# Patient Record
Sex: MALE | Race: BLACK OR AFRICAN AMERICAN | NOT HISPANIC OR LATINO
[De-identification: names, ages, dates, MRNs, and addresses within clinical notes are randomized per-mention and may not be internally consistent; named-entity substitution may affect disease eponyms.]

---

## 2021-08-27 ENCOUNTER — APPOINTMENT (OUTPATIENT)
Dept: ORTHOPEDIC SURGERY | Facility: CLINIC | Age: 71
End: 2021-08-27
Payer: MEDICARE

## 2021-08-27 VITALS
BODY MASS INDEX: 19.04 KG/M2 | WEIGHT: 136 LBS | HEIGHT: 71 IN | DIASTOLIC BLOOD PRESSURE: 66 MMHG | SYSTOLIC BLOOD PRESSURE: 187 MMHG | HEART RATE: 86 BPM

## 2021-08-27 PROCEDURE — 20610 DRAIN/INJ JOINT/BURSA W/O US: CPT | Mod: RT

## 2021-08-27 PROCEDURE — 99204 OFFICE O/P NEW MOD 45 MIN: CPT | Mod: 25

## 2021-08-27 NOTE — PHYSICAL EXAM
[de-identified] : Constitutional - the patient is of normal build and nutrition.  He is a patient that has had end-stage renal disease and in the past has had hemodialysis.  The patient remains oriented to person, time, and  place.  Mood is normal. Vital signs as recorded.  The patients gait is slow and with some discomfort over the lateral aspect of his right hip. The patient has satisfactory  balance and can stand on toes and heels.\par \par The patient has no difficulty with respiration. Respiration at rest is a normal rate. The patient is not short of breath and has not become short of breath with short ambulation. There is no audible wheezing. No coughing.\par \par Skin is normal for the patient's age. There are no abnormal masses or lymph nodes which stand out in the lower extremities.\par \par Spine - deep tendon reflexes are symmetric. Motor and sensory are symmetric.  He has had intermittent low back pain in the past.\par \par \par UPPER EXTREMITIES \par \par Shoulders ROM  is symmetric  and the motion is satisfactory.  There is no significant shoulder pain or limitation in motion which would make using a cane or a walker difficult. Shoulder stability and  strength are satisfactory.\par \par There is normal motion in the wrists and elbows.\par \par The shunts from hemodialysis could be seen in his left forearm.\par \par Circulation appears satisfactory with pedal pulses present.  There is no major edema in the lower legs. No skin tenderness or increased temperature. No major varicosities.\par \par HIP EXAMINATION the abduction and abduction as well as rotation measurements were taken with the hip in flexion.\par \par Motion\par His hip motion is symmetrical flexion of 135 degrees, abduction is 75 degrees adduction 30 degrees external rotation 70 degrees internal rotation of 15 degrees.  He has pain directly over his right greater trochanter consistent with trochanteric bursitis.\par \par The hips have good range of motion. There is good strength across the hips. There is no crepitus in either hip. The alignment of the hips is normal.\par \par \par KNEE EXAMINATION\par \par Motion\par Right Knee has 0 to 135 degrees of motion with good medial  lateral and anterior posterior stability.  There is no major effusion.  There is no Baker's cyst.  There is no significant patellofemoral crepitus.  The patient has satisfactory strength across the knee.               \par Left  Knee   has 0 to 135 degrees of motion with good medial lateral and anterior posterior stability.  There is no major effusion there is no Baker's cyst.  There is no significant patellofemoral crepitus.  The patient has satisfactory strength across the knee.            \par \par Ankle and foot examination\par Of the ankle has normal motion.  There is normal ankle stability.  The patient has no major abnormalities of the foot.\par \par \par \par  [de-identified] : X-rays that he has had previously are reviewed and these do show that there is no osteoarthritis in his right or his left hip.  There is no calcification over the greater trochanter but this does not rule out trochanteric bursitis

## 2021-08-27 NOTE — PROCEDURE
[de-identified] : Procedure Note: \par \par Anatomic Location: Right hip trochanteric bursitis\par \par Diagnosis: Right hip trochanteric bursitis\par \par Procedure:  Injection of 6ccs of Marcaine 0.5% plain and Depo-Medrol 1cc (40 MG)\par \par Local Spray: Ethyl Chloride.\par \par Skin preparation with alcohol.\par \par Patient has consented for the procedure.\par \par Injection 22-gauge spinal needle  through a direct lateral approach.\par \par Patient tolerated the procedure well.\par \par Patient instructed to call the office if any reaction, fever, chills, increased erythema or swelling.   [phone].

## 2021-08-27 NOTE — DISCUSSION/SUMMARY
[de-identified] : At this time the patient is managing satisfactorily with his hips and has excellent hip motion is pain is over his greater trochanter consistent with trochanteric bursitis he has some initial improvement following the injection he will return in 3 months as needed.

## 2021-08-27 NOTE — HISTORY OF PRESENT ILLNESS
[de-identified] : Mr. LISA ROJAS is a 70 year male who presents to office complaining of right hip pain x 2-3 weeks with insidious onset.\par Pain had been felt intermittently years ago which was dull/achy and intermittent.\par Now, pain is constant and is sharp in nature. Pain is on the lateral aspect of the hip.\par Pain is aggravated with getting up from a seated position and with ambulating long distances.\par Pain radiates into his anterior thigh. Denies numbness/tingling in the leg.\par He has tried rest, Tylenol, and heat application, which has helped a bit.\par Patient has a kidney transplant, so he is unable to take NSAIDs.\par All review of systems, family history, social history, surgical history, past medical history, medications, and allergies not previously stated as positive are negative. They were reviewed by me today with the patient and documented accordingly.

## 2021-10-06 ENCOUNTER — APPOINTMENT (OUTPATIENT)
Dept: ORTHOPEDIC SURGERY | Facility: CLINIC | Age: 71
End: 2021-10-06
Payer: MEDICARE

## 2021-10-06 VITALS
HEIGHT: 71 IN | SYSTOLIC BLOOD PRESSURE: 167 MMHG | BODY MASS INDEX: 19.04 KG/M2 | DIASTOLIC BLOOD PRESSURE: 55 MMHG | HEART RATE: 82 BPM | WEIGHT: 136 LBS

## 2021-10-06 DIAGNOSIS — M70.61 TROCHANTERIC BURSITIS, RIGHT HIP: ICD-10-CM

## 2021-10-06 PROCEDURE — 99212 OFFICE O/P EST SF 10 MIN: CPT

## 2021-10-06 NOTE — PHYSICAL EXAM
[de-identified] : He does have a some stiffening of the spine that was seen on his x-ray last visit with a straightening of his normal lordosis but his deep tendon reflexes motor and sensory remained symmetric and normal.\par \par HIP EXAMINATION the abduction and abduction as well as rotation measurements were taken with the hip in flexion.\par \par Motion his motion has not changed since his last visit.\par His hip motion is symmetrical flexion of 135 degrees, abduction is 75 degrees adduction 30 degrees external rotation 70 degrees internal rotation of 15 degrees.  \par \par His pain again is directly over his right greater trochanter.  The patient is very thin and that there is not much subcutaneous tissue and the tenderness is present again.  At this time he is using ice and Tylenol which gives some relief.  \par \par

## 2021-10-06 NOTE — HISTORY OF PRESENT ILLNESS
[de-identified] : Mr. LISA ROJAS is a 70 year male who returns to office complaining of right hip pain x 2-3 months with insidious onset.\par Pain had been felt intermittently years ago which was dull/achy and intermittent.\par Now, pain is constant and is sharp in nature. Pain is on the lateral aspect of the hip.\par Pain is aggravated with getting up from a seated position and with ambulating long distances.\par Pain radiates into his anterior thigh. Denies numbness/tingling in the leg.\par He has tried rest, Tylenol, and heat application, which has helped a bit.\par This patient does have right trochanteric bursitis.  He said the shot given to him last time gave him improvement but it is now wearing off.\par Patient has a kidney transplant, so he is unable to take NSAIDs.\par He received a right hip GT bursa injection on 8/27/21 which only helped for a short time, maybe for a few weeks.

## 2021-10-06 NOTE — DISCUSSION/SUMMARY
[de-identified] : At this time we will follow conservative measures and ice and Tylenol.  In another 5- 6 weeks if is still bothering him he can receive another injection of of cortisone.  He will stay away from oral anti-inflammatories because of his history of renal disease.  Return visit after 1 month.

## 2023-03-09 ENCOUNTER — RX ONLY (RX ONLY)
Age: 73
End: 2023-03-09

## 2023-03-09 ENCOUNTER — OFFICE (OUTPATIENT)
Dept: URBAN - METROPOLITAN AREA CLINIC 76 | Facility: CLINIC | Age: 73
Setting detail: OPHTHALMOLOGY
End: 2023-03-09
Payer: MEDICARE

## 2023-03-09 DIAGNOSIS — H01.004: ICD-10-CM

## 2023-03-09 DIAGNOSIS — H16.223: ICD-10-CM

## 2023-03-09 DIAGNOSIS — H54.40: ICD-10-CM

## 2023-03-09 DIAGNOSIS — H01.001: ICD-10-CM

## 2023-03-09 DIAGNOSIS — E11.3593: ICD-10-CM

## 2023-03-09 DIAGNOSIS — H26.493: ICD-10-CM

## 2023-03-09 PROCEDURE — 92250 FUNDUS PHOTOGRAPHY W/I&R: CPT | Performed by: OPHTHALMOLOGY

## 2023-03-09 PROCEDURE — 99213 OFFICE O/P EST LOW 20 MIN: CPT | Performed by: OPHTHALMOLOGY

## 2023-03-09 ASSESSMENT — KERATOMETRY
OS_AXISANGLE_DEGREES: 15
OD_AXISANGLE_DEGREES: 70
OS_K2POWER_DIOPTERS: 43.00
OS_K1POWER_DIOPTERS: 42.50
OD_K2POWER_DIOPTERS: 40.75
OD_K1POWER_DIOPTERS: 40.25

## 2023-03-09 ASSESSMENT — REFRACTION_MANIFEST
OS_ADD: +2.50
OU_VA: 20/40
OS_SPHERE: -1.50
OD_SPHERE: -1.50
OD_AXIS: 50
OS_AXIS: 50
OS_CYLINDER: -4.00
OD_CYLINDER: -4.00
OD_ADD: +2.50
OD_VA1: 20/CF
OS_VA1: 20/40

## 2023-03-09 ASSESSMENT — SPHEQUIV_DERIVED
OS_SPHEQUIV: -3.25
OD_SPHEQUIV: 0.375
OS_SPHEQUIV: -3.5
OD_SPHEQUIV: -3.5

## 2023-03-09 ASSESSMENT — VISUAL ACUITY
OS_BCVA: 20/CF
OD_BCVA: 20/80

## 2023-03-09 ASSESSMENT — SUPERFICIAL PUNCTATE KERATITIS (SPK)
OD_SPK: 2+
OS_SPK: 2+

## 2023-03-09 ASSESSMENT — TONOMETRY
OS_IOP_MMHG: 10
OD_IOP_MMHG: 10

## 2023-03-09 ASSESSMENT — AXIALLENGTH_DERIVED
OS_AL: 25.2414
OD_AL: 26.3452
OD_AL: 24.5881
OS_AL: 25.3534

## 2023-03-09 ASSESSMENT — LID EXAM ASSESSMENTS
OD_BLEPHARITIS: RUL 1+
OS_BLEPHARITIS: LUL 1+

## 2023-03-09 ASSESSMENT — CONFRONTATIONAL VISUAL FIELD TEST (CVF)
OD_FINDINGS: FULL
OS_FINDINGS: FULL

## 2023-03-09 ASSESSMENT — REFRACTION_AUTOREFRACTION
OS_SPHERE: -1.25
OD_SPHERE: +1.25
OS_AXIS: 49
OD_AXIS: 97
OD_CYLINDER: -1.75
OS_CYLINDER: -4.00

## 2023-08-15 ENCOUNTER — OFFICE (OUTPATIENT)
Dept: URBAN - METROPOLITAN AREA CLINIC 76 | Facility: CLINIC | Age: 73
Setting detail: OPHTHALMOLOGY
End: 2023-08-15
Payer: MEDICARE

## 2023-08-15 DIAGNOSIS — H26.40: ICD-10-CM

## 2023-08-15 DIAGNOSIS — E11.3593: ICD-10-CM

## 2023-08-15 DIAGNOSIS — H01.004: ICD-10-CM

## 2023-08-15 DIAGNOSIS — H16.223: ICD-10-CM

## 2023-08-15 DIAGNOSIS — H01.001: ICD-10-CM

## 2023-08-15 DIAGNOSIS — Z96.1: ICD-10-CM

## 2023-08-15 DIAGNOSIS — H52.4: ICD-10-CM

## 2023-08-15 DIAGNOSIS — H54.40: ICD-10-CM

## 2023-08-15 PROCEDURE — 92015 DETERMINE REFRACTIVE STATE: CPT | Performed by: OPHTHALMOLOGY

## 2023-08-15 PROCEDURE — 92134 CPTRZ OPH DX IMG PST SGM RTA: CPT | Performed by: OPHTHALMOLOGY

## 2023-08-15 PROCEDURE — 92014 COMPRE OPH EXAM EST PT 1/>: CPT | Performed by: OPHTHALMOLOGY

## 2023-08-15 ASSESSMENT — KERATOMETRY
OS_K1POWER_DIOPTERS: 42.50
OS_K2POWER_DIOPTERS: 43.00
OD_K2POWER_DIOPTERS: 40.75
OS_AXISANGLE_DEGREES: 15
OD_AXISANGLE_DEGREES: 70
OD_K1POWER_DIOPTERS: 40.25

## 2023-08-15 ASSESSMENT — AXIALLENGTH_DERIVED
OS_AL: 24.4843
OD_AL: 26.3452
OS_AL: 25.3534
OD_AL: 24.5881
OS_AL: 25.2414

## 2023-08-15 ASSESSMENT — REFRACTION_MANIFEST
OD_VA1: 20/CF
OS_VA1: 20/40
OD_SPHERE: -1.50
OS_SPHERE: -1.50
OD_SPHERE: PLANO
OS_ADD: +2.50
OS_VA1: 20/80
OD_AXIS: 50
OS_ADD: +2.50
OD_ADD: +2.50
OS_AXIS: 50
OD_VA1: HM
OD_ADD: +2.50
OS_CYLINDER: -1.00
OU_VA: 20/40
OD_CYLINDER: -4.00
OS_CYLINDER: -4.00
OS_SPHERE: -1.00
OS_AXIS: 51

## 2023-08-15 ASSESSMENT — TONOMETRY
OD_IOP_MMHG: 15
OS_IOP_MMHG: 14

## 2023-08-15 ASSESSMENT — REFRACTION_AUTOREFRACTION
OD_SPHERE: +1.25
OD_CYLINDER: -1.75
OS_AXIS: 49
OS_SPHERE: -1.25
OS_CYLINDER: -4.00
OD_AXIS: 97

## 2023-08-15 ASSESSMENT — SPHEQUIV_DERIVED
OD_SPHEQUIV: -3.5
OS_SPHEQUIV: -3.5
OD_SPHEQUIV: 0.375
OS_SPHEQUIV: -1.5
OS_SPHEQUIV: -3.25

## 2023-08-15 ASSESSMENT — SUPERFICIAL PUNCTATE KERATITIS (SPK)
OD_SPK: 3+
OS_SPK: 3+

## 2023-08-15 ASSESSMENT — CONFRONTATIONAL VISUAL FIELD TEST (CVF): OS_FINDINGS: FULL

## 2023-08-15 ASSESSMENT — LID EXAM ASSESSMENTS
OD_BLEPHARITIS: RUL 1+
OS_BLEPHARITIS: LUL 1+

## 2023-08-15 ASSESSMENT — VISUAL ACUITY
OS_BCVA: HM
OD_BCVA: 20/80

## 2023-11-07 ENCOUNTER — INPATIENT (INPATIENT)
Facility: HOSPITAL | Age: 73
LOS: 27 days | Discharge: NURSING, REHAB OR LONG-TERM CARE | End: 2023-12-05
Attending: STUDENT IN AN ORGANIZED HEALTH CARE EDUCATION/TRAINING PROGRAM | Admitting: STUDENT IN AN ORGANIZED HEALTH CARE EDUCATION/TRAINING PROGRAM
Payer: MEDICARE

## 2023-11-07 VITALS
SYSTOLIC BLOOD PRESSURE: 121 MMHG | OXYGEN SATURATION: 100 % | HEART RATE: 73 BPM | DIASTOLIC BLOOD PRESSURE: 87 MMHG | RESPIRATION RATE: 21 BRPM

## 2023-11-07 DIAGNOSIS — R41.82 ALTERED MENTAL STATUS, UNSPECIFIED: ICD-10-CM

## 2023-11-07 LAB
ALBUMIN SERPL ELPH-MCNC: 1.7 G/DL — LOW (ref 3.3–5)
ALBUMIN SERPL ELPH-MCNC: 1.7 G/DL — LOW (ref 3.3–5)
ALBUMIN SERPL ELPH-MCNC: 2.2 G/DL — LOW (ref 3.3–5)
ALBUMIN SERPL ELPH-MCNC: 2.2 G/DL — LOW (ref 3.3–5)
ALBUMIN SERPL ELPH-MCNC: 2.3 G/DL — LOW (ref 3.3–5)
ALBUMIN SERPL ELPH-MCNC: 2.3 G/DL — LOW (ref 3.3–5)
ALP SERPL-CCNC: 368 U/L — HIGH (ref 40–120)
ALP SERPL-CCNC: 368 U/L — HIGH (ref 40–120)
ALP SERPL-CCNC: 484 U/L — HIGH (ref 40–120)
ALP SERPL-CCNC: 484 U/L — HIGH (ref 40–120)
ALP SERPL-CCNC: 512 U/L — HIGH (ref 40–120)
ALP SERPL-CCNC: 512 U/L — HIGH (ref 40–120)
ALT FLD-CCNC: 119 U/L — HIGH (ref 4–41)
ALT FLD-CCNC: 119 U/L — HIGH (ref 4–41)
ALT FLD-CCNC: 174 U/L — HIGH (ref 4–41)
ALT FLD-CCNC: 174 U/L — HIGH (ref 4–41)
ALT FLD-CCNC: 186 U/L — HIGH (ref 4–41)
ALT FLD-CCNC: 186 U/L — HIGH (ref 4–41)
AMMONIA BLD-MCNC: 53 UMOL/L — SIGNIFICANT CHANGE UP (ref 11–55)
AMMONIA BLD-MCNC: 53 UMOL/L — SIGNIFICANT CHANGE UP (ref 11–55)
ANION GAP SERPL CALC-SCNC: 10 MMOL/L — SIGNIFICANT CHANGE UP (ref 7–14)
ANION GAP SERPL CALC-SCNC: 10 MMOL/L — SIGNIFICANT CHANGE UP (ref 7–14)
ANION GAP SERPL CALC-SCNC: 13 MMOL/L — SIGNIFICANT CHANGE UP (ref 7–14)
ANION GAP SERPL CALC-SCNC: 13 MMOL/L — SIGNIFICANT CHANGE UP (ref 7–14)
ANION GAP SERPL CALC-SCNC: 14 MMOL/L — SIGNIFICANT CHANGE UP (ref 7–14)
ANION GAP SERPL CALC-SCNC: 14 MMOL/L — SIGNIFICANT CHANGE UP (ref 7–14)
APTT BLD: 41.9 SEC — HIGH (ref 24.5–35.6)
APTT BLD: 41.9 SEC — HIGH (ref 24.5–35.6)
APTT BLD: 43.5 SEC — HIGH (ref 24.5–35.6)
APTT BLD: 43.5 SEC — HIGH (ref 24.5–35.6)
AST SERPL-CCNC: 398 U/L — HIGH (ref 4–40)
AST SERPL-CCNC: 398 U/L — HIGH (ref 4–40)
AST SERPL-CCNC: 606 U/L — HIGH (ref 4–40)
AST SERPL-CCNC: 606 U/L — HIGH (ref 4–40)
AST SERPL-CCNC: 656 U/L — HIGH (ref 4–40)
AST SERPL-CCNC: 656 U/L — HIGH (ref 4–40)
BASE EXCESS BLDV CALC-SCNC: 3.2 MMOL/L — HIGH (ref -2–3)
BASE EXCESS BLDV CALC-SCNC: 3.2 MMOL/L — HIGH (ref -2–3)
BASOPHILS # BLD AUTO: 0 K/UL — SIGNIFICANT CHANGE UP (ref 0–0.2)
BASOPHILS # BLD AUTO: 0 K/UL — SIGNIFICANT CHANGE UP (ref 0–0.2)
BASOPHILS # BLD AUTO: 0.02 K/UL — SIGNIFICANT CHANGE UP (ref 0–0.2)
BASOPHILS NFR BLD AUTO: 0 % — SIGNIFICANT CHANGE UP (ref 0–2)
BASOPHILS NFR BLD AUTO: 0 % — SIGNIFICANT CHANGE UP (ref 0–2)
BASOPHILS NFR BLD AUTO: 0.3 % — SIGNIFICANT CHANGE UP (ref 0–2)
BILIRUB SERPL-MCNC: 3.1 MG/DL — HIGH (ref 0.2–1.2)
BILIRUB SERPL-MCNC: 3.1 MG/DL — HIGH (ref 0.2–1.2)
BILIRUB SERPL-MCNC: 4.8 MG/DL — HIGH (ref 0.2–1.2)
BILIRUB SERPL-MCNC: 4.8 MG/DL — HIGH (ref 0.2–1.2)
BILIRUB SERPL-MCNC: 4.9 MG/DL — HIGH (ref 0.2–1.2)
BILIRUB SERPL-MCNC: 4.9 MG/DL — HIGH (ref 0.2–1.2)
BLD GP AB SCN SERPL QL: NEGATIVE — SIGNIFICANT CHANGE UP
BLD GP AB SCN SERPL QL: NEGATIVE — SIGNIFICANT CHANGE UP
BLOOD GAS ARTERIAL COMPREHENSIVE RESULT: SIGNIFICANT CHANGE UP
BLOOD GAS ARTERIAL COMPREHENSIVE RESULT: SIGNIFICANT CHANGE UP
BLOOD GAS VENOUS COMPREHENSIVE RESULT: SIGNIFICANT CHANGE UP
BUN SERPL-MCNC: 18 MG/DL — SIGNIFICANT CHANGE UP (ref 7–23)
BUN SERPL-MCNC: 18 MG/DL — SIGNIFICANT CHANGE UP (ref 7–23)
BUN SERPL-MCNC: 24 MG/DL — HIGH (ref 7–23)
BUN SERPL-MCNC: 24 MG/DL — HIGH (ref 7–23)
BUN SERPL-MCNC: 29 MG/DL — HIGH (ref 7–23)
BUN SERPL-MCNC: 29 MG/DL — HIGH (ref 7–23)
CALCIUM SERPL-MCNC: 5.5 MG/DL — CRITICAL LOW (ref 8.4–10.5)
CALCIUM SERPL-MCNC: 5.5 MG/DL — CRITICAL LOW (ref 8.4–10.5)
CALCIUM SERPL-MCNC: 7.8 MG/DL — LOW (ref 8.4–10.5)
CALCIUM SERPL-MCNC: 7.8 MG/DL — LOW (ref 8.4–10.5)
CALCIUM SERPL-MCNC: 8 MG/DL — LOW (ref 8.4–10.5)
CALCIUM SERPL-MCNC: 8 MG/DL — LOW (ref 8.4–10.5)
CHLORIDE BLDV-SCNC: 102 MMOL/L — SIGNIFICANT CHANGE UP (ref 96–108)
CHLORIDE BLDV-SCNC: 102 MMOL/L — SIGNIFICANT CHANGE UP (ref 96–108)
CHLORIDE SERPL-SCNC: 101 MMOL/L — SIGNIFICANT CHANGE UP (ref 98–107)
CHLORIDE SERPL-SCNC: 101 MMOL/L — SIGNIFICANT CHANGE UP (ref 98–107)
CHLORIDE SERPL-SCNC: 109 MMOL/L — HIGH (ref 98–107)
CHLORIDE SERPL-SCNC: 109 MMOL/L — HIGH (ref 98–107)
CHLORIDE SERPL-SCNC: 99 MMOL/L — SIGNIFICANT CHANGE UP (ref 98–107)
CHLORIDE SERPL-SCNC: 99 MMOL/L — SIGNIFICANT CHANGE UP (ref 98–107)
CO2 BLDV-SCNC: 29.2 MMOL/L — HIGH (ref 22–26)
CO2 BLDV-SCNC: 29.2 MMOL/L — HIGH (ref 22–26)
CO2 SERPL-SCNC: 21 MMOL/L — LOW (ref 22–31)
CO2 SERPL-SCNC: 21 MMOL/L — LOW (ref 22–31)
CO2 SERPL-SCNC: 24 MMOL/L — SIGNIFICANT CHANGE UP (ref 22–31)
CO2 SERPL-SCNC: 24 MMOL/L — SIGNIFICANT CHANGE UP (ref 22–31)
CO2 SERPL-SCNC: 25 MMOL/L — SIGNIFICANT CHANGE UP (ref 22–31)
CO2 SERPL-SCNC: 25 MMOL/L — SIGNIFICANT CHANGE UP (ref 22–31)
CORTIS F PM SERPL-MCNC: 6.4 UG/DL — SIGNIFICANT CHANGE UP (ref 2.7–10.5)
CORTIS F PM SERPL-MCNC: 6.4 UG/DL — SIGNIFICANT CHANGE UP (ref 2.7–10.5)
CREAT SERPL-MCNC: 2.54 MG/DL — HIGH (ref 0.5–1.3)
CREAT SERPL-MCNC: 2.54 MG/DL — HIGH (ref 0.5–1.3)
CREAT SERPL-MCNC: 3.42 MG/DL — HIGH (ref 0.5–1.3)
CREAT SERPL-MCNC: 3.42 MG/DL — HIGH (ref 0.5–1.3)
CREAT SERPL-MCNC: 3.7 MG/DL — HIGH (ref 0.5–1.3)
CREAT SERPL-MCNC: 3.7 MG/DL — HIGH (ref 0.5–1.3)
EGFR: 17 ML/MIN/1.73M2 — LOW
EGFR: 17 ML/MIN/1.73M2 — LOW
EGFR: 18 ML/MIN/1.73M2 — LOW
EGFR: 18 ML/MIN/1.73M2 — LOW
EGFR: 26 ML/MIN/1.73M2 — LOW
EGFR: 26 ML/MIN/1.73M2 — LOW
EOSINOPHIL # BLD AUTO: 0 K/UL — SIGNIFICANT CHANGE UP (ref 0–0.5)
EOSINOPHIL # BLD AUTO: 0 K/UL — SIGNIFICANT CHANGE UP (ref 0–0.5)
EOSINOPHIL # BLD AUTO: 0.06 K/UL — SIGNIFICANT CHANGE UP (ref 0–0.5)
EOSINOPHIL # BLD AUTO: 0.06 K/UL — SIGNIFICANT CHANGE UP (ref 0–0.5)
EOSINOPHIL # BLD AUTO: 0.1 K/UL — SIGNIFICANT CHANGE UP (ref 0–0.5)
EOSINOPHIL # BLD AUTO: 0.1 K/UL — SIGNIFICANT CHANGE UP (ref 0–0.5)
EOSINOPHIL NFR BLD AUTO: 0 % — SIGNIFICANT CHANGE UP (ref 0–6)
EOSINOPHIL NFR BLD AUTO: 0 % — SIGNIFICANT CHANGE UP (ref 0–6)
EOSINOPHIL NFR BLD AUTO: 1 % — SIGNIFICANT CHANGE UP (ref 0–6)
EOSINOPHIL NFR BLD AUTO: 1 % — SIGNIFICANT CHANGE UP (ref 0–6)
EOSINOPHIL NFR BLD AUTO: 1.7 % — SIGNIFICANT CHANGE UP (ref 0–6)
EOSINOPHIL NFR BLD AUTO: 1.7 % — SIGNIFICANT CHANGE UP (ref 0–6)
GAS PNL BLDV: 137 MMOL/L — SIGNIFICANT CHANGE UP (ref 136–145)
GAS PNL BLDV: 137 MMOL/L — SIGNIFICANT CHANGE UP (ref 136–145)
GLUCOSE BLDV-MCNC: 113 MG/DL — HIGH (ref 70–99)
GLUCOSE BLDV-MCNC: 113 MG/DL — HIGH (ref 70–99)
GLUCOSE SERPL-MCNC: 107 MG/DL — HIGH (ref 70–99)
GLUCOSE SERPL-MCNC: 107 MG/DL — HIGH (ref 70–99)
GLUCOSE SERPL-MCNC: 116 MG/DL — HIGH (ref 70–99)
GLUCOSE SERPL-MCNC: 116 MG/DL — HIGH (ref 70–99)
GLUCOSE SERPL-MCNC: 173 MG/DL — HIGH (ref 70–99)
GLUCOSE SERPL-MCNC: 173 MG/DL — HIGH (ref 70–99)
HAPTOGLOB SERPL-MCNC: <20 MG/DL — LOW (ref 34–200)
HAPTOGLOB SERPL-MCNC: <20 MG/DL — LOW (ref 34–200)
HCO3 BLDV-SCNC: 28 MMOL/L — SIGNIFICANT CHANGE UP (ref 22–29)
HCO3 BLDV-SCNC: 28 MMOL/L — SIGNIFICANT CHANGE UP (ref 22–29)
HCT VFR BLD CALC: 19.9 % — CRITICAL LOW (ref 39–50)
HCT VFR BLD CALC: 19.9 % — CRITICAL LOW (ref 39–50)
HCT VFR BLD CALC: 24.1 % — LOW (ref 39–50)
HCT VFR BLD CALC: 24.1 % — LOW (ref 39–50)
HCT VFR BLD CALC: 26.5 % — LOW (ref 39–50)
HCT VFR BLD CALC: 26.5 % — LOW (ref 39–50)
HCT VFR BLDA CALC: 27 % — LOW (ref 39–51)
HCT VFR BLDA CALC: 27 % — LOW (ref 39–51)
HGB BLD CALC-MCNC: 9.1 G/DL — LOW (ref 12.6–17.4)
HGB BLD CALC-MCNC: 9.1 G/DL — LOW (ref 12.6–17.4)
HGB BLD-MCNC: 7 G/DL — CRITICAL LOW (ref 13–17)
HGB BLD-MCNC: 7 G/DL — CRITICAL LOW (ref 13–17)
HGB BLD-MCNC: 8.6 G/DL — LOW (ref 13–17)
HGB BLD-MCNC: 8.6 G/DL — LOW (ref 13–17)
HGB BLD-MCNC: 9.6 G/DL — LOW (ref 13–17)
HGB BLD-MCNC: 9.6 G/DL — LOW (ref 13–17)
IANC: 2.7 K/UL — SIGNIFICANT CHANGE UP (ref 1.8–7.4)
IANC: 2.7 K/UL — SIGNIFICANT CHANGE UP (ref 1.8–7.4)
IANC: 3.98 K/UL — SIGNIFICANT CHANGE UP (ref 1.8–7.4)
IANC: 3.98 K/UL — SIGNIFICANT CHANGE UP (ref 1.8–7.4)
IANC: 4.11 K/UL — SIGNIFICANT CHANGE UP (ref 1.8–7.4)
IANC: 4.11 K/UL — SIGNIFICANT CHANGE UP (ref 1.8–7.4)
IMM GRANULOCYTES NFR BLD AUTO: 0.2 % — SIGNIFICANT CHANGE UP (ref 0–0.9)
IMM GRANULOCYTES NFR BLD AUTO: 0.2 % — SIGNIFICANT CHANGE UP (ref 0–0.9)
IMM GRANULOCYTES NFR BLD AUTO: 0.7 % — SIGNIFICANT CHANGE UP (ref 0–0.9)
IMM GRANULOCYTES NFR BLD AUTO: 0.7 % — SIGNIFICANT CHANGE UP (ref 0–0.9)
INR BLD: 1.89 RATIO — HIGH (ref 0.85–1.18)
INR BLD: 1.89 RATIO — HIGH (ref 0.85–1.18)
INR BLD: 2.05 RATIO — HIGH (ref 0.85–1.18)
INR BLD: 2.05 RATIO — HIGH (ref 0.85–1.18)
LACTATE BLDV-MCNC: 2.2 MMOL/L — HIGH (ref 0.5–2)
LACTATE BLDV-MCNC: 2.2 MMOL/L — HIGH (ref 0.5–2)
LYMPHOCYTES # BLD AUTO: 1.36 K/UL — SIGNIFICANT CHANGE UP (ref 1–3.3)
LYMPHOCYTES # BLD AUTO: 1.36 K/UL — SIGNIFICANT CHANGE UP (ref 1–3.3)
LYMPHOCYTES # BLD AUTO: 1.38 K/UL — SIGNIFICANT CHANGE UP (ref 1–3.3)
LYMPHOCYTES # BLD AUTO: 1.38 K/UL — SIGNIFICANT CHANGE UP (ref 1–3.3)
LYMPHOCYTES # BLD AUTO: 2.09 K/UL — SIGNIFICANT CHANGE UP (ref 1–3.3)
LYMPHOCYTES # BLD AUTO: 2.09 K/UL — SIGNIFICANT CHANGE UP (ref 1–3.3)
LYMPHOCYTES # BLD AUTO: 22.8 % — SIGNIFICANT CHANGE UP (ref 13–44)
LYMPHOCYTES # BLD AUTO: 22.8 % — SIGNIFICANT CHANGE UP (ref 13–44)
LYMPHOCYTES # BLD AUTO: 23 % — SIGNIFICANT CHANGE UP (ref 13–44)
LYMPHOCYTES # BLD AUTO: 23 % — SIGNIFICANT CHANGE UP (ref 13–44)
LYMPHOCYTES # BLD AUTO: 39 % — SIGNIFICANT CHANGE UP (ref 13–44)
LYMPHOCYTES # BLD AUTO: 39 % — SIGNIFICANT CHANGE UP (ref 13–44)
MAGNESIUM SERPL-MCNC: 2.1 MG/DL — SIGNIFICANT CHANGE UP (ref 1.6–2.6)
MCHC RBC-ENTMCNC: 30 PG — SIGNIFICANT CHANGE UP (ref 27–34)
MCHC RBC-ENTMCNC: 30.2 PG — SIGNIFICANT CHANGE UP (ref 27–34)
MCHC RBC-ENTMCNC: 30.2 PG — SIGNIFICANT CHANGE UP (ref 27–34)
MCHC RBC-ENTMCNC: 35.2 GM/DL — SIGNIFICANT CHANGE UP (ref 32–36)
MCHC RBC-ENTMCNC: 35.2 GM/DL — SIGNIFICANT CHANGE UP (ref 32–36)
MCHC RBC-ENTMCNC: 35.7 GM/DL — SIGNIFICANT CHANGE UP (ref 32–36)
MCHC RBC-ENTMCNC: 35.7 GM/DL — SIGNIFICANT CHANGE UP (ref 32–36)
MCHC RBC-ENTMCNC: 36.2 GM/DL — HIGH (ref 32–36)
MCHC RBC-ENTMCNC: 36.2 GM/DL — HIGH (ref 32–36)
MCV RBC AUTO: 83.3 FL — SIGNIFICANT CHANGE UP (ref 80–100)
MCV RBC AUTO: 83.3 FL — SIGNIFICANT CHANGE UP (ref 80–100)
MCV RBC AUTO: 84 FL — SIGNIFICANT CHANGE UP (ref 80–100)
MCV RBC AUTO: 84 FL — SIGNIFICANT CHANGE UP (ref 80–100)
MCV RBC AUTO: 85.4 FL — SIGNIFICANT CHANGE UP (ref 80–100)
MCV RBC AUTO: 85.4 FL — SIGNIFICANT CHANGE UP (ref 80–100)
MONOCYTES # BLD AUTO: 0.37 K/UL — SIGNIFICANT CHANGE UP (ref 0–0.9)
MONOCYTES # BLD AUTO: 0.37 K/UL — SIGNIFICANT CHANGE UP (ref 0–0.9)
MONOCYTES # BLD AUTO: 0.38 K/UL — SIGNIFICANT CHANGE UP (ref 0–0.9)
MONOCYTES # BLD AUTO: 0.38 K/UL — SIGNIFICANT CHANGE UP (ref 0–0.9)
MONOCYTES # BLD AUTO: 0.53 K/UL — SIGNIFICANT CHANGE UP (ref 0–0.9)
MONOCYTES # BLD AUTO: 0.53 K/UL — SIGNIFICANT CHANGE UP (ref 0–0.9)
MONOCYTES NFR BLD AUTO: 6.2 % — SIGNIFICANT CHANGE UP (ref 2–14)
MONOCYTES NFR BLD AUTO: 6.2 % — SIGNIFICANT CHANGE UP (ref 2–14)
MONOCYTES NFR BLD AUTO: 7 % — SIGNIFICANT CHANGE UP (ref 2–14)
MONOCYTES NFR BLD AUTO: 7 % — SIGNIFICANT CHANGE UP (ref 2–14)
MONOCYTES NFR BLD AUTO: 8.8 % — SIGNIFICANT CHANGE UP (ref 2–14)
MONOCYTES NFR BLD AUTO: 8.8 % — SIGNIFICANT CHANGE UP (ref 2–14)
MRSA PCR RESULT.: SIGNIFICANT CHANGE UP
MRSA PCR RESULT.: SIGNIFICANT CHANGE UP
NEUTROPHILS # BLD AUTO: 2.9 K/UL — SIGNIFICANT CHANGE UP (ref 1.8–7.4)
NEUTROPHILS # BLD AUTO: 2.9 K/UL — SIGNIFICANT CHANGE UP (ref 1.8–7.4)
NEUTROPHILS # BLD AUTO: 3.98 K/UL — SIGNIFICANT CHANGE UP (ref 1.8–7.4)
NEUTROPHILS # BLD AUTO: 3.98 K/UL — SIGNIFICANT CHANGE UP (ref 1.8–7.4)
NEUTROPHILS # BLD AUTO: 4.11 K/UL — SIGNIFICANT CHANGE UP (ref 1.8–7.4)
NEUTROPHILS # BLD AUTO: 4.11 K/UL — SIGNIFICANT CHANGE UP (ref 1.8–7.4)
NEUTROPHILS NFR BLD AUTO: 54 % — SIGNIFICANT CHANGE UP (ref 43–77)
NEUTROPHILS NFR BLD AUTO: 54 % — SIGNIFICANT CHANGE UP (ref 43–77)
NEUTROPHILS NFR BLD AUTO: 66.2 % — SIGNIFICANT CHANGE UP (ref 43–77)
NEUTROPHILS NFR BLD AUTO: 66.2 % — SIGNIFICANT CHANGE UP (ref 43–77)
NEUTROPHILS NFR BLD AUTO: 68.8 % — SIGNIFICANT CHANGE UP (ref 43–77)
NEUTROPHILS NFR BLD AUTO: 68.8 % — SIGNIFICANT CHANGE UP (ref 43–77)
NRBC # BLD: 0 /100 WBCS — SIGNIFICANT CHANGE UP (ref 0–0)
NRBC # FLD: 0 K/UL — SIGNIFICANT CHANGE UP (ref 0–0)
NRBC # FLD: 0 K/UL — SIGNIFICANT CHANGE UP (ref 0–0)
NRBC # FLD: 0.02 K/UL — HIGH (ref 0–0)
NRBC # FLD: 0.02 K/UL — HIGH (ref 0–0)
NT-PROBNP SERPL-SCNC: HIGH PG/ML
NT-PROBNP SERPL-SCNC: HIGH PG/ML
PCO2 BLDV: 42 MMHG — SIGNIFICANT CHANGE UP (ref 42–55)
PCO2 BLDV: 42 MMHG — SIGNIFICANT CHANGE UP (ref 42–55)
PH BLDV: 7.43 — SIGNIFICANT CHANGE UP (ref 7.32–7.43)
PH BLDV: 7.43 — SIGNIFICANT CHANGE UP (ref 7.32–7.43)
PHOSPHATE SERPL-MCNC: 2.8 MG/DL — SIGNIFICANT CHANGE UP (ref 2.5–4.5)
PHOSPHATE SERPL-MCNC: 2.8 MG/DL — SIGNIFICANT CHANGE UP (ref 2.5–4.5)
PHOSPHATE SERPL-MCNC: 3 MG/DL — SIGNIFICANT CHANGE UP (ref 2.5–4.5)
PHOSPHATE SERPL-MCNC: 3 MG/DL — SIGNIFICANT CHANGE UP (ref 2.5–4.5)
PLATELET # BLD AUTO: 37 K/UL — LOW (ref 150–400)
PLATELET # BLD AUTO: 37 K/UL — LOW (ref 150–400)
PLATELET # BLD AUTO: 46 K/UL — LOW (ref 150–400)
PLATELET # BLD AUTO: 46 K/UL — LOW (ref 150–400)
PLATELET # BLD AUTO: 53 K/UL — LOW (ref 150–400)
PLATELET # BLD AUTO: 53 K/UL — LOW (ref 150–400)
PO2 BLDV: 43 MMHG — SIGNIFICANT CHANGE UP (ref 25–45)
PO2 BLDV: 43 MMHG — SIGNIFICANT CHANGE UP (ref 25–45)
POTASSIUM BLDV-SCNC: 4 MMOL/L — SIGNIFICANT CHANGE UP (ref 3.5–5.1)
POTASSIUM BLDV-SCNC: 4 MMOL/L — SIGNIFICANT CHANGE UP (ref 3.5–5.1)
POTASSIUM SERPL-MCNC: 3.3 MMOL/L — LOW (ref 3.5–5.3)
POTASSIUM SERPL-MCNC: 3.3 MMOL/L — LOW (ref 3.5–5.3)
POTASSIUM SERPL-MCNC: 3.8 MMOL/L — SIGNIFICANT CHANGE UP (ref 3.5–5.3)
POTASSIUM SERPL-MCNC: 3.8 MMOL/L — SIGNIFICANT CHANGE UP (ref 3.5–5.3)
POTASSIUM SERPL-MCNC: 3.9 MMOL/L — SIGNIFICANT CHANGE UP (ref 3.5–5.3)
POTASSIUM SERPL-MCNC: 3.9 MMOL/L — SIGNIFICANT CHANGE UP (ref 3.5–5.3)
POTASSIUM SERPL-SCNC: 3.3 MMOL/L — LOW (ref 3.5–5.3)
POTASSIUM SERPL-SCNC: 3.3 MMOL/L — LOW (ref 3.5–5.3)
POTASSIUM SERPL-SCNC: 3.8 MMOL/L — SIGNIFICANT CHANGE UP (ref 3.5–5.3)
POTASSIUM SERPL-SCNC: 3.8 MMOL/L — SIGNIFICANT CHANGE UP (ref 3.5–5.3)
POTASSIUM SERPL-SCNC: 3.9 MMOL/L — SIGNIFICANT CHANGE UP (ref 3.5–5.3)
POTASSIUM SERPL-SCNC: 3.9 MMOL/L — SIGNIFICANT CHANGE UP (ref 3.5–5.3)
PROCALCITONIN SERPL-MCNC: 4.1 NG/ML — HIGH (ref 0.02–0.1)
PROCALCITONIN SERPL-MCNC: 4.1 NG/ML — HIGH (ref 0.02–0.1)
PROT SERPL-MCNC: 5.8 G/DL — LOW (ref 6–8.3)
PROT SERPL-MCNC: 5.8 G/DL — LOW (ref 6–8.3)
PROT SERPL-MCNC: 7.5 G/DL — SIGNIFICANT CHANGE UP (ref 6–8.3)
PROT SERPL-MCNC: 7.5 G/DL — SIGNIFICANT CHANGE UP (ref 6–8.3)
PROT SERPL-MCNC: 7.9 G/DL — SIGNIFICANT CHANGE UP (ref 6–8.3)
PROT SERPL-MCNC: 7.9 G/DL — SIGNIFICANT CHANGE UP (ref 6–8.3)
PROTHROM AB SERPL-ACNC: 20.8 SEC — HIGH (ref 9.5–13)
PROTHROM AB SERPL-ACNC: 20.8 SEC — HIGH (ref 9.5–13)
PROTHROM AB SERPL-ACNC: 22.5 SEC — HIGH (ref 9.5–13)
PROTHROM AB SERPL-ACNC: 22.5 SEC — HIGH (ref 9.5–13)
RBC # BLD: 2.33 M/UL — LOW (ref 4.2–5.8)
RBC # BLD: 2.33 M/UL — LOW (ref 4.2–5.8)
RBC # BLD: 2.87 M/UL — LOW (ref 4.2–5.8)
RBC # BLD: 2.87 M/UL — LOW (ref 4.2–5.8)
RBC # BLD: 3.18 M/UL — LOW (ref 4.2–5.8)
RBC # BLD: 3.18 M/UL — LOW (ref 4.2–5.8)
RBC # FLD: 23.9 % — HIGH (ref 10.3–14.5)
RBC # FLD: 23.9 % — HIGH (ref 10.3–14.5)
RBC # FLD: 24.2 % — HIGH (ref 10.3–14.5)
RBC # FLD: 24.2 % — HIGH (ref 10.3–14.5)
RBC # FLD: 24.5 % — HIGH (ref 10.3–14.5)
RBC # FLD: 24.5 % — HIGH (ref 10.3–14.5)
RETICS #: 50.3 K/UL — SIGNIFICANT CHANGE UP (ref 25–125)
RETICS #: 50.3 K/UL — SIGNIFICANT CHANGE UP (ref 25–125)
RETICS/RBC NFR: 2.1 % — SIGNIFICANT CHANGE UP (ref 0.5–2.5)
RETICS/RBC NFR: 2.1 % — SIGNIFICANT CHANGE UP (ref 0.5–2.5)
RH IG SCN BLD-IMP: POSITIVE — SIGNIFICANT CHANGE UP
S AUREUS DNA NOSE QL NAA+PROBE: SIGNIFICANT CHANGE UP
S AUREUS DNA NOSE QL NAA+PROBE: SIGNIFICANT CHANGE UP
SAO2 % BLDV: 64.4 % — LOW (ref 67–88)
SAO2 % BLDV: 64.4 % — LOW (ref 67–88)
SODIUM SERPL-SCNC: 137 MMOL/L — SIGNIFICANT CHANGE UP (ref 135–145)
SODIUM SERPL-SCNC: 137 MMOL/L — SIGNIFICANT CHANGE UP (ref 135–145)
SODIUM SERPL-SCNC: 139 MMOL/L — SIGNIFICANT CHANGE UP (ref 135–145)
SODIUM SERPL-SCNC: 139 MMOL/L — SIGNIFICANT CHANGE UP (ref 135–145)
SODIUM SERPL-SCNC: 140 MMOL/L — SIGNIFICANT CHANGE UP (ref 135–145)
SODIUM SERPL-SCNC: 140 MMOL/L — SIGNIFICANT CHANGE UP (ref 135–145)
T4 AB SER-ACNC: 5.16 UG/DL — SIGNIFICANT CHANGE UP (ref 5.1–13)
T4 AB SER-ACNC: 5.16 UG/DL — SIGNIFICANT CHANGE UP (ref 5.1–13)
T4 FREE SERPL-MCNC: 1.2 NG/DL — SIGNIFICANT CHANGE UP (ref 0.9–1.8)
T4 FREE SERPL-MCNC: 1.2 NG/DL — SIGNIFICANT CHANGE UP (ref 0.9–1.8)
TOXICOLOGY SCREEN, DRUGS OF ABUSE, SERUM RESULT: SIGNIFICANT CHANGE UP
TOXICOLOGY SCREEN, DRUGS OF ABUSE, SERUM RESULT: SIGNIFICANT CHANGE UP
TSH SERPL-MCNC: 28.95 UIU/ML — HIGH (ref 0.27–4.2)
TSH SERPL-MCNC: 28.95 UIU/ML — HIGH (ref 0.27–4.2)
WBC # BLD: 5.37 K/UL — SIGNIFICANT CHANGE UP (ref 3.8–10.5)
WBC # BLD: 5.37 K/UL — SIGNIFICANT CHANGE UP (ref 3.8–10.5)
WBC # BLD: 5.97 K/UL — SIGNIFICANT CHANGE UP (ref 3.8–10.5)
WBC # BLD: 5.97 K/UL — SIGNIFICANT CHANGE UP (ref 3.8–10.5)
WBC # BLD: 6.01 K/UL — SIGNIFICANT CHANGE UP (ref 3.8–10.5)
WBC # BLD: 6.01 K/UL — SIGNIFICANT CHANGE UP (ref 3.8–10.5)
WBC # FLD AUTO: 5.37 K/UL — SIGNIFICANT CHANGE UP (ref 3.8–10.5)
WBC # FLD AUTO: 5.37 K/UL — SIGNIFICANT CHANGE UP (ref 3.8–10.5)
WBC # FLD AUTO: 5.97 K/UL — SIGNIFICANT CHANGE UP (ref 3.8–10.5)
WBC # FLD AUTO: 5.97 K/UL — SIGNIFICANT CHANGE UP (ref 3.8–10.5)
WBC # FLD AUTO: 6.01 K/UL — SIGNIFICANT CHANGE UP (ref 3.8–10.5)
WBC # FLD AUTO: 6.01 K/UL — SIGNIFICANT CHANGE UP (ref 3.8–10.5)

## 2023-11-07 PROCEDURE — 99291 CRITICAL CARE FIRST HOUR: CPT

## 2023-11-07 PROCEDURE — 99291 CRITICAL CARE FIRST HOUR: CPT | Mod: FT,25

## 2023-11-07 PROCEDURE — 70496 CT ANGIOGRAPHY HEAD: CPT | Mod: 26,MA

## 2023-11-07 PROCEDURE — 70450 CT HEAD/BRAIN W/O DYE: CPT | Mod: 26,MA,59

## 2023-11-07 PROCEDURE — 71250 CT THORAX DX C-: CPT | Mod: 26,59,MA

## 2023-11-07 PROCEDURE — 0042T: CPT | Mod: MA

## 2023-11-07 PROCEDURE — 71045 X-RAY EXAM CHEST 1 VIEW: CPT | Mod: 26

## 2023-11-07 PROCEDURE — 70498 CT ANGIOGRAPHY NECK: CPT | Mod: 26,MA

## 2023-11-07 PROCEDURE — 93010 ELECTROCARDIOGRAM REPORT: CPT

## 2023-11-07 PROCEDURE — 31500 INSERT EMERGENCY AIRWAY: CPT

## 2023-11-07 PROCEDURE — 71275 CT ANGIOGRAPHY CHEST: CPT | Mod: 26

## 2023-11-07 PROCEDURE — 74176 CT ABD & PELVIS W/O CONTRAST: CPT | Mod: 26,MA

## 2023-11-07 RX ORDER — ETOMIDATE 2 MG/ML
20 INJECTION INTRAVENOUS ONCE
Refills: 0 | Status: COMPLETED | OUTPATIENT
Start: 2023-11-07 | End: 2023-11-07

## 2023-11-07 RX ORDER — VANCOMYCIN HCL 1 G
1000 VIAL (EA) INTRAVENOUS ONCE
Refills: 0 | Status: DISCONTINUED | OUTPATIENT
Start: 2023-11-07 | End: 2023-11-07

## 2023-11-07 RX ORDER — CEFEPIME 1 G/1
2000 INJECTION, POWDER, FOR SOLUTION INTRAMUSCULAR; INTRAVENOUS ONCE
Refills: 0 | Status: COMPLETED | OUTPATIENT
Start: 2023-11-07 | End: 2023-11-07

## 2023-11-07 RX ORDER — PIPERACILLIN AND TAZOBACTAM 4; .5 G/20ML; G/20ML
3.38 INJECTION, POWDER, LYOPHILIZED, FOR SOLUTION INTRAVENOUS EVERY 12 HOURS
Refills: 0 | Status: DISCONTINUED | OUTPATIENT
Start: 2023-11-08 | End: 2023-11-08

## 2023-11-07 RX ORDER — CLOPIDOGREL BISULFATE 75 MG/1
75 TABLET, FILM COATED ORAL DAILY
Refills: 0 | Status: DISCONTINUED | OUTPATIENT
Start: 2023-11-07 | End: 2023-12-05

## 2023-11-07 RX ORDER — PIPERACILLIN AND TAZOBACTAM 4; .5 G/20ML; G/20ML
3.38 INJECTION, POWDER, LYOPHILIZED, FOR SOLUTION INTRAVENOUS ONCE
Refills: 0 | Status: DISCONTINUED | OUTPATIENT
Start: 2023-11-07 | End: 2023-11-07

## 2023-11-07 RX ORDER — CHLORHEXIDINE GLUCONATE 213 G/1000ML
15 SOLUTION TOPICAL EVERY 12 HOURS
Refills: 0 | Status: DISCONTINUED | OUTPATIENT
Start: 2023-11-07 | End: 2023-11-11

## 2023-11-07 RX ORDER — PIPERACILLIN AND TAZOBACTAM 4; .5 G/20ML; G/20ML
3.38 INJECTION, POWDER, LYOPHILIZED, FOR SOLUTION INTRAVENOUS ONCE
Refills: 0 | Status: COMPLETED | OUTPATIENT
Start: 2023-11-08 | End: 2023-11-08

## 2023-11-07 RX ORDER — NOREPINEPHRINE BITARTRATE/D5W 8 MG/250ML
0.33 PLASTIC BAG, INJECTION (ML) INTRAVENOUS
Qty: 16 | Refills: 0 | Status: DISCONTINUED | OUTPATIENT
Start: 2023-11-07 | End: 2023-11-07

## 2023-11-07 RX ORDER — ROCURONIUM BROMIDE 10 MG/ML
100 VIAL (ML) INTRAVENOUS ONCE
Refills: 0 | Status: COMPLETED | OUTPATIENT
Start: 2023-11-07 | End: 2023-11-07

## 2023-11-07 RX ORDER — NOREPINEPHRINE BITARTRATE/D5W 8 MG/250ML
0.05 PLASTIC BAG, INJECTION (ML) INTRAVENOUS
Qty: 8 | Refills: 0 | Status: DISCONTINUED | OUTPATIENT
Start: 2023-11-07 | End: 2023-11-07

## 2023-11-07 RX ORDER — SODIUM BICARBONATE 1 MEQ/ML
50 SYRINGE (ML) INTRAVENOUS ONCE
Refills: 0 | Status: COMPLETED | OUTPATIENT
Start: 2023-11-07 | End: 2023-11-07

## 2023-11-07 RX ORDER — HYDROCORTISONE 20 MG
100 TABLET ORAL ONCE
Refills: 0 | Status: COMPLETED | OUTPATIENT
Start: 2023-11-07 | End: 2023-11-07

## 2023-11-07 RX ORDER — AZITHROMYCIN 500 MG/1
500 TABLET, FILM COATED ORAL ONCE
Refills: 0 | Status: COMPLETED | OUTPATIENT
Start: 2023-11-07 | End: 2023-11-07

## 2023-11-07 RX ORDER — FOLIC ACID 0.8 MG
1 TABLET ORAL DAILY
Refills: 0 | Status: DISCONTINUED | OUTPATIENT
Start: 2023-11-07 | End: 2023-12-05

## 2023-11-07 RX ORDER — PROPOFOL 10 MG/ML
20 INJECTION, EMULSION INTRAVENOUS
Qty: 1000 | Refills: 0 | Status: DISCONTINUED | OUTPATIENT
Start: 2023-11-07 | End: 2023-11-10

## 2023-11-07 RX ORDER — ATORVASTATIN CALCIUM 80 MG/1
80 TABLET, FILM COATED ORAL AT BEDTIME
Refills: 0 | Status: DISCONTINUED | OUTPATIENT
Start: 2023-11-07 | End: 2023-11-09

## 2023-11-07 RX ORDER — ASPIRIN/CALCIUM CARB/MAGNESIUM 324 MG
81 TABLET ORAL DAILY
Refills: 0 | Status: DISCONTINUED | OUTPATIENT
Start: 2023-11-07 | End: 2023-11-13

## 2023-11-07 RX ORDER — AZITHROMYCIN 500 MG/1
500 TABLET, FILM COATED ORAL ONCE
Refills: 0 | Status: COMPLETED | OUTPATIENT
Start: 2023-11-08 | End: 2023-11-08

## 2023-11-07 RX ORDER — SODIUM CHLORIDE 9 MG/ML
500 INJECTION INTRAMUSCULAR; INTRAVENOUS; SUBCUTANEOUS ONCE
Refills: 0 | Status: COMPLETED | OUTPATIENT
Start: 2023-11-07 | End: 2023-11-07

## 2023-11-07 RX ORDER — AZITHROMYCIN 500 MG/1
500 TABLET, FILM COATED ORAL EVERY 24 HOURS
Refills: 0 | Status: COMPLETED | OUTPATIENT
Start: 2023-11-09 | End: 2023-11-10

## 2023-11-07 RX ORDER — CALCIUM GLUCONATE 100 MG/ML
2 VIAL (ML) INTRAVENOUS ONCE
Refills: 0 | Status: COMPLETED | OUTPATIENT
Start: 2023-11-07 | End: 2023-11-07

## 2023-11-07 RX ORDER — VANCOMYCIN HCL 1 G
1000 VIAL (EA) INTRAVENOUS ONCE
Refills: 0 | Status: COMPLETED | OUTPATIENT
Start: 2023-11-07 | End: 2023-11-07

## 2023-11-07 RX ORDER — PIPERACILLIN AND TAZOBACTAM 4; .5 G/20ML; G/20ML
3.38 INJECTION, POWDER, LYOPHILIZED, FOR SOLUTION INTRAVENOUS EVERY 12 HOURS
Refills: 0 | Status: DISCONTINUED | OUTPATIENT
Start: 2023-11-07 | End: 2023-11-07

## 2023-11-07 RX ORDER — NOREPINEPHRINE BITARTRATE/D5W 8 MG/250ML
0.33 PLASTIC BAG, INJECTION (ML) INTRAVENOUS
Qty: 16 | Refills: 0 | Status: DISCONTINUED | OUTPATIENT
Start: 2023-11-07 | End: 2023-11-14

## 2023-11-07 RX ORDER — VASOPRESSIN 20 [USP'U]/ML
0.04 INJECTION INTRAVENOUS
Qty: 40 | Refills: 0 | Status: DISCONTINUED | OUTPATIENT
Start: 2023-11-07 | End: 2023-11-12

## 2023-11-07 RX ORDER — AZITHROMYCIN 500 MG/1
TABLET, FILM COATED ORAL
Refills: 0 | Status: COMPLETED | OUTPATIENT
Start: 2023-11-08 | End: 2023-11-11

## 2023-11-07 RX ADMIN — VASOPRESSIN 6 UNIT(S)/MIN: 20 INJECTION INTRAVENOUS at 21:25

## 2023-11-07 RX ADMIN — Medication 6.56 MICROGRAM(S)/KG/MIN: at 10:45

## 2023-11-07 RX ADMIN — AZITHROMYCIN 255 MILLIGRAM(S): 500 TABLET, FILM COATED ORAL at 10:45

## 2023-11-07 RX ADMIN — Medication 200 GRAM(S): at 10:12

## 2023-11-07 RX ADMIN — SODIUM CHLORIDE 500 MILLILITER(S): 9 INJECTION INTRAMUSCULAR; INTRAVENOUS; SUBCUTANEOUS at 08:35

## 2023-11-07 RX ADMIN — Medication 20 MICROGRAM(S)/KG/MIN: at 17:49

## 2023-11-07 RX ADMIN — Medication 250 MILLIGRAM(S): at 11:59

## 2023-11-07 RX ADMIN — Medication 50 MILLIEQUIVALENT(S): at 20:00

## 2023-11-07 RX ADMIN — CEFEPIME 100 MILLIGRAM(S): 1 INJECTION, POWDER, FOR SOLUTION INTRAMUSCULAR; INTRAVENOUS at 10:13

## 2023-11-07 RX ADMIN — PROPOFOL 8.4 MICROGRAM(S)/KG/MIN: 10 INJECTION, EMULSION INTRAVENOUS at 10:45

## 2023-11-07 RX ADMIN — Medication 100 MILLIGRAM(S): at 08:35

## 2023-11-07 RX ADMIN — CHLORHEXIDINE GLUCONATE 15 MILLILITER(S): 213 SOLUTION TOPICAL at 17:41

## 2023-11-07 RX ADMIN — Medication 100 MILLIGRAM(S): at 21:10

## 2023-11-07 RX ADMIN — ETOMIDATE 20 MILLIGRAM(S): 2 INJECTION INTRAVENOUS at 08:35

## 2023-11-07 RX ADMIN — ATORVASTATIN CALCIUM 80 MILLIGRAM(S): 80 TABLET, FILM COATED ORAL at 21:10

## 2023-11-07 NOTE — H&P ADULT - ASSESSMENT
73M w/ ESRD on HD MWF, CAD s/p stent 1m ago, BPH, DM, HTN presenting w/ AMS and AHRF; concern for stroke vs toxic metabolic encephalopathy, intubated for airway protection and hypoxic respiratory failure.       ==============NEURO=============  Encephalopathy:   - baseline independent, AOx3  - CT head w/ moderate chronic ischemic changes  - CTA neck and brain: Patent, ECAs, ICAs, no  hemodynamically significant stenosis at ICA origins by NASCET criteria. Bilateral vertebral arteries are patent without flow limiting stenosis.  - Neurology consulted: recommended MRI, spot EEG, maintaining on DAPT  - Acetaminophen, salicylates negative    =========CARDIOVASCULAR=========  CAD s/p stent 1 month ago  Home meds:   - aspirin 81mg qd  - atorvastatin 80mg qd  - coreg 12.5mg BID  - plavix 75mg qd    ============RESPIRATORY==========  Acute hypoxic respiratory failure: desat to 80% at home w/ agonal breathing per patient's daughter    ============RENAL==============  Cr 2.54  Iglesias placed; strict I/Os    ============GI===============  NPO w/ tube feeds    ============ENDO============  DM: home regimen - Humalog 15u qAC, Glargine 6u qhs  - FS q6h w/ ISS while NPO w/ tube feeds    ===========HEME/ONC===========  Anemia: Hgb 7 on admission    =============ID=============  Given Azithro, Vanc, Zosyn  Sources of infection include: MF PNA (though patient w/ PNA 1 month ago, possibly lingering post-infectious changes on CT chest), diffuse mural thickening of bowel.     ===========Lines/Tubes/Iglesias===========  - GOC:   73M w/ ESRD on HD MWF, CAD s/p stent 1m ago, BPH, DM, HTN presenting w/ AMS and AHRF; concern for stroke vs toxic metabolic encephalopathy, intubated for airway protection and hypoxic respiratory failure.       ==============NEURO=============  #Acute Metabolic Encephalopathy:   - baseline independent, AOx3  - CT head w/ moderate chronic ischemic changes  - CTA neck and brain: Patent, ECAs, ICAs, no  hemodynamically significant stenosis at ICA origins by NASCET criteria. Bilateral vertebral arteries are patent without flow limiting stenosis.  - Neurology consulted: recommended MRI, spot EEG, maintaining on DAPT  - Acetaminophen, salicylates negative    =========CARDIOVASCULAR=========  #Shock  - unclear etiology however may be iso sepsis vs vasoplegia vs obstructive  - on levophed for BP support  - bedside POCUS with evidence of enlarged RV  - pending CTA PE protocol to r/o PE     #CAD s/p stent 10/11/2023  #RCA stent with 99% stenosis   - c/w home aspirin 81mg qd  - c/w home plavix 75mg qd  - c/w home atorvastatin 80mg qd  - hold home coreg 12.5mg BID iso shock state     ============RESPIRATORY==========  #Acute hypoxic respiratory failure  - desat to 80% at home w/ agonal breathing per patient's daughter  - s/p intubation 11/7  - on volume AC RR 14  PEEP 5 and FiO2 50%   - CPAP as tolerated    #Bilateral Multifocal PNA  - may be residual from recent hospitalization at [facility] for bacterial PNA  - will treat broadly with zosyn and azithromycin  - f/u urine legionella and strep     ============RENAL==============  #ESRD on HD  - Cr 2.54  - s/p HD on 11/6 via L AVF  - will consult nephrology for ongoing HD in unit   Iglesias placed; strict I/Os    ============GI===============  #Diet and Nutrition  - NPO w/ tube feeds    ============ENDO============  #DM  - home regimen - Humalog 15u qAC, Glargine 6u qhs  - FS q6h w/ ISS while NPO w/ tube feeds    ===========HEME/ONC===========  #Anemia  - Hgb 7 on admission  - was receiving aranesp outpatient with HD    =============ID=============  #r/o Sepsis  - potential etiologies include multifocal PNA vs colitis vs other   - s/p Azithromycin, Vancomycin, and Zosyn in ED  - continue with broad spectrum with zosyn and azithromycin (should cover GI sources)  - MRSA swab and can dose vanc by level  - pending blood cultures (11/7-)  - pending urinalysis and cultures (11/7-)    ===========Lines/Tubes/Iglesias===========  - GOC: FULL CODE

## 2023-11-07 NOTE — H&P ADULT - HISTORY OF PRESENT ILLNESS
73-year-old male history of ESRD on HD, left Monday Wednesday Friday, last HD yesterday, CAD status post stents last stent was 1 month ago, BPH, diabetes, hypertensionPresents with EMS for altered mental status.  Per EMS on their arrival patient was found on his bed unresponsive, agonal breathing, hypoglycemia, given glucose with appropriate increase in fingerstick without any change in his mental status.  Put him on a nonrebreather, found him to be mildly hypotensive.  On arrival to the ED, Patient unresponsive, initially maintaining respiratory rate of approximately 18 with 100% saturation on nonrebreather however breathing becomes intermittently agonal. Minimally responsive to pain only (groans to IV placement), no other meaningful interaction.  	Per EMS family was on the way and patient as far as they know is full code. Given respiratory failure and altered mental status, decision was made to intubate patient, code stroke was called for acute altered mental status.    Daughter arrived after initial interventions, confirms patient is full code, reports that he was at his baseline state of health yesterday, had a full dialysis session, blood pressure did drop a bit during dialysis which it often does however it has been more persistently low recently so he was told not to take his antihypertensive medications.  Then yesterday evening he had episode of transient hypoxia to 90% after going up the stairs which she has experienced intermittently since having pneumonia approximately 1 month ago.  Daughter did chest PT breathing exercises, he felt better his oxygen normalized and he ate a full meal before going to sleep.  She last heard him in the bathroom this morning at 7 AM. When she arrived to his room to check on him, she found him sprawled on his bed looking as if he had just made it to the bed before collapsing.  She called EMS, given she reported agonal breathing they instructed her to perform CPR, she is concerned she might of cracked a rib, then EMS arrived.    ED course:   Given vanc, azithro, cefepime, 500cc NS bolus. Intubated for airway protection, hypoxemic respiratory failure - done w/ etomidate, prop. Started on levo gtt.   Code stroke activated - brain imaging negative for intracranial bleeding; CTA neck and brain w/ patent vasculature, no flow limiting stenoses.   CT A/P w/ b/l lower lung consolidative opacities c/w MF PNA, right pleural thickening w/ loculated small effusion, intrathoracic LAD.   Moderate ascites and anasarca, diffuse mural thickening of stomach, small bowel, colon.

## 2023-11-07 NOTE — H&P ADULT - NSHPLABSRESULTS_GEN_ALL_CORE
.  LABS:                         7.0    5.37  )-----------( 37       ( 07 Nov 2023 08:37 )             19.9     11-07    140  |  109<H>  |  18  ----------------------------<  173<H>  3.3<L>   |  21<L>  |  2.54<H>    Ca    5.5<LL>      07 Nov 2023 08:37  Mg     2.10     11-07    TPro  x   /  Alb  x   /  TBili  4.3<H>  /  DBili  3.7<H>  /  AST  x   /  ALT  x   /  AlkPhos  x   11-07    PT/INR - ( 07 Nov 2023 08:37 )   PT: 22.5 sec;   INR: 2.05 ratio         PTT - ( 07 Nov 2023 08:37 )  PTT:41.9 sec  Urinalysis Basic - ( 07 Nov 2023 08:37 )    Color: x / Appearance: x / SG: x / pH: x  Gluc: 173 mg/dL / Ketone: x  / Bili: x / Urobili: x   Blood: x / Protein: x / Nitrite: x   Leuk Esterase: x / RBC: x / WBC x   Sq Epi: x / Non Sq Epi: x / Bacteria: x            RADIOLOGY, EKG & ADDITIONAL TESTS: Reviewed.

## 2023-11-07 NOTE — PATIENT PROFILE ADULT - FALL HARM RISK - HARM RISK INTERVENTIONS

## 2023-11-07 NOTE — ED PROVIDER NOTE - CARE PLAN
1 Principal Discharge DX:	Altered mental status  Secondary Diagnosis:	Acute respiratory failure, unspecified whether with hypoxia or hypercapnia

## 2023-11-07 NOTE — H&P ADULT - ATTENDING COMMENTS
Patient examined and case reviewed in detail on bedside rounds  Critically ill and unstable on vent/pressors s/p possible cardiac arrest with PNA/CAD  Frequent bedside visits with therapy change today.   I have personally and independently provided 35+ minutes of critical care services; this excludes time spent on separate procedures or teaching

## 2023-11-07 NOTE — H&P ADULT - NSHPPHYSICALEXAM_GEN_ALL_CORE
LOS:     VITALS:   T(C): 35.2 (11-07-23 @ 11:00), Max: 35.2 (11-07-23 @ 11:00)  HR: 61 (11-07-23 @ 12:15) (59 - 73)  BP: 96/58 (11-07-23 @ 12:15) (81/52 - 121/87)  RR: 14 (11-07-23 @ 12:15) (14 - 21)  SpO2: 95% (11-07-23 @ 12:15) (95% - 100%)    GENERAL: NAD, lying in bed comfortably  HEAD:  Atraumatic, Normocephalic  EYES: EOMI, PERRLA, conjunctiva and sclera clear  ENT: Moist mucous membranes  NECK: Supple, No JVD  CHEST/LUNG: Clear to auscultation bilaterally; No rales, rhonchi, wheezing, or rubs. Unlabored respirations  HEART: Regular rate and rhythm; No murmurs, rubs, or gallops  ABDOMEN: BSx4; Soft, nontender, nondistended  EXTREMITIES:  2+ Peripheral Pulses, brisk capillary refill. No clubbing, cyanosis, or edema  NERVOUS SYSTEM:  A&Ox3, no focal deficits   SKIN: No rashes or lesions LOS:     VITALS:   T(C): 35.2 (11-07-23 @ 11:00), Max: 35.2 (11-07-23 @ 11:00)  HR: 61 (11-07-23 @ 12:15) (59 - 73)  BP: 96/58 (11-07-23 @ 12:15) (81/52 - 121/87)  RR: 14 (11-07-23 @ 12:15) (14 - 21)  SpO2: 95% (11-07-23 @ 12:15) (95% - 100%)    GENERAL: intubated and sedated   HEAD:  Atraumatic, Normocephalic  EYES: PERRL  ENT: unable to assess iso intubation   NECK: Supple, No JVD  CHEST/LUNG: air movement noted on bilateral lung apices   HEART: Regular rate and rhythm; No murmurs, rubs, or gallops  ABDOMEN: BSx4; Soft, nontender, nondistended  EXTREMITIES: diminished b/l lower extremity pulses   NERVOUS SYSTEM:  A&Ox0 iso propofol

## 2023-11-07 NOTE — ED PROVIDER NOTE - OBJECTIVE STATEMENT
73-year-old male history of ESRD on HD, left Monday Wednesday Friday, last HD yesterday, CAD status post stents last stent was 1 month ago, BPH, diabetes, hypertensionPresents with EMS for altered mental status.  Per EMS on their arrival patient was found on his bed unresponsive, agonal breathing, hypoglycemia, given glucose with appropriate increase in fingerstick without any change in his mental status.  Put him on a nonrebreather, found him to be mildly hypotensive.  On arrival to the ED, Patient unresponsive, initially maintaining respiratory rate of approximately 18 with 100% saturation on nonrebreather however breathing becomes intermittently agonal. Minimally responsive to pain only (groans to IV placement), no other meaningful interaction.  Per EMS family was on the way and patient as far as they know is full code. Given respiratory failure and altered mental status, decision was made to intubate patient, code stroke was called for acute altered mental status.    Daughter arrived after initial interventions, confirms patient is full code, reports that he was at his baseline state of health yesterday, had a full dialysis session, blood pressure did drop a bit during dialysis which it often does however it has been more persistently low recently so he was told not to take his antihypertensive medications.  Then yesterday evening he had episode of transient hypoxia to 90% after going up the stairs which she has experienced intermittently since having pneumonia approximately 1 month ago.  Daughter did chest PT breathing exercises, he felt better his oxygen normalized and he ate a full meal before going to sleep.  She last heard him in the bathroom this morning at 7 AM.When she arrived to his room to check on him, she found him sprawled on his bed looking as if he had just made it to the bed before collapsing.  She called EMS, given she reported agonal breathing they instructed her to perform CPR, she is concerned she might of cracked a rib, then EMS arrived.

## 2023-11-07 NOTE — CONSULT NOTE ADULT - ASSESSMENT
73y (1950)  man with a PMH of DM, HTN, CAD s.p stent 1 month ago on dual AP, CKD on dialysis MWF, hx of colon polyps presenting for sudden onset loss of consciousness and inability to ambulate. Patient was in his complete state of health before this happened at 7:30 am. he was last seen well at 7:00 am. He was feeling unwell yesterday after dailysis session per family and has been having fluctuations in his blood sugar and blood pressure but otherwise sensing generalized weakness. He typically ambulates with a cane due to low back pain and uses a walker occasionally.     Impression: altered mental status with localizing signs, however less likely stroke due to absence of large vessel occlusion that explains clinical picture along side multiple possible metabolic etiologies including but not limited to hepatic vs renal encephalopathy    LKW 7 am  NIHSS 18  preMRS 3    not tenecteplase candidate since plts and inr all C/I  not thrombectomy candidate since no Large vessel occlusion    Plan:  admit to MICU  [] MRI brain  [] full metabolic panel  [] 20 minute EEG rule out metabolic triphasic waves contributing to encephalopathy  [] contact neurology as needed    case discussed with stroke fellow Jamal Durham 73y (1950)  man with a PMH of DM, HTN, CAD s.p stent 1 month ago on dual AP, CKD on dialysis MWF, hx of colon polyps presenting for sudden onset loss of consciousness and inability to ambulate. Patient was in his complete state of health before this happened at 7:30 am. he was last seen well at 7:00 am. He was feeling unwell yesterday after dailysis session per family and has been having fluctuations in his blood sugar and blood pressure but otherwise sensing generalized weakness. He typically ambulates with a cane due to low back pain and uses a walker occasionally.     Impression: altered mental status with localizing signs, however less likely stroke due to absence of large vessel occlusion that explains clinical picture along side multiple possible metabolic etiologies including but not limited to hepatic vs renal encephalopathy    LKW 7 am  NIHSS 18  preMRS 3    not tenecteplase candidate since plts and inr all C/I  not thrombectomy candidate since no Large vessel occlusion    Plan:  [] admit to MICU  [] maintain permissive HTN  [] MRI brain without contrast  [] if MRI not possible within 24 hr, obtain repeat CTH  [] full metabolic panel  [] 20 minute EEG rule out metabolic triphasic waves contributing to encephalopathy  [] contact neurology as needed  [] maintain patient on dual antiplatelets aspirin 81 mg and plavix 75 mg for CAD stenting  [] no c/i for DVT prophylaxis    case discussed with stroke fellow Jamal Durham

## 2023-11-07 NOTE — CONSULT NOTE ADULT - SUBJECTIVE AND OBJECTIVE BOX
Neurology - Consult Note    -  Spectra: [phone] ([facility]), [phone] ([facility])  -    HPI: Patient LISA ROJAS is a 73y (1950)  man with a PMH of DM, HTN, CAD s.p stent 1 month ago on dual AP, CKD on dialysis MWF, hx of colon polyps presenting for sudden onset loss of consciousness and inability to ambulate. Patient was in his complete state of health before this happened at 7:30 am. he was last seen well at 7:00 am. He was feeling unwell yesterday after dailysis session per family and has been having fluctuations in his blood sugar and blood pressure but otherwise sensing generalized weakness. He typically ambulates with a cane due to low back pain and uses a walker occasionally.       Review of Systems:       NEUROLOGICAL: +As stated in HPI above      Allergies:  No Known Allergies      PMHx/PSHx/Family Hx: As above, otherwise see below   End Stage Renal Disease on Dialysis    Diabetes    H/O: Hypertension    Coronary artery disease    Transplanted kidney        Social Hx:  No current use of tobacco, alcohol, or illicit drugs      Medications:  MEDICATIONS  (STANDING):  azithromycin  IVPB 500 milliGRAM(s) IV Intermittent once  calcium gluconate IVPB 2 Gram(s) IV Intermittent Once  cefepime   IVPB 2000 milliGRAM(s) IV Intermittent once  etomidate Injectable 20 milliGRAM(s) IV Push Once  rocuronium Injectable 100 milliGRAM(s) IV Push Once  sodium chloride 0.9% Bolus 500 milliLiter(s) IV Bolus once  vancomycin  IVPB. 1000 milliGRAM(s) IV Intermittent once    MEDICATIONS  (PRN):      Vitals:  T(C): --  HR: --  BP: --  RR: --  SpO2: --    Physical Examination:     Neurologic Exam:  Mental status - patient not responsive to verbal stimuli. He is only responsive to pain.     Cranial nerves - facial strength intact without asymmetry b/l, there is gaze palsy to the right and misaligned pupils along with dilated left pupils in comparison to right    Motor - moving left UE and LE to sever pain also RUE to pain but not RLE to pain.  Sensation - responds to pain as above      Coordination - UTD    Gait and station - UTD    Labs:                        7.0    5.37  )-----------( 37       ( 07 Nov 2023 08:37 )             19.9     11-07    140  |  109<H>  |  18  ----------------------------<  173<H>  3.3<L>   |  21<L>  |  2.54<H>    Ca    5.5<LL>      07 Nov 2023 08:37    TPro  5.8<L>  /  Alb  1.7<L>  /  TBili  3.1<H>  /  DBili  x   /  AST  398<H>  /  ALT  119<H>  /  AlkPhos  368<H>  11-07    CAPILLARY BLOOD GLUCOSE        LIVER FUNCTIONS - ( 07 Nov 2023 08:37 )  Alb: 1.7 g/dL / Pro: 5.8 g/dL / ALK PHOS: 368 U/L / ALT: 119 U/L / AST: 398 U/L / GGT: x             PT/INR - ( 07 Nov 2023 08:37 )   PT: 22.5 sec;   INR: 2.05 ratio         PTT - ( 07 Nov 2023 08:37 )  PTT:41.9 sec  CSF:                  Radiology:     Neurology - Consult Note    -  Spectra: [phone] ([facility]), [phone] ([facility])  -    HPI: Patient LISA ROJAS is a 73y (1950)  man with a PMH of DM, HTN, CAD s.p stent 1 month ago on dual AP, CKD on dialysis MWF, hx of colon polyps presenting for sudden onset loss of consciousness and inability to ambulate. Patient was in his complete state of health before this happened at 7:30 am. he was last seen well at 7:00 am. He was feeling unwell yesterday after dailysis session per family and has been having fluctuations in his blood sugar and blood pressure but otherwise sensing generalized weakness. He typically ambulates with a cane due to low back pain and uses a walker occasionally.       Review of Systems:       NEUROLOGICAL: +As stated in HPI above      Allergies:  No Known Allergies      PMHx/PSHx/Family Hx: As above, otherwise see below   End Stage Renal Disease on Dialysis    Diabetes    H/O: Hypertension    Coronary artery disease    Transplanted kidney        Social Hx:  No current use of tobacco, alcohol, or illicit drugs      Medications:  MEDICATIONS  (STANDING):  azithromycin  IVPB 500 milliGRAM(s) IV Intermittent once  calcium gluconate IVPB 2 Gram(s) IV Intermittent Once  cefepime   IVPB 2000 milliGRAM(s) IV Intermittent once  etomidate Injectable 20 milliGRAM(s) IV Push Once  rocuronium Injectable 100 milliGRAM(s) IV Push Once  sodium chloride 0.9% Bolus 500 milliLiter(s) IV Bolus once  vancomycin  IVPB. 1000 milliGRAM(s) IV Intermittent once    MEDICATIONS  (PRN):      Vitals:  T(C): --  HR: --  BP: --  RR: --  SpO2: --    Physical Examination:     Neurologic Exam:  Mental status - patient not responsive to verbal stimuli. He is only responsive to pain.     Cranial nerves - facial strength intact without asymmetry b/l, there is gaze palsy to the right and misaligned pupils along with dilated left pupils in comparison to right    Motor - moving left UE and LE to sever pain also RUE to pain but not RLE to pain.  Sensation - responds to pain as above      Coordination - UTD    Gait and station - UTD    Labs:                        7.0    5.37  )-----------( 37       ( 07 Nov 2023 08:37 )             19.9     11-07    140  |  109<H>  |  18  ----------------------------<  173<H>  3.3<L>   |  21<L>  |  2.54<H>    Ca    5.5<LL>      07 Nov 2023 08:37    TPro  5.8<L>  /  Alb  1.7<L>  /  TBili  3.1<H>  /  DBili  x   /  AST  398<H>  /  ALT  119<H>  /  AlkPhos  368<H>  11-07    CAPILLARY BLOOD GLUCOSE        LIVER FUNCTIONS - ( 07 Nov 2023 08:37 )  Alb: 1.7 g/dL / Pro: 5.8 g/dL / ALK PHOS: 368 U/L / ALT: 119 U/L / AST: 398 U/L / GGT: x             PT/INR - ( 07 Nov 2023 08:37 )   PT: 22.5 sec;   INR: 2.05 ratio         PTT - ( 07 Nov 2023 08:37 )  PTT:41.9 sec  CSF:                  Radiology:  < from: CT Brain Perfusion Maps Stroke (11.07.23 @ 09:34) >  CT PERFUSION demonstrated: No asymmetric or territorial perfusion   abnormality.    If symptoms persist consider follow-up imaging with MRI of the brain if   no contraindications.     CTA COW:  Patent intracranial circulation without flow limiting stenosis   or large vessel occlusion.    CTA NECK: Patent, ECAs, ICAs, no  hemodynamically significant stenosis at    ICA origins by NASCET criteria.  Bilateral vertebral arteries are patent without flow limiting stenosis.    < end of copied text >  < from: CT Brain Stroke Protocol (11.07.23 @ 08:57) >  IMPRESSION:  No acute intracranial hemorrhage or acute territorial infarction.   Moderate chronic microvascular ischemic changes.    < end of copied text >

## 2023-11-07 NOTE — ED PROVIDER NOTE - PHYSICAL EXAMINATION
GEN - unresponsive, ill appearing  HEAD - NC/AT   EYES- r. pupil blown (appears surgical), left pupil 2mm/sluggish  ENT: Airway patent, dry mm, no inflammation, swelling, exudate, or lesions.    NECK: Neck supple, no masses  PULMONARY - rhonchi b/l, breath sounds hear in all lung fields, no crepitus, equal chest rise present  CARDIAC -s1s2, RRR (hr 70s on monitor), no M,G,R  ABDOMEN - distended, soft  BACK - no CVA tenderness, Normal  spine   EXTREMITIES - No edema, no deformity, fistula with thrill to lue, posturing arms without any meaningful movement  SKIN - no rash or bruising   NEUROLOGIC - unresponsive, groans, does not meaningfully interact

## 2023-11-07 NOTE — ED ADULT NURSE NOTE - NSFALLRISKINTERV_ED_ALL_ED
Assistance OOB with selected safe patient handling equipment if applicable/Communicate fall risk and risk factors to all staff, patient, and family/Encourage patient to sit up slowly, dangle for a short time, stand at bedside before walking/Provide visual cue: yellow wristband, yellow gown, etc/Reinforce activity limits and safety measures with patient and family/Review medications for side effects contributing to fall risk/Toileting schedule using arm’s reach rule for commode and bathroom/Call bell, personal items and telephone in reach/Instruct patient to call for assistance before getting out of bed/chair/stretcher/Non-slip footwear applied when patient is off stretcher/[place] to call system/Physically safe environment - no spills, clutter or unnecessary equipment/Purposeful Proactive Rounding/Room/bathroom lighting operational, light cord in reach

## 2023-11-07 NOTE — ED ADULT NURSE NOTE - OBJECTIVE STATEMENT
pt received to [facility] as a notification for unresponsiveness.  as per EMS, pt was found to be unresponsive by family.  upon arrival to the ED, pt began posturing, code stroke activated, neuro at bedside.  ETT placed by ED resident, size 7.5, 23inches @ lip.  vent settings: PEEP 5, rate 14, FiO2 40%, .  placed on tele, right femoral triple lumen central line placed by ED resident.  labs sent  pt taken to CT with neuro at bedside.  abdomen firm and distended, LUE av fistula present with + thrill.  report given to Primary RN Mita.  will monitor

## 2023-11-07 NOTE — ED PROVIDER NOTE - PROGRESS NOTE DETAILS
micu evaluated patient, accepting to icu, results thus far d/w patients daughter at bedside, sedation ordered for comfort while intubated, patient does urinate will order yu for placement.

## 2023-11-07 NOTE — ED ADULT NURSE REASSESSMENT NOTE - NS ED NURSE REASSESS COMMENT FT1
Mobile Critical Care RN: patient is 73/M arrives unresponsive, initial code stroke. PMH of ESRD (HD MWF), CAD with stents, BPH, DM, HTN. intubated in the ER due to altered mental status. patient is FULL CODE. patient remains in room TR-C, sedated on propofol, R pupil 3-4mm > L pupil 1mm. synchronous with vent, settings 14/450/5/60%. copious oral secretions noted. NSR, levophed titrated to MAP above 65. R femoral TLC, bleeding noted around site and dressing changed completed. NPO, abdomen distended. Iglesias in place with no urine output, flushes well and is patent. skin intact. MICU accepted. Mobile Critical Care RN: patient is 73/M arrives unresponsive, initial code stroke. PMH of ESRD (HD MWF), CAD with stents, BPH, DM, HTN. intubated in the ER due to altered mental status. patient is FULL CODE. patient remains in room TR-C, sedated on propofol, R pupil 3-4mm > L pupil 1mm. synchronous with vent, settings 14/450/5/60%. copious oral secretions noted, no gag noted during in line suction. NSR, levophed titrated to MAP above 65. R femoral TLC, bleeding noted around site and dressing changed completed. NPO, abdomen distended. Iglesias in place with no urine output, flushes well and is patent. skin intact. MICU accepted. Mobile Critical Care RN: patient is 73/M arrives unresponsive, initial code stroke. PMH of ESRD (HD MWF), CAD with stents, BPH, DM, HTN. intubated in the ER due to altered mental status. patient is FULL CODE. patient remains in room TR-C, sedated on propofol, R pupil 3-4mm > L pupil 1mm. synchronous with vent, settings 14/450/5/60%. copious oral secretions noted, no gag noted during in line suction. NSR to sinus misha 58-61HR, levophed titrated to MAP above 65. R femoral TLC, bleeding noted around site and dressing changed completed. NPO, abdomen distended. Iglesias in place with no urine output, flushes well and is patent. skin intact. MICU accepted. Mobile Critical Care RN: patient is 73/M arrives unresponsive, initial code stroke. PMH of ESRD (HD MWF), CAD with stents, BPH, DM, HTN. intubated in the ER due to altered mental status. patient is FULL CODE. patient remains in room TR-C, sedated on propofol, R pupil 3-4mm > L pupil 1mm. intubated with 7.5 ETT 23 @ lip, synchronous with vent, settings 14/450/5/60%. copious oral secretions noted, no gag noted during in line suction. NSR to sinus misha 58-61HR, levophed titrated to MAP above 65. R femoral TLC, bleeding noted around site and dressing changed completed. NPO, abdomen distended. LUE AVF present, Iglesias in place with no urine output, flushes well and is patent. skin intact. MICU accepted. repeat labs drawn and sent.

## 2023-11-07 NOTE — ED PROVIDER NOTE - NSICDXPASTMEDICALHX_GEN_ALL_CORE_FT
PAST MEDICAL HISTORY:  Coronary artery disease     Diabetes     End Stage Renal Disease on Dialysis     H/O: Hypertension     Transplanted kidney

## 2023-11-08 DIAGNOSIS — N18.6 END STAGE RENAL DISEASE: ICD-10-CM

## 2023-11-08 LAB
ALBUMIN FLD-MCNC: 1.3 G/DL — SIGNIFICANT CHANGE UP
ALBUMIN FLD-MCNC: 1.3 G/DL — SIGNIFICANT CHANGE UP
ALBUMIN SERPL ELPH-MCNC: 2 G/DL — LOW (ref 3.3–5)
ALBUMIN SERPL ELPH-MCNC: 2 G/DL — LOW (ref 3.3–5)
ALP SERPL-CCNC: 481 U/L — HIGH (ref 40–120)
ALP SERPL-CCNC: 481 U/L — HIGH (ref 40–120)
ALT FLD-CCNC: 193 U/L — HIGH (ref 4–41)
ALT FLD-CCNC: 193 U/L — HIGH (ref 4–41)
ANION GAP SERPL CALC-SCNC: 14 MMOL/L — SIGNIFICANT CHANGE UP (ref 7–14)
ANION GAP SERPL CALC-SCNC: 14 MMOL/L — SIGNIFICANT CHANGE UP (ref 7–14)
APTT BLD: 42.2 SEC — HIGH (ref 24.5–35.6)
APTT BLD: 42.2 SEC — HIGH (ref 24.5–35.6)
AST SERPL-CCNC: 688 U/L — HIGH (ref 4–40)
AST SERPL-CCNC: 688 U/L — HIGH (ref 4–40)
B PERT IGG+IGM PNL SER: ABNORMAL
BASOPHILS # BLD AUTO: 0.02 K/UL — SIGNIFICANT CHANGE UP (ref 0–0.2)
BASOPHILS # BLD AUTO: 0.02 K/UL — SIGNIFICANT CHANGE UP (ref 0–0.2)
BASOPHILS NFR BLD AUTO: 0.2 % — SIGNIFICANT CHANGE UP (ref 0–2)
BASOPHILS NFR BLD AUTO: 0.2 % — SIGNIFICANT CHANGE UP (ref 0–2)
BILIRUB SERPL-MCNC: 5.5 MG/DL — HIGH (ref 0.2–1.2)
BILIRUB SERPL-MCNC: 5.5 MG/DL — HIGH (ref 0.2–1.2)
BLOOD GAS ARTERIAL - LYTES,HGB,ICA,LACT RESULT: SIGNIFICANT CHANGE UP
BLOOD GAS ARTERIAL - LYTES,HGB,ICA,LACT RESULT: SIGNIFICANT CHANGE UP
BUN SERPL-MCNC: 29 MG/DL — HIGH (ref 7–23)
BUN SERPL-MCNC: 29 MG/DL — HIGH (ref 7–23)
CA-I BLD-SCNC: 0.98 MMOL/L — LOW (ref 1.15–1.29)
CA-I BLD-SCNC: 0.98 MMOL/L — LOW (ref 1.15–1.29)
CALCIUM SERPL-MCNC: 7.6 MG/DL — LOW (ref 8.4–10.5)
CALCIUM SERPL-MCNC: 7.6 MG/DL — LOW (ref 8.4–10.5)
CHLORIDE SERPL-SCNC: 101 MMOL/L — SIGNIFICANT CHANGE UP (ref 98–107)
CHLORIDE SERPL-SCNC: 101 MMOL/L — SIGNIFICANT CHANGE UP (ref 98–107)
CO2 SERPL-SCNC: 23 MMOL/L — SIGNIFICANT CHANGE UP (ref 22–31)
CO2 SERPL-SCNC: 23 MMOL/L — SIGNIFICANT CHANGE UP (ref 22–31)
COLOR FLD: ABNORMAL
CREAT SERPL-MCNC: 3.8 MG/DL — HIGH (ref 0.5–1.3)
CREAT SERPL-MCNC: 3.8 MG/DL — HIGH (ref 0.5–1.3)
EGFR: 16 ML/MIN/1.73M2 — LOW
EGFR: 16 ML/MIN/1.73M2 — LOW
EOSINOPHIL # BLD AUTO: 0.05 K/UL — SIGNIFICANT CHANGE UP (ref 0–0.5)
EOSINOPHIL # BLD AUTO: 0.05 K/UL — SIGNIFICANT CHANGE UP (ref 0–0.5)
EOSINOPHIL # FLD: 5 % — SIGNIFICANT CHANGE UP
EOSINOPHIL # FLD: 5 % — SIGNIFICANT CHANGE UP
EOSINOPHIL NFR BLD AUTO: 0.6 % — SIGNIFICANT CHANGE UP (ref 0–6)
EOSINOPHIL NFR BLD AUTO: 0.6 % — SIGNIFICANT CHANGE UP (ref 0–6)
FLUID INTAKE SUBSTANCE CLASS: SIGNIFICANT CHANGE UP
FOLATE+VIT B12 SERBLD-IMP: 4 % — SIGNIFICANT CHANGE UP
FOLATE+VIT B12 SERBLD-IMP: 4 % — SIGNIFICANT CHANGE UP
GLUCOSE BLDC GLUCOMTR-MCNC: 111 MG/DL — HIGH (ref 70–99)
GLUCOSE BLDC GLUCOMTR-MCNC: 111 MG/DL — HIGH (ref 70–99)
GLUCOSE BLDC GLUCOMTR-MCNC: 121 MG/DL — HIGH (ref 70–99)
GLUCOSE BLDC GLUCOMTR-MCNC: 121 MG/DL — HIGH (ref 70–99)
GLUCOSE BLDC GLUCOMTR-MCNC: 129 MG/DL — HIGH (ref 70–99)
GLUCOSE BLDC GLUCOMTR-MCNC: 129 MG/DL — HIGH (ref 70–99)
GLUCOSE BLDC GLUCOMTR-MCNC: 130 MG/DL — HIGH (ref 70–99)
GLUCOSE BLDC GLUCOMTR-MCNC: 130 MG/DL — HIGH (ref 70–99)
GLUCOSE BLDC GLUCOMTR-MCNC: 134 MG/DL — HIGH (ref 70–99)
GLUCOSE BLDC GLUCOMTR-MCNC: 134 MG/DL — HIGH (ref 70–99)
GLUCOSE BLDC GLUCOMTR-MCNC: 145 MG/DL — HIGH (ref 70–99)
GLUCOSE BLDC GLUCOMTR-MCNC: 145 MG/DL — HIGH (ref 70–99)
GLUCOSE BLDC GLUCOMTR-MCNC: 63 MG/DL — LOW (ref 70–99)
GLUCOSE BLDC GLUCOMTR-MCNC: 63 MG/DL — LOW (ref 70–99)
GLUCOSE FLD-MCNC: 140 MG/DL — SIGNIFICANT CHANGE UP
GLUCOSE FLD-MCNC: 140 MG/DL — SIGNIFICANT CHANGE UP
GLUCOSE SERPL-MCNC: 65 MG/DL — LOW (ref 70–99)
GLUCOSE SERPL-MCNC: 65 MG/DL — LOW (ref 70–99)
GRAM STN FLD: ABNORMAL
GRAM STN FLD: ABNORMAL
GRAM STN FLD: SIGNIFICANT CHANGE UP
GRAM STN FLD: SIGNIFICANT CHANGE UP
HCT VFR BLD CALC: 27.1 % — LOW (ref 39–50)
HCT VFR BLD CALC: 27.1 % — LOW (ref 39–50)
HCV AB S/CO SERPL IA: 0.2 S/CO — SIGNIFICANT CHANGE UP (ref 0–0.99)
HCV AB S/CO SERPL IA: 0.2 S/CO — SIGNIFICANT CHANGE UP (ref 0–0.99)
HCV AB SERPL-IMP: SIGNIFICANT CHANGE UP
HCV AB SERPL-IMP: SIGNIFICANT CHANGE UP
HGB BLD-MCNC: 9.8 G/DL — LOW (ref 13–17)
HGB BLD-MCNC: 9.8 G/DL — LOW (ref 13–17)
IANC: 6.35 K/UL — SIGNIFICANT CHANGE UP (ref 1.8–7.4)
IANC: 6.35 K/UL — SIGNIFICANT CHANGE UP (ref 1.8–7.4)
IMM GRANULOCYTES NFR BLD AUTO: 0.4 % — SIGNIFICANT CHANGE UP (ref 0–0.9)
IMM GRANULOCYTES NFR BLD AUTO: 0.4 % — SIGNIFICANT CHANGE UP (ref 0–0.9)
INR BLD: 1.83 RATIO — HIGH (ref 0.85–1.18)
INR BLD: 1.83 RATIO — HIGH (ref 0.85–1.18)
LDH SERPL L TO P-CCNC: 252 U/L — SIGNIFICANT CHANGE UP
LDH SERPL L TO P-CCNC: 252 U/L — SIGNIFICANT CHANGE UP
LYMPHOCYTES # BLD AUTO: 0.98 K/UL — LOW (ref 1–3.3)
LYMPHOCYTES # BLD AUTO: 0.98 K/UL — LOW (ref 1–3.3)
LYMPHOCYTES # BLD AUTO: 12.1 % — LOW (ref 13–44)
LYMPHOCYTES # BLD AUTO: 12.1 % — LOW (ref 13–44)
LYMPHOCYTES # FLD: 5 % — SIGNIFICANT CHANGE UP
MAGNESIUM SERPL-MCNC: 2 MG/DL — SIGNIFICANT CHANGE UP (ref 1.6–2.6)
MAGNESIUM SERPL-MCNC: 2 MG/DL — SIGNIFICANT CHANGE UP (ref 1.6–2.6)
MCHC RBC-ENTMCNC: 30.1 PG — SIGNIFICANT CHANGE UP (ref 27–34)
MCHC RBC-ENTMCNC: 30.1 PG — SIGNIFICANT CHANGE UP (ref 27–34)
MCHC RBC-ENTMCNC: 36.2 GM/DL — HIGH (ref 32–36)
MCHC RBC-ENTMCNC: 36.2 GM/DL — HIGH (ref 32–36)
MCV RBC AUTO: 83.1 FL — SIGNIFICANT CHANGE UP (ref 80–100)
MCV RBC AUTO: 83.1 FL — SIGNIFICANT CHANGE UP (ref 80–100)
MONOCYTES # BLD AUTO: 0.69 K/UL — SIGNIFICANT CHANGE UP (ref 0–0.9)
MONOCYTES # BLD AUTO: 0.69 K/UL — SIGNIFICANT CHANGE UP (ref 0–0.9)
MONOCYTES NFR BLD AUTO: 8.5 % — SIGNIFICANT CHANGE UP (ref 2–14)
MONOCYTES NFR BLD AUTO: 8.5 % — SIGNIFICANT CHANGE UP (ref 2–14)
MONOS+MACROS # FLD: 44 % — SIGNIFICANT CHANGE UP
MONOS+MACROS # FLD: 44 % — SIGNIFICANT CHANGE UP
MONOS+MACROS # FLD: 85 % — SIGNIFICANT CHANGE UP
MONOS+MACROS # FLD: 85 % — SIGNIFICANT CHANGE UP
NEUTROPHILS # BLD AUTO: 6.35 K/UL — SIGNIFICANT CHANGE UP (ref 1.8–7.4)
NEUTROPHILS # BLD AUTO: 6.35 K/UL — SIGNIFICANT CHANGE UP (ref 1.8–7.4)
NEUTROPHILS NFR BLD AUTO: 78.2 % — HIGH (ref 43–77)
NEUTROPHILS NFR BLD AUTO: 78.2 % — HIGH (ref 43–77)
NEUTROPHILS-BODY FLUID: 1 % — SIGNIFICANT CHANGE UP
NEUTROPHILS-BODY FLUID: 1 % — SIGNIFICANT CHANGE UP
NEUTROPHILS-BODY FLUID: 51 % — SIGNIFICANT CHANGE UP
NEUTROPHILS-BODY FLUID: 51 % — SIGNIFICANT CHANGE UP
NRBC # BLD: 0 /100 WBCS — SIGNIFICANT CHANGE UP (ref 0–0)
NRBC # BLD: 0 /100 WBCS — SIGNIFICANT CHANGE UP (ref 0–0)
NRBC # FLD: 0 K/UL — SIGNIFICANT CHANGE UP (ref 0–0)
NRBC # FLD: 0 K/UL — SIGNIFICANT CHANGE UP (ref 0–0)
PHOSPHATE SERPL-MCNC: 3.2 MG/DL — SIGNIFICANT CHANGE UP (ref 2.5–4.5)
PHOSPHATE SERPL-MCNC: 3.2 MG/DL — SIGNIFICANT CHANGE UP (ref 2.5–4.5)
PLATELET # BLD AUTO: 55 K/UL — LOW (ref 150–400)
PLATELET # BLD AUTO: 55 K/UL — LOW (ref 150–400)
POTASSIUM SERPL-MCNC: 4 MMOL/L — SIGNIFICANT CHANGE UP (ref 3.5–5.3)
POTASSIUM SERPL-MCNC: 4 MMOL/L — SIGNIFICANT CHANGE UP (ref 3.5–5.3)
POTASSIUM SERPL-SCNC: 4 MMOL/L — SIGNIFICANT CHANGE UP (ref 3.5–5.3)
POTASSIUM SERPL-SCNC: 4 MMOL/L — SIGNIFICANT CHANGE UP (ref 3.5–5.3)
PROCALCITONIN SERPL-MCNC: 5.86 NG/ML — HIGH (ref 0.02–0.1)
PROCALCITONIN SERPL-MCNC: 5.86 NG/ML — HIGH (ref 0.02–0.1)
PROT FLD-MCNC: 4.1 G/DL — SIGNIFICANT CHANGE UP
PROT FLD-MCNC: 4.1 G/DL — SIGNIFICANT CHANGE UP
PROT SERPL-MCNC: 7.6 G/DL — SIGNIFICANT CHANGE UP (ref 6–8.3)
PROT SERPL-MCNC: 7.6 G/DL — SIGNIFICANT CHANGE UP (ref 6–8.3)
PROTHROM AB SERPL-ACNC: 20.3 SEC — HIGH (ref 9.5–13)
PROTHROM AB SERPL-ACNC: 20.3 SEC — HIGH (ref 9.5–13)
RBC # BLD: 3.26 M/UL — LOW (ref 4.2–5.8)
RBC # BLD: 3.26 M/UL — LOW (ref 4.2–5.8)
RBC # FLD: 24 % — HIGH (ref 10.3–14.5)
RBC # FLD: 24 % — HIGH (ref 10.3–14.5)
RCV VOL RI: 5000 CELLS/UL — HIGH (ref 0–5)
RCV VOL RI: 5000 CELLS/UL — HIGH (ref 0–5)
RCV VOL RI: SIGNIFICANT CHANGE UP CELLS/UL (ref 0–5)
RCV VOL RI: SIGNIFICANT CHANGE UP CELLS/UL (ref 0–5)
SODIUM SERPL-SCNC: 138 MMOL/L — SIGNIFICANT CHANGE UP (ref 135–145)
SODIUM SERPL-SCNC: 138 MMOL/L — SIGNIFICANT CHANGE UP (ref 135–145)
SPECIMEN SOURCE: SIGNIFICANT CHANGE UP
THYROPEROXIDASE AB SERPL-ACNC: 27.8 IU/ML — SIGNIFICANT CHANGE UP
THYROPEROXIDASE AB SERPL-ACNC: 27.8 IU/ML — SIGNIFICANT CHANGE UP
TOTAL CELLS COUNTED, BODY FLUID: 100 CELLS — SIGNIFICANT CHANGE UP
TOTAL CELLS COUNTED, BODY FLUID: 100 CELLS — SIGNIFICANT CHANGE UP
TOTAL NUCLEATED CELL COUNT, BODY FLUID: 189 CELLS/UL — HIGH (ref 0–5)
TOTAL NUCLEATED CELL COUNT, BODY FLUID: 189 CELLS/UL — HIGH (ref 0–5)
TOTAL NUCLEATED CELL COUNT, BODY FLUID: SIGNIFICANT CHANGE UP CELLS/UL (ref 0–5)
TOTAL NUCLEATED CELL COUNT, BODY FLUID: SIGNIFICANT CHANGE UP CELLS/UL (ref 0–5)
TROPONIN T, HIGH SENSITIVITY RESULT: 1394 NG/L — CRITICAL HIGH
TROPONIN T, HIGH SENSITIVITY RESULT: 1394 NG/L — CRITICAL HIGH
TROPONIN T, HIGH SENSITIVITY RESULT: 1472 NG/L — CRITICAL HIGH
TROPONIN T, HIGH SENSITIVITY RESULT: 1472 NG/L — CRITICAL HIGH
TUBE TYPE: SIGNIFICANT CHANGE UP
VANCOMYCIN FLD-MCNC: 12.1 UG/ML — SIGNIFICANT CHANGE UP
VANCOMYCIN FLD-MCNC: 12.1 UG/ML — SIGNIFICANT CHANGE UP
WBC # BLD: 8.12 K/UL — SIGNIFICANT CHANGE UP (ref 3.8–10.5)
WBC # BLD: 8.12 K/UL — SIGNIFICANT CHANGE UP (ref 3.8–10.5)
WBC # FLD AUTO: 8.12 K/UL — SIGNIFICANT CHANGE UP (ref 3.8–10.5)
WBC # FLD AUTO: 8.12 K/UL — SIGNIFICANT CHANGE UP (ref 3.8–10.5)

## 2023-11-08 PROCEDURE — 99223 1ST HOSP IP/OBS HIGH 75: CPT

## 2023-11-08 PROCEDURE — 71045 X-RAY EXAM CHEST 1 VIEW: CPT | Mod: 26,76

## 2023-11-08 PROCEDURE — 36620 INSERTION CATHETER ARTERY: CPT | Mod: RT

## 2023-11-08 PROCEDURE — 31624 DX BRONCHOSCOPE/LAVAGE: CPT | Mod: GC

## 2023-11-08 PROCEDURE — 76705 ECHO EXAM OF ABDOMEN: CPT | Mod: 26

## 2023-11-08 PROCEDURE — 99223 1ST HOSP IP/OBS HIGH 75: CPT | Mod: GC

## 2023-11-08 RX ORDER — SODIUM CHLORIDE 9 MG/ML
10 INJECTION INTRAMUSCULAR; INTRAVENOUS; SUBCUTANEOUS
Refills: 0 | Status: DISCONTINUED | OUTPATIENT
Start: 2023-11-08 | End: 2023-11-14

## 2023-11-08 RX ORDER — LACTULOSE 10 G/15ML
20 SOLUTION ORAL THREE TIMES A DAY
Refills: 0 | Status: DISCONTINUED | OUTPATIENT
Start: 2023-11-08 | End: 2023-11-10

## 2023-11-08 RX ORDER — HYDROCORTISONE 20 MG
50 TABLET ORAL EVERY 6 HOURS
Refills: 0 | Status: COMPLETED | OUTPATIENT
Start: 2023-11-08 | End: 2023-11-12

## 2023-11-08 RX ORDER — HYDROCORTISONE 20 MG
50 TABLET ORAL EVERY 6 HOURS
Refills: 0 | Status: DISCONTINUED | OUTPATIENT
Start: 2023-11-08 | End: 2023-11-08

## 2023-11-08 RX ORDER — HYDROCORTISONE 20 MG
100 TABLET ORAL EVERY 8 HOURS
Refills: 0 | Status: DISCONTINUED | OUTPATIENT
Start: 2023-11-08 | End: 2023-11-08

## 2023-11-08 RX ORDER — CHLORHEXIDINE GLUCONATE 213 G/1000ML
1 SOLUTION TOPICAL
Refills: 0 | Status: DISCONTINUED | OUTPATIENT
Start: 2023-11-08 | End: 2023-11-08

## 2023-11-08 RX ORDER — CALCIUM GLUCONATE 100 MG/ML
2 VIAL (ML) INTRAVENOUS ONCE
Refills: 0 | Status: COMPLETED | OUTPATIENT
Start: 2023-11-08 | End: 2023-11-08

## 2023-11-08 RX ORDER — MEROPENEM 1 G/30ML
500 INJECTION INTRAVENOUS EVERY 24 HOURS
Refills: 0 | Status: DISCONTINUED | OUTPATIENT
Start: 2023-11-08 | End: 2023-11-09

## 2023-11-08 RX ORDER — DEXTROSE 50 % IN WATER 50 %
50 SYRINGE (ML) INTRAVENOUS ONCE
Refills: 0 | Status: COMPLETED | OUTPATIENT
Start: 2023-11-08 | End: 2023-11-08

## 2023-11-08 RX ADMIN — Medication 50 MILLILITER(S): at 02:12

## 2023-11-08 RX ADMIN — Medication 1 MILLIGRAM(S): at 12:39

## 2023-11-08 RX ADMIN — Medication 50 MILLIGRAM(S): at 12:39

## 2023-11-08 RX ADMIN — Medication 50 MILLIGRAM(S): at 23:57

## 2023-11-08 RX ADMIN — Medication 50 MILLIGRAM(S): at 17:38

## 2023-11-08 RX ADMIN — CHLORHEXIDINE GLUCONATE 15 MILLILITER(S): 213 SOLUTION TOPICAL at 17:40

## 2023-11-08 RX ADMIN — LACTULOSE 20 GRAM(S): 10 SOLUTION ORAL at 21:01

## 2023-11-08 RX ADMIN — ATORVASTATIN CALCIUM 80 MILLIGRAM(S): 80 TABLET, FILM COATED ORAL at 21:01

## 2023-11-08 RX ADMIN — Medication 200 GRAM(S): at 05:05

## 2023-11-08 RX ADMIN — LACTULOSE 20 GRAM(S): 10 SOLUTION ORAL at 14:30

## 2023-11-08 RX ADMIN — VASOPRESSIN 6 UNIT(S)/MIN: 20 INJECTION INTRAVENOUS at 18:57

## 2023-11-08 RX ADMIN — Medication 20 MICROGRAM(S)/KG/MIN: at 18:58

## 2023-11-08 RX ADMIN — CHLORHEXIDINE GLUCONATE 15 MILLILITER(S): 213 SOLUTION TOPICAL at 06:18

## 2023-11-08 RX ADMIN — VASOPRESSIN 6 UNIT(S)/MIN: 20 INJECTION INTRAVENOUS at 08:25

## 2023-11-08 RX ADMIN — PIPERACILLIN AND TAZOBACTAM 25 GRAM(S): 4; .5 INJECTION, POWDER, LYOPHILIZED, FOR SOLUTION INTRAVENOUS at 00:35

## 2023-11-08 RX ADMIN — PROPOFOL 8.4 MICROGRAM(S)/KG/MIN: 10 INJECTION, EMULSION INTRAVENOUS at 08:25

## 2023-11-08 RX ADMIN — CLOPIDOGREL BISULFATE 75 MILLIGRAM(S): 75 TABLET, FILM COATED ORAL at 12:38

## 2023-11-08 RX ADMIN — PROPOFOL 8.4 MICROGRAM(S)/KG/MIN: 10 INJECTION, EMULSION INTRAVENOUS at 18:58

## 2023-11-08 RX ADMIN — Medication 81 MILLIGRAM(S): at 12:39

## 2023-11-08 RX ADMIN — Medication 20 MICROGRAM(S)/KG/MIN: at 08:26

## 2023-11-08 RX ADMIN — AZITHROMYCIN 255 MILLIGRAM(S): 500 TABLET, FILM COATED ORAL at 06:18

## 2023-11-08 RX ADMIN — Medication 100 MILLIGRAM(S): at 05:05

## 2023-11-08 RX ADMIN — MEROPENEM 100 MILLIGRAM(S): 1 INJECTION INTRAVENOUS at 12:39

## 2023-11-08 NOTE — PROCEDURE NOTE - NSPOSTCAREGUIDE_GEN_A_CORE
Verbal/written post procedure instructions were given to patient/caregiver/Keep the cast/splint/dressing clean and dry
Care for catheter as per unit/ICU protocols
Verbal/written post procedure instructions were given to patient/caregiver/Keep the cast/splint/dressing clean and dry/Care for catheter as per unit/ICU protocols

## 2023-11-08 NOTE — PROCEDURE NOTE - NSINFORMCONSENT_GEN_A_CORE
by daughter/Benefits, risks, and possible complications of procedure explained to patient/caregiver who verbalized understanding and gave written consent.
Benefits, risks, and possible complications of procedure explained to patient/caregiver who verbalized understanding and gave written consent.
This was an emergent procedure.
Benefits, risks, and possible complications of procedure explained to patient/caregiver who verbalized understanding and gave written consent.

## 2023-11-08 NOTE — PROGRESS NOTE ADULT - ASSESSMENT
73M w/ ESRD on HD MWF, CAD s/p stent 1m ago, BPH, DM, HTN presenting w/ AMS and AHRF; concern for stroke vs toxic metabolic encephalopathy, intubated for airway protection and hypoxic respiratory failure.       ==============NEURO=============  #Acute Metabolic Encephalopathy:   - baseline independent, AOx3  - CT head w/ moderate chronic ischemic changes  - CTA neck and brain: Patent, ECAs, ICAs, no  hemodynamically significant stenosis at ICA origins by NASCET criteria. Bilateral vertebral arteries are patent without flow limiting stenosis.  - Neurology consulted: recommended MRI, spot EEG, maintaining on DAPT  - Acetaminophen, salicylates negative    =========CARDIOVASCULAR=========  #Shock  - unclear etiology however may be iso sepsis vs vasoplegia vs obstructive  - on levophed for BP support  - bedside POCUS with evidence of enlarged RV  - pending CTA PE protocol to r/o PE     #CAD s/p stent 10/11/2023  #RCA stent with 99% stenosis   - c/w home aspirin 81mg qd  - c/w home plavix 75mg qd  - c/w home atorvastatin 80mg qd  - hold home coreg 12.5mg BID iso shock state     ============RESPIRATORY==========  #Acute hypoxic respiratory failure  - desat to 80% at home w/ agonal breathing per patient's daughter  - s/p intubation 11/7  - on volume AC RR 14  PEEP 5 and FiO2 50%   - CPAP as tolerated    #Bilateral Multifocal PNA  - may be residual from recent hospitalization at [facility] for bacterial PNA  - will treat broadly with zosyn and azithromycin  - f/u urine legionella and strep     ============RENAL==============  #ESRD on HD  - Cr 2.54  - s/p HD on 11/6 via L AVF  - will consult nephrology for ongoing HD in unit   Iglesias placed; strict I/Os    ============GI===============  #Diet and Nutrition  - NPO w/ tube feeds    ============ENDO============  #DM  - home regimen - Humalog 15u qAC, Glargine 6u qhs  - FS q6h w/ ISS while NPO w/ tube feeds    ===========HEME/ONC===========  #Anemia  - Hgb 7 on admission  - was receiving aranesp outpatient with HD    =============ID=============  #r/o Sepsis  - potential etiologies include multifocal PNA vs colitis vs other   - s/p Azithromycin, Vancomycin, and Zosyn in ED  - continue with broad spectrum with zosyn and azithromycin (should cover GI sources)  - MRSA swab and can dose vanc by level  - pending blood cultures (11/7-)  - pending urinalysis and cultures (11/7-)    ===========Lines/Tubes/Iglesias===========  - GOC: FULL CODE    73M w/ ESRD on HD MWF, CAD s/p stent 1m ago, BPH, DM, HTN presenting w/ AMS and AHRF; concern for stroke vs toxic metabolic encephalopathy, intubated for airway protection and hypoxic respiratory failure.       ==============NEURO=============  #Acute Metabolic Encephalopathy:   - baseline independent, AOx3  - CT head w/ moderate chronic ischemic changes  - CTA neck and brain: Patent, ECAs, ICAs, no  hemodynamically significant stenosis at ICA origins by NASCET criteria. Bilateral vertebral arteries are patent without flow limiting stenosis.  - Neurology consulted: recommended MRI, spot EEG, maintaining on DAPT  - Acetaminophen, salicylates negative    =========CARDIOVASCULAR=========  #Shock  - unclear etiology however may be iso sepsis vs vasoplegia vs obstructive  - on levophed for BP support  - started on vasopressin overnight  - on solucortef 100 q8 for presumed adrenal insufficiency state   - bedside POCUS with evidence of enlarged RV  - pending CTA PE protocol to r/o PE     #CAD s/p stent 10/11/2023  #RCA stent with 99% stenosis   - c/w home aspirin 81mg qd  - c/w home plavix 75mg qd  - c/w home atorvastatin 80mg qd  - hold home coreg 12.5mg BID iso shock state     ============RESPIRATORY==========  #Acute hypoxic respiratory failure  - desat to 80% at home w/ agonal breathing per patient's daughter  - s/p intubation 11/7  - on volume AC RR 14  PEEP 5 and FiO2 50%   - CPAP as tolerated    #Bilateral Multifocal PNA  - may be residual from recent hospitalization at [facility] for bacterial PNA  - will treat broadly with zosyn and azithromycin  - f/u urine legionella and strep     ============RENAL==============  #ESRD on HD  - Cr 2.54  - s/p HD on 11/6 via L AVF  - will consult nephrology for ongoing HD in unit   Iglesias placed; strict I/Os    ============GI===============  #Diet and Nutrition  - NPO w/ tube feeds    ============ENDO============  #DM  - home regimen - Humalog 15u qAC, Glargine 6u qhs  - FS q6h w/ ISS while NPO w/ tube feeds    #Euthyroid Sick Syndrome  - initial labs concerning for TSH 28.9 and FT4 wnl  - will follow up remainder of thyroid labs     ===========HEME/ONC===========  #Anemia  - Hgb 7 on admission  - was receiving aranesp outpatient with HD    =============ID=============  #r/o Sepsis  - potential etiologies include multifocal PNA vs colitis vs other   - s/p Azithromycin, Vancomycin, and Zosyn in ED  - continue with broad spectrum with zosyn and azithromycin (should cover GI sources)  - MRSA swab and can dose vanc by level  - pending blood cultures (11/7-)  - pending urinalysis and cultures (11/7-)    ===========Lines/Tubes/Iglesias===========  - GOC: FULL CODE

## 2023-11-08 NOTE — CHART NOTE - NSCHARTNOTEFT_GEN_A_CORE
: Da Rhodes MD     INDICATION: Critical Illness     PROCEDURE:  [ X ] LIMITED ECHO  [ X ] LIMITED CHEST  [ X ] LIMITED RETROPERITONEAL  [ X ] LIMITED ABDOMINAL  [ ] LIMITED DVT  [ ] NEEDLE GUIDANCE VASCULAR  [ ] NEEDLE GUIDANCE THORACENTESIS  [ X ] NEEDLE GUIDANCE PARACENTESIS  [ ] NEEDLE GUIDANCE PERICARDIOCENTESIS  [ ] OTHER    FINDINGS:   A-line predominant at lung apices, scattered b-lines in b/l lung bases. Dense lobar consolidations in b/l lower lung.   Segmental wall motion abnormality, anterior septum with decreased motion. R-sided heart dysfunction, enlarged RV. Severe tricuspid regurg.   IV 2.2cm with good variability.  Thickened gallbladder wall with questionable complex fluid mostly within gallbladder, possibly around. No gallstones noted.  Diffuse anechoic fluid noted throughout the abdomen.   L kidney hyperechoic, loss of pelvic-calyceal differentiation.      INTERPRETATION:   Lungs: Dense lobar consolidations in bilateral lungs.   Cardiac: septal wall motion abnormality, R-sided dysfunction, severe tricuspid regurgitation.   Abdomen: Thickened gallbladder wall, possibly cholecystitis, no cholelithiasis noted. Diffuse ascites. S/p needle diagnostic paracentesis in LLQ today. Left kidney with likely severe loss of function. : Da Rhodes MD     INDICATION: Critical Illness     PROCEDURE:  [ X ] LIMITED ECHO  [ X ] LIMITED CHEST  [ X ] LIMITED RETROPERITONEAL  [ X ] LIMITED ABDOMINAL  [ ] LIMITED DVT  [ ] NEEDLE GUIDANCE VASCULAR  [ ] NEEDLE GUIDANCE THORACENTESIS  [ X ] NEEDLE GUIDANCE PARACENTESIS  [ ] NEEDLE GUIDANCE PERICARDIOCENTESIS  [ ] OTHER    FINDINGS:   A-line predominant at lung apices, scattered b-lines in b/l lung bases. Dense lobar consolidations in b/l lower lung.   Segmental wall motion abnormality, anterior septum with decreased motion. R-sided heart dysfunction, enlarged RV. Severe tricuspid regurg.   IV 2.2cm with good variability.  Thickened gallbladder wall with questionable complex fluid mostly within gallbladder, possibly around. No gallstones noted.  Diffuse anechoic fluid noted throughout the abdomen.   L kidney hyperechoic, loss of pelvic-calyceal differentiation.      INTERPRETATION:   Lungs: Dense lobar consolidations in bilateral lungs.   Cardiac: septal wall motion abnormality, R-sided dysfunction, severe tricuspid regurgitation.   Abdomen: Thickened gallbladder wall, possibly cholecystitis, no cholelithiasis noted. Diffuse ascites. S/p needle diagnostic paracentesis in LLQ today. Left kidney with likely severe loss of function.    I was at bedside throughout the procedure   Quentin LUGO

## 2023-11-08 NOTE — CONSULT NOTE ADULT - SUBJECTIVE AND OBJECTIVE BOX
[facility] DIVISION OF KIDNEY DISEASES AND HYPERTENSION -- [phone]  -- INITIAL CONSULT NOTE  --------------------------------------------------------------------------------  HPI: 72 yo M with a PMH of ESRD on HD TIW (MWF, AVF), s/p Renal transplant, HTN, DM who presented to [facility] [place] after being found down at home. Per family, found unresponsive in the bathroom prior to admission. Admitted to MICU due to shock requiring multiple vasopressors. Nephrology consulted for ESRD/HD management.    Pt seen and examined in the MICU, family present. Per family, he last had HD on 11/6/23. BP after HD noted to be in the 90s systolic, per family, he can be symptomatic with this BP but no overt acute issues recently. No recent fevers, chills, n/v, diarrhea. He is currently on intubated, sedated and on multiple vasopressors.     PAST HISTORY  --------------------------------------------------------------------------------  PAST MEDICAL & SURGICAL HISTORY:  End Stage Renal Disease on Dialysis  Diabetes  H/O: Hypertension  Coronary artery disease  Transplanted kidney  Status Post Coronary Artery Stent Placement  S/P kidney transplant      FAMILY HISTORY: None    PAST SOCIAL HISTORY: No illicit drugs per family     ALLERGIES & MEDICATIONS  --------------------------------------------------------------------------------  Allergies  No Known Allergies    Intolerances    Standing Inpatient Medications  aspirin  chewable 81 milliGRAM(s) Oral daily  atorvastatin 80 milliGRAM(s) Oral at bedtime  azithromycin  IVPB      chlorhexidine 0.12% Liquid 15 milliLiter(s) Oral Mucosa every 12 hours  clopidogrel Tablet 75 milliGRAM(s) Oral daily  CRRT Treatment    <Continuous>  folic acid 1 milliGRAM(s) Oral daily  hydrocortisone sodium succinate Injectable 50 milliGRAM(s) IV Push every 6 hours  lactulose Syrup 20 Gram(s) Oral three times a day  meropenem  IVPB 500 milliGRAM(s) IV Intermittent every 24 hours  norepinephrine Infusion 0.329 MICROgram(s)/kG/Min IV Continuous <Continuous>  Phoxillum Filtration BK 4 / 2.5 5000 milliLiter(s) CRRT <Continuous>  Phoxillum Filtration BK 4 / 2.5 5000 milliLiter(s) CRRT <Continuous>  PrismaSATE Dialysate BGK 4 / 2.5 5000 milliLiter(s) CRRT <Continuous>  propofol Infusion 20 MICROgram(s)/kG/Min IV Continuous <Continuous>  vasopressin Infusion 0.04 Unit(s)/Min IV Continuous <Continuous>    PRN Inpatient Medications    REVIEW OF SYSTEMS  --------------------------------------------------------------------------------  Unable to obtain further ROS     VITALS/PHYSICAL EXAM  --------------------------------------------------------------------------------  T(C): 36.9 (11-08-23 @ 12:00), Max: 37.6 (11-08-23 @ 04:00)  HR: 84 (11-08-23 @ 12:00) (57 - 86)  BP: 60/25 (11-07-23 @ 20:00) (60/25 - 150/40)  RR: 15 (11-08-23 @ 12:00) (0 - 20)  SpO2: 100% (11-08-23 @ 12:00) (95% - 100%)  Wt(kg): --  Height (cm): 180.3 (11-07-23 @ 14:45)  Weight (kg): 64.8 (11-07-23 @ 14:45)  BMI (kg/m2): 19.9 (11-07-23 @ 14:45)  BSA (m2): 1.83 (11-07-23 @ 14:45)    11-07-23 @ 07:01  -  11-08-23 @ 07:00  --------------------------------------------------------  IN: 1082.3 mL / OUT: 0 mL / NET: 1082.3 mL    11-08-23 @ 07:01  -  11-08-23 @ 13:06  --------------------------------------------------------  IN: 99.4 mL / OUT: 0 mL / NET: 99.4 mL    Physical Exam:  	Gen: Intubated, sedated  	HEENT: Anicteric  	Pulm: Mechanical breath sounds  	CV: S1S2+  	Abd: Soft, +BS    	Ext: No LE edema B/L  	Neuro: Sedated              : Iglesias+ with no urine   	Skin: Warm and dry  	Dialysis access: L AVF+    LABS/STUDIES  --------------------------------------------------------------------------------              9.8    8.12  >-----------<  55       [11-08-23 @ 00:42]              27.1     138  |  101  |  29  ----------------------------<  65      [11-08-23 @ 00:42]  4.0   |  23  |  3.80        Ca     7.6     [11-08-23 @ 00:42]      iCa    0.98     [11-08 @ 00:42]      Mg     2.00     [11-08-23 @ 00:42]      Phos  3.2     [11-08-23 @ 00:42]    TPro  7.6  /  Alb  2.0  /  TBili  5.5  /  DBili  x   /  AST  688  /  ALT  193  /  AlkPhos  481  [11-08-23 @ 00:42]    PT/INR: PT 20.3 , INR 1.83       [11-08-23 @ 00:42]  PTT: 42.2       [11-08-23 @ 00:42]    Creatinine Trend:  SCr 3.80 [11-08 @ 00:42]  SCr 3.70 [11-07 @ 19:50]  SCr 3.42 [11-07 @ 12:53]  SCr 2.54 [11-07 @ 08:37]    Urinalysis - [11-08-23 @ 00:42]      Color  / Appearance  / SG  / pH       Gluc 65 / Ketone   / Bili  / Urobili        Blood  / Protein  / Leuk Est  / Nitrite       RBC  / WBC  / Hyaline  / Gran  / Sq Epi  / Non Sq Epi  / Bacteria

## 2023-11-08 NOTE — PROGRESS NOTE ADULT - SUBJECTIVE AND OBJECTIVE BOX
Patient is a 73y old  Male who presents with a chief complaint of     SUBJECTIVE / OVERNIGHT EVENTS:  Patient seen and evaluated at bedside.    Denies any fevers, chills, CP, or SOB.    Vital Signs Last 24 Hrs  T(C): 37.6 (08 Nov 2023 04:00), Max: 37.6 (08 Nov 2023 04:00)  T(F): 99.7 (08 Nov 2023 04:00), Max: 99.7 (08 Nov 2023 04:00)  HR: 82 (08 Nov 2023 06:00) (57 - 84)  BP: 60/25 (07 Nov 2023 20:00) (60/25 - 150/40)  BP(mean): 31 (07 Nov 2023 20:00) (31 - 98)  RR: 15 (08 Nov 2023 06:00) (0 - 21)  SpO2: 98% (08 Nov 2023 06:00) (95% - 100%)    Parameters below as of 08 Nov 2023 06:00  Patient On (Oxygen Delivery Method): ventilator    O2 Concentration (%): 50    PHYSICAL EXAM:  GENERAL: intubated and sedated   HEAD:  Atraumatic, Normocephalic  EYES: PERRL  ENT: unable to assess iso intubation   NECK: Supple, No JVD  CHEST/LUNG: air movement noted on bilateral lung apices   HEART: Regular rate and rhythm; No murmurs, rubs, or gallops  ABDOMEN: BSx4; Soft, nontender, nondistended  EXTREMITIES: diminished b/l lower extremity pulses   NERVOUS SYSTEM:  A&Ox0 iso propofol    LABS:                        9.8    8.12  )-----------( 55       ( 08 Nov 2023 00:42 )             27.1     Hgb Trend: 9.8<--, 9.6<--, 8.6<--, 7.0<--  11-08    138  |  101  |  29<H>  ----------------------------<  65<L>  4.0   |  23  |  3.80<H>    Ca    7.6<L>      08 Nov 2023 00:42  Phos  3.2     11-08  Mg     2.00     11-08    TPro  7.6  /  Alb  2.0<L>  /  TBili  5.5<H>  /  DBili  x   /  AST  688<H>  /  ALT  193<H>  /  AlkPhos  481<H>  11-08    Creatinine Trend: 3.80<--, 3.70<--, 3.42<--, 2.54<--  LIVER FUNCTIONS - ( 08 Nov 2023 00:42 )  Alb: 2.0 g/dL / Pro: 7.6 g/dL / ALK PHOS: 481 U/L / ALT: 193 U/L / AST: 688 U/L / GGT: x           PT/INR - ( 08 Nov 2023 00:42 )   PT: 20.3 sec;   INR: 1.83 ratio         PTT - ( 08 Nov 2023 00:42 )  PTT:42.2 sec      Urinalysis Basic - ( 08 Nov 2023 00:42 )    Color: x / Appearance: x / SG: x / pH: x  Gluc: 65 mg/dL / Ketone: x  / Bili: x / Urobili: x   Blood: x / Protein: x / Nitrite: x   Leuk Esterase: x / RBC: x / WBC x   Sq Epi: x / Non Sq Epi: x / Bacteria: x     Patient is a 73y old  Male who presents with a chief complaint of     SUBJECTIVE / OVERNIGHT EVENTS:  Patient seen and evaluated at bedside.  Overnight, patient started on solucortef 100 q8. Given sodium bicarbonate.     Vital Signs Last 24 Hrs  T(C): 37.6 (08 Nov 2023 04:00), Max: 37.6 (08 Nov 2023 04:00)  T(F): 99.7 (08 Nov 2023 04:00), Max: 99.7 (08 Nov 2023 04:00)  HR: 82 (08 Nov 2023 06:00) (57 - 84)  BP: 60/25 (07 Nov 2023 20:00) (60/25 - 150/40)  BP(mean): 31 (07 Nov 2023 20:00) (31 - 98)  RR: 15 (08 Nov 2023 06:00) (0 - 21)  SpO2: 98% (08 Nov 2023 06:00) (95% - 100%)    Parameters below as of 08 Nov 2023 06:00  Patient On (Oxygen Delivery Method): ventilator    O2 Concentration (%): 50    PHYSICAL EXAM:  GENERAL: intubated and sedated   HEAD:  Atraumatic, Normocephalic  EYES: PERRL  ENT: unable to assess iso intubation   NECK: Supple, No JVD  CHEST/LUNG: air movement noted on bilateral lung apices   HEART: Regular rate and rhythm; No murmurs, rubs, or gallops  ABDOMEN: BSx4; Soft, nontender, nondistended  EXTREMITIES: diminished b/l lower extremity pulses   NERVOUS SYSTEM:  A&Ox0 iso propofol    LABS:                        9.8    8.12  )-----------( 55       ( 08 Nov 2023 00:42 )             27.1     Hgb Trend: 9.8<--, 9.6<--, 8.6<--, 7.0<--  11-08    138  |  101  |  29<H>  ----------------------------<  65<L>  4.0   |  23  |  3.80<H>    Ca    7.6<L>      08 Nov 2023 00:42  Phos  3.2     11-08  Mg     2.00     11-08    TPro  7.6  /  Alb  2.0<L>  /  TBili  5.5<H>  /  DBili  x   /  AST  688<H>  /  ALT  193<H>  /  AlkPhos  481<H>  11-08    Creatinine Trend: 3.80<--, 3.70<--, 3.42<--, 2.54<--  LIVER FUNCTIONS - ( 08 Nov 2023 00:42 )  Alb: 2.0 g/dL / Pro: 7.6 g/dL / ALK PHOS: 481 U/L / ALT: 193 U/L / AST: 688 U/L / GGT: x           PT/INR - ( 08 Nov 2023 00:42 )   PT: 20.3 sec;   INR: 1.83 ratio         PTT - ( 08 Nov 2023 00:42 )  PTT:42.2 sec      Urinalysis Basic - ( 08 Nov 2023 00:42 )    Color: x / Appearance: x / SG: x / pH: x  Gluc: 65 mg/dL / Ketone: x  / Bili: x / Urobili: x   Blood: x / Protein: x / Nitrite: x   Leuk Esterase: x / RBC: x / WBC x   Sq Epi: x / Non Sq Epi: x / Bacteria: x

## 2023-11-08 NOTE — PROCEDURE NOTE - NSBRONCHPROCDETAILS_GEN_A_CORE_FT
Indication: respiratory failure, LLL consolidation    Operators: Kolton Berry, Fritz Stokes, Funmilayo Dempsey    Pre-op Dx: see above    Findings:  Bronchoscope inserted through ETT. ETT noted to be in good position. Airway evaluation revealed mildly erythematous mucosa, no significant secretions, some mucus. BAL performed in lateral segment of the LLL.  Bronchoscope then withdrawn from ETT.    Specimens: BAL for culture, cell count

## 2023-11-08 NOTE — PROCEDURE NOTE - NSINDICATIONS_GEN_A_CORE
critical patient/monitoring purposes
dialysis/CRRT
ascites/hemodynamic instability/suspected spontaneous bacterial peritonitis

## 2023-11-08 NOTE — PROCEDURE NOTE - NSPROCDETAILS_GEN_ALL_CORE
guidewire recovered/lumen(s) aspirated and flushed/sterile dressing applied/sterile technique, catheter placed/ultrasound guidance with use of sterile gel and probe cove
location identified, draped/prepped, sterile technique used, needle inserted/introduced/positive blood return obtained via catheter/connected to a pressurized flush line/sutured in place/hemostasis with direct pressure, dressing applied/Seldinger technique/all materials/supplies accounted for at end of procedure
location identified, sterile technique used, catheter introduced, fluid drained/Seldinger technique/sterile dressing applied
(1) Other Diagnosis

## 2023-11-09 LAB
ALBUMIN SERPL ELPH-MCNC: 2 G/DL — LOW (ref 3.3–5)
ALBUMIN SERPL ELPH-MCNC: 2 G/DL — LOW (ref 3.3–5)
ALBUMIN SERPL ELPH-MCNC: 2.1 G/DL — LOW (ref 3.3–5)
ALBUMIN SERPL ELPH-MCNC: 2.1 G/DL — LOW (ref 3.3–5)
ALBUMIN SERPL ELPH-MCNC: 2.2 G/DL — LOW (ref 3.3–5)
ALBUMIN SERPL ELPH-MCNC: 2.2 G/DL — LOW (ref 3.3–5)
ALP SERPL-CCNC: 452 U/L — HIGH (ref 40–120)
ALP SERPL-CCNC: 452 U/L — HIGH (ref 40–120)
ALP SERPL-CCNC: 469 U/L — HIGH (ref 40–120)
ALP SERPL-CCNC: 469 U/L — HIGH (ref 40–120)
ALP SERPL-CCNC: 484 U/L — HIGH (ref 40–120)
ALP SERPL-CCNC: 484 U/L — HIGH (ref 40–120)
ALT FLD-CCNC: 235 U/L — HIGH (ref 4–41)
ALT FLD-CCNC: 262 U/L — HIGH (ref 4–41)
ALT FLD-CCNC: 262 U/L — HIGH (ref 4–41)
ANION GAP SERPL CALC-SCNC: 13 MMOL/L — SIGNIFICANT CHANGE UP (ref 7–14)
ANION GAP SERPL CALC-SCNC: 14 MMOL/L — SIGNIFICANT CHANGE UP (ref 7–14)
ANION GAP SERPL CALC-SCNC: 14 MMOL/L — SIGNIFICANT CHANGE UP (ref 7–14)
APTT BLD: 41.8 SEC — HIGH (ref 24.5–35.6)
APTT BLD: 41.8 SEC — HIGH (ref 24.5–35.6)
APTT BLD: 44.3 SEC — HIGH (ref 24.5–35.6)
APTT BLD: 44.3 SEC — HIGH (ref 24.5–35.6)
APTT BLD: 44.9 SEC — HIGH (ref 24.5–35.6)
APTT BLD: 44.9 SEC — HIGH (ref 24.5–35.6)
AST SERPL-CCNC: 877 U/L — HIGH (ref 4–40)
AST SERPL-CCNC: 877 U/L — HIGH (ref 4–40)
AST SERPL-CCNC: 886 U/L — HIGH (ref 4–40)
AST SERPL-CCNC: 886 U/L — HIGH (ref 4–40)
AST SERPL-CCNC: 915 U/L — HIGH (ref 4–40)
AST SERPL-CCNC: 915 U/L — HIGH (ref 4–40)
BASOPHILS # BLD AUTO: 0.01 K/UL — SIGNIFICANT CHANGE UP (ref 0–0.2)
BASOPHILS NFR BLD AUTO: 0.1 % — SIGNIFICANT CHANGE UP (ref 0–2)
BILIRUB SERPL-MCNC: 6.6 MG/DL — HIGH (ref 0.2–1.2)
BILIRUB SERPL-MCNC: 6.6 MG/DL — HIGH (ref 0.2–1.2)
BILIRUB SERPL-MCNC: 6.8 MG/DL — HIGH (ref 0.2–1.2)
BILIRUB SERPL-MCNC: 6.8 MG/DL — HIGH (ref 0.2–1.2)
BILIRUB SERPL-MCNC: 6.9 MG/DL — HIGH (ref 0.2–1.2)
BILIRUB SERPL-MCNC: 6.9 MG/DL — HIGH (ref 0.2–1.2)
BLOOD GAS ARTERIAL - LYTES,HGB,ICA,LACT RESULT: SIGNIFICANT CHANGE UP
BUN SERPL-MCNC: 27 MG/DL — HIGH (ref 7–23)
BUN SERPL-MCNC: 27 MG/DL — HIGH (ref 7–23)
BUN SERPL-MCNC: 29 MG/DL — HIGH (ref 7–23)
BUN SERPL-MCNC: 29 MG/DL — HIGH (ref 7–23)
BUN SERPL-MCNC: 31 MG/DL — HIGH (ref 7–23)
BUN SERPL-MCNC: 31 MG/DL — HIGH (ref 7–23)
CA-I BLD-SCNC: 0.92 MMOL/L — LOW (ref 1.15–1.29)
CA-I BLD-SCNC: 0.92 MMOL/L — LOW (ref 1.15–1.29)
CA-I BLD-SCNC: 0.96 MMOL/L — LOW (ref 1.15–1.29)
CA-I BLD-SCNC: 0.96 MMOL/L — LOW (ref 1.15–1.29)
CALCIUM SERPL-MCNC: 7.4 MG/DL — LOW (ref 8.4–10.5)
CALCIUM SERPL-MCNC: 7.4 MG/DL — LOW (ref 8.4–10.5)
CALCIUM SERPL-MCNC: 7.8 MG/DL — LOW (ref 8.4–10.5)
CALCIUM SERPL-MCNC: 7.8 MG/DL — LOW (ref 8.4–10.5)
CALCIUM SERPL-MCNC: 8 MG/DL — LOW (ref 8.4–10.5)
CALCIUM SERPL-MCNC: 8 MG/DL — LOW (ref 8.4–10.5)
CHLORIDE SERPL-SCNC: 98 MMOL/L — SIGNIFICANT CHANGE UP (ref 98–107)
CHLORIDE SERPL-SCNC: 98 MMOL/L — SIGNIFICANT CHANGE UP (ref 98–107)
CHLORIDE SERPL-SCNC: 99 MMOL/L — SIGNIFICANT CHANGE UP (ref 98–107)
CO2 SERPL-SCNC: 22 MMOL/L — SIGNIFICANT CHANGE UP (ref 22–31)
CO2 SERPL-SCNC: 22 MMOL/L — SIGNIFICANT CHANGE UP (ref 22–31)
CO2 SERPL-SCNC: 23 MMOL/L — SIGNIFICANT CHANGE UP (ref 22–31)
CREAT SERPL-MCNC: 2.54 MG/DL — HIGH (ref 0.5–1.3)
CREAT SERPL-MCNC: 2.54 MG/DL — HIGH (ref 0.5–1.3)
CREAT SERPL-MCNC: 3.02 MG/DL — HIGH (ref 0.5–1.3)
CREAT SERPL-MCNC: 3.02 MG/DL — HIGH (ref 0.5–1.3)
CREAT SERPL-MCNC: 3.58 MG/DL — HIGH (ref 0.5–1.3)
CREAT SERPL-MCNC: 3.58 MG/DL — HIGH (ref 0.5–1.3)
EGFR: 17 ML/MIN/1.73M2 — LOW
EGFR: 17 ML/MIN/1.73M2 — LOW
EGFR: 21 ML/MIN/1.73M2 — LOW
EGFR: 21 ML/MIN/1.73M2 — LOW
EGFR: 26 ML/MIN/1.73M2 — LOW
EGFR: 26 ML/MIN/1.73M2 — LOW
EOSINOPHIL # BLD AUTO: 0 K/UL — SIGNIFICANT CHANGE UP (ref 0–0.5)
EOSINOPHIL # BLD AUTO: 0.01 K/UL — SIGNIFICANT CHANGE UP (ref 0–0.5)
EOSINOPHIL # BLD AUTO: 0.01 K/UL — SIGNIFICANT CHANGE UP (ref 0–0.5)
EOSINOPHIL NFR BLD AUTO: 0 % — SIGNIFICANT CHANGE UP (ref 0–6)
EOSINOPHIL NFR BLD AUTO: 0.1 % — SIGNIFICANT CHANGE UP (ref 0–6)
EOSINOPHIL NFR BLD AUTO: 0.1 % — SIGNIFICANT CHANGE UP (ref 0–6)
GLUCOSE BLDC GLUCOMTR-MCNC: 182 MG/DL — HIGH (ref 70–99)
GLUCOSE SERPL-MCNC: 150 MG/DL — HIGH (ref 70–99)
GLUCOSE SERPL-MCNC: 150 MG/DL — HIGH (ref 70–99)
GLUCOSE SERPL-MCNC: 223 MG/DL — HIGH (ref 70–99)
GLUCOSE SERPL-MCNC: 223 MG/DL — HIGH (ref 70–99)
GLUCOSE SERPL-MCNC: 226 MG/DL — HIGH (ref 70–99)
GLUCOSE SERPL-MCNC: 226 MG/DL — HIGH (ref 70–99)
HCT VFR BLD CALC: 24.9 % — LOW (ref 39–50)
HCT VFR BLD CALC: 24.9 % — LOW (ref 39–50)
HCT VFR BLD CALC: 25.6 % — LOW (ref 39–50)
HCT VFR BLD CALC: 25.6 % — LOW (ref 39–50)
HCT VFR BLD CALC: 26.4 % — LOW (ref 39–50)
HCT VFR BLD CALC: 26.4 % — LOW (ref 39–50)
HGB BLD-MCNC: 8.9 G/DL — LOW (ref 13–17)
HGB BLD-MCNC: 8.9 G/DL — LOW (ref 13–17)
HGB BLD-MCNC: 9 G/DL — LOW (ref 13–17)
HGB BLD-MCNC: 9 G/DL — LOW (ref 13–17)
HGB BLD-MCNC: 9.5 G/DL — LOW (ref 13–17)
HGB BLD-MCNC: 9.5 G/DL — LOW (ref 13–17)
IANC: 7.37 K/UL — SIGNIFICANT CHANGE UP (ref 1.8–7.4)
IANC: 7.37 K/UL — SIGNIFICANT CHANGE UP (ref 1.8–7.4)
IANC: 7.5 K/UL — HIGH (ref 1.8–7.4)
IANC: 7.5 K/UL — HIGH (ref 1.8–7.4)
IANC: 8.55 K/UL — HIGH (ref 1.8–7.4)
IANC: 8.55 K/UL — HIGH (ref 1.8–7.4)
IMM GRANULOCYTES NFR BLD AUTO: 0.3 % — SIGNIFICANT CHANGE UP (ref 0–0.9)
IMM GRANULOCYTES NFR BLD AUTO: 0.3 % — SIGNIFICANT CHANGE UP (ref 0–0.9)
IMM GRANULOCYTES NFR BLD AUTO: 0.5 % — SIGNIFICANT CHANGE UP (ref 0–0.9)
IMM GRANULOCYTES NFR BLD AUTO: 0.5 % — SIGNIFICANT CHANGE UP (ref 0–0.9)
IMM GRANULOCYTES NFR BLD AUTO: 0.6 % — SIGNIFICANT CHANGE UP (ref 0–0.9)
IMM GRANULOCYTES NFR BLD AUTO: 0.6 % — SIGNIFICANT CHANGE UP (ref 0–0.9)
INR BLD: 2.09 RATIO — HIGH (ref 0.85–1.18)
INR BLD: 2.09 RATIO — HIGH (ref 0.85–1.18)
INR BLD: 2.1 RATIO — HIGH (ref 0.85–1.18)
INR BLD: 2.1 RATIO — HIGH (ref 0.85–1.18)
INR BLD: 2.22 RATIO — HIGH (ref 0.85–1.18)
INR BLD: 2.22 RATIO — HIGH (ref 0.85–1.18)
LYMPHOCYTES # BLD AUTO: 1.03 K/UL — SIGNIFICANT CHANGE UP (ref 1–3.3)
LYMPHOCYTES # BLD AUTO: 1.03 K/UL — SIGNIFICANT CHANGE UP (ref 1–3.3)
LYMPHOCYTES # BLD AUTO: 1.17 K/UL — SIGNIFICANT CHANGE UP (ref 1–3.3)
LYMPHOCYTES # BLD AUTO: 1.17 K/UL — SIGNIFICANT CHANGE UP (ref 1–3.3)
LYMPHOCYTES # BLD AUTO: 1.34 K/UL — SIGNIFICANT CHANGE UP (ref 1–3.3)
LYMPHOCYTES # BLD AUTO: 1.34 K/UL — SIGNIFICANT CHANGE UP (ref 1–3.3)
LYMPHOCYTES # BLD AUTO: 11.2 % — LOW (ref 13–44)
LYMPHOCYTES # BLD AUTO: 11.2 % — LOW (ref 13–44)
LYMPHOCYTES # BLD AUTO: 12.3 % — LOW (ref 13–44)
LYMPHOCYTES # BLD AUTO: 12.3 % — LOW (ref 13–44)
LYMPHOCYTES # BLD AUTO: 12.6 % — LOW (ref 13–44)
LYMPHOCYTES # BLD AUTO: 12.6 % — LOW (ref 13–44)
MAGNESIUM SERPL-MCNC: 2.1 MG/DL — SIGNIFICANT CHANGE UP (ref 1.6–2.6)
MAGNESIUM SERPL-MCNC: 2.1 MG/DL — SIGNIFICANT CHANGE UP (ref 1.6–2.6)
MAGNESIUM SERPL-MCNC: 2.2 MG/DL — SIGNIFICANT CHANGE UP (ref 1.6–2.6)
MAGNESIUM SERPL-MCNC: 2.2 MG/DL — SIGNIFICANT CHANGE UP (ref 1.6–2.6)
MAGNESIUM SERPL-MCNC: 2.3 MG/DL — SIGNIFICANT CHANGE UP (ref 1.6–2.6)
MAGNESIUM SERPL-MCNC: 2.3 MG/DL — SIGNIFICANT CHANGE UP (ref 1.6–2.6)
MCHC RBC-ENTMCNC: 29.9 PG — SIGNIFICANT CHANGE UP (ref 27–34)
MCHC RBC-ENTMCNC: 29.9 PG — SIGNIFICANT CHANGE UP (ref 27–34)
MCHC RBC-ENTMCNC: 30 PG — SIGNIFICANT CHANGE UP (ref 27–34)
MCHC RBC-ENTMCNC: 35.2 GM/DL — SIGNIFICANT CHANGE UP (ref 32–36)
MCHC RBC-ENTMCNC: 35.2 GM/DL — SIGNIFICANT CHANGE UP (ref 32–36)
MCHC RBC-ENTMCNC: 35.7 GM/DL — SIGNIFICANT CHANGE UP (ref 32–36)
MCHC RBC-ENTMCNC: 35.7 GM/DL — SIGNIFICANT CHANGE UP (ref 32–36)
MCHC RBC-ENTMCNC: 36 GM/DL — SIGNIFICANT CHANGE UP (ref 32–36)
MCHC RBC-ENTMCNC: 36 GM/DL — SIGNIFICANT CHANGE UP (ref 32–36)
MCV RBC AUTO: 83.3 FL — SIGNIFICANT CHANGE UP (ref 80–100)
MCV RBC AUTO: 83.3 FL — SIGNIFICANT CHANGE UP (ref 80–100)
MCV RBC AUTO: 83.6 FL — SIGNIFICANT CHANGE UP (ref 80–100)
MCV RBC AUTO: 83.6 FL — SIGNIFICANT CHANGE UP (ref 80–100)
MCV RBC AUTO: 85.3 FL — SIGNIFICANT CHANGE UP (ref 80–100)
MCV RBC AUTO: 85.3 FL — SIGNIFICANT CHANGE UP (ref 80–100)
MONOCYTES # BLD AUTO: 0.6 K/UL — SIGNIFICANT CHANGE UP (ref 0–0.9)
MONOCYTES # BLD AUTO: 0.6 K/UL — SIGNIFICANT CHANGE UP (ref 0–0.9)
MONOCYTES # BLD AUTO: 0.71 K/UL — SIGNIFICANT CHANGE UP (ref 0–0.9)
MONOCYTES # BLD AUTO: 0.71 K/UL — SIGNIFICANT CHANGE UP (ref 0–0.9)
MONOCYTES # BLD AUTO: 0.96 K/UL — HIGH (ref 0–0.9)
MONOCYTES # BLD AUTO: 0.96 K/UL — HIGH (ref 0–0.9)
MONOCYTES NFR BLD AUTO: 6.5 % — SIGNIFICANT CHANGE UP (ref 2–14)
MONOCYTES NFR BLD AUTO: 6.5 % — SIGNIFICANT CHANGE UP (ref 2–14)
MONOCYTES NFR BLD AUTO: 7.6 % — SIGNIFICANT CHANGE UP (ref 2–14)
MONOCYTES NFR BLD AUTO: 7.6 % — SIGNIFICANT CHANGE UP (ref 2–14)
MONOCYTES NFR BLD AUTO: 8.8 % — SIGNIFICANT CHANGE UP (ref 2–14)
MONOCYTES NFR BLD AUTO: 8.8 % — SIGNIFICANT CHANGE UP (ref 2–14)
NEUTROPHILS # BLD AUTO: 7.37 K/UL — SIGNIFICANT CHANGE UP (ref 1.8–7.4)
NEUTROPHILS # BLD AUTO: 7.37 K/UL — SIGNIFICANT CHANGE UP (ref 1.8–7.4)
NEUTROPHILS # BLD AUTO: 7.5 K/UL — HIGH (ref 1.8–7.4)
NEUTROPHILS # BLD AUTO: 7.5 K/UL — HIGH (ref 1.8–7.4)
NEUTROPHILS # BLD AUTO: 8.55 K/UL — HIGH (ref 1.8–7.4)
NEUTROPHILS # BLD AUTO: 8.55 K/UL — HIGH (ref 1.8–7.4)
NEUTROPHILS NFR BLD AUTO: 78.2 % — HIGH (ref 43–77)
NEUTROPHILS NFR BLD AUTO: 78.2 % — HIGH (ref 43–77)
NEUTROPHILS NFR BLD AUTO: 79.1 % — HIGH (ref 43–77)
NEUTROPHILS NFR BLD AUTO: 79.1 % — HIGH (ref 43–77)
NEUTROPHILS NFR BLD AUTO: 81.9 % — HIGH (ref 43–77)
NEUTROPHILS NFR BLD AUTO: 81.9 % — HIGH (ref 43–77)
NIGHT BLUE STAIN TISS: SIGNIFICANT CHANGE UP
NIGHT BLUE STAIN TISS: SIGNIFICANT CHANGE UP
NRBC # BLD: 0 /100 WBCS — SIGNIFICANT CHANGE UP (ref 0–0)
NRBC # FLD: 0.03 K/UL — HIGH (ref 0–0)
NRBC # FLD: 0.03 K/UL — HIGH (ref 0–0)
NRBC # FLD: 0.04 K/UL — HIGH (ref 0–0)
NRBC # FLD: 0.04 K/UL — HIGH (ref 0–0)
NRBC # FLD: 0.06 K/UL — HIGH (ref 0–0)
NRBC # FLD: 0.06 K/UL — HIGH (ref 0–0)
PHOSPHATE SERPL-MCNC: 4 MG/DL — SIGNIFICANT CHANGE UP (ref 2.5–4.5)
PHOSPHATE SERPL-MCNC: 4.1 MG/DL — SIGNIFICANT CHANGE UP (ref 2.5–4.5)
PHOSPHATE SERPL-MCNC: 4.1 MG/DL — SIGNIFICANT CHANGE UP (ref 2.5–4.5)
PLATELET # BLD AUTO: 51 K/UL — LOW (ref 150–400)
PLATELET # BLD AUTO: 51 K/UL — LOW (ref 150–400)
PLATELET # BLD AUTO: 57 K/UL — LOW (ref 150–400)
PLATELET # BLD AUTO: 57 K/UL — LOW (ref 150–400)
PLATELET # BLD AUTO: 61 K/UL — LOW (ref 150–400)
PLATELET # BLD AUTO: 61 K/UL — LOW (ref 150–400)
POTASSIUM SERPL-MCNC: 4.4 MMOL/L — SIGNIFICANT CHANGE UP (ref 3.5–5.3)
POTASSIUM SERPL-MCNC: 4.4 MMOL/L — SIGNIFICANT CHANGE UP (ref 3.5–5.3)
POTASSIUM SERPL-MCNC: 4.8 MMOL/L — SIGNIFICANT CHANGE UP (ref 3.5–5.3)
POTASSIUM SERPL-MCNC: 4.8 MMOL/L — SIGNIFICANT CHANGE UP (ref 3.5–5.3)
POTASSIUM SERPL-MCNC: 5.1 MMOL/L — SIGNIFICANT CHANGE UP (ref 3.5–5.3)
POTASSIUM SERPL-MCNC: 5.1 MMOL/L — SIGNIFICANT CHANGE UP (ref 3.5–5.3)
POTASSIUM SERPL-SCNC: 4.4 MMOL/L — SIGNIFICANT CHANGE UP (ref 3.5–5.3)
POTASSIUM SERPL-SCNC: 4.4 MMOL/L — SIGNIFICANT CHANGE UP (ref 3.5–5.3)
POTASSIUM SERPL-SCNC: 4.8 MMOL/L — SIGNIFICANT CHANGE UP (ref 3.5–5.3)
POTASSIUM SERPL-SCNC: 4.8 MMOL/L — SIGNIFICANT CHANGE UP (ref 3.5–5.3)
POTASSIUM SERPL-SCNC: 5.1 MMOL/L — SIGNIFICANT CHANGE UP (ref 3.5–5.3)
POTASSIUM SERPL-SCNC: 5.1 MMOL/L — SIGNIFICANT CHANGE UP (ref 3.5–5.3)
PROCALCITONIN SERPL-MCNC: 9.22 NG/ML — HIGH (ref 0.02–0.1)
PROCALCITONIN SERPL-MCNC: 9.22 NG/ML — HIGH (ref 0.02–0.1)
PROCALCITONIN SERPL-MCNC: 9.39 NG/ML — HIGH (ref 0.02–0.1)
PROCALCITONIN SERPL-MCNC: 9.39 NG/ML — HIGH (ref 0.02–0.1)
PROCALCITONIN SERPL-MCNC: 9.83 NG/ML — HIGH (ref 0.02–0.1)
PROCALCITONIN SERPL-MCNC: 9.83 NG/ML — HIGH (ref 0.02–0.1)
PROT SERPL-MCNC: 7.5 G/DL — SIGNIFICANT CHANGE UP (ref 6–8.3)
PROT SERPL-MCNC: 7.5 G/DL — SIGNIFICANT CHANGE UP (ref 6–8.3)
PROT SERPL-MCNC: 7.8 G/DL — SIGNIFICANT CHANGE UP (ref 6–8.3)
PROT SERPL-MCNC: 7.8 G/DL — SIGNIFICANT CHANGE UP (ref 6–8.3)
PROT SERPL-MCNC: 7.9 G/DL — SIGNIFICANT CHANGE UP (ref 6–8.3)
PROT SERPL-MCNC: 7.9 G/DL — SIGNIFICANT CHANGE UP (ref 6–8.3)
PROTHROM AB SERPL-ACNC: 23 SEC — HIGH (ref 9.5–13)
PROTHROM AB SERPL-ACNC: 23 SEC — HIGH (ref 9.5–13)
PROTHROM AB SERPL-ACNC: 23.1 SEC — HIGH (ref 9.5–13)
PROTHROM AB SERPL-ACNC: 23.1 SEC — HIGH (ref 9.5–13)
PROTHROM AB SERPL-ACNC: 24.3 SEC — HIGH (ref 9.5–13)
PROTHROM AB SERPL-ACNC: 24.3 SEC — HIGH (ref 9.5–13)
RBC # BLD: 2.98 M/UL — LOW (ref 4.2–5.8)
RBC # BLD: 2.98 M/UL — LOW (ref 4.2–5.8)
RBC # BLD: 3 M/UL — LOW (ref 4.2–5.8)
RBC # BLD: 3 M/UL — LOW (ref 4.2–5.8)
RBC # BLD: 3.17 M/UL — LOW (ref 4.2–5.8)
RBC # BLD: 3.17 M/UL — LOW (ref 4.2–5.8)
RBC # FLD: 24.5 % — HIGH (ref 10.3–14.5)
RBC # FLD: 24.5 % — HIGH (ref 10.3–14.5)
RBC # FLD: 24.9 % — HIGH (ref 10.3–14.5)
SODIUM SERPL-SCNC: 134 MMOL/L — LOW (ref 135–145)
SODIUM SERPL-SCNC: 134 MMOL/L — LOW (ref 135–145)
SODIUM SERPL-SCNC: 135 MMOL/L — SIGNIFICANT CHANGE UP (ref 135–145)
SPECIMEN SOURCE: SIGNIFICANT CHANGE UP
SPECIMEN SOURCE: SIGNIFICANT CHANGE UP
TROPONIN T, HIGH SENSITIVITY RESULT: 1251 NG/L — CRITICAL HIGH
TROPONIN T, HIGH SENSITIVITY RESULT: 1251 NG/L — CRITICAL HIGH
TROPONIN T, HIGH SENSITIVITY RESULT: 1375 NG/L — CRITICAL HIGH
TROPONIN T, HIGH SENSITIVITY RESULT: 1375 NG/L — CRITICAL HIGH
VANCOMYCIN FLD-MCNC: 7.8 UG/ML — SIGNIFICANT CHANGE UP
VANCOMYCIN FLD-MCNC: 7.8 UG/ML — SIGNIFICANT CHANGE UP
WBC # BLD: 10.92 K/UL — HIGH (ref 3.8–10.5)
WBC # BLD: 10.92 K/UL — HIGH (ref 3.8–10.5)
WBC # BLD: 9.17 K/UL — SIGNIFICANT CHANGE UP (ref 3.8–10.5)
WBC # BLD: 9.17 K/UL — SIGNIFICANT CHANGE UP (ref 3.8–10.5)
WBC # BLD: 9.32 K/UL — SIGNIFICANT CHANGE UP (ref 3.8–10.5)
WBC # BLD: 9.32 K/UL — SIGNIFICANT CHANGE UP (ref 3.8–10.5)
WBC # FLD AUTO: 10.92 K/UL — HIGH (ref 3.8–10.5)
WBC # FLD AUTO: 10.92 K/UL — HIGH (ref 3.8–10.5)
WBC # FLD AUTO: 9.17 K/UL — SIGNIFICANT CHANGE UP (ref 3.8–10.5)
WBC # FLD AUTO: 9.17 K/UL — SIGNIFICANT CHANGE UP (ref 3.8–10.5)
WBC # FLD AUTO: 9.32 K/UL — SIGNIFICANT CHANGE UP (ref 3.8–10.5)
WBC # FLD AUTO: 9.32 K/UL — SIGNIFICANT CHANGE UP (ref 3.8–10.5)

## 2023-11-09 PROCEDURE — 76604 US EXAM CHEST: CPT | Mod: 26,GC

## 2023-11-09 PROCEDURE — 90945 DIALYSIS ONE EVALUATION: CPT | Mod: GC

## 2023-11-09 PROCEDURE — 99291 CRITICAL CARE FIRST HOUR: CPT | Mod: 25

## 2023-11-09 PROCEDURE — 49083 ABD PARACENTESIS W/IMAGING: CPT | Mod: GC

## 2023-11-09 PROCEDURE — 93308 TTE F-UP OR LMTD: CPT | Mod: 26,GC

## 2023-11-09 PROCEDURE — 99292 CRITICAL CARE ADDL 30 MIN: CPT | Mod: 25

## 2023-11-09 PROCEDURE — 36556 INSERT NON-TUNNEL CV CATH: CPT | Mod: GC

## 2023-11-09 RX ORDER — DEXTROSE 50 % IN WATER 50 %
15 SYRINGE (ML) INTRAVENOUS ONCE
Refills: 0 | Status: DISCONTINUED | OUTPATIENT
Start: 2023-11-09 | End: 2023-11-10

## 2023-11-09 RX ORDER — DEXTROSE 50 % IN WATER 50 %
25 SYRINGE (ML) INTRAVENOUS ONCE
Refills: 0 | Status: DISCONTINUED | OUTPATIENT
Start: 2023-11-09 | End: 2023-12-05

## 2023-11-09 RX ORDER — CALCIUM GLUCONATE 100 MG/ML
2 VIAL (ML) INTRAVENOUS ONCE
Refills: 0 | Status: COMPLETED | OUTPATIENT
Start: 2023-11-09 | End: 2023-11-09

## 2023-11-09 RX ORDER — POLYETHYLENE GLYCOL 3350 17 G/17G
17 POWDER, FOR SOLUTION ORAL
Refills: 0 | Status: DISCONTINUED | OUTPATIENT
Start: 2023-11-09 | End: 2023-11-10

## 2023-11-09 RX ORDER — CHLORHEXIDINE GLUCONATE 213 G/1000ML
1 SOLUTION TOPICAL DAILY
Refills: 0 | Status: DISCONTINUED | OUTPATIENT
Start: 2023-11-09 | End: 2023-12-05

## 2023-11-09 RX ORDER — DEXTROSE 50 % IN WATER 50 %
12.5 SYRINGE (ML) INTRAVENOUS ONCE
Refills: 0 | Status: DISCONTINUED | OUTPATIENT
Start: 2023-11-09 | End: 2023-11-10

## 2023-11-09 RX ORDER — INSULIN LISPRO 100/ML
VIAL (ML) SUBCUTANEOUS AT BEDTIME
Refills: 0 | Status: DISCONTINUED | OUTPATIENT
Start: 2023-11-09 | End: 2023-11-10

## 2023-11-09 RX ORDER — HYDROMORPHONE HYDROCHLORIDE 2 MG/ML
0.5 INJECTION INTRAMUSCULAR; INTRAVENOUS; SUBCUTANEOUS ONCE
Refills: 0 | Status: DISCONTINUED | OUTPATIENT
Start: 2023-11-09 | End: 2023-11-09

## 2023-11-09 RX ORDER — DEXTROSE 50 % IN WATER 50 %
25 SYRINGE (ML) INTRAVENOUS ONCE
Refills: 0 | Status: DISCONTINUED | OUTPATIENT
Start: 2023-11-09 | End: 2023-11-10

## 2023-11-09 RX ORDER — MEROPENEM 1 G/30ML
1000 INJECTION INTRAVENOUS EVERY 8 HOURS
Refills: 0 | Status: COMPLETED | OUTPATIENT
Start: 2023-11-09 | End: 2023-11-10

## 2023-11-09 RX ORDER — INSULIN LISPRO 100/ML
VIAL (ML) SUBCUTANEOUS
Refills: 0 | Status: DISCONTINUED | OUTPATIENT
Start: 2023-11-09 | End: 2023-11-10

## 2023-11-09 RX ORDER — GLUCAGON INJECTION, SOLUTION 0.5 MG/.1ML
1 INJECTION, SOLUTION SUBCUTANEOUS ONCE
Refills: 0 | Status: DISCONTINUED | OUTPATIENT
Start: 2023-11-09 | End: 2023-11-10

## 2023-11-09 RX ADMIN — CHLORHEXIDINE GLUCONATE 1 APPLICATION(S): 213 SOLUTION TOPICAL at 13:52

## 2023-11-09 RX ADMIN — LACTULOSE 20 GRAM(S): 10 SOLUTION ORAL at 16:19

## 2023-11-09 RX ADMIN — VASOPRESSIN 6 UNIT(S)/MIN: 20 INJECTION INTRAVENOUS at 19:41

## 2023-11-09 RX ADMIN — CHLORHEXIDINE GLUCONATE 15 MILLILITER(S): 213 SOLUTION TOPICAL at 06:05

## 2023-11-09 RX ADMIN — Medication 20 MICROGRAM(S)/KG/MIN: at 07:46

## 2023-11-09 RX ADMIN — MEROPENEM 100 MILLIGRAM(S): 1 INJECTION INTRAVENOUS at 23:03

## 2023-11-09 RX ADMIN — Medication 50 MILLIGRAM(S): at 05:27

## 2023-11-09 RX ADMIN — PROPOFOL 8.4 MICROGRAM(S)/KG/MIN: 10 INJECTION, EMULSION INTRAVENOUS at 19:40

## 2023-11-09 RX ADMIN — VASOPRESSIN 6 UNIT(S)/MIN: 20 INJECTION INTRAVENOUS at 12:07

## 2023-11-09 RX ADMIN — Medication 200 GRAM(S): at 08:26

## 2023-11-09 RX ADMIN — Medication 20 MICROGRAM(S)/KG/MIN: at 18:35

## 2023-11-09 RX ADMIN — AZITHROMYCIN 255 MILLIGRAM(S): 500 TABLET, FILM COATED ORAL at 06:05

## 2023-11-09 RX ADMIN — VASOPRESSIN 6 UNIT(S)/MIN: 20 INJECTION INTRAVENOUS at 07:46

## 2023-11-09 RX ADMIN — LACTULOSE 20 GRAM(S): 10 SOLUTION ORAL at 21:19

## 2023-11-09 RX ADMIN — Medication 20 MICROGRAM(S)/KG/MIN: at 19:40

## 2023-11-09 RX ADMIN — CHLORHEXIDINE GLUCONATE 15 MILLILITER(S): 213 SOLUTION TOPICAL at 18:35

## 2023-11-09 RX ADMIN — Medication 50 MILLIGRAM(S): at 23:03

## 2023-11-09 RX ADMIN — HYDROMORPHONE HYDROCHLORIDE 0.5 MILLIGRAM(S): 2 INJECTION INTRAMUSCULAR; INTRAVENOUS; SUBCUTANEOUS at 14:37

## 2023-11-09 RX ADMIN — MEROPENEM 100 MILLIGRAM(S): 1 INJECTION INTRAVENOUS at 16:20

## 2023-11-09 RX ADMIN — Medication 200 GRAM(S): at 02:36

## 2023-11-09 RX ADMIN — Medication 50 MILLIGRAM(S): at 18:35

## 2023-11-09 RX ADMIN — PROPOFOL 8.4 MICROGRAM(S)/KG/MIN: 10 INJECTION, EMULSION INTRAVENOUS at 07:46

## 2023-11-09 RX ADMIN — LACTULOSE 20 GRAM(S): 10 SOLUTION ORAL at 05:27

## 2023-11-09 RX ADMIN — PROPOFOL 8.4 MICROGRAM(S)/KG/MIN: 10 INJECTION, EMULSION INTRAVENOUS at 05:27

## 2023-11-09 RX ADMIN — MEROPENEM 100 MILLIGRAM(S): 1 INJECTION INTRAVENOUS at 08:26

## 2023-11-09 RX ADMIN — Medication 20 MICROGRAM(S)/KG/MIN: at 12:05

## 2023-11-09 RX ADMIN — HYDROMORPHONE HYDROCHLORIDE 0.5 MILLIGRAM(S): 2 INJECTION INTRAMUSCULAR; INTRAVENOUS; SUBCUTANEOUS at 14:52

## 2023-11-09 NOTE — PROGRESS NOTE ADULT - SUBJECTIVE AND OBJECTIVE BOX
[facility] DIVISION OF KIDNEY DISEASES AND HYPERTENSION   FOLLOW UP NOTE  --------------------------------------------------------------------------------  HPI: 74 yo M with a PMH of ESRD on HD TIW (MWF, AVF), s/p Renal transplant, HTN, DM who presented to [facility] after being found down at home. Per family, found unresponsive in the bathroom prior to admission. Admitted to MICU due to shock requiring multiple vasopressors. Initiated on CRRT. Nephrology following for ESRD/HD management.    24 hour events/subjective: Pt. was seen and examined today. Remains intubated, sedated and on vasopressors. Per nursing, tolerated 100cc/hr of fluid removal with CRRT. Plan to continue CRRT today.     PAST HISTORY  --------------------------------------------------------------------------------  No significant changes to PMH, PSH, FHx, SHx, unless otherwise noted    ALLERGIES & MEDICATIONS  --------------------------------------------------------------------------------  Allergies  No Known Allergies    Intolerances    Standing Inpatient Medications  aspirin  chewable 81 milliGRAM(s) Oral daily  azithromycin  IVPB      azithromycin  IVPB 500 milliGRAM(s) IV Intermittent every 24 hours  chlorhexidine 0.12% Liquid 15 milliLiter(s) Oral Mucosa every 12 hours  chlorhexidine 2% Cloths 1 Application(s) Topical daily  clopidogrel Tablet 75 milliGRAM(s) Oral daily  CRRT Treatment    <Continuous>  folic acid 1 milliGRAM(s) Oral daily  hydrocortisone sodium succinate Injectable 50 milliGRAM(s) IV Push every 6 hours  lactulose Syrup 20 Gram(s) Oral three times a day  meropenem  IVPB 1000 milliGRAM(s) IV Intermittent every 8 hours  norepinephrine Infusion 0.329 MICROgram(s)/kG/Min IV Continuous <Continuous>  petrolatum Ophthalmic Ointment 1 Application(s) Both EYES every 12 hours  Phoxillum Filtration BK 4 / 2.5 5000 milliLiter(s) CRRT <Continuous>  Phoxillum Filtration BK 4 / 2.5 5000 milliLiter(s) CRRT <Continuous>  polyethylene glycol 3350 17 Gram(s) Oral two times a day  PrismaSATE Dialysate BK 0 / 3.5 5000 milliLiter(s) CRRT <Continuous>  propofol Infusion 20 MICROgram(s)/kG/Min IV Continuous <Continuous>  vasopressin Infusion 0.04 Unit(s)/Min IV Continuous <Continuous>    PRN Inpatient Medications  sodium chloride 0.9% lock flush 10 milliLiter(s) IV Push every 1 hour PRN    REVIEW OF SYSTEMS  --------------------------------------------------------------------------------  Unable to obtain ROS     VITALS/PHYSICAL EXAM  --------------------------------------------------------------------------------  T(C): 35.1 (11-09-23 @ 12:00), Max: 36.9 (11-08-23 @ 16:00)  HR: 67 (11-09-23 @ 13:00) (59 - 84)  BP: --  RR: 16 (11-09-23 @ 13:00) (8 - 21)  SpO2: 100% (11-09-23 @ 13:00) (97% - 100%)  Wt(kg): --  Height (cm): 180.3 (11-07-23 @ 14:45)  Weight (kg): 64.8 (11-07-23 @ 14:45)  BMI (kg/m2): 19.9 (11-07-23 @ 14:45)  BSA (m2): 1.83 (11-07-23 @ 14:45)    11-08-23 @ 07:01  -  11-09-23 @ 07:00  --------------------------------------------------------  IN: 2415.9 mL / OUT: 2567 mL / NET: -151.1 mL    11-09-23 @ 07:01  -  11-09-23 @ 13:31  --------------------------------------------------------  IN: 439.2 mL / OUT: 956 mL / NET: -516.8 mL    Physical Exam:  	Gen: Intubated, sedated  	HEENT: Anicteric  	Pulm: Mechanical breath sounds  	CV: S1S2+  	Abd: Soft, +BS    	Ext: Trace LE edema B/L  	Neuro: Sedated              : Iglesias+ with no urine   	Skin: Warm and dry  	Dialysis access: L AVF+    LABS/STUDIES  --------------------------------------------------------------------------------              8.9    9.32  >-----------<  51       [11-09-23 @ 06:10]              24.9     134  |  99  |  29  ----------------------------<  223      [11-09-23 @ 06:10]  5.1   |  22  |  3.02        Ca     7.8     [11-09-23 @ 06:10]      iCa    0.96     [11-09 @ 06:10]      Mg     2.20     [11-09-23 @ 06:10]      Phos  4.0     [11-09-23 @ 06:10]    TPro  7.5  /  Alb  2.0  /  TBili  6.6  /  DBili  x   /  AST  886  /  ALT  235  /  AlkPhos  452  [11-09-23 @ 06:10]    PT/INR: PT 24.3 , INR 2.22       [11-09-23 @ 06:10]  PTT: 44.9       [11-09-23 @ 06:10]    Creatinine Trend:  SCr 3.02 [11-09 @ 06:10]  SCr 3.58 [11-09 @ 00:06]  SCr 3.80 [11-08 @ 00:42]  SCr 3.70 [11-07 @ 19:50]  SCr 3.42 [11-07 @ 12:53]    HCV 0.20, Nonreact      [11-08-23 @ 14:53]

## 2023-11-09 NOTE — DIETITIAN INITIAL EVALUATION ADULT - ADD RECOMMEND
---- Monitor tolerance to TF, GI status, electrolytes, glucose  ---- Modify bowel regimen, as appropriate  ---- Consider insulin coverage  ---- Monitor tolerance to TF, GI status, electrolytes, glucose

## 2023-11-09 NOTE — DIETITIAN INITIAL EVALUATION ADULT - ORAL INTAKE PTA/DIET HISTORY
Per daughters, decreased appetite for at least 3-6 months with poor PO intake.    Typically would eat 3 meals/d and snacks in between (non dialysis days). Foods are home prepared by wife.  And Pt. adheres to renal diet modifications.  Drinks Nepro supplement 1x daily.      NKFA.

## 2023-11-09 NOTE — PROGRESS NOTE ADULT - PROBLEM SELECTOR PLAN 1
Pt. with ESRD on HD TIW (MWF, AVF). He also has a hx of renal transplant (not on immunosuppression). Presenting to MICU with respiratory failure and systemic shock, etiology somewhat unclear (septic v cardiogenic v both). Last HD as outpatient was done on 11/6/23 via L AVF. Labs reviewed. Pt. critically ill, requiring multiple vasopressors and he is intubated/sedated. Due to requiring multiple vasopressors he was initiated CRRT. Appears to be tolerating around 100cc/hr of UF. Plan to continue CRRT today. Monitor labs. Dose meds as per HD.

## 2023-11-09 NOTE — DIETITIAN INITIAL EVALUATION ADULT - NS FNS DIET ORDER
Diet, NPO with Tube Feed:   Tube Feeding Modality: Orogastric  Glucerna 1.5 Juan Carlos ([id number])  Total Volume for 24 Hours (mL): 960  Continuous  Starting Tube Feed Rate {mL per Hour}: 10  Increase Tube Feed Rate by (mL): 10     Every 4 hours  Until Goal Tube Feed Rate (mL per Hour): 40  Tube Feed Duration (in Hours): 24  Tube Feed Start Time: 02:00 (11-08-23 @ 01:55) [Active]    provides:  960mL total volume  1440 kcals  79g protein  729mL free water

## 2023-11-09 NOTE — CHART NOTE - NSCHARTNOTEFT_GEN_A_CORE
: Da Rhodes MD     INDICATION: Critical Illness     PROCEDURE:  [ X ] LIMITED ECHO  [ X ] LIMITED CHEST  [ ] LIMITED RETROPERITONEAL  [ ] LIMITED ABDOMINAL  [ ] LIMITED DVT  [ ] NEEDLE GUIDANCE VASCULAR  [ ] NEEDLE GUIDANCE THORACENTESIS  [ ] NEEDLE GUIDANCE PARACENTESIS  [ ] NEEDLE GUIDANCE PERICARDIOCENTESIS  [ ] OTHER    FINDINGS: A-line predominant, scattered b-lines b/l. Mildly reduced LV systolic function. RV enlarged with flattening of interventricular septum. IVC 2.3cm.      INTERPRETATION: Mildly reduced LV systolic function. RV enlargement. : Da Rhodes MD     INDICATION: Critical Illness     PROCEDURE:  [ X ] LIMITED ECHO  [ X ] LIMITED CHEST  [ ] LIMITED RETROPERITONEAL  [ ] LIMITED ABDOMINAL  [ ] LIMITED DVT  [ ] NEEDLE GUIDANCE VASCULAR  [ ] NEEDLE GUIDANCE THORACENTESIS  [ ] NEEDLE GUIDANCE PARACENTESIS  [ ] NEEDLE GUIDANCE PERICARDIOCENTESIS  [ ] OTHER    FINDINGS: A-line predominant, scattered b-lines b/l. Mildly reduced LV systolic function. RV enlarged with flattening of interventricular septum. IVC 2.3cm.      INTERPRETATION: Mildly reduced LV systolic function. RV enlargement.  I was present during the key portions of the procedure and immediately available during the entire procedure.  Quentin LUGO  Attending

## 2023-11-09 NOTE — DIETITIAN INITIAL EVALUATION ADULT - ETIOLOGY
In the context of chronic illness (Hx malignancy). In the context of chronic illness (Hx of malignancy likely contributory).

## 2023-11-09 NOTE — DIETITIAN INITIAL EVALUATION ADULT - OTHER INFO
RDN extensively reviewed chart. +Pleural effusion. Noted current CT documentation of moderate ascites/anasarca.  Also reviewed prior outpatient documentation which is indicative of prior Dx of abnormal colonoscopy & colon Ca & Pt. had declined surgical intervention (2022). Informed MICU physicians.    Pt. remains intubated.  On CRRT for "fluid removal".  Spoke extensively with RNs.      Met with daughters (both are RNs), at bedside.  Upon inquiring, Pt. has been experiencing more frequent episodes of hypoglycemia in recent months (~80-90mg/dL with symptoms).  Attributed to decrease in PO intake & prolonged periods between eating.  It is more likely to occur on dialysis days.  Hence, Pt. does not consistently take insulin due to low blood glucose.  New onset constipation in previous few weeks since prior hospitalization.  Started on bowel regimen.     Stated, per daughters, usual body weight ~61-65kg, but that [Pt.] appears "cachectic".  Daughter also reports visible increase in "ascites"  x ~3 months PTA (since the summer).  RDN inquired... daughters report Pt. had paracentesis x1 with ~800mL fluid removal on prior hospitalization and then "LFTs improved".      Spoke with physicians and interdisciplinary team during rounds to inform of the information obtained by RDN.  Verbalized all nutrition recommendations.     RDN extensively reviewed chart. +Pleural effusion. Noted current CT documentation of moderate ascites/anasarca.  Also reviewed prior outpatient documentation which is indicative of prior Dx of abnormal colonoscopy & colon Ca & Pt. had declined surgical intervention (2022). Informed MICU physicians.    Pt. remains intubated.  On CRRT for "fluid removal".  Spoke extensively with RNs.    Met with daughters (both are RNs), at bedside.  Upon inquiring, Pt. has been experiencing more frequent episodes of hypoglycemia in recent months (~80-90mg/dL with symptoms).  Attributed to decrease in PO intake & prolonged periods between eating.  It is more likely to occur on dialysis days.  Hence, Pt. does not consistently take insulin due to low blood glucose.  New onset constipation in previous few weeks since prior hospitalization.  Started on bowel regimen.     Stated, per daughters, usual body weight ~61-65kg, but that [Pt.] appears "cachectic".  Daughter also reports visible increase in "ascites"  x ~3 months PTA (since the summer).  RDN inquired... daughters report Pt. had paracentesis x1 with ~800mL fluid removal on prior hospitalization and then "LFTs improved".    Discussed suggestion for modifying current enteral regimen, particularly increasing protein intake considering fluid status & CRRT.  Explained to daughters who demonstrate good understanding.      Spoke with resident physician(s).  Informed of nutrition recommendations and above information.

## 2023-11-09 NOTE — DIETITIAN INITIAL EVALUATION ADULT - NSFNSGIIOFT_GEN_A_CORE
11-08-23 @ 07:01  -  11-09-23 @ 07:00  --------------------------------------------------------  OUT:  Total OUT: 0 mL    Total NET: 640 mL      11-09-23 @ 07:01  -  11-09-23 @ 10:23  --------------------------------------------------------  OUT:  Total OUT: 0 mL    Total NET: 80 mL      No bowel movement since admit (x2-3d). No vomiting.  +Abdominal distention.

## 2023-11-09 NOTE — DIETITIAN INITIAL EVALUATION ADULT - ENTERAL
--- Increase Glucerna 1.5 to goal of 45mL/hr & add 2 packets Liquid Protein Supplement (LPS) per day    1080mL total volume  1620 kcals (+ 200 KCals LPS) = 1820 KCals  89g protein (+30g additional protein via LPS)= 119g protein/d  820mL free water

## 2023-11-09 NOTE — DIETITIAN INITIAL EVALUATION ADULT - ENTER TO (CAL/KG)
This 72-year-old patient was told that her breast and pelvic exam are normal   She will call if she sees any vaginal bleeding over the next year  I did write a prescription for Estroven 1 daily number 90 with 3 refills to help with her hot flashes 
25

## 2023-11-09 NOTE — DIETITIAN INITIAL EVALUATION ADULT - PERTINENT MEDS FT
MEDICATIONS  (STANDING):  aspirin  chewable 81 milliGRAM(s) Oral daily  atorvastatin 80 milliGRAM(s) Oral at bedtime  azithromycin  IVPB      azithromycin  IVPB 500 milliGRAM(s) IV Intermittent every 24 hours  chlorhexidine 0.12% Liquid 15 milliLiter(s) Oral Mucosa every 12 hours  clopidogrel Tablet 75 milliGRAM(s) Oral daily  CRRT Treatment    <Continuous>  folic acid 1 milliGRAM(s) Oral daily  hydrocortisone sodium succinate Injectable 50 milliGRAM(s) IV Push every 6 hours  lactulose Syrup 20 Gram(s) Oral three times a day  meropenem  IVPB 1000 milliGRAM(s) IV Intermittent every 8 hours  norepinephrine Infusion 0.329 MICROgram(s)/kG/Min (20 mL/Hr) IV Continuous <Continuous>  Phoxillum Filtration BK 4 / 2.5 5000 milliLiter(s) (500 mL/Hr) CRRT <Continuous>  Phoxillum Filtration BK 4 / 2.5 5000 milliLiter(s) (200 mL/Hr) CRRT <Continuous>  PrismaSATE Dialysate BK 0 / 3.5 5000 milliLiter(s) (1000 mL/Hr) CRRT <Continuous>  propofol Infusion 20 MICROgram(s)/kG/Min (8.4 mL/Hr) IV Continuous <Continuous>  vasopressin Infusion 0.04 Unit(s)/Min (6 mL/Hr) IV Continuous <Continuous>    MEDICATIONS  (PRN):  sodium chloride 0.9% lock flush 10 milliLiter(s) IV Push every 1 hour PRN Pre/post blood products, medications, blood draw, and to maintain line patency

## 2023-11-09 NOTE — PROGRESS NOTE ADULT - ASSESSMENT
73M w/ ESRD on HD MWF, CAD s/p stent 1m ago, BPH, DM, HTN presenting w/ AMS and AHRF; concern for stroke vs toxic metabolic encephalopathy, intubated for airway protection and hypoxic respiratory failure.       ==============NEURO=============  #Acute Metabolic Encephalopathy:   - baseline independent, AOx3  - CT head w/ moderate chronic ischemic changes  - CTA neck and brain: Patent, ECAs, ICAs, no  hemodynamically significant stenosis at ICA origins by NASCET criteria. Bilateral vertebral arteries are patent without flow limiting stenosis.  - Neurology consulted: recommended MRI, spot EEG, maintaining on DAPT  - Acetaminophen, salicylates negative    =========CARDIOVASCULAR=========  #Shock  - unclear etiology however may be iso sepsis vs vasoplegia vs obstructive  - on levophed for BP support  - started on vasopressin overnight  - on solucortef 100 q8 for presumed adrenal insufficiency state   - bedside POCUS with evidence of enlarged RV  - pending CTA PE protocol to r/o PE     #CAD s/p stent 10/11/2023  #RCA stent with 99% stenosis   - c/w home aspirin 81mg qd  - c/w home plavix 75mg qd  - c/w home atorvastatin 80mg qd  - hold home coreg 12.5mg BID iso shock state     #Severe RV Dysfunction  - Bedside POCUS with evidence of RV dilated and thin RV wall concerning for acute process  - elevated troponin 1472 however downtrending  - R EKG without evidence of ST changes     ============RESPIRATORY==========  #Acute hypoxic respiratory failure  - desat to 80% at home w/ agonal breathing per patient's daughter  - s/p intubation 11/7  - on volume AC RR 14  PEEP 5 and FiO2 50%   - CPAP as tolerated    #Bilateral Multifocal PNA  - may be residual from recent hospitalization at [facility] for bacterial PNA  - will treat broadly with zosyn and azithromycin  - s/p bronch 11/8 pending culture data   - f/u urine legionella and strep     ============RENAL==============  #ESRD on HD  - Cr 2.54  - s/p HD on 11/6 via L AVF  - started on CRRT on 11/8 for volume removal     ============GI===============  #Ascites  - s/p diagnostic para 11/8 pending results     #Diet and Nutrition  - NPO w/ tube feeds    ============ENDO============  #DM  - home regimen - Humalog 15u qAC, Glargine 6u qhs  - FS q6h w/ ISS while NPO w/ tube feeds    #Elevated TSH   - initial labs concerning for TSH 28.9 and FT4 wnl  - will follow up remainder of thyroid labs   - would recommend follow up of TSH in 1-2 weeks after hospital stay    ===========HEME/ONC===========  #Anemia  - Hgb 7 on admission  - was receiving aranesp outpatient with HD    =============ID=============  #r/o Sepsis  - potential etiologies include multifocal PNA vs colitis vs other   - s/p Azithromycin, Vancomycin, and Zosyn in ED  - continue with broad spectrum with zosyn and azithromycin (should cover GI sources)  - MRSA swab and can dose vanc by level  - pending blood cultures (11/7-)  - pending urinalysis and cultures (11/7-)    ===========Lines/Tubes/Iglesias===========  - GOC: FULL CODE

## 2023-11-09 NOTE — DIETITIAN INITIAL EVALUATION ADULT - REASON FOR ADMISSION
72 yo M w Hx of CAD, DM, HTN, s/p renal transplant, currently ESRD/HD TIW with recent hospitalization for bacterial PNA.  Presenting after being found unresponsive at home with agonal breathing, AMS & hypoglycemia.  Pt. intubated for airway protection and acute hypoxic respiratory failure.

## 2023-11-09 NOTE — PROGRESS NOTE ADULT - SUBJECTIVE AND OBJECTIVE BOX
Patient is a 73y old  Male who presents with a chief complaint of     SUBJECTIVE / OVERNIGHT EVENTS:  Patient seen and evaluated at bedside.  No events overnight.  Yesterday started on CRRT.     Vital Signs Last 24 Hrs  T(C): 34.7 (09 Nov 2023 04:00), Max: 37.6 (08 Nov 2023 08:00)  T(F): 94.4 (09 Nov 2023 04:00), Max: 99.6 (08 Nov 2023 08:00)  HR: 65 (09 Nov 2023 07:35) (59 - 85)  BP: --  BP(mean): --  RR: 16 (09 Nov 2023 07:02) (8 - 21)  SpO2: 100% (09 Nov 2023 07:35) (95% - 100%)    Parameters below as of 09 Nov 2023 06:00  Patient On (Oxygen Delivery Method): ventilator    O2 Concentration (%): 40    PHYSICAL EXAM:  GENERAL: intubated and sedated but able to arouse to verbal and tactile stimuli   HEAD:  Atraumatic, Normocephalic  EYES: PERRL  ENT: unable to assess iso intubation   NECK: Supple, No JVD  CHEST/LUNG: air movement noted on bilateral lung apices   HEART: Regular rate and rhythm; No murmurs, rubs, or gallops  ABDOMEN: BSx4; Soft, nontender, nondistended  EXTREMITIES: diminished b/l lower extremity pulses   NERVOUS SYSTEM:  on prop however able to follow commands    LABS:                        8.9    9.32  )-----------( 51       ( 09 Nov 2023 06:10 )             24.9     Hgb Trend: 8.9<--, 9.5<--, 9.8<--, 9.6<--, 8.6<--  11-09    134<L>  |  99  |  29<H>  ----------------------------<  223<H>  5.1   |  22  |  3.02<H>    Ca    7.8<L>      09 Nov 2023 06:10  Phos  4.0     11-09  Mg     2.20     11-09    TPro  7.5  /  Alb  2.0<L>  /  TBili  6.6<H>  /  DBili  x   /  AST  886<H>  /  ALT  235<H>  /  AlkPhos  452<H>  11-09    Creatinine Trend: 3.02<--, 3.58<--, 3.80<--, 3.70<--, 3.42<--, 2.54<--  LIVER FUNCTIONS - ( 09 Nov 2023 06:10 )  Alb: 2.0 g/dL / Pro: 7.5 g/dL / ALK PHOS: 452 U/L / ALT: 235 U/L / AST: 886 U/L / GGT: x           PT/INR - ( 09 Nov 2023 06:10 )   PT: 24.3 sec;   INR: 2.22 ratio         PTT - ( 09 Nov 2023 06:10 )  PTT:44.9 sec      Urinalysis Basic - ( 09 Nov 2023 06:10 )    Color: x / Appearance: x / SG: x / pH: x  Gluc: 223 mg/dL / Ketone: x  / Bili: x / Urobili: x   Blood: x / Protein: x / Nitrite: x   Leuk Esterase: x / RBC: x / WBC x   Sq Epi: x / Non Sq Epi: x / Bacteria: x

## 2023-11-09 NOTE — DIETITIAN INITIAL EVALUATION ADULT - PERTINENT LABORATORY DATA
11-09    134<L>  |  99  |  29<H>  ----------------------------<  223<H>  5.1   |  22  |  3.02<H>    Ca    7.8<L>      09 Nov 2023 06:10  Phos  4.0     11-09  Mg     2.20     11-09    TPro  7.5  /  Alb  2.0<L>  /  TBili  6.6<H>  /  DBili  x   /  AST  886<H>  /  ALT  235<H>  /  AlkPhos  452<H>  11-09    CAPILLARY BLOOD GLUCOSE    POCT Blood Glucose.: 145 mg/dL (08 Nov 2023 21:12)  POCT Blood Glucose.: 121 mg/dL (08 Nov 2023 16:52)  POCT Blood Glucose.: 134 mg/dL (08 Nov 2023 12:13)  POCT Blood Glucose.: 145 mg/dL (11-08-23 @ 21:12)

## 2023-11-10 LAB
ALBUMIN SERPL ELPH-MCNC: 2 G/DL — LOW (ref 3.3–5)
ALBUMIN SERPL ELPH-MCNC: 2 G/DL — LOW (ref 3.3–5)
ALBUMIN SERPL ELPH-MCNC: 2.1 G/DL — LOW (ref 3.3–5)
ALBUMIN SERPL ELPH-MCNC: 2.2 G/DL — LOW (ref 3.3–5)
ALP SERPL-CCNC: 495 U/L — HIGH (ref 40–120)
ALP SERPL-CCNC: 495 U/L — HIGH (ref 40–120)
ALP SERPL-CCNC: 499 U/L — HIGH (ref 40–120)
ALP SERPL-CCNC: 499 U/L — HIGH (ref 40–120)
ALP SERPL-CCNC: 506 U/L — HIGH (ref 40–120)
ALP SERPL-CCNC: 506 U/L — HIGH (ref 40–120)
ALP SERPL-CCNC: 514 U/L — HIGH (ref 40–120)
ALP SERPL-CCNC: 514 U/L — HIGH (ref 40–120)
ALP SERPL-CCNC: 522 U/L — HIGH (ref 40–120)
ALP SERPL-CCNC: 522 U/L — HIGH (ref 40–120)
ALT FLD-CCNC: 259 U/L — HIGH (ref 4–41)
ALT FLD-CCNC: 259 U/L — HIGH (ref 4–41)
ALT FLD-CCNC: 264 U/L — HIGH (ref 4–41)
ALT FLD-CCNC: 264 U/L — HIGH (ref 4–41)
ALT FLD-CCNC: 266 U/L — HIGH (ref 4–41)
ALT FLD-CCNC: 266 U/L — HIGH (ref 4–41)
ALT FLD-CCNC: 268 U/L — HIGH (ref 4–41)
ALT FLD-CCNC: 268 U/L — HIGH (ref 4–41)
ALT FLD-CCNC: 269 U/L — HIGH (ref 4–41)
ALT FLD-CCNC: 269 U/L — HIGH (ref 4–41)
ANION GAP SERPL CALC-SCNC: 12 MMOL/L — SIGNIFICANT CHANGE UP (ref 7–14)
ANION GAP SERPL CALC-SCNC: 13 MMOL/L — SIGNIFICANT CHANGE UP (ref 7–14)
ANION GAP SERPL CALC-SCNC: 13 MMOL/L — SIGNIFICANT CHANGE UP (ref 7–14)
ANION GAP SERPL CALC-SCNC: 14 MMOL/L — SIGNIFICANT CHANGE UP (ref 7–14)
ANISOCYTOSIS BLD QL: SIGNIFICANT CHANGE UP
ANISOCYTOSIS BLD QL: SIGNIFICANT CHANGE UP
APTT BLD: 40.2 SEC — HIGH (ref 24.5–35.6)
APTT BLD: 40.2 SEC — HIGH (ref 24.5–35.6)
APTT BLD: 40.6 SEC — HIGH (ref 24.5–35.6)
APTT BLD: 40.6 SEC — HIGH (ref 24.5–35.6)
APTT BLD: 41.1 SEC — HIGH (ref 24.5–35.6)
APTT BLD: 41.1 SEC — HIGH (ref 24.5–35.6)
APTT BLD: 41.3 SEC — HIGH (ref 24.5–35.6)
APTT BLD: 41.3 SEC — HIGH (ref 24.5–35.6)
APTT BLD: 41.6 SEC — HIGH (ref 24.5–35.6)
APTT BLD: 41.6 SEC — HIGH (ref 24.5–35.6)
AST SERPL-CCNC: 958 U/L — HIGH (ref 4–40)
AST SERPL-CCNC: 958 U/L — HIGH (ref 4–40)
AST SERPL-CCNC: 974 U/L — HIGH (ref 4–40)
AST SERPL-CCNC: 974 U/L — HIGH (ref 4–40)
AST SERPL-CCNC: 978 U/L — HIGH (ref 4–40)
AST SERPL-CCNC: 978 U/L — HIGH (ref 4–40)
AST SERPL-CCNC: 987 U/L — HIGH (ref 4–40)
AST SERPL-CCNC: 987 U/L — HIGH (ref 4–40)
AST SERPL-CCNC: 988 U/L — HIGH (ref 4–40)
AST SERPL-CCNC: 988 U/L — HIGH (ref 4–40)
BASOPHILS # BLD AUTO: 0 K/UL — SIGNIFICANT CHANGE UP (ref 0–0.2)
BASOPHILS # BLD AUTO: 0.01 K/UL — SIGNIFICANT CHANGE UP (ref 0–0.2)
BASOPHILS # BLD AUTO: 0.01 K/UL — SIGNIFICANT CHANGE UP (ref 0–0.2)
BASOPHILS NFR BLD AUTO: 0 % — SIGNIFICANT CHANGE UP (ref 0–2)
BASOPHILS NFR BLD AUTO: 0.1 % — SIGNIFICANT CHANGE UP (ref 0–2)
BASOPHILS NFR BLD AUTO: 0.1 % — SIGNIFICANT CHANGE UP (ref 0–2)
BILIRUB SERPL-MCNC: 5.2 MG/DL — HIGH (ref 0.2–1.2)
BILIRUB SERPL-MCNC: 5.2 MG/DL — HIGH (ref 0.2–1.2)
BILIRUB SERPL-MCNC: 5.6 MG/DL — HIGH (ref 0.2–1.2)
BILIRUB SERPL-MCNC: 5.6 MG/DL — HIGH (ref 0.2–1.2)
BILIRUB SERPL-MCNC: 5.8 MG/DL — HIGH (ref 0.2–1.2)
BILIRUB SERPL-MCNC: 5.8 MG/DL — HIGH (ref 0.2–1.2)
BILIRUB SERPL-MCNC: 6.1 MG/DL — HIGH (ref 0.2–1.2)
BILIRUB SERPL-MCNC: 6.1 MG/DL — HIGH (ref 0.2–1.2)
BILIRUB SERPL-MCNC: 6.6 MG/DL — HIGH (ref 0.2–1.2)
BILIRUB SERPL-MCNC: 6.6 MG/DL — HIGH (ref 0.2–1.2)
BLD GP AB SCN SERPL QL: NEGATIVE — SIGNIFICANT CHANGE UP
BLD GP AB SCN SERPL QL: NEGATIVE — SIGNIFICANT CHANGE UP
BLOOD GAS ARTERIAL - LYTES,HGB,ICA,LACT RESULT: SIGNIFICANT CHANGE UP
BUN SERPL-MCNC: 22 MG/DL — SIGNIFICANT CHANGE UP (ref 7–23)
BUN SERPL-MCNC: 23 MG/DL — SIGNIFICANT CHANGE UP (ref 7–23)
BUN SERPL-MCNC: 23 MG/DL — SIGNIFICANT CHANGE UP (ref 7–23)
BUN SERPL-MCNC: 24 MG/DL — HIGH (ref 7–23)
BUN SERPL-MCNC: 24 MG/DL — HIGH (ref 7–23)
BUN SERPL-MCNC: 26 MG/DL — HIGH (ref 7–23)
BUN SERPL-MCNC: 26 MG/DL — HIGH (ref 7–23)
BURR CELLS BLD QL SMEAR: PRESENT — SIGNIFICANT CHANGE UP
BURR CELLS BLD QL SMEAR: PRESENT — SIGNIFICANT CHANGE UP
CA-I BLD-SCNC: 0.99 MMOL/L — LOW (ref 1.15–1.29)
CA-I BLD-SCNC: 0.99 MMOL/L — LOW (ref 1.15–1.29)
CA-I BLD-SCNC: 1.03 MMOL/L — LOW (ref 1.15–1.29)
CA-I BLD-SCNC: 1.03 MMOL/L — LOW (ref 1.15–1.29)
CA-I BLD-SCNC: 1.04 MMOL/L — LOW (ref 1.15–1.29)
CA-I BLD-SCNC: 1.04 MMOL/L — LOW (ref 1.15–1.29)
CALCIUM SERPL-MCNC: 7.6 MG/DL — LOW (ref 8.4–10.5)
CALCIUM SERPL-MCNC: 7.6 MG/DL — LOW (ref 8.4–10.5)
CALCIUM SERPL-MCNC: 7.8 MG/DL — LOW (ref 8.4–10.5)
CALCIUM SERPL-MCNC: 7.8 MG/DL — LOW (ref 8.4–10.5)
CALCIUM SERPL-MCNC: 7.9 MG/DL — LOW (ref 8.4–10.5)
CHLORIDE SERPL-SCNC: 100 MMOL/L — SIGNIFICANT CHANGE UP (ref 98–107)
CHLORIDE SERPL-SCNC: 100 MMOL/L — SIGNIFICANT CHANGE UP (ref 98–107)
CHLORIDE SERPL-SCNC: 102 MMOL/L — SIGNIFICANT CHANGE UP (ref 98–107)
CHLORIDE SERPL-SCNC: 102 MMOL/L — SIGNIFICANT CHANGE UP (ref 98–107)
CHLORIDE SERPL-SCNC: 98 MMOL/L — SIGNIFICANT CHANGE UP (ref 98–107)
CHLORIDE SERPL-SCNC: 98 MMOL/L — SIGNIFICANT CHANGE UP (ref 98–107)
CHLORIDE SERPL-SCNC: 99 MMOL/L — SIGNIFICANT CHANGE UP (ref 98–107)
CO2 SERPL-SCNC: 18 MMOL/L — LOW (ref 22–31)
CO2 SERPL-SCNC: 18 MMOL/L — LOW (ref 22–31)
CO2 SERPL-SCNC: 20 MMOL/L — LOW (ref 22–31)
CO2 SERPL-SCNC: 20 MMOL/L — LOW (ref 22–31)
CO2 SERPL-SCNC: 21 MMOL/L — LOW (ref 22–31)
CO2 SERPL-SCNC: 21 MMOL/L — LOW (ref 22–31)
CO2 SERPL-SCNC: 22 MMOL/L — SIGNIFICANT CHANGE UP (ref 22–31)
CREAT SERPL-MCNC: 1.35 MG/DL — HIGH (ref 0.5–1.3)
CREAT SERPL-MCNC: 1.35 MG/DL — HIGH (ref 0.5–1.3)
CREAT SERPL-MCNC: 1.52 MG/DL — HIGH (ref 0.5–1.3)
CREAT SERPL-MCNC: 1.52 MG/DL — HIGH (ref 0.5–1.3)
CREAT SERPL-MCNC: 1.69 MG/DL — HIGH (ref 0.5–1.3)
CREAT SERPL-MCNC: 1.69 MG/DL — HIGH (ref 0.5–1.3)
CREAT SERPL-MCNC: 1.74 MG/DL — HIGH (ref 0.5–1.3)
CREAT SERPL-MCNC: 1.74 MG/DL — HIGH (ref 0.5–1.3)
CREAT SERPL-MCNC: 1.89 MG/DL — HIGH (ref 0.5–1.3)
CREAT SERPL-MCNC: 1.89 MG/DL — HIGH (ref 0.5–1.3)
CULTURE RESULTS: ABNORMAL
CULTURE RESULTS: ABNORMAL
DACRYOCYTES BLD QL SMEAR: SLIGHT — SIGNIFICANT CHANGE UP
DACRYOCYTES BLD QL SMEAR: SLIGHT — SIGNIFICANT CHANGE UP
EGFR: 37 ML/MIN/1.73M2 — LOW
EGFR: 37 ML/MIN/1.73M2 — LOW
EGFR: 41 ML/MIN/1.73M2 — LOW
EGFR: 41 ML/MIN/1.73M2 — LOW
EGFR: 43 ML/MIN/1.73M2 — LOW
EGFR: 43 ML/MIN/1.73M2 — LOW
EGFR: 49 ML/MIN/1.73M2 — LOW
EGFR: 49 ML/MIN/1.73M2 — LOW
EGFR: 56 ML/MIN/1.73M2 — LOW
EGFR: 56 ML/MIN/1.73M2 — LOW
EOSINOPHIL # BLD AUTO: 0 K/UL — SIGNIFICANT CHANGE UP (ref 0–0.5)
EOSINOPHIL NFR BLD AUTO: 0 % — SIGNIFICANT CHANGE UP (ref 0–6)
GIANT PLATELETS BLD QL SMEAR: PRESENT — SIGNIFICANT CHANGE UP
GIANT PLATELETS BLD QL SMEAR: PRESENT — SIGNIFICANT CHANGE UP
GLUCOSE BLDC GLUCOMTR-MCNC: 181 MG/DL — HIGH (ref 70–99)
GLUCOSE BLDC GLUCOMTR-MCNC: 181 MG/DL — HIGH (ref 70–99)
GLUCOSE BLDC GLUCOMTR-MCNC: 227 MG/DL — HIGH (ref 70–99)
GLUCOSE BLDC GLUCOMTR-MCNC: 227 MG/DL — HIGH (ref 70–99)
GLUCOSE SERPL-MCNC: 170 MG/DL — HIGH (ref 70–99)
GLUCOSE SERPL-MCNC: 170 MG/DL — HIGH (ref 70–99)
GLUCOSE SERPL-MCNC: 175 MG/DL — HIGH (ref 70–99)
GLUCOSE SERPL-MCNC: 175 MG/DL — HIGH (ref 70–99)
GLUCOSE SERPL-MCNC: 195 MG/DL — HIGH (ref 70–99)
GLUCOSE SERPL-MCNC: 195 MG/DL — HIGH (ref 70–99)
GLUCOSE SERPL-MCNC: 216 MG/DL — HIGH (ref 70–99)
GLUCOSE SERPL-MCNC: 216 MG/DL — HIGH (ref 70–99)
GLUCOSE SERPL-MCNC: 239 MG/DL — HIGH (ref 70–99)
GLUCOSE SERPL-MCNC: 239 MG/DL — HIGH (ref 70–99)
HCT VFR BLD CALC: 22.9 % — LOW (ref 39–50)
HCT VFR BLD CALC: 22.9 % — LOW (ref 39–50)
HCT VFR BLD CALC: 23.3 % — LOW (ref 39–50)
HCT VFR BLD CALC: 23.3 % — LOW (ref 39–50)
HCT VFR BLD CALC: 24.1 % — LOW (ref 39–50)
HCT VFR BLD CALC: 24.1 % — LOW (ref 39–50)
HCT VFR BLD CALC: 24.5 % — LOW (ref 39–50)
HCT VFR BLD CALC: 24.5 % — LOW (ref 39–50)
HCT VFR BLD CALC: 24.8 % — LOW (ref 39–50)
HCT VFR BLD CALC: 24.8 % — LOW (ref 39–50)
HGB BLD-MCNC: 8.3 G/DL — LOW (ref 13–17)
HGB BLD-MCNC: 8.3 G/DL — LOW (ref 13–17)
HGB BLD-MCNC: 8.4 G/DL — LOW (ref 13–17)
HGB BLD-MCNC: 8.4 G/DL — LOW (ref 13–17)
HGB BLD-MCNC: 8.5 G/DL — LOW (ref 13–17)
HGB BLD-MCNC: 8.5 G/DL — LOW (ref 13–17)
HGB BLD-MCNC: 8.6 G/DL — LOW (ref 13–17)
HGB BLD-MCNC: 8.6 G/DL — LOW (ref 13–17)
HGB BLD-MCNC: 8.8 G/DL — LOW (ref 13–17)
HGB BLD-MCNC: 8.8 G/DL — LOW (ref 13–17)
IANC: 5.05 K/UL — SIGNIFICANT CHANGE UP (ref 1.8–7.4)
IANC: 5.05 K/UL — SIGNIFICANT CHANGE UP (ref 1.8–7.4)
IANC: 5.41 K/UL — SIGNIFICANT CHANGE UP (ref 1.8–7.4)
IANC: 5.41 K/UL — SIGNIFICANT CHANGE UP (ref 1.8–7.4)
IANC: 6.16 K/UL — SIGNIFICANT CHANGE UP (ref 1.8–7.4)
IANC: 6.16 K/UL — SIGNIFICANT CHANGE UP (ref 1.8–7.4)
IANC: 6.33 K/UL — SIGNIFICANT CHANGE UP (ref 1.8–7.4)
IANC: 6.33 K/UL — SIGNIFICANT CHANGE UP (ref 1.8–7.4)
IANC: 6.93 K/UL — SIGNIFICANT CHANGE UP (ref 1.8–7.4)
IANC: 6.93 K/UL — SIGNIFICANT CHANGE UP (ref 1.8–7.4)
IMM GRANULOCYTES NFR BLD AUTO: 0.5 % — SIGNIFICANT CHANGE UP (ref 0–0.9)
IMM GRANULOCYTES NFR BLD AUTO: 0.6 % — SIGNIFICANT CHANGE UP (ref 0–0.9)
IMM GRANULOCYTES NFR BLD AUTO: 0.6 % — SIGNIFICANT CHANGE UP (ref 0–0.9)
IMM GRANULOCYTES NFR BLD AUTO: 0.8 % — SIGNIFICANT CHANGE UP (ref 0–0.9)
IMM GRANULOCYTES NFR BLD AUTO: 0.8 % — SIGNIFICANT CHANGE UP (ref 0–0.9)
IMM GRANULOCYTES NFR BLD AUTO: 1.2 % — HIGH (ref 0–0.9)
IMM GRANULOCYTES NFR BLD AUTO: 1.2 % — HIGH (ref 0–0.9)
INR BLD: 1.7 RATIO — HIGH (ref 0.85–1.18)
INR BLD: 1.7 RATIO — HIGH (ref 0.85–1.18)
INR BLD: 1.71 RATIO — HIGH (ref 0.85–1.18)
INR BLD: 1.71 RATIO — HIGH (ref 0.85–1.18)
INR BLD: 1.74 RATIO — HIGH (ref 0.85–1.18)
INR BLD: 1.74 RATIO — HIGH (ref 0.85–1.18)
INR BLD: 1.75 RATIO — HIGH (ref 0.85–1.18)
INR BLD: 1.75 RATIO — HIGH (ref 0.85–1.18)
INR BLD: 1.86 RATIO — HIGH (ref 0.85–1.18)
INR BLD: 1.86 RATIO — HIGH (ref 0.85–1.18)
LYMPHOCYTES # BLD AUTO: 0.79 K/UL — LOW (ref 1–3.3)
LYMPHOCYTES # BLD AUTO: 0.79 K/UL — LOW (ref 1–3.3)
LYMPHOCYTES # BLD AUTO: 0.82 K/UL — LOW (ref 1–3.3)
LYMPHOCYTES # BLD AUTO: 0.82 K/UL — LOW (ref 1–3.3)
LYMPHOCYTES # BLD AUTO: 0.9 K/UL — LOW (ref 1–3.3)
LYMPHOCYTES # BLD AUTO: 0.9 K/UL — LOW (ref 1–3.3)
LYMPHOCYTES # BLD AUTO: 0.94 K/UL — LOW (ref 1–3.3)
LYMPHOCYTES # BLD AUTO: 0.94 K/UL — LOW (ref 1–3.3)
LYMPHOCYTES # BLD AUTO: 0.97 K/UL — LOW (ref 1–3.3)
LYMPHOCYTES # BLD AUTO: 0.97 K/UL — LOW (ref 1–3.3)
LYMPHOCYTES # BLD AUTO: 10.4 % — LOW (ref 13–44)
LYMPHOCYTES # BLD AUTO: 10.4 % — LOW (ref 13–44)
LYMPHOCYTES # BLD AUTO: 11.9 % — LOW (ref 13–44)
LYMPHOCYTES # BLD AUTO: 11.9 % — LOW (ref 13–44)
LYMPHOCYTES # BLD AUTO: 14.1 % — SIGNIFICANT CHANGE UP (ref 13–44)
LYMPHOCYTES # BLD AUTO: 14.1 % — SIGNIFICANT CHANGE UP (ref 13–44)
LYMPHOCYTES # BLD AUTO: 14.4 % — SIGNIFICANT CHANGE UP (ref 13–44)
LYMPHOCYTES # BLD AUTO: 14.4 % — SIGNIFICANT CHANGE UP (ref 13–44)
LYMPHOCYTES # BLD AUTO: 9.8 % — LOW (ref 13–44)
LYMPHOCYTES # BLD AUTO: 9.8 % — LOW (ref 13–44)
MACROCYTES BLD QL: SIGNIFICANT CHANGE UP
MACROCYTES BLD QL: SIGNIFICANT CHANGE UP
MAGNESIUM SERPL-MCNC: 2.3 MG/DL — SIGNIFICANT CHANGE UP (ref 1.6–2.6)
MANUAL SMEAR VERIFICATION: SIGNIFICANT CHANGE UP
MANUAL SMEAR VERIFICATION: SIGNIFICANT CHANGE UP
MCHC RBC-ENTMCNC: 29.6 PG — SIGNIFICANT CHANGE UP (ref 27–34)
MCHC RBC-ENTMCNC: 29.9 PG — SIGNIFICANT CHANGE UP (ref 27–34)
MCHC RBC-ENTMCNC: 29.9 PG — SIGNIFICANT CHANGE UP (ref 27–34)
MCHC RBC-ENTMCNC: 30.2 PG — SIGNIFICANT CHANGE UP (ref 27–34)
MCHC RBC-ENTMCNC: 34.3 GM/DL — SIGNIFICANT CHANGE UP (ref 32–36)
MCHC RBC-ENTMCNC: 34.3 GM/DL — SIGNIFICANT CHANGE UP (ref 32–36)
MCHC RBC-ENTMCNC: 35.5 GM/DL — SIGNIFICANT CHANGE UP (ref 32–36)
MCHC RBC-ENTMCNC: 35.5 GM/DL — SIGNIFICANT CHANGE UP (ref 32–36)
MCHC RBC-ENTMCNC: 35.7 GM/DL — SIGNIFICANT CHANGE UP (ref 32–36)
MCHC RBC-ENTMCNC: 35.7 GM/DL — SIGNIFICANT CHANGE UP (ref 32–36)
MCHC RBC-ENTMCNC: 36.2 GM/DL — HIGH (ref 32–36)
MCHC RBC-ENTMCNC: 36.2 GM/DL — HIGH (ref 32–36)
MCHC RBC-ENTMCNC: 36.5 GM/DL — HIGH (ref 32–36)
MCHC RBC-ENTMCNC: 36.5 GM/DL — HIGH (ref 32–36)
MCV RBC AUTO: 81.8 FL — SIGNIFICANT CHANGE UP (ref 80–100)
MCV RBC AUTO: 81.8 FL — SIGNIFICANT CHANGE UP (ref 80–100)
MCV RBC AUTO: 82.9 FL — SIGNIFICANT CHANGE UP (ref 80–100)
MCV RBC AUTO: 82.9 FL — SIGNIFICANT CHANGE UP (ref 80–100)
MCV RBC AUTO: 83.7 FL — SIGNIFICANT CHANGE UP (ref 80–100)
MCV RBC AUTO: 83.7 FL — SIGNIFICANT CHANGE UP (ref 80–100)
MCV RBC AUTO: 85.2 FL — SIGNIFICANT CHANGE UP (ref 80–100)
MCV RBC AUTO: 85.2 FL — SIGNIFICANT CHANGE UP (ref 80–100)
MCV RBC AUTO: 86.3 FL — SIGNIFICANT CHANGE UP (ref 80–100)
MCV RBC AUTO: 86.3 FL — SIGNIFICANT CHANGE UP (ref 80–100)
MONOCYTES # BLD AUTO: 0.44 K/UL — SIGNIFICANT CHANGE UP (ref 0–0.9)
MONOCYTES # BLD AUTO: 0.44 K/UL — SIGNIFICANT CHANGE UP (ref 0–0.9)
MONOCYTES # BLD AUTO: 0.45 K/UL — SIGNIFICANT CHANGE UP (ref 0–0.9)
MONOCYTES # BLD AUTO: 0.45 K/UL — SIGNIFICANT CHANGE UP (ref 0–0.9)
MONOCYTES # BLD AUTO: 0.46 K/UL — SIGNIFICANT CHANGE UP (ref 0–0.9)
MONOCYTES # BLD AUTO: 0.46 K/UL — SIGNIFICANT CHANGE UP (ref 0–0.9)
MONOCYTES # BLD AUTO: 0.49 K/UL — SIGNIFICANT CHANGE UP (ref 0–0.9)
MONOCYTES # BLD AUTO: 0.49 K/UL — SIGNIFICANT CHANGE UP (ref 0–0.9)
MONOCYTES # BLD AUTO: 0.54 K/UL — SIGNIFICANT CHANGE UP (ref 0–0.9)
MONOCYTES # BLD AUTO: 0.54 K/UL — SIGNIFICANT CHANGE UP (ref 0–0.9)
MONOCYTES NFR BLD AUTO: 5.9 % — SIGNIFICANT CHANGE UP (ref 2–14)
MONOCYTES NFR BLD AUTO: 5.9 % — SIGNIFICANT CHANGE UP (ref 2–14)
MONOCYTES NFR BLD AUTO: 6 % — SIGNIFICANT CHANGE UP (ref 2–14)
MONOCYTES NFR BLD AUTO: 6 % — SIGNIFICANT CHANGE UP (ref 2–14)
MONOCYTES NFR BLD AUTO: 6.4 % — SIGNIFICANT CHANGE UP (ref 2–14)
MONOCYTES NFR BLD AUTO: 6.4 % — SIGNIFICANT CHANGE UP (ref 2–14)
MONOCYTES NFR BLD AUTO: 6.5 % — SIGNIFICANT CHANGE UP (ref 2–14)
MONOCYTES NFR BLD AUTO: 6.5 % — SIGNIFICANT CHANGE UP (ref 2–14)
MONOCYTES NFR BLD AUTO: 7.5 % — SIGNIFICANT CHANGE UP (ref 2–14)
MONOCYTES NFR BLD AUTO: 7.5 % — SIGNIFICANT CHANGE UP (ref 2–14)
NEUTROPHILS # BLD AUTO: 5.05 K/UL — SIGNIFICANT CHANGE UP (ref 1.8–7.4)
NEUTROPHILS # BLD AUTO: 5.05 K/UL — SIGNIFICANT CHANGE UP (ref 1.8–7.4)
NEUTROPHILS # BLD AUTO: 5.41 K/UL — SIGNIFICANT CHANGE UP (ref 1.8–7.4)
NEUTROPHILS # BLD AUTO: 5.41 K/UL — SIGNIFICANT CHANGE UP (ref 1.8–7.4)
NEUTROPHILS # BLD AUTO: 6.16 K/UL — SIGNIFICANT CHANGE UP (ref 1.8–7.4)
NEUTROPHILS # BLD AUTO: 6.16 K/UL — SIGNIFICANT CHANGE UP (ref 1.8–7.4)
NEUTROPHILS # BLD AUTO: 6.33 K/UL — SIGNIFICANT CHANGE UP (ref 1.8–7.4)
NEUTROPHILS # BLD AUTO: 6.33 K/UL — SIGNIFICANT CHANGE UP (ref 1.8–7.4)
NEUTROPHILS # BLD AUTO: 6.93 K/UL — SIGNIFICANT CHANGE UP (ref 1.8–7.4)
NEUTROPHILS # BLD AUTO: 6.93 K/UL — SIGNIFICANT CHANGE UP (ref 1.8–7.4)
NEUTROPHILS NFR BLD AUTO: 77.6 % — HIGH (ref 43–77)
NEUTROPHILS NFR BLD AUTO: 77.6 % — HIGH (ref 43–77)
NEUTROPHILS NFR BLD AUTO: 78.3 % — HIGH (ref 43–77)
NEUTROPHILS NFR BLD AUTO: 78.3 % — HIGH (ref 43–77)
NEUTROPHILS NFR BLD AUTO: 81.4 % — HIGH (ref 43–77)
NEUTROPHILS NFR BLD AUTO: 81.4 % — HIGH (ref 43–77)
NEUTROPHILS NFR BLD AUTO: 83 % — HIGH (ref 43–77)
NEUTROPHILS NFR BLD AUTO: 83 % — HIGH (ref 43–77)
NEUTROPHILS NFR BLD AUTO: 83.1 % — HIGH (ref 43–77)
NEUTROPHILS NFR BLD AUTO: 83.1 % — HIGH (ref 43–77)
NEUTS BAND # BLD: 2 % — SIGNIFICANT CHANGE UP (ref 0–6)
NEUTS BAND # BLD: 2 % — SIGNIFICANT CHANGE UP (ref 0–6)
NRBC # BLD: 0 /100 WBCS — SIGNIFICANT CHANGE UP (ref 0–0)
NRBC # FLD: 0.04 K/UL — HIGH (ref 0–0)
NRBC # FLD: 0.05 K/UL — HIGH (ref 0–0)
PHOSPHATE SERPL-MCNC: 3.1 MG/DL — SIGNIFICANT CHANGE UP (ref 2.5–4.5)
PHOSPHATE SERPL-MCNC: 3.1 MG/DL — SIGNIFICANT CHANGE UP (ref 2.5–4.5)
PHOSPHATE SERPL-MCNC: 3.2 MG/DL — SIGNIFICANT CHANGE UP (ref 2.5–4.5)
PHOSPHATE SERPL-MCNC: 3.2 MG/DL — SIGNIFICANT CHANGE UP (ref 2.5–4.5)
PHOSPHATE SERPL-MCNC: 3.4 MG/DL — SIGNIFICANT CHANGE UP (ref 2.5–4.5)
PHOSPHATE SERPL-MCNC: 3.7 MG/DL — SIGNIFICANT CHANGE UP (ref 2.5–4.5)
PHOSPHATE SERPL-MCNC: 3.7 MG/DL — SIGNIFICANT CHANGE UP (ref 2.5–4.5)
PLAT MORPH BLD: ABNORMAL
PLAT MORPH BLD: ABNORMAL
PLATELET # BLD AUTO: 41 K/UL — LOW (ref 150–400)
PLATELET # BLD AUTO: 41 K/UL — LOW (ref 150–400)
PLATELET # BLD AUTO: 43 K/UL — LOW (ref 150–400)
PLATELET # BLD AUTO: 43 K/UL — LOW (ref 150–400)
PLATELET # BLD AUTO: 46 K/UL — LOW (ref 150–400)
PLATELET # BLD AUTO: 46 K/UL — LOW (ref 150–400)
PLATELET # BLD AUTO: 50 K/UL — LOW (ref 150–400)
PLATELET # BLD AUTO: 50 K/UL — LOW (ref 150–400)
PLATELET # BLD AUTO: 56 K/UL — LOW (ref 150–400)
PLATELET # BLD AUTO: 56 K/UL — LOW (ref 150–400)
PLATELET COUNT - ESTIMATE: ABNORMAL
PLATELET COUNT - ESTIMATE: ABNORMAL
POIKILOCYTOSIS BLD QL AUTO: SLIGHT — SIGNIFICANT CHANGE UP
POIKILOCYTOSIS BLD QL AUTO: SLIGHT — SIGNIFICANT CHANGE UP
POLYCHROMASIA BLD QL SMEAR: SLIGHT — SIGNIFICANT CHANGE UP
POLYCHROMASIA BLD QL SMEAR: SLIGHT — SIGNIFICANT CHANGE UP
POTASSIUM SERPL-MCNC: 3.8 MMOL/L — SIGNIFICANT CHANGE UP (ref 3.5–5.3)
POTASSIUM SERPL-MCNC: 3.8 MMOL/L — SIGNIFICANT CHANGE UP (ref 3.5–5.3)
POTASSIUM SERPL-MCNC: 3.9 MMOL/L — SIGNIFICANT CHANGE UP (ref 3.5–5.3)
POTASSIUM SERPL-MCNC: 3.9 MMOL/L — SIGNIFICANT CHANGE UP (ref 3.5–5.3)
POTASSIUM SERPL-MCNC: 4.1 MMOL/L — SIGNIFICANT CHANGE UP (ref 3.5–5.3)
POTASSIUM SERPL-SCNC: 3.8 MMOL/L — SIGNIFICANT CHANGE UP (ref 3.5–5.3)
POTASSIUM SERPL-SCNC: 3.8 MMOL/L — SIGNIFICANT CHANGE UP (ref 3.5–5.3)
POTASSIUM SERPL-SCNC: 3.9 MMOL/L — SIGNIFICANT CHANGE UP (ref 3.5–5.3)
POTASSIUM SERPL-SCNC: 3.9 MMOL/L — SIGNIFICANT CHANGE UP (ref 3.5–5.3)
POTASSIUM SERPL-SCNC: 4.1 MMOL/L — SIGNIFICANT CHANGE UP (ref 3.5–5.3)
PROCALCITONIN SERPL-MCNC: 8.52 NG/ML — HIGH (ref 0.02–0.1)
PROCALCITONIN SERPL-MCNC: 8.52 NG/ML — HIGH (ref 0.02–0.1)
PROCALCITONIN SERPL-MCNC: 9.19 NG/ML — HIGH (ref 0.02–0.1)
PROCALCITONIN SERPL-MCNC: 9.25 NG/ML — HIGH (ref 0.02–0.1)
PROCALCITONIN SERPL-MCNC: 9.25 NG/ML — HIGH (ref 0.02–0.1)
PROT SERPL-MCNC: 7.5 G/DL — SIGNIFICANT CHANGE UP (ref 6–8.3)
PROT SERPL-MCNC: 7.5 G/DL — SIGNIFICANT CHANGE UP (ref 6–8.3)
PROT SERPL-MCNC: 7.7 G/DL — SIGNIFICANT CHANGE UP (ref 6–8.3)
PROT SERPL-MCNC: 7.7 G/DL — SIGNIFICANT CHANGE UP (ref 6–8.3)
PROT SERPL-MCNC: 7.8 G/DL — SIGNIFICANT CHANGE UP (ref 6–8.3)
PROTHROM AB SERPL-ACNC: 18.8 SEC — HIGH (ref 9.5–13)
PROTHROM AB SERPL-ACNC: 18.8 SEC — HIGH (ref 9.5–13)
PROTHROM AB SERPL-ACNC: 18.9 SEC — HIGH (ref 9.5–13)
PROTHROM AB SERPL-ACNC: 18.9 SEC — HIGH (ref 9.5–13)
PROTHROM AB SERPL-ACNC: 19.3 SEC — HIGH (ref 9.5–13)
PROTHROM AB SERPL-ACNC: 19.3 SEC — HIGH (ref 9.5–13)
PROTHROM AB SERPL-ACNC: 19.5 SEC — HIGH (ref 9.5–13)
PROTHROM AB SERPL-ACNC: 19.5 SEC — HIGH (ref 9.5–13)
PROTHROM AB SERPL-ACNC: 20.5 SEC — HIGH (ref 9.5–13)
PROTHROM AB SERPL-ACNC: 20.5 SEC — HIGH (ref 9.5–13)
RBC # BLD: 2.8 M/UL — LOW (ref 4.2–5.8)
RBC # BLD: 2.8 M/UL — LOW (ref 4.2–5.8)
RBC # BLD: 2.81 M/UL — LOW (ref 4.2–5.8)
RBC # BLD: 2.81 M/UL — LOW (ref 4.2–5.8)
RBC # BLD: 2.84 M/UL — LOW (ref 4.2–5.8)
RBC # BLD: 2.84 M/UL — LOW (ref 4.2–5.8)
RBC # BLD: 2.88 M/UL — LOW (ref 4.2–5.8)
RBC # BLD: 2.88 M/UL — LOW (ref 4.2–5.8)
RBC # BLD: 2.91 M/UL — LOW (ref 4.2–5.8)
RBC # BLD: 2.91 M/UL — LOW (ref 4.2–5.8)
RBC # FLD: 24.7 % — HIGH (ref 10.3–14.5)
RBC # FLD: 24.7 % — HIGH (ref 10.3–14.5)
RBC # FLD: 24.9 % — HIGH (ref 10.3–14.5)
RBC # FLD: 24.9 % — HIGH (ref 10.3–14.5)
RBC # FLD: 25 % — HIGH (ref 10.3–14.5)
RBC # FLD: 25.2 % — HIGH (ref 10.3–14.5)
RBC # FLD: 25.2 % — HIGH (ref 10.3–14.5)
RBC BLD AUTO: ABNORMAL
RBC BLD AUTO: ABNORMAL
RH IG SCN BLD-IMP: POSITIVE — SIGNIFICANT CHANGE UP
RH IG SCN BLD-IMP: POSITIVE — SIGNIFICANT CHANGE UP
SCHISTOCYTES BLD QL AUTO: SLIGHT — SIGNIFICANT CHANGE UP
SCHISTOCYTES BLD QL AUTO: SLIGHT — SIGNIFICANT CHANGE UP
SMUDGE CELLS # BLD: PRESENT — SIGNIFICANT CHANGE UP
SMUDGE CELLS # BLD: PRESENT — SIGNIFICANT CHANGE UP
SODIUM SERPL-SCNC: 131 MMOL/L — LOW (ref 135–145)
SODIUM SERPL-SCNC: 131 MMOL/L — LOW (ref 135–145)
SODIUM SERPL-SCNC: 133 MMOL/L — LOW (ref 135–145)
SODIUM SERPL-SCNC: 134 MMOL/L — LOW (ref 135–145)
SODIUM SERPL-SCNC: 134 MMOL/L — LOW (ref 135–145)
SODIUM SERPL-SCNC: 135 MMOL/L — SIGNIFICANT CHANGE UP (ref 135–145)
SODIUM SERPL-SCNC: 135 MMOL/L — SIGNIFICANT CHANGE UP (ref 135–145)
SPECIMEN SOURCE: SIGNIFICANT CHANGE UP
SPECIMEN SOURCE: SIGNIFICANT CHANGE UP
TARGETS BLD QL SMEAR: SIGNIFICANT CHANGE UP
TARGETS BLD QL SMEAR: SIGNIFICANT CHANGE UP
TROPONIN T, HIGH SENSITIVITY RESULT: 1184 NG/L — CRITICAL HIGH
TROPONIN T, HIGH SENSITIVITY RESULT: 1184 NG/L — CRITICAL HIGH
TROPONIN T, HIGH SENSITIVITY RESULT: 1187 NG/L — CRITICAL HIGH
TROPONIN T, HIGH SENSITIVITY RESULT: 1187 NG/L — CRITICAL HIGH
VANCOMYCIN FLD-MCNC: <4 UG/ML — SIGNIFICANT CHANGE UP
VANCOMYCIN FLD-MCNC: <4 UG/ML — SIGNIFICANT CHANGE UP
WBC # BLD: 6.51 K/UL — SIGNIFICANT CHANGE UP (ref 3.8–10.5)
WBC # BLD: 6.51 K/UL — SIGNIFICANT CHANGE UP (ref 3.8–10.5)
WBC # BLD: 6.9 K/UL — SIGNIFICANT CHANGE UP (ref 3.8–10.5)
WBC # BLD: 6.9 K/UL — SIGNIFICANT CHANGE UP (ref 3.8–10.5)
WBC # BLD: 7.57 K/UL — SIGNIFICANT CHANGE UP (ref 3.8–10.5)
WBC # BLD: 7.57 K/UL — SIGNIFICANT CHANGE UP (ref 3.8–10.5)
WBC # BLD: 7.62 K/UL — SIGNIFICANT CHANGE UP (ref 3.8–10.5)
WBC # BLD: 7.62 K/UL — SIGNIFICANT CHANGE UP (ref 3.8–10.5)
WBC # BLD: 8.35 K/UL — SIGNIFICANT CHANGE UP (ref 3.8–10.5)
WBC # BLD: 8.35 K/UL — SIGNIFICANT CHANGE UP (ref 3.8–10.5)
WBC # FLD AUTO: 6.51 K/UL — SIGNIFICANT CHANGE UP (ref 3.8–10.5)
WBC # FLD AUTO: 6.51 K/UL — SIGNIFICANT CHANGE UP (ref 3.8–10.5)
WBC # FLD AUTO: 6.9 K/UL — SIGNIFICANT CHANGE UP (ref 3.8–10.5)
WBC # FLD AUTO: 6.9 K/UL — SIGNIFICANT CHANGE UP (ref 3.8–10.5)
WBC # FLD AUTO: 7.57 K/UL — SIGNIFICANT CHANGE UP (ref 3.8–10.5)
WBC # FLD AUTO: 7.57 K/UL — SIGNIFICANT CHANGE UP (ref 3.8–10.5)
WBC # FLD AUTO: 7.62 K/UL — SIGNIFICANT CHANGE UP (ref 3.8–10.5)
WBC # FLD AUTO: 7.62 K/UL — SIGNIFICANT CHANGE UP (ref 3.8–10.5)
WBC # FLD AUTO: 8.35 K/UL — SIGNIFICANT CHANGE UP (ref 3.8–10.5)
WBC # FLD AUTO: 8.35 K/UL — SIGNIFICANT CHANGE UP (ref 3.8–10.5)

## 2023-11-10 PROCEDURE — 76604 US EXAM CHEST: CPT | Mod: 26,GC

## 2023-11-10 PROCEDURE — 99291 CRITICAL CARE FIRST HOUR: CPT | Mod: 25

## 2023-11-10 PROCEDURE — 93308 TTE F-UP OR LMTD: CPT | Mod: 26,GC

## 2023-11-10 PROCEDURE — 90945 DIALYSIS ONE EVALUATION: CPT | Mod: GC

## 2023-11-10 RX ORDER — INSULIN LISPRO 100/ML
VIAL (ML) SUBCUTANEOUS EVERY 6 HOURS
Refills: 0 | Status: DISCONTINUED | OUTPATIENT
Start: 2023-11-10 | End: 2023-11-13

## 2023-11-10 RX ORDER — LACTULOSE 10 G/15ML
20 SOLUTION ORAL THREE TIMES A DAY
Refills: 0 | Status: DISCONTINUED | OUTPATIENT
Start: 2023-11-10 | End: 2023-12-05

## 2023-11-10 RX ORDER — MEROPENEM 1 G/30ML
500 INJECTION INTRAVENOUS EVERY 24 HOURS
Refills: 0 | Status: COMPLETED | OUTPATIENT
Start: 2023-11-11 | End: 2023-11-15

## 2023-11-10 RX ORDER — MAGNESIUM SULFATE 500 MG/ML
2 VIAL (ML) INJECTION ONCE
Refills: 0 | Status: COMPLETED | OUTPATIENT
Start: 2023-11-10 | End: 2023-11-10

## 2023-11-10 RX ORDER — MEROPENEM 1 G/30ML
1000 INJECTION INTRAVENOUS ONCE
Refills: 0 | Status: COMPLETED | OUTPATIENT
Start: 2023-11-10 | End: 2023-11-10

## 2023-11-10 RX ORDER — INSULIN HUMAN 100 [IU]/ML
INJECTION, SOLUTION SUBCUTANEOUS EVERY 6 HOURS
Refills: 0 | Status: DISCONTINUED | OUTPATIENT
Start: 2023-11-10 | End: 2023-11-10

## 2023-11-10 RX ORDER — POLYETHYLENE GLYCOL 3350 17 G/17G
17 POWDER, FOR SOLUTION ORAL
Refills: 0 | Status: DISCONTINUED | OUTPATIENT
Start: 2023-11-10 | End: 2023-12-05

## 2023-11-10 RX ORDER — CALCIUM GLUCONATE 100 MG/ML
2 VIAL (ML) INTRAVENOUS ONCE
Refills: 0 | Status: COMPLETED | OUTPATIENT
Start: 2023-11-10 | End: 2023-11-10

## 2023-11-10 RX ADMIN — Medication 50 MILLIGRAM(S): at 11:30

## 2023-11-10 RX ADMIN — MEROPENEM 100 MILLIGRAM(S): 1 INJECTION INTRAVENOUS at 08:01

## 2023-11-10 RX ADMIN — Medication 50 MILLIGRAM(S): at 05:19

## 2023-11-10 RX ADMIN — Medication 1: at 17:08

## 2023-11-10 RX ADMIN — CHLORHEXIDINE GLUCONATE 1 APPLICATION(S): 213 SOLUTION TOPICAL at 09:00

## 2023-11-10 RX ADMIN — CLOPIDOGREL BISULFATE 75 MILLIGRAM(S): 75 TABLET, FILM COATED ORAL at 11:30

## 2023-11-10 RX ADMIN — Medication 200 GRAM(S): at 23:15

## 2023-11-10 RX ADMIN — CHLORHEXIDINE GLUCONATE 15 MILLILITER(S): 213 SOLUTION TOPICAL at 17:03

## 2023-11-10 RX ADMIN — Medication 1 MILLIGRAM(S): at 11:30

## 2023-11-10 RX ADMIN — Medication 1: at 23:44

## 2023-11-10 RX ADMIN — Medication 2: at 11:46

## 2023-11-10 RX ADMIN — Medication 81 MILLIGRAM(S): at 11:31

## 2023-11-10 RX ADMIN — Medication 20 MICROGRAM(S)/KG/MIN: at 19:23

## 2023-11-10 RX ADMIN — Medication 2: at 07:15

## 2023-11-10 RX ADMIN — VASOPRESSIN 6 UNIT(S)/MIN: 20 INJECTION INTRAVENOUS at 17:04

## 2023-11-10 RX ADMIN — Medication 50 MILLIGRAM(S): at 17:03

## 2023-11-10 RX ADMIN — PROPOFOL 8.4 MICROGRAM(S)/KG/MIN: 10 INJECTION, EMULSION INTRAVENOUS at 07:40

## 2023-11-10 RX ADMIN — VASOPRESSIN 6 UNIT(S)/MIN: 20 INJECTION INTRAVENOUS at 07:40

## 2023-11-10 RX ADMIN — CHLORHEXIDINE GLUCONATE 15 MILLILITER(S): 213 SOLUTION TOPICAL at 05:19

## 2023-11-10 RX ADMIN — Medication 20 MICROGRAM(S)/KG/MIN: at 17:04

## 2023-11-10 RX ADMIN — VASOPRESSIN 6 UNIT(S)/MIN: 20 INJECTION INTRAVENOUS at 19:24

## 2023-11-10 RX ADMIN — Medication 50 MILLIGRAM(S): at 23:15

## 2023-11-10 RX ADMIN — Medication 1 APPLICATION(S): at 05:24

## 2023-11-10 RX ADMIN — MEROPENEM 100 MILLIGRAM(S): 1 INJECTION INTRAVENOUS at 16:14

## 2023-11-10 RX ADMIN — Medication 1 APPLICATION(S): at 17:03

## 2023-11-10 RX ADMIN — AZITHROMYCIN 255 MILLIGRAM(S): 500 TABLET, FILM COATED ORAL at 06:03

## 2023-11-10 RX ADMIN — MEROPENEM 100 MILLIGRAM(S): 1 INJECTION INTRAVENOUS at 22:59

## 2023-11-10 RX ADMIN — Medication 20 MICROGRAM(S)/KG/MIN: at 07:40

## 2023-11-10 RX ADMIN — Medication 25 GRAM(S): at 23:46

## 2023-11-10 NOTE — PROGRESS NOTE ADULT - PROBLEM SELECTOR PLAN 1
Pt. with ESRD on HD TIW (MWF, AVF). He also has a hx of renal transplant (not on immunosuppression). Presenting to MICU with respiratory failure and systemic shock (likely septic shock per primary team). Last HD as outpatient was done on 11/6/23 via L AVF. Labs reviewed. Pt. remains critically ill, requiring multiple vasopressors and he is intubated/sedated. Due to requiring multiple vasopressors he was initiated CRRT. Appears to be tolerating around 100cc/hr of UF. Plan to continue CRRT today. Will assess RRT needs daily. Monitor BP and labs. Dose meds as per HD.

## 2023-11-10 NOTE — PROGRESS NOTE ADULT - SUBJECTIVE AND OBJECTIVE BOX
Patient is a 73y old  Male who presents with a chief complaint of 72 yo M w Hx of CAD, DM, HTN, s/p renal transplant, currently ESRD/HD TIW with recent hospitalization for bacterial PNA.  Presenting after being found unresponsive at home with agonal breathing, AMS & hypoglycemia.  Pt. intubated for airway protection and acute hypoxic respiratory failure.     (09 Nov 2023 10:21)      SUBJECTIVE / OVERNIGHT EVENTS:  Patient seen and evaluated at bedside.  no events overnight.    Vital Signs Last 24 Hrs  T(C): 36.3 (10 Nov 2023 04:00), Max: 36.3 (10 Nov 2023 04:00)  T(F): 97.4 (10 Nov 2023 04:00), Max: 97.4 (10 Nov 2023 04:00)  HR: 81 (10 Nov 2023 07:29) (63 - 81)  BP: --  BP(mean): --  RR: 18 (10 Nov 2023 06:00) (14 - 23)  SpO2: 100% (10 Nov 2023 07:29) (100% - 100%)    Parameters below as of 10 Nov 2023 06:00  Patient On (Oxygen Delivery Method): ventilator    O2 Concentration (%): 40    PHYSICAL EXAM:  GENERAL: intubated and sedated but able to arouse to verbal and tactile stimuli   HEAD:  Atraumatic, Normocephalic  EYES: PERRL  ENT: unable to assess iso intubation   NECK: Supple, No JVD  CHEST/LUNG: air movement noted on bilateral lung apices   HEART: Regular rate and rhythm; No murmurs, rubs, or gallops  ABDOMEN: BSx4; Soft, nontender, nondistended  EXTREMITIES: diminished b/l lower extremity pulses   NERVOUS SYSTEM:  on prop however able to follow commands    LABS:                        8.6    7.62  )-----------( 50       ( 10 Nov 2023 06:00 )             24.1     Hgb Trend: 8.6<--, 8.8<--, 9.0<--, 8.9<--, 9.5<--  11-10    135  |  99  |  23  ----------------------------<  239<H>  4.1   |  22  |  1.74<H>    Ca    7.8<L>      10 Nov 2023 06:00  Phos  3.4     11-10  Mg     2.30     11-10    TPro  7.8  /  Alb  2.1<L>  /  TBili  6.1<H>  /  DBili  x   /  AST  978<H>  /  ALT  266<H>  /  AlkPhos  495<H>  11-10    Creatinine Trend: 1.74<--, 1.89<--, 2.54<--, 3.02<--, 3.58<--, 3.80<--  LIVER FUNCTIONS - ( 10 Nov 2023 06:00 )  Alb: 2.1 g/dL / Pro: 7.8 g/dL / ALK PHOS: 495 U/L / ALT: 266 U/L / AST: 978 U/L / GGT: x           PT/INR - ( 10 Nov 2023 06:00 )   PT: 19.5 sec;   INR: 1.75 ratio         PTT - ( 10 Nov 2023 06:00 )  PTT:40.6 sec      Urinalysis Basic - ( 10 Nov 2023 06:00 )    Color: x / Appearance: x / SG: x / pH: x  Gluc: 239 mg/dL / Ketone: x  / Bili: x / Urobili: x   Blood: x / Protein: x / Nitrite: x   Leuk Esterase: x / RBC: x / WBC x   Sq Epi: x / Non Sq Epi: x / Bacteria: x     Patient is a 73y old  Male who presents with a chief complaint of 74 yo M w Hx of CAD, DM, HTN, s/p renal transplant, currently ESRD/HD TIW with recent hospitalization for bacterial PNA.  Presenting after being found unresponsive at home with agonal breathing, AMS & hypoglycemia.  Pt. intubated for airway protection and acute hypoxic respiratory failure.     (09 Nov 2023 10:21)      SUBJECTIVE / OVERNIGHT EVENTS:  Patient seen and evaluated at bedside.  noted to be hypothermic 96F overnight. otherwise ongoing CRRT but with decreased IV pressor requirements.     Vital Signs Last 24 Hrs  T(C): 36.3 (10 Nov 2023 04:00), Max: 36.3 (10 Nov 2023 04:00)  T(F): 97.4 (10 Nov 2023 04:00), Max: 97.4 (10 Nov 2023 04:00)  HR: 81 (10 Nov 2023 07:29) (63 - 81)  BP: --  BP(mean): --  RR: 18 (10 Nov 2023 06:00) (14 - 23)  SpO2: 100% (10 Nov 2023 07:29) (100% - 100%)    Parameters below as of 10 Nov 2023 06:00  Patient On (Oxygen Delivery Method): ventilator    O2 Concentration (%): 40    PHYSICAL EXAM:  GENERAL: intubated and sedated but able to arouse to verbal and tactile stimuli   HEAD:  Atraumatic, Normocephalic  EYES: PERRL  ENT: unable to assess iso intubation   NECK: Supple, No JVD  CHEST/LUNG: air movement noted on bilateral lung apices   HEART: Regular rate and rhythm; No murmurs, rubs, or gallops  ABDOMEN: BSx4; Soft, nontender, nondistended  EXTREMITIES: diminished b/l lower extremity pulses   NERVOUS SYSTEM:  on prop however able to follow commands    LABS:                        8.6    7.62  )-----------( 50       ( 10 Nov 2023 06:00 )             24.1     Hgb Trend: 8.6<--, 8.8<--, 9.0<--, 8.9<--, 9.5<--  11-10    135  |  99  |  23  ----------------------------<  239<H>  4.1   |  22  |  1.74<H>    Ca    7.8<L>      10 Nov 2023 06:00  Phos  3.4     11-10  Mg     2.30     11-10    TPro  7.8  /  Alb  2.1<L>  /  TBili  6.1<H>  /  DBili  x   /  AST  978<H>  /  ALT  266<H>  /  AlkPhos  495<H>  11-10    Creatinine Trend: 1.74<--, 1.89<--, 2.54<--, 3.02<--, 3.58<--, 3.80<--  LIVER FUNCTIONS - ( 10 Nov 2023 06:00 )  Alb: 2.1 g/dL / Pro: 7.8 g/dL / ALK PHOS: 495 U/L / ALT: 266 U/L / AST: 978 U/L / GGT: x           PT/INR - ( 10 Nov 2023 06:00 )   PT: 19.5 sec;   INR: 1.75 ratio         PTT - ( 10 Nov 2023 06:00 )  PTT:40.6 sec      Urinalysis Basic - ( 10 Nov 2023 06:00 )    Color: x / Appearance: x / SG: x / pH: x  Gluc: 239 mg/dL / Ketone: x  / Bili: x / Urobili: x   Blood: x / Protein: x / Nitrite: x   Leuk Esterase: x / RBC: x / WBC x   Sq Epi: x / Non Sq Epi: x / Bacteria: x

## 2023-11-10 NOTE — PROGRESS NOTE ADULT - ASSESSMENT
73M w/ ESRD on HD MWF, CAD s/p stent 1m ago, BPH, DM, HTN presenting w/ AMS and AHRF; concern for stroke vs toxic metabolic encephalopathy, intubated for airway protection and hypoxic respiratory failure.       ==============NEURO=============  #Acute Metabolic Encephalopathy:   - baseline independent, AOx3  - CT head w/ moderate chronic ischemic changes  - CTA neck and brain: Patent, ECAs, ICAs, no  hemodynamically significant stenosis at ICA origins by NASCET criteria. Bilateral vertebral arteries are patent without flow limiting stenosis.  - Neurology consulted: recommended MRI, spot EEG, maintaining on DAPT  - Acetaminophen, salicylates negative    =========CARDIOVASCULAR=========  #Shock  - unclear etiology however may be iso sepsis vs vasoplegia vs obstructive  - on levophed for BP support  - started on vasopressin overnight  - on solucortef 100 q8 for presumed adrenal insufficiency state   - bedside POCUS with evidence of enlarged RV  - pending CTA PE protocol to r/o PE     #CAD s/p stent 10/11/2023  #RCA stent with 99% stenosis   - c/w home aspirin 81mg qd  - c/w home plavix 75mg qd  - c/w home atorvastatin 80mg qd  - hold home coreg 12.5mg BID iso shock state     #Severe RV Dysfunction  - Bedside POCUS with evidence of RV dilated and thin RV wall concerning for acute process  - elevated troponin 1472 however downtrending  - R EKG without evidence of ST changes     ============RESPIRATORY==========  #Acute hypoxic respiratory failure  - desat to 80% at home w/ agonal breathing per patient's daughter  - s/p intubation 11/7  - on volume AC RR 14  PEEP 5 and FiO2 50%   - CPAP as tolerated    #Bilateral Multifocal PNA  - may be residual from recent hospitalization at [facility] for bacterial PNA  - will treat broadly with zosyn and azithromycin  - s/p bronch 11/8 pending culture data   - f/u urine legionella and strep     ============RENAL==============  #ESRD on HD  - Cr 2.54  - s/p HD on 11/6 via L AVF  - started on CRRT on 11/8 for volume removal     ============GI===============  #Ascites  - s/p diagnostic para 11/8 pending results     #Diet and Nutrition  - NPO w/ tube feeds    ============ENDO============  #DM  - home regimen - Humalog 15u qAC, Glargine 6u qhs  - FS q6h w/ ISS while NPO w/ tube feeds    #Elevated TSH   - initial labs concerning for TSH 28.9 and FT4 wnl  - will follow up remainder of thyroid labs   - would recommend follow up of TSH in 1-2 weeks after hospital stay    ===========HEME/ONC===========  #Anemia  - Hgb 7 on admission  - was receiving aranesp outpatient with HD    =============ID=============  #r/o Sepsis  - potential etiologies include multifocal PNA vs colitis vs other   - s/p Azithromycin, Vancomycin, and Zosyn in ED  - continue with broad spectrum with zosyn and azithromycin (should cover GI sources)  - MRSA swab and can dose vanc by level  - pending blood cultures (11/7-)  - pending urinalysis and cultures (11/7-)    ===========Lines/Tubes/Iglesias===========  - GOC: FULL CODE

## 2023-11-10 NOTE — PROGRESS NOTE ADULT - SUBJECTIVE AND OBJECTIVE BOX
[facility] DIVISION OF KIDNEY DISEASES AND HYPERTENSION   FOLLOW UP NOTE  --------------------------------------------------------------------------------  HPI: 74 yo M with a PMH of ESRD on HD TIW (MWF, AVF), s/p Renal transplant, HTN, DM who presented to [facility] after being found down at home. Per family, found unresponsive in the bathroom prior to admission. Admitted to MICU due to shock requiring multiple vasopressors. Initiated on CRRT. Nephrology following for ESRD/HD management.    24 hour events/subjective: Pt. was seen and examined today, family at bedside. Remains intubated, sedated and on vasopressors. Per nursing, tolerating 100cc/hr of fluid removal with CRRT. Vasopressor requirements are gradually improving. Would plan to continue CRRT today but will assess RRT needs daily.     PAST HISTORY  --------------------------------------------------------------------------------  No significant changes to PMH, PSH, FHx, SHx, unless otherwise noted    ALLERGIES & MEDICATIONS  --------------------------------------------------------------------------------  Allergies  No Known Allergies    Intolerances    Standing Inpatient Medications  aspirin  chewable 81 milliGRAM(s) Oral daily  chlorhexidine 0.12% Liquid 15 milliLiter(s) Oral Mucosa every 12 hours  chlorhexidine 2% Cloths 1 Application(s) Topical daily  clopidogrel Tablet 75 milliGRAM(s) Oral daily  CRRT Treatment    <Continuous>  dextrose 50% Injectable 25 Gram(s) IV Push once  dextrose 50% Injectable 25 Gram(s) IV Push once  dextrose 50% Injectable 12.5 Gram(s) IV Push once  dextrose Oral Gel 15 Gram(s) Oral once  folic acid 1 milliGRAM(s) Oral daily  glucagon  Injectable 1 milliGRAM(s) IntraMuscular once  hydrocortisone sodium succinate Injectable 50 milliGRAM(s) IV Push every 6 hours  insulin lispro (ADMELOG) corrective regimen sliding scale   SubCutaneous three times a day before meals  insulin lispro (ADMELOG) corrective regimen sliding scale   SubCutaneous at bedtime  meropenem  IVPB 1000 milliGRAM(s) IV Intermittent every 8 hours  norepinephrine Infusion 0.329 MICROgram(s)/kG/Min IV Continuous <Continuous>  petrolatum Ophthalmic Ointment 1 Application(s) Both EYES every 12 hours  Phoxillum Filtration BK 4 / 2.5 5000 milliLiter(s) CRRT <Continuous>  Phoxillum Filtration BK 4 / 2.5 5000 milliLiter(s) CRRT <Continuous>  PrismaSATE Dialysate BK 0 / 3.5 5000 milliLiter(s) CRRT <Continuous>  propofol Infusion 20 MICROgram(s)/kG/Min IV Continuous <Continuous>  vasopressin Infusion 0.04 Unit(s)/Min IV Continuous <Continuous>    PRN Inpatient Medications  lactulose Syrup 20 Gram(s) Oral three times a day PRN  polyethylene glycol 3350 17 Gram(s) Oral two times a day PRN  sodium chloride 0.9% lock flush 10 milliLiter(s) IV Push every 1 hour PRN    REVIEW OF SYSTEMS  --------------------------------------------------------------------------------  Unable to obtain ROS    VITALS/PHYSICAL EXAM  --------------------------------------------------------------------------------  T(C): 36.3 (11-10-23 @ 04:00), Max: 36.3 (11-10-23 @ 04:00)  HR: 81 (11-10-23 @ 07:29) (63 - 81)  BP: --  RR: 18 (11-10-23 @ 06:00) (14 - 23)  SpO2: 100% (11-10-23 @ 07:29) (100% - 100%)  Wt(kg): --    11-09-23 @ 07:01  -  11-10-23 @ 07:00  --------------------------------------------------------  IN: 2060.4 mL / OUT: 4981 mL / NET: -2920.6 mL    Physical Exam:  	Gen: Intubated, sedated  	HEENT: Anicteric  	Pulm: Mechanical breath sounds  	CV: S1S2+  	Abd: Soft, +BS    	Ext: Trace LE edema B/L  	Neuro: Sedated              : Iglesias+ with few mLs of dark urine   	Skin: Warm and dry  	Dialysis access: L AVF+    LABS/STUDIES  --------------------------------------------------------------------------------              8.6    7.62  >-----------<  50       [11-10-23 @ 06:00]              24.1     135  |  99  |  23  ----------------------------<  239      [11-10-23 @ 06:00]  4.1   |  22  |  1.74        Ca     7.8     [11-10-23 @ 06:00]      iCa    1.04     [11-10 @ 06:00]      Mg     2.30     [11-10-23 @ 06:00]      Phos  3.4     [11-10-23 @ 06:00]    TPro  7.8  /  Alb  2.1  /  TBili  6.1  /  DBili  x   /  AST  978  /  ALT  266  /  AlkPhos  495  [11-10-23 @ 06:00]    PT/INR: PT 19.5 , INR 1.75       [11-10-23 @ 06:00]  PTT: 40.6       [11-10-23 @ 06:00]    Creatinine Trend:  SCr 1.74 [11-10 @ 06:00]  SCr 1.89 [11-10 @ 00:10]  SCr 2.54 [11-09 @ 14:00]  SCr 3.02 [11-09 @ 06:10]  SCr 3.58 [11-09 @ 00:06]    HCV 0.20, Nonreact      [11-08-23 @ 14:53]

## 2023-11-10 NOTE — CHART NOTE - NSCHARTNOTEFT_GEN_A_CORE
: Da Rhodes MD. Fritz Stokes MD.    INDICATION: Critical Illness     PROCEDURE:  [ X ] LIMITED ECHO  [ X ] LIMITED CHEST  [ ] LIMITED RETROPERITONEAL  [ ] LIMITED ABDOMINAL  [ ] LIMITED DVT  [ ] NEEDLE GUIDANCE VASCULAR  [ ] NEEDLE GUIDANCE THORACENTESIS  [ ] NEEDLE GUIDANCE PARACENTESIS  [ ] NEEDLE GUIDANCE PERICARDIOCENTESIS  [ ] OTHER    FINDINGS: A-line predominant b/l, no pleural effusions. Reduced LV systolic function, LVOT VTI 12.8cm. RV enlargement. Mildly improved flattening of interventricular septum today. Evidence of severe tricuspid regurgitation and mod-severe mitral regurgitation.      INTERPRETATION: Normal aeration pattern. Reduced LV systolic function, RV enlargement, severe TR, mod-severe MR. : Da Rhodes MD. Fritz Stokes MD.    INDICATION: Critical Illness     PROCEDURE:  [ X ] LIMITED ECHO  [ X ] LIMITED CHEST  [ ] LIMITED RETROPERITONEAL  [ ] LIMITED ABDOMINAL  [ ] LIMITED DVT  [ ] NEEDLE GUIDANCE VASCULAR  [ ] NEEDLE GUIDANCE THORACENTESIS  [ ] NEEDLE GUIDANCE PARACENTESIS  [ ] NEEDLE GUIDANCE PERICARDIOCENTESIS  [ ] OTHER    FINDINGS: A-line predominant b/l, no pleural effusions. Reduced LV systolic function, LVOT VTI 12.8cm. RV enlargement. Mildly improved flattening of interventricular septum today. Evidence of severe tricuspid regurgitation and mod-severe mitral regurgitation.      INTERPRETATION: Normal aeration pattern. Reduced LV systolic function, RV enlargement, severe TR, mod-severe MR.    I was present during the key portions of the procedure and immediately available during the entire procedure.  Quentin LUGO  Attending

## 2023-11-11 LAB
GLUCOSE BLDC GLUCOMTR-MCNC: 150 MG/DL — HIGH (ref 70–99)
GLUCOSE BLDC GLUCOMTR-MCNC: 150 MG/DL — HIGH (ref 70–99)
GLUCOSE BLDC GLUCOMTR-MCNC: 167 MG/DL — HIGH (ref 70–99)
GLUCOSE BLDC GLUCOMTR-MCNC: 167 MG/DL — HIGH (ref 70–99)
GLUCOSE BLDC GLUCOMTR-MCNC: 191 MG/DL — HIGH (ref 70–99)
GLUCOSE BLDC GLUCOMTR-MCNC: 191 MG/DL — HIGH (ref 70–99)
THYROGLOB AB FLD IA-ACNC: <1 IU/ML — SIGNIFICANT CHANGE UP
THYROGLOB AB FLD IA-ACNC: <1 IU/ML — SIGNIFICANT CHANGE UP
THYROGLOB AB SERPL-ACNC: <1 IU/ML — SIGNIFICANT CHANGE UP
THYROGLOB AB SERPL-ACNC: <1 IU/ML — SIGNIFICANT CHANGE UP
THYROGLOB SERPL-MCNC: 84.1 NG/ML — HIGH (ref 2.8–40.9)
THYROGLOB SERPL-MCNC: 84.1 NG/ML — HIGH (ref 2.8–40.9)
TSH RECEP AB FLD-ACNC: <1.1 IU/L — SIGNIFICANT CHANGE UP (ref 0–1.75)
TSH RECEP AB FLD-ACNC: <1.1 IU/L — SIGNIFICANT CHANGE UP (ref 0–1.75)

## 2023-11-11 PROCEDURE — 99291 CRITICAL CARE FIRST HOUR: CPT

## 2023-11-11 PROCEDURE — 99232 SBSQ HOSP IP/OBS MODERATE 35: CPT | Mod: GC

## 2023-11-11 RX ORDER — CALCIUM GLUCONATE 100 MG/ML
2 VIAL (ML) INTRAVENOUS ONCE
Refills: 0 | Status: DISCONTINUED | OUTPATIENT
Start: 2023-11-11 | End: 2023-11-11

## 2023-11-11 RX ADMIN — Medication 1 MILLIGRAM(S): at 12:03

## 2023-11-11 RX ADMIN — Medication 50 MILLIGRAM(S): at 18:34

## 2023-11-11 RX ADMIN — Medication 81 MILLIGRAM(S): at 12:03

## 2023-11-11 RX ADMIN — Medication 50 MILLIGRAM(S): at 05:53

## 2023-11-11 RX ADMIN — CHLORHEXIDINE GLUCONATE 1 APPLICATION(S): 213 SOLUTION TOPICAL at 12:12

## 2023-11-11 RX ADMIN — CLOPIDOGREL BISULFATE 75 MILLIGRAM(S): 75 TABLET, FILM COATED ORAL at 12:03

## 2023-11-11 RX ADMIN — CHLORHEXIDINE GLUCONATE 15 MILLILITER(S): 213 SOLUTION TOPICAL at 05:53

## 2023-11-11 RX ADMIN — MEROPENEM 100 MILLIGRAM(S): 1 INJECTION INTRAVENOUS at 18:34

## 2023-11-11 RX ADMIN — Medication 50 MILLIGRAM(S): at 12:04

## 2023-11-11 RX ADMIN — Medication 1: at 18:00

## 2023-11-11 RX ADMIN — Medication 20 MICROGRAM(S)/KG/MIN: at 07:52

## 2023-11-11 RX ADMIN — Medication 1 APPLICATION(S): at 18:36

## 2023-11-11 RX ADMIN — Medication 1 APPLICATION(S): at 06:10

## 2023-11-11 RX ADMIN — Medication 1: at 12:05

## 2023-11-11 RX ADMIN — Medication 20 MICROGRAM(S)/KG/MIN: at 19:05

## 2023-11-11 NOTE — PROGRESS NOTE ADULT - ASSESSMENT
Pt with ESRD on HD, initiated on CRRT in setting of systemic shock requiring multiple vasopressors Pt. with ESRD.

## 2023-11-11 NOTE — PROGRESS NOTE ADULT - ASSESSMENT
73M w/ ESRD on HD MWF, CAD s/p stent 1m ago, BPH, DM, HTN presenting w/ AMS and AHRF; concern for stroke vs toxic metabolic encephalopathy, intubated for airway protection and hypoxic respiratory failure.       ==============NEURO=============  #Acute Metabolic Encephalopathy:   - baseline independent, AOx3  - CT head w/ moderate chronic ischemic changes  - CTA neck and brain: Patent, ECAs, ICAs, no  hemodynamically significant stenosis at ICA origins by NASCET criteria. Bilateral vertebral arteries are patent without flow limiting stenosis.  - Neurology consulted: recommended MRI, spot EEG, maintaining on DAPT  - Acetaminophen, salicylates negative    =========CARDIOVASCULAR=========  #Shock  - unclear etiology however may be iso sepsis vs vasoplegia vs obstructive  - on levophed for BP support  - started on vasopressin overnight  - on solucortef 100 q8 for presumed adrenal insufficiency state   - bedside POCUS with evidence of enlarged RV  - pending CTA PE protocol to r/o PE     #CAD s/p stent 10/11/2023  #RCA stent with 99% stenosis   - c/w home aspirin 81mg qd  - c/w home plavix 75mg qd  - c/w home atorvastatin 80mg qd  - hold home coreg 12.5mg BID iso shock state     #Severe RV Dysfunction  - Bedside POCUS with evidence of RV dilated and thin RV wall concerning for acute process  - elevated troponin 1472 however downtrending  - R EKG without evidence of ST changes     ============RESPIRATORY==========  #Acute hypoxic respiratory failure  - desat to 80% at home w/ agonal breathing per patient's daughter  - s/p intubation 11/7  - on volume AC RR 14  PEEP 5 and FiO2 50%   - CPAP as tolerated    #Bilateral Multifocal PNA  - may be residual from recent hospitalization at [facility] for bacterial PNA  - will treat broadly with zosyn and azithromycin  - s/p bronch 11/8 pending culture data   - f/u urine legionella and strep     ============RENAL==============  #ESRD on HD  - Cr 2.54  - s/p HD on 11/6 via L AVF  - started on CRRT on 11/8 for volume removal     ============GI===============  #Ascites  - s/p diagnostic para 11/8 pending results     #Diet and Nutrition  - NPO w/ tube feeds    ============ENDO============  #DM  - home regimen - Humalog 15u qAC, Glargine 6u qhs  - FS q6h w/ ISS while NPO w/ tube feeds    #Elevated TSH   - initial labs concerning for TSH 28.9 and FT4 wnl  - will follow up remainder of thyroid labs   - would recommend follow up of TSH in 1-2 weeks after hospital stay    ===========HEME/ONC===========  #Anemia  - Hgb 7 on admission  - was receiving aranesp outpatient with HD    =============ID=============  #r/o Sepsis  - potential etiologies include multifocal PNA vs colitis vs other   - s/p Azithromycin, Vancomycin, and Zosyn in ED  - continue with broad spectrum with zosyn and azithromycin (should cover GI sources)  - MRSA swab and can dose vanc by level  - pending blood cultures (11/7-)  - pending urinalysis and cultures (11/7-)    ===========Lines/Tubes/Iglesias===========  - GOC: FULL CODE

## 2023-11-11 NOTE — PROGRESS NOTE ADULT - PROBLEM SELECTOR PLAN 1
Pt. with ESRD on HD TIW (MWF, LUE AVF). He also has a hx of renal transplant (not on immunosuppression). Presenting to MICU with respiratory failure and systemic shock (likely septic shock per primary team). Last HD as outpatient was done on 11/6/23 via L AVF. Labs reviewed. Pt. remains critically ill, intubated and on multiple vasopressors. Was initiated on CRRT on 11/8, stopped on 11/10 as appears volume status and FiO2 requirements were improving. He is oliguric. Labs are pending today. Will consider trial of iHD today v resuming usual MWF schedule per clinical course. Will assess RRT needs daily. Monitor BP and labs. Dose meds as per HD. Pt. with ESRD on HD TIW (MWF, LUE AVF). Pt. also with history of kidney transplant (not on immunosuppression). Last HD as outpatient was done on 11/6/23 via L AVF. Pt. admitted to MICU with respiratory failure and systemic shock. Pt. initiated on CRRT on 11/8, stopped on 11/10 as volume status and FiO2 requirements were improving. Labs from yesterday night reviewed. No plan for HD today. Will assess need for HD daily. Monitor BP and labs. Dose meds as per HD.

## 2023-11-11 NOTE — PROGRESS NOTE ADULT - SUBJECTIVE AND OBJECTIVE BOX
[facility] DIVISION OF KIDNEY DISEASES AND HYPERTENSION   FOLLOW UP NOTE  --------------------------------------------------------------------------------  HPI: 72 yo M with a PMH of ESRD on HD TIW (MWF, AVF), s/p Renal transplant, HTN, DM who presented to [facility] after being found down at home. Per family, found unresponsive in the bathroom prior to admission. Admitted to MICU due to shock requiring multiple vasopressors. Initiated on CRRT on 11/8. Nephrology following for ESRD/HD management.    24 hour events/subjective: Pt. was seen and examined today, family at bedside. Remains intubated but off of sedation. Per MICU, pressor requirements are improving. He is currently off of CRRT as appears volume status has improved. He is oliguric.     PAST HISTORY  --------------------------------------------------------------------------------  No significant changes to PMH, PSH, FHx, SHx, unless otherwise noted    ALLERGIES & MEDICATIONS  --------------------------------------------------------------------------------  Allergies  No Known Allergies    Intolerances    Standing Inpatient Medications  aspirin  chewable 81 milliGRAM(s) Oral daily  chlorhexidine 0.12% Liquid 15 milliLiter(s) Oral Mucosa every 12 hours  chlorhexidine 2% Cloths 1 Application(s) Topical daily  clopidogrel Tablet 75 milliGRAM(s) Oral daily  CRRT Treatment    <Continuous>  dextrose 50% Injectable 25 Gram(s) IV Push once  folic acid 1 milliGRAM(s) Oral daily  hydrocortisone sodium succinate Injectable 50 milliGRAM(s) IV Push every 6 hours  insulin lispro (ADMELOG) corrective regimen sliding scale   SubCutaneous every 6 hours  meropenem  IVPB 500 milliGRAM(s) IV Intermittent every 24 hours  norepinephrine Infusion 0.329 MICROgram(s)/kG/Min IV Continuous <Continuous>  petrolatum Ophthalmic Ointment 1 Application(s) Both EYES every 12 hours  Phoxillum Filtration BK 4 / 2.5 5000 milliLiter(s) CRRT <Continuous>  Phoxillum Filtration BK 4 / 2.5 5000 milliLiter(s) CRRT <Continuous>  PrismaSATE Dialysate BK 0 / 3.5 5000 milliLiter(s) CRRT <Continuous>  vasopressin Infusion 0.04 Unit(s)/Min IV Continuous <Continuous>    PRN Inpatient Medications  lactulose Syrup 20 Gram(s) Oral three times a day PRN  polyethylene glycol 3350 17 Gram(s) Oral two times a day PRN  sodium chloride 0.9% lock flush 10 milliLiter(s) IV Push every 1 hour PRN    REVIEW OF SYSTEMS  --------------------------------------------------------------------------------  Unable to obtain ROS     VITALS/PHYSICAL EXAM  --------------------------------------------------------------------------------  T(C): 37.2 (11-11-23 @ 04:00), Max: 37.7 (11-11-23 @ 00:00)  HR: 70 (11-11-23 @ 05:15) (56 - 82)  BP: --  RR: 14 (11-11-23 @ 05:15) (12 - 20)  SpO2: 98% (11-11-23 @ 05:15) (96% - 100%)  Wt(kg): --    11-10-23 @ 07:01  -  11-11-23 @ 07:00  --------------------------------------------------------  IN: 919 mL / OUT: 752 mL / NET: 167 mL    Physical Exam:  	Gen: Intubated, off of sedation  	HEENT: Anicteric  	Pulm: Mechanical breath sounds   	CV: S1S2+  	Abd: Soft, +BS    	Ext: No LE edema B/L  	Neuro: Follows simple commands  	Skin: Warm and dry  	Dialysis access: L AVF+, R IJ non tunneled HD catheter     LABS/STUDIES  --------------------------------------------------------------------------------              8.3    6.51  >-----------<  41       [11-10-23 @ 22:50]              22.9     133  |  100  |  26  ----------------------------<  170      [11-10-23 @ 22:50]  4.1   |  21  |  1.69        Ca     7.9     [11-10-23 @ 22:50]      iCa    0.99     [11-10 @ 22:50]      Mg     2.30     [11-10-23 @ 22:50]      Phos  3.2     [11-10-23 @ 22:50]    TPro  7.7  /  Alb  2.2  /  TBili  5.2  /  DBili  x   /  AST  988  /  ALT  259  /  AlkPhos  514  [11-10-23 @ 22:50]    PT/INR: PT 18.8 , INR 1.70       [11-10-23 @ 22:50]  PTT: 40.2       [11-10-23 @ 22:50]    Creatinine Trend:  SCr 1.69 [11-10 @ 22:50]  SCr 1.35 [11-10 @ 17:10]  SCr 1.52 [11-10 @ 11:42]  SCr 1.74 [11-10 @ 06:00]  SCr 1.89 [11-10 @ 00:10]    HCV 0.20, Nonreact      [11-08-23 @ 14:53] [facility] DIVISION OF KIDNEY DISEASES AND HYPERTENSION   FOLLOW UP NOTE  --------------------------------------------------------------------------------  HPI: 74 yo M with a PMH of ESRD on HD TIW (MWF, AVF), s/p Renal transplant, HTN, DM who presented to [facility] after being found down at home. Per family, found unresponsive in the bathroom prior to admission. Admitted to MICU due to shock requiring multiple vasopressors. Initiated on CRRT on 11/8. Nephrology following for ESRD/HD management.    24 hour events/subjective: Pt. was seen and examined today, family at bedside. Remains intubated but off sedation. Per MICU, pressor requirements are improving. He is currently off CRRT as appears volume status has improved. Pt. is oliguric.     PAST HISTORY  --------------------------------------------------------------------------------  No significant changes to PMH, PSH, FHx, SHx, unless otherwise noted    ALLERGIES & MEDICATIONS  --------------------------------------------------------------------------------  Allergies  No Known Allergies    Intolerances    Standing Inpatient Medications  aspirin  chewable 81 milliGRAM(s) Oral daily  chlorhexidine 0.12% Liquid 15 milliLiter(s) Oral Mucosa every 12 hours  chlorhexidine 2% Cloths 1 Application(s) Topical daily  clopidogrel Tablet 75 milliGRAM(s) Oral daily  CRRT Treatment    <Continuous>  dextrose 50% Injectable 25 Gram(s) IV Push once  folic acid 1 milliGRAM(s) Oral daily  hydrocortisone sodium succinate Injectable 50 milliGRAM(s) IV Push every 6 hours  insulin lispro (ADMELOG) corrective regimen sliding scale   SubCutaneous every 6 hours  meropenem  IVPB 500 milliGRAM(s) IV Intermittent every 24 hours  norepinephrine Infusion 0.329 MICROgram(s)/kG/Min IV Continuous <Continuous>  petrolatum Ophthalmic Ointment 1 Application(s) Both EYES every 12 hours  Phoxillum Filtration BK 4 / 2.5 5000 milliLiter(s) CRRT <Continuous>  Phoxillum Filtration BK 4 / 2.5 5000 milliLiter(s) CRRT <Continuous>  PrismaSATE Dialysate BK 0 / 3.5 5000 milliLiter(s) CRRT <Continuous>  vasopressin Infusion 0.04 Unit(s)/Min IV Continuous <Continuous>    PRN Inpatient Medications  lactulose Syrup 20 Gram(s) Oral three times a day PRN  polyethylene glycol 3350 17 Gram(s) Oral two times a day PRN  sodium chloride 0.9% lock flush 10 milliLiter(s) IV Push every 1 hour PRN    REVIEW OF SYSTEMS  --------------------------------------------------------------------------------  Unable to obtain ROS     VITALS/PHYSICAL EXAM  --------------------------------------------------------------------------------  T(C): 37.2 (11-11-23 @ 04:00), Max: 37.7 (11-11-23 @ 00:00)  HR: 70 (11-11-23 @ 05:15) (56 - 82)  BP: --  RR: 14 (11-11-23 @ 05:15) (12 - 20)  SpO2: 98% (11-11-23 @ 05:15) (96% - 100%)  Wt(kg): --    11-10-23 @ 07:01  -  11-11-23 @ 07:00  --------------------------------------------------------  IN: 919 mL / OUT: 752 mL / NET: 167 mL    Physical Exam:  	Gen: Intubated, eyes open  	HEENT: Anicteric  	Pulm: Mechanical breath sounds B/L   	CV: S1S2+  	Abd: Soft, +BS    	Ext: No LE edema B/L  	Neuro: Follows simple commands  	Skin: Warm and dry  	Dialysis access: YOANDY AVF+, R IJ non tunneled HD catheter     LABS/STUDIES  --------------------------------------------------------------------------------              8.3    6.51  >-----------<  41       [11-10-23 @ 22:50]              22.9     133  |  100  |  26  ----------------------------<  170      [11-10-23 @ 22:50]  4.1   |  21  |  1.69        Ca     7.9     [11-10-23 @ 22:50]      iCa    0.99     [11-10 @ 22:50]      Mg     2.30     [11-10-23 @ 22:50]      Phos  3.2     [11-10-23 @ 22:50]    TPro  7.7  /  Alb  2.2  /  TBili  5.2  /  DBili  x   /  AST  988  /  ALT  259  /  AlkPhos  514  [11-10-23 @ 22:50]    Creatinine Trend:  SCr 1.69 [11-10 @ 22:50]  SCr 1.35 [11-10 @ 17:10]  SCr 1.52 [11-10 @ 11:42]  SCr 1.74 [11-10 @ 06:00]  SCr 1.89 [11-10 @ 00:10]

## 2023-11-11 NOTE — PROGRESS NOTE ADULT - SUBJECTIVE AND OBJECTIVE BOX
INTERVAL HPI/OVERNIGHT EVENTS:    SUBJECTIVE: Patient seen and examined at bedside.       VITAL SIGNS:  ICU Vital Signs Last 24 Hrs  T(C): 37.2 (11 Nov 2023 04:00), Max: 37.7 (11 Nov 2023 00:00)  T(F): 99 (11 Nov 2023 04:00), Max: 99.9 (11 Nov 2023 00:00)  HR: 70 (11 Nov 2023 05:15) (56 - 82)  BP: --  BP(mean): --  ABP: 111/46 (11 Nov 2023 05:15) (98/39 - 128/60)  ABP(mean): 70 (11 Nov 2023 05:15) (60 - 86)  RR: 14 (11 Nov 2023 05:15) (12 - 20)  SpO2: 98% (11 Nov 2023 05:15) (96% - 100%)    O2 Parameters below as of 11 Nov 2023 05:00  Patient On (Oxygen Delivery Method): ventilator    O2 Concentration (%): 30      Mode: CPAP with PS, FiO2: 30, PEEP: 5, MAP: 8, PC: 5, PIP: 11  Plateau pressure:   P/F ratio:     11-09 @ 07:01  -  11-10 @ 07:00  --------------------------------------------------------  IN: 2060.4 mL / OUT: 4981 mL / NET: -2920.6 mL    11-10 @ 07:01 - 11-11 @ 06:54  --------------------------------------------------------  IN: 919 mL / OUT: 752 mL / NET: 167 mL      CAPILLARY BLOOD GLUCOSE      POCT Blood Glucose.: 150 mg/dL (11 Nov 2023 05:48)    ECG:    PHYSICAL EXAM:    General:   HEENT:   Neck:   Respiratory:   Cardiovascular:   Abdomen:   Extremities:  Neurological:    MEDICATIONS:  MEDICATIONS  (STANDING):  aspirin  chewable 81 milliGRAM(s) Oral daily  chlorhexidine 0.12% Liquid 15 milliLiter(s) Oral Mucosa every 12 hours  chlorhexidine 2% Cloths 1 Application(s) Topical daily  clopidogrel Tablet 75 milliGRAM(s) Oral daily  CRRT Treatment    <Continuous>  dextrose 50% Injectable 25 Gram(s) IV Push once  folic acid 1 milliGRAM(s) Oral daily  hydrocortisone sodium succinate Injectable 50 milliGRAM(s) IV Push every 6 hours  insulin lispro (ADMELOG) corrective regimen sliding scale   SubCutaneous every 6 hours  meropenem  IVPB 500 milliGRAM(s) IV Intermittent every 24 hours  norepinephrine Infusion 0.329 MICROgram(s)/kG/Min (20 mL/Hr) IV Continuous <Continuous>  petrolatum Ophthalmic Ointment 1 Application(s) Both EYES every 12 hours  Phoxillum Filtration BK 4 / 2.5 5000 milliLiter(s) (1000 mL/Hr) CRRT <Continuous>  Phoxillum Filtration BK 4 / 2.5 5000 milliLiter(s) (200 mL/Hr) CRRT <Continuous>  PrismaSATE Dialysate BK 0 / 3.5 5000 milliLiter(s) (1000 mL/Hr) CRRT <Continuous>  vasopressin Infusion 0.04 Unit(s)/Min (6 mL/Hr) IV Continuous <Continuous>    MEDICATIONS  (PRN):  lactulose Syrup 20 Gram(s) Oral three times a day PRN 2-3 bm daily  polyethylene glycol 3350 17 Gram(s) Oral two times a day PRN 2-3 bm daily  sodium chloride 0.9% lock flush 10 milliLiter(s) IV Push every 1 hour PRN Pre/post blood products, medications, blood draw, and to maintain line patency      ALLERGIES:  Allergies    No Known Allergies    Intolerances        LABS:                        8.3    6.51  )-----------( 41       ( 10 Nov 2023 22:50 )             22.9     11-10    133<L>  |  100  |  26<H>  ----------------------------<  170<H>  4.1   |  21<L>  |  1.69<H>    Ca    7.9<L>      10 Nov 2023 22:50  Phos  3.2     11-10  Mg     2.30     11-10    TPro  7.7  /  Alb  2.2<L>  /  TBili  5.2<H>  /  DBili  x   /  AST  988<H>  /  ALT  259<H>  /  AlkPhos  514<H>  11-10    PT/INR - ( 10 Nov 2023 22:50 )   PT: 18.8 sec;   INR: 1.70 ratio         PTT - ( 10 Nov 2023 22:50 )  PTT:40.2 sec  Urinalysis Basic - ( 10 Nov 2023 22:50 )    Color: x / Appearance: x / SG: x / pH: x  Gluc: 170 mg/dL / Ketone: x  / Bili: x / Urobili: x   Blood: x / Protein: x / Nitrite: x   Leuk Esterase: x / RBC: x / WBC x   Sq Epi: x / Non Sq Epi: x / Bacteria: x        RADIOLOGY & ADDITIONAL TESTS: Reviewed.

## 2023-11-12 LAB
ALBUMIN SERPL ELPH-MCNC: 1.9 G/DL — LOW (ref 3.3–5)
ALBUMIN SERPL ELPH-MCNC: 1.9 G/DL — LOW (ref 3.3–5)
ALP SERPL-CCNC: 521 U/L — HIGH (ref 40–120)
ALP SERPL-CCNC: 521 U/L — HIGH (ref 40–120)
ALT FLD-CCNC: 199 U/L — HIGH (ref 4–41)
ALT FLD-CCNC: 199 U/L — HIGH (ref 4–41)
ANION GAP SERPL CALC-SCNC: 15 MMOL/L — HIGH (ref 7–14)
ANION GAP SERPL CALC-SCNC: 15 MMOL/L — HIGH (ref 7–14)
APTT BLD: 40.5 SEC — HIGH (ref 24.5–35.6)
APTT BLD: 40.5 SEC — HIGH (ref 24.5–35.6)
AST SERPL-CCNC: 764 U/L — HIGH (ref 4–40)
AST SERPL-CCNC: 764 U/L — HIGH (ref 4–40)
BASE EXCESS BLDA CALC-SCNC: -4.4 MMOL/L — LOW (ref -2–3)
BASE EXCESS BLDA CALC-SCNC: -4.4 MMOL/L — LOW (ref -2–3)
BASOPHILS # BLD AUTO: 0.01 K/UL — SIGNIFICANT CHANGE UP (ref 0–0.2)
BASOPHILS # BLD AUTO: 0.01 K/UL — SIGNIFICANT CHANGE UP (ref 0–0.2)
BASOPHILS NFR BLD AUTO: 0.1 % — SIGNIFICANT CHANGE UP (ref 0–2)
BASOPHILS NFR BLD AUTO: 0.1 % — SIGNIFICANT CHANGE UP (ref 0–2)
BILIRUB SERPL-MCNC: 4.3 MG/DL — HIGH (ref 0.2–1.2)
BILIRUB SERPL-MCNC: 4.3 MG/DL — HIGH (ref 0.2–1.2)
BUN SERPL-MCNC: 52 MG/DL — HIGH (ref 7–23)
BUN SERPL-MCNC: 52 MG/DL — HIGH (ref 7–23)
CA-I BLD-SCNC: 1.03 MMOL/L — LOW (ref 1.15–1.29)
CA-I BLD-SCNC: 1.03 MMOL/L — LOW (ref 1.15–1.29)
CALCIUM SERPL-MCNC: 7.7 MG/DL — LOW (ref 8.4–10.5)
CALCIUM SERPL-MCNC: 7.7 MG/DL — LOW (ref 8.4–10.5)
CHLORIDE SERPL-SCNC: 100 MMOL/L — SIGNIFICANT CHANGE UP (ref 98–107)
CHLORIDE SERPL-SCNC: 100 MMOL/L — SIGNIFICANT CHANGE UP (ref 98–107)
CO2 BLDA-SCNC: 21 MMOL/L — SIGNIFICANT CHANGE UP (ref 19–24)
CO2 BLDA-SCNC: 21 MMOL/L — SIGNIFICANT CHANGE UP (ref 19–24)
CO2 SERPL-SCNC: 19 MMOL/L — LOW (ref 22–31)
CO2 SERPL-SCNC: 19 MMOL/L — LOW (ref 22–31)
CREAT SERPL-MCNC: 2.63 MG/DL — HIGH (ref 0.5–1.3)
CREAT SERPL-MCNC: 2.63 MG/DL — HIGH (ref 0.5–1.3)
CULTURE RESULTS: SIGNIFICANT CHANGE UP
EGFR: 25 ML/MIN/1.73M2 — LOW
EGFR: 25 ML/MIN/1.73M2 — LOW
EOSINOPHIL # BLD AUTO: 0 K/UL — SIGNIFICANT CHANGE UP (ref 0–0.5)
EOSINOPHIL # BLD AUTO: 0 K/UL — SIGNIFICANT CHANGE UP (ref 0–0.5)
EOSINOPHIL NFR BLD AUTO: 0 % — SIGNIFICANT CHANGE UP (ref 0–6)
EOSINOPHIL NFR BLD AUTO: 0 % — SIGNIFICANT CHANGE UP (ref 0–6)
GLUCOSE BLDC GLUCOMTR-MCNC: 143 MG/DL — HIGH (ref 70–99)
GLUCOSE BLDC GLUCOMTR-MCNC: 143 MG/DL — HIGH (ref 70–99)
GLUCOSE BLDC GLUCOMTR-MCNC: 188 MG/DL — HIGH (ref 70–99)
GLUCOSE BLDC GLUCOMTR-MCNC: 188 MG/DL — HIGH (ref 70–99)
GLUCOSE BLDC GLUCOMTR-MCNC: 198 MG/DL — HIGH (ref 70–99)
GLUCOSE BLDC GLUCOMTR-MCNC: 198 MG/DL — HIGH (ref 70–99)
GLUCOSE BLDC GLUCOMTR-MCNC: 201 MG/DL — HIGH (ref 70–99)
GLUCOSE BLDC GLUCOMTR-MCNC: 201 MG/DL — HIGH (ref 70–99)
GLUCOSE SERPL-MCNC: 176 MG/DL — HIGH (ref 70–99)
GLUCOSE SERPL-MCNC: 176 MG/DL — HIGH (ref 70–99)
HCO3 BLDA-SCNC: 20 MMOL/L — LOW (ref 21–28)
HCO3 BLDA-SCNC: 20 MMOL/L — LOW (ref 21–28)
HCT VFR BLD CALC: 23.4 % — LOW (ref 39–50)
HCT VFR BLD CALC: 23.4 % — LOW (ref 39–50)
HGB BLD-MCNC: 8.1 G/DL — LOW (ref 13–17)
HGB BLD-MCNC: 8.1 G/DL — LOW (ref 13–17)
HOROWITZ INDEX BLDA+IHG-RTO: 50 — SIGNIFICANT CHANGE UP
HOROWITZ INDEX BLDA+IHG-RTO: 50 — SIGNIFICANT CHANGE UP
IANC: 5.68 K/UL — SIGNIFICANT CHANGE UP (ref 1.8–7.4)
IANC: 5.68 K/UL — SIGNIFICANT CHANGE UP (ref 1.8–7.4)
IMM GRANULOCYTES NFR BLD AUTO: 0.5 % — SIGNIFICANT CHANGE UP (ref 0–0.9)
IMM GRANULOCYTES NFR BLD AUTO: 0.5 % — SIGNIFICANT CHANGE UP (ref 0–0.9)
INR BLD: 1.55 RATIO — HIGH (ref 0.85–1.18)
INR BLD: 1.55 RATIO — HIGH (ref 0.85–1.18)
LYMPHOCYTES # BLD AUTO: 1.1 K/UL — SIGNIFICANT CHANGE UP (ref 1–3.3)
LYMPHOCYTES # BLD AUTO: 1.1 K/UL — SIGNIFICANT CHANGE UP (ref 1–3.3)
LYMPHOCYTES # BLD AUTO: 14.8 % — SIGNIFICANT CHANGE UP (ref 13–44)
LYMPHOCYTES # BLD AUTO: 14.8 % — SIGNIFICANT CHANGE UP (ref 13–44)
MAGNESIUM SERPL-MCNC: 2.7 MG/DL — HIGH (ref 1.6–2.6)
MAGNESIUM SERPL-MCNC: 2.7 MG/DL — HIGH (ref 1.6–2.6)
MCHC RBC-ENTMCNC: 29.3 PG — SIGNIFICANT CHANGE UP (ref 27–34)
MCHC RBC-ENTMCNC: 29.3 PG — SIGNIFICANT CHANGE UP (ref 27–34)
MCHC RBC-ENTMCNC: 34.6 GM/DL — SIGNIFICANT CHANGE UP (ref 32–36)
MCHC RBC-ENTMCNC: 34.6 GM/DL — SIGNIFICANT CHANGE UP (ref 32–36)
MCV RBC AUTO: 84.8 FL — SIGNIFICANT CHANGE UP (ref 80–100)
MCV RBC AUTO: 84.8 FL — SIGNIFICANT CHANGE UP (ref 80–100)
MONOCYTES # BLD AUTO: 0.59 K/UL — SIGNIFICANT CHANGE UP (ref 0–0.9)
MONOCYTES # BLD AUTO: 0.59 K/UL — SIGNIFICANT CHANGE UP (ref 0–0.9)
MONOCYTES NFR BLD AUTO: 8 % — SIGNIFICANT CHANGE UP (ref 2–14)
MONOCYTES NFR BLD AUTO: 8 % — SIGNIFICANT CHANGE UP (ref 2–14)
NEUTROPHILS # BLD AUTO: 5.68 K/UL — SIGNIFICANT CHANGE UP (ref 1.8–7.4)
NEUTROPHILS # BLD AUTO: 5.68 K/UL — SIGNIFICANT CHANGE UP (ref 1.8–7.4)
NEUTROPHILS NFR BLD AUTO: 76.6 % — SIGNIFICANT CHANGE UP (ref 43–77)
NEUTROPHILS NFR BLD AUTO: 76.6 % — SIGNIFICANT CHANGE UP (ref 43–77)
NRBC # BLD: 1 /100 WBCS — HIGH (ref 0–0)
NRBC # BLD: 1 /100 WBCS — HIGH (ref 0–0)
NRBC # FLD: 0.08 K/UL — HIGH (ref 0–0)
NRBC # FLD: 0.08 K/UL — HIGH (ref 0–0)
PCO2 BLDA: 35 MMHG — SIGNIFICANT CHANGE UP (ref 35–48)
PCO2 BLDA: 35 MMHG — SIGNIFICANT CHANGE UP (ref 35–48)
PH BLDA: 7.37 — SIGNIFICANT CHANGE UP (ref 7.35–7.45)
PH BLDA: 7.37 — SIGNIFICANT CHANGE UP (ref 7.35–7.45)
PHOSPHATE SERPL-MCNC: 4.1 MG/DL — SIGNIFICANT CHANGE UP (ref 2.5–4.5)
PHOSPHATE SERPL-MCNC: 4.1 MG/DL — SIGNIFICANT CHANGE UP (ref 2.5–4.5)
PLATELET # BLD AUTO: 38 K/UL — LOW (ref 150–400)
PLATELET # BLD AUTO: 38 K/UL — LOW (ref 150–400)
PO2 BLDA: 113 MMHG — HIGH (ref 83–108)
PO2 BLDA: 113 MMHG — HIGH (ref 83–108)
POTASSIUM SERPL-MCNC: 4.1 MMOL/L — SIGNIFICANT CHANGE UP (ref 3.5–5.3)
POTASSIUM SERPL-MCNC: 4.1 MMOL/L — SIGNIFICANT CHANGE UP (ref 3.5–5.3)
POTASSIUM SERPL-SCNC: 4.1 MMOL/L — SIGNIFICANT CHANGE UP (ref 3.5–5.3)
POTASSIUM SERPL-SCNC: 4.1 MMOL/L — SIGNIFICANT CHANGE UP (ref 3.5–5.3)
PROT SERPL-MCNC: 7.4 G/DL — SIGNIFICANT CHANGE UP (ref 6–8.3)
PROT SERPL-MCNC: 7.4 G/DL — SIGNIFICANT CHANGE UP (ref 6–8.3)
PROTHROM AB SERPL-ACNC: 17.3 SEC — HIGH (ref 9.5–13)
PROTHROM AB SERPL-ACNC: 17.3 SEC — HIGH (ref 9.5–13)
RBC # BLD: 2.76 M/UL — LOW (ref 4.2–5.8)
RBC # BLD: 2.76 M/UL — LOW (ref 4.2–5.8)
RBC # FLD: 24.9 % — HIGH (ref 10.3–14.5)
RBC # FLD: 24.9 % — HIGH (ref 10.3–14.5)
SAO2 % BLDA: 99 % — HIGH (ref 94–98)
SAO2 % BLDA: 99 % — HIGH (ref 94–98)
SODIUM SERPL-SCNC: 134 MMOL/L — LOW (ref 135–145)
SODIUM SERPL-SCNC: 134 MMOL/L — LOW (ref 135–145)
SPECIMEN SOURCE: SIGNIFICANT CHANGE UP
VANCOMYCIN FLD-MCNC: <4 UG/ML — SIGNIFICANT CHANGE UP
VANCOMYCIN FLD-MCNC: <4 UG/ML — SIGNIFICANT CHANGE UP
WBC # BLD: 7.42 K/UL — SIGNIFICANT CHANGE UP (ref 3.8–10.5)
WBC # BLD: 7.42 K/UL — SIGNIFICANT CHANGE UP (ref 3.8–10.5)
WBC # FLD AUTO: 7.42 K/UL — SIGNIFICANT CHANGE UP (ref 3.8–10.5)
WBC # FLD AUTO: 7.42 K/UL — SIGNIFICANT CHANGE UP (ref 3.8–10.5)

## 2023-11-12 PROCEDURE — 99291 CRITICAL CARE FIRST HOUR: CPT

## 2023-11-12 PROCEDURE — 99232 SBSQ HOSP IP/OBS MODERATE 35: CPT | Mod: GC

## 2023-11-12 RX ORDER — DROXIDOPA 100 MG/1
100 CAPSULE ORAL EVERY 8 HOURS
Refills: 0 | Status: DISCONTINUED | OUTPATIENT
Start: 2023-11-12 | End: 2023-11-13

## 2023-11-12 RX ORDER — HYDROCORTISONE 20 MG
25 TABLET ORAL EVERY 6 HOURS
Refills: 0 | Status: DISCONTINUED | OUTPATIENT
Start: 2023-11-13 | End: 2023-11-13

## 2023-11-12 RX ORDER — CALCIUM GLUCONATE 100 MG/ML
2 VIAL (ML) INTRAVENOUS ONCE
Refills: 0 | Status: COMPLETED | OUTPATIENT
Start: 2023-11-12 | End: 2023-11-12

## 2023-11-12 RX ORDER — SODIUM CHLORIDE 9 MG/ML
100 INJECTION INTRAMUSCULAR; INTRAVENOUS; SUBCUTANEOUS
Refills: 0 | Status: DISCONTINUED | OUTPATIENT
Start: 2023-11-12 | End: 2023-12-05

## 2023-11-12 RX ORDER — HYDROCORTISONE 20 MG
TABLET ORAL
Refills: 0 | Status: DISCONTINUED | OUTPATIENT
Start: 2023-11-13 | End: 2023-11-13

## 2023-11-12 RX ADMIN — Medication 1 APPLICATION(S): at 06:39

## 2023-11-12 RX ADMIN — Medication 1: at 00:31

## 2023-11-12 RX ADMIN — Medication 50 MILLIGRAM(S): at 17:40

## 2023-11-12 RX ADMIN — Medication 81 MILLIGRAM(S): at 11:43

## 2023-11-12 RX ADMIN — DROXIDOPA 100 MILLIGRAM(S): 100 CAPSULE ORAL at 14:15

## 2023-11-12 RX ADMIN — Medication 1: at 11:44

## 2023-11-12 RX ADMIN — CLOPIDOGREL BISULFATE 75 MILLIGRAM(S): 75 TABLET, FILM COATED ORAL at 11:43

## 2023-11-12 RX ADMIN — Medication 50 MILLIGRAM(S): at 00:30

## 2023-11-12 RX ADMIN — Medication 1 MILLIGRAM(S): at 11:43

## 2023-11-12 RX ADMIN — Medication 20 MICROGRAM(S)/KG/MIN: at 19:05

## 2023-11-12 RX ADMIN — Medication 2: at 17:38

## 2023-11-12 RX ADMIN — Medication 200 GRAM(S): at 05:57

## 2023-11-12 RX ADMIN — MEROPENEM 100 MILLIGRAM(S): 1 INJECTION INTRAVENOUS at 16:06

## 2023-11-12 RX ADMIN — Medication 50 MILLIGRAM(S): at 05:57

## 2023-11-12 RX ADMIN — DROXIDOPA 100 MILLIGRAM(S): 100 CAPSULE ORAL at 21:12

## 2023-11-12 RX ADMIN — Medication 50 MILLIGRAM(S): at 11:43

## 2023-11-12 NOTE — PROGRESS NOTE ADULT - SUBJECTIVE AND OBJECTIVE BOX
[facility] DIVISION OF KIDNEY DISEASES AND HYPERTENSION   FOLLOW UP NOTE  --------------------------------------------------------------------------------  HPI: 72 yo M with a PMH of ESRD on HD TIW (MWF, AVF), s/p Renal transplant, HTN, DM who presented to [facility] after being found down at home. Per family, found unresponsive in the bathroom prior to admission. Admitted to MICU due to shock requiring multiple vasopressors. Initiated on CRRT on 11/8. Nephrology following for ESRD/HD management.    24 hour events/subjective: Pt. was seen and examined today, family at bedside. He has been extubated and is off of sedation. He states he is "feeling okay". He is able to recall events prior to admission. Has been off of CRRT since 11/10/23. He denied overt shortness of breath, nausea, vomiting, fevers, chills. He does not produce urine.     PAST HISTORY  --------------------------------------------------------------------------------  No significant changes to PMH, PSH, FHx, SHx, unless otherwise noted    ALLERGIES & MEDICATIONS  --------------------------------------------------------------------------------  Allergies  No Known Allergies    Intolerances    Standing Inpatient Medications  aspirin  chewable 81 milliGRAM(s) Oral daily  chlorhexidine 2% Cloths 1 Application(s) Topical daily  clopidogrel Tablet 75 milliGRAM(s) Oral daily  dextrose 50% Injectable 25 Gram(s) IV Push once  folic acid 1 milliGRAM(s) Oral daily  hydrocortisone sodium succinate Injectable 50 milliGRAM(s) IV Push every 6 hours  insulin lispro (ADMELOG) corrective regimen sliding scale   SubCutaneous every 6 hours  meropenem  IVPB 500 milliGRAM(s) IV Intermittent every 24 hours  norepinephrine Infusion 0.329 MICROgram(s)/kG/Min IV Continuous <Continuous>  petrolatum Ophthalmic Ointment 1 Application(s) Both EYES every 12 hours  vasopressin Infusion 0.04 Unit(s)/Min IV Continuous <Continuous>    PRN Inpatient Medications  lactulose Syrup 20 Gram(s) Oral three times a day PRN  polyethylene glycol 3350 17 Gram(s) Oral two times a day PRN  sodium chloride 0.9% lock flush 10 milliLiter(s) IV Push every 1 hour PRN    REVIEW OF SYSTEMS  --------------------------------------------------------------------------------  Gen: No fevers/chills  Head/Eyes/Ears: No HA   Respiratory: No dyspnea, cough  CV: No chest pain  GI: No abdominal pain, diarrhea  : does not produce urine   MSK: No  edema  Skin: No rashes  Heme: No easy bruising or bleeding    All other systems were reviewed and are negative, except as noted.    VITALS/PHYSICAL EXAM  --------------------------------------------------------------------------------  T(C): 35.8 (11-12-23 @ 04:00), Max: 36.9 (11-11-23 @ 08:00)  HR: 60 (11-12-23 @ 06:00) (59 - 70)  BP: --  RR: 17 (11-12-23 @ 06:00) (11 - 24)  SpO2: 97% (11-12-23 @ 06:00) (95% - 100%)  Wt(kg): --    11-10-23 @ 07:01  -  11-11-23 @ 07:00  --------------------------------------------------------  IN: 919 mL / OUT: 752 mL / NET: 167 mL    11-11-23 @ 07:01  -  11-12-23 @ 06:47  --------------------------------------------------------  IN: 420.1 mL / OUT: 200 mL / NET: 220.1 mL    Physical Exam:  	Gen: Extubated, laying in bed and in NAD  	HEENT: Anicteric  	Pulm: Diminished but CTA B/L  	CV: S1S2+  	Abd: Soft, +BS    	Ext: No LE edema B/L  	Neuro: Awake and alert  	Skin: Warm and dry  	Dialysis access: YOANDY AVF+, R IJ non tunneled HD catheter     LABS/STUDIES  --------------------------------------------------------------------------------              8.1    7.42  >-----------<  38       [11-12-23 @ 00:20]              23.4     134  |  100  |  52  ----------------------------<  176      [11-12-23 @ 00:20]  4.1   |  19  |  2.63        Ca     7.7     [11-12-23 @ 00:20]      iCa    1.03     [11-12 @ 00:20]      Mg     2.70     [11-12-23 @ 00:20]      Phos  4.1     [11-12-23 @ 00:20]    TPro  7.4  /  Alb  1.9  /  TBili  4.3  /  DBili  x   /  AST  764  /  ALT  199  /  AlkPhos  521  [11-12-23 @ 00:20]    PT/INR: PT 17.3 , INR 1.55       [11-12-23 @ 00:20]  PTT: 40.5       [11-12-23 @ 00:20]    Creatinine Trend:  SCr 2.63 [11-12 @ 00:20]  SCr 1.69 [11-10 @ 22:50]  SCr 1.35 [11-10 @ 17:10]  SCr 1.52 [11-10 @ 11:42]  SCr 1.74 [11-10 @ 06:00]    HCV 0.20, Nonreact      [11-08-23 @ 14:53] [facility] DIVISION OF KIDNEY DISEASES AND HYPERTENSION   FOLLOW UP NOTE  --------------------------------------------------------------------------------  CC: ESRD on HD, history of kidney transplantation    24 hour events/subjective: Pt. was seen and examined in MICU today, family at bedside. Pt. extubated. Pt. awake, feeling okay. No fever, SOB, CP during rounds.     PAST HISTORY  --------------------------------------------------------------------------------  No significant changes to PMH, PSH, FHx, SHx, unless otherwise noted    ALLERGIES & MEDICATIONS  --------------------------------------------------------------------------------  Allergies  No Known Allergies    Intolerances    Standing Inpatient Medications  aspirin  chewable 81 milliGRAM(s) Oral daily  chlorhexidine 2% Cloths 1 Application(s) Topical daily  clopidogrel Tablet 75 milliGRAM(s) Oral daily  dextrose 50% Injectable 25 Gram(s) IV Push once  folic acid 1 milliGRAM(s) Oral daily  hydrocortisone sodium succinate Injectable 50 milliGRAM(s) IV Push every 6 hours  insulin lispro (ADMELOG) corrective regimen sliding scale   SubCutaneous every 6 hours  meropenem  IVPB 500 milliGRAM(s) IV Intermittent every 24 hours  norepinephrine Infusion 0.329 MICROgram(s)/kG/Min IV Continuous <Continuous>  petrolatum Ophthalmic Ointment 1 Application(s) Both EYES every 12 hours  vasopressin Infusion 0.04 Unit(s)/Min IV Continuous <Continuous>    PRN Inpatient Medications  lactulose Syrup 20 Gram(s) Oral three times a day PRN  polyethylene glycol 3350 17 Gram(s) Oral two times a day PRN  sodium chloride 0.9% lock flush 10 milliLiter(s) IV Push every 1 hour PRN    REVIEW OF SYSTEMS  --------------------------------------------------------------------------------  Gen: No fever  Head/Eyes/Ears: No HA   Respiratory: No dyspnea  CV: No chest pain  GI: No abdominal pain  : Does not produce urine   MSK: No edema  Heme: No bleeding    All other systems were reviewed and are negative, except as noted.    VITALS/PHYSICAL EXAM  --------------------------------------------------------------------------------  T(C): 35.8 (11-12-23 @ 04:00), Max: 36.9 (11-11-23 @ 08:00)  HR: 60 (11-12-23 @ 06:00) (59 - 70)  BP: --  RR: 17 (11-12-23 @ 06:00) (11 - 24)  SpO2: 97% (11-12-23 @ 06:00) (95% - 100%)  Wt(kg): --    11-10-23 @ 07:01  -  11-11-23 @ 07:00  --------------------------------------------------------  IN: 919 mL / OUT: 752 mL / NET: 167 mL    11-11-23 @ 07:01  -  11-12-23 @ 06:47  --------------------------------------------------------  IN: 420.1 mL / OUT: 200 mL / NET: 220.1 mL    Physical Exam:  	Gen: resting, NAD  	HEENT: Anicteric  	Pulm: Fair entry B/L, CTA B/L  	CV: S1S2+  	Abd: Soft, +BS    	Ext: No LE edema B/L  	Neuro: Awake and alert  	Skin: Warm and dry  	Dialysis access: LUE AVF+, R IJ non tunneled HD catheter+     LABS/STUDIES  --------------------------------------------------------------------------------              8.1    7.42  >-----------<  38       [11-12-23 @ 00:20]              23.4     134  |  100  |  52  ----------------------------<  176      [11-12-23 @ 00:20]  4.1   |  19  |  2.63        Ca     7.7     [11-12-23 @ 00:20]      iCa    1.03     [11-12 @ 00:20]      Mg     2.70     [11-12-23 @ 00:20]      Phos  4.1     [11-12-23 @ 00:20]    TPro  7.4  /  Alb  1.9  /  TBili  4.3  /  DBili  x   /  AST  764  /  ALT  199  /  AlkPhos  521  [11-12-23 @ 00:20]    Creatinine Trend:  SCr 2.63 [11-12 @ 00:20]  SCr 1.69 [11-10 @ 22:50]  SCr 1.35 [11-10 @ 17:10]  SCr 1.52 [11-10 @ 11:42]  SCr 1.74 [11-10 @ 06:00]

## 2023-11-12 NOTE — PROGRESS NOTE ADULT - PROBLEM SELECTOR PLAN 1
Pt. with ESRD on HD TIW (MWF, LUE AVF). Pt. also with history of kidney transplant (not on immunosuppression). Last HD as outpatient was done on 11/6/23 via L AVF. Pt. admitted to MICU with respiratory failure and systemic shock. Pt. initiated on CRRT on 11/8, stopped on 11/10 as volume status and FiO2 requirements were improving. Labs reviewed. Pt remains in MICU on IV vasopressor but requirements are improving. Overall he appears stable. No plan for HD today. Will assess need for HD daily. Monitor BP and labs. Dose meds as per HD. Pt. with ESRD on HD TIW (MWF, LUE AVF). Pt. also with history of kidney transplant (not on immunosuppression). Last HD as outpatient was done on 11/6/23 via L AVF. Pt. admitted to MICU with respiratory failure and systemic shock. Pt. initiated on CRRT on 11/8, discontinued on 11/10 as volume status and FiO2 requirements improved. Pt. clinically improved. Labs reviewed. No plan for HD today. Plan for maintenance HD tomorrow. Monitor BP and labs. Dose meds as per HD.

## 2023-11-12 NOTE — PROGRESS NOTE ADULT - ASSESSMENT
73M w/ ESRD on HD MWF, CAD s/p stent 1m ago, BPH, DM, HTN presenting w/ AMS and AHRF; concern for stroke vs toxic metabolic encephalopathy, intubated for airway protection and hypoxic respiratory failure.       ==============NEURO=============  #Acute Metabolic Encephalopathy:   - baseline independent, AOx3  - CT head w/ moderate chronic ischemic changes  - CTA neck and brain: Patent, ECAs, ICAs, no  hemodynamically significant stenosis at ICA origins by NASCET criteria. Bilateral vertebral arteries are patent without flow limiting stenosis.  - Neurology consulted: recommended MRI, spot EEG, maintaining on DAPT  - Acetaminophen, salicylates negative    =========CARDIOVASCULAR=========  #Shock  - unclear etiology however may be iso sepsis vs vasoplegia vs obstructive  - on levophed for BP support  - started on vasopressin overnight  - on solucortef 100 q8 for presumed adrenal insufficiency state   - bedside POCUS with evidence of enlarged RV  - CTA PE protocol to r/o PE negative    #CAD s/p stent 10/11/2023  #RCA stent with 99% stenosis   - c/w home aspirin 81mg qd  - c/w home plavix 75mg qd  - c/w home atorvastatin 80mg qd  - hold home coreg 12.5mg BID iso shock state     #Severe RV Dysfunction  - Bedside POCUS with evidence of RV dilated and thin RV wall concerning for acute process  - elevated troponin 1472 however downtrending  - R EKG without evidence of ST changes     ============RESPIRATORY==========  #Acute hypoxic respiratory failure  - desat to 80% at home w/ agonal breathing per patient's daughter  - s/p intubation 11/7 and extubation 11/11  - tolerating on    #Bilateral Multifocal PNA  - may be residual from recent hospitalization at [facility] for bacterial PNA  - will treat broadly with zosyn and azithromycin  - s/p bronch 11/8 with gram positive cocci    ============RENAL==============  #ESRD on HD  - Cr 2.54  - s/p HD on 11/6 via L AVF  - started on CRRT on 11/8 for volume removal; stopped on 11/10  - daily assessment of HD needs    ============GI===============  #Ascites  - s/p diagnostic para 11/8 without evidence of SBP     #History of colonic polyps   - s/p polyp removal in 2021/2022 with no residual disease    #Diet and Nutrition  - NPO w/ tube feeds    ============ENDO============  #DM  - home regimen - Humalog 15u qAC, Glargine 6u qhs  - FS q6h w/ ISS while NPO w/ tube feeds    #Elevated TSH   - initial labs concerning for TSH 28.9 and FT4 wnl  - will follow up remainder of thyroid labs   - would recommend follow up of TSH in 1-2 weeks after hospital stay    ===========HEME/ONC===========  #Anemia  - Hgb 7 on admission  - was receiving aranesp outpatient with HD    =============ID=============  #r/o Sepsis  - potential etiologies include multifocal PNA vs colitis vs other   - s/p Azithromycin, Vancomycin, and Zosyn in ED  - receiving meropenem (11/9-) post HD   - MRSA swab negative  - blood cultures (11/7) NGTD  - bronch culture with few gram positive cocci     ===========Lines/Tubes/Iglesias===========  - GOC: FULL CODE    73M w/ ESRD on HD MWF, CAD s/p stent 1m ago, BPH, DM, HTN presenting w/ AMS and AHRF; concern for stroke vs toxic metabolic encephalopathy, intubated for airway protection and hypoxic respiratory failure. s/p extubation with improved mental status; pending weaning of IV pressors.      ==============NEURO=============  #Acute Metabolic Encephalopathy:   - baseline independent, AOx3  - CT head w/ moderate chronic ischemic changes  - CTA neck and brain: Patent, ECAs, ICAs, no  hemodynamically significant stenosis at ICA origins by NASCET criteria. Bilateral vertebral arteries are patent without flow limiting stenosis.  - Neurology consulted: recommended MRI, spot EEG, maintaining on DAPT  - Acetaminophen, salicylates negative    =========CARDIOVASCULAR=========  #Shock  - unclear etiology however may be iso sepsis vs vasoplegia vs obstructive  - on levophed for BP support  - started on vasopressin overnight  - on solucortef 100 q8 for presumed adrenal insufficiency state   - bedside POCUS with evidence of enlarged RV  - CTA PE protocol to r/o PE negative    #CAD s/p stent 10/11/2023  #RCA stent with 99% stenosis   - c/w home aspirin 81mg qd  - c/w home plavix 75mg qd  - c/w home atorvastatin 80mg qd  - hold home coreg 12.5mg BID iso shock state     #Severe RV Dysfunction  - Bedside POCUS with evidence of RV dilated and thin RV wall concerning for acute process  - elevated troponin 1472 however downtrending  - R EKG without evidence of ST changes     ============RESPIRATORY==========  #Acute hypoxic respiratory failure  - desat to 80% at home w/ agonal breathing per patient's daughter  - s/p intubation 11/7 and extubation 11/11  - tolerating on    #Bilateral Multifocal PNA  - may be residual from recent hospitalization at [facility] for bacterial PNA  - will treat broadly with zosyn and azithromycin  - s/p bronch 11/8 with gram positive cocci    ============RENAL==============  #ESRD on HD  - Cr 2.54  - s/p HD on 11/6 via L AVF  - started on CRRT on 11/8 for volume removal; stopped on 11/10  - daily assessment of HD needs    ============GI===============  #Ascites  - s/p diagnostic para 11/8 without evidence of SBP     #History of colonic polyps   - s/p polyp removal in 2021/2022 with no residual disease    #Diet and Nutrition  - NPO w/ tube feeds    ============ENDO============  #DM  - home regimen - Humalog 15u qAC, Glargine 6u qhs  - FS q6h w/ ISS while NPO w/ tube feeds    #Elevated TSH   - initial labs concerning for TSH 28.9 and FT4 wnl  - will follow up remainder of thyroid labs   - would recommend follow up of TSH in 1-2 weeks after hospital stay    ===========HEME/ONC===========  #Anemia  - Hgb 7 on admission  - was receiving aranesp outpatient with HD    =============ID=============  #r/o Sepsis  - potential etiologies include multifocal PNA vs colitis vs other   - s/p Azithromycin, Vancomycin, and Zosyn in ED  - receiving meropenem (11/9-) post HD   - MRSA swab negative  - blood cultures (11/7) NGTD  - bronch culture with few gram positive cocci     ===========Lines/Tubes/Iglesias===========  - GOC: FULL CODE

## 2023-11-12 NOTE — PROGRESS NOTE ADULT - SUBJECTIVE AND OBJECTIVE BOX
Patient is a 73y old  Male who presents with a chief complaint of 72 yo M w Hx of CAD, DM, HTN, s/p renal transplant, currently ESRD/HD TIW with recent hospitalization for bacterial PNA.  Presenting after being found unresponsive at home with agonal breathing, AMS & hypoglycemia.  Pt. intubated for airway protection and acute hypoxic respiratory failure.     (09 Nov 2023 10:21)      SUBJECTIVE / OVERNIGHT EVENTS:  Patient seen and evaluated at bedside.    Denies any fevers, chills, CP, or SOB.    Vital Signs Last 24 Hrs  T(C): 35.8 (12 Nov 2023 04:00), Max: 36.9 (11 Nov 2023 08:00)  T(F): 96.4 (12 Nov 2023 04:00), Max: 98.5 (11 Nov 2023 08:00)  HR: 63 (12 Nov 2023 07:00) (59 - 70)  BP: --  BP(mean): --  RR: 17 (12 Nov 2023 07:00) (11 - 24)  SpO2: 96% (12 Nov 2023 07:00) (95% - 100%)    Parameters below as of 12 Nov 2023 07:00  Patient On (Oxygen Delivery Method): room air        PHYSICAL EXAM:  GENERAL: NAD, well-developed  CHEST/LUNG: Clear to auscultation bilaterally; No wheeze  HEART: Regular rate and rhythm; Normal S1 S2, No murmurs, rubs, or gallops  ABDOMEN: Soft, Nontender, Nondistended; Bowel sounds present  EXTREMITIES:  2+ Peripheral Pulses, No clubbing, cyanosis, or edema  PSYCH: AAOx3    LABS:                        8.1    7.42  )-----------( 38       ( 12 Nov 2023 00:20 )             23.4     Hgb Trend: 8.1<--, 8.3<--, 8.4<--, 8.5<--, 8.6<--  11-12    134<L>  |  100  |  52<H>  ----------------------------<  176<H>  4.1   |  19<L>  |  2.63<H>    Ca    7.7<L>      12 Nov 2023 00:20  Phos  4.1     11-12  Mg     2.70     11-12    TPro  7.4  /  Alb  1.9<L>  /  TBili  4.3<H>  /  DBili  x   /  AST  764<H>  /  ALT  199<H>  /  AlkPhos  521<H>  11-12    Creatinine Trend: 2.63<--, 1.69<--, 1.35<--, 1.52<--, 1.74<--, 1.89<--  LIVER FUNCTIONS - ( 12 Nov 2023 00:20 )  Alb: 1.9 g/dL / Pro: 7.4 g/dL / ALK PHOS: 521 U/L / ALT: 199 U/L / AST: 764 U/L / GGT: x           PT/INR - ( 12 Nov 2023 00:20 )   PT: 17.3 sec;   INR: 1.55 ratio         PTT - ( 12 Nov 2023 00:20 )  PTT:40.5 sec      Urinalysis Basic - ( 12 Nov 2023 00:20 )    Color: x / Appearance: x / SG: x / pH: x  Gluc: 176 mg/dL / Ketone: x  / Bili: x / Urobili: x   Blood: x / Protein: x / Nitrite: x   Leuk Esterase: x / RBC: x / WBC x   Sq Epi: x / Non Sq Epi: x / Bacteria: x

## 2023-11-13 DIAGNOSIS — I25.10 ATHEROSCLEROTIC HEART DISEASE OF NATIVE CORONARY ARTERY WITHOUT ANGINA PECTORIS: ICD-10-CM

## 2023-11-13 DIAGNOSIS — I95.9 HYPOTENSION, UNSPECIFIED: ICD-10-CM

## 2023-11-13 LAB
ALBUMIN SERPL ELPH-MCNC: 1.8 G/DL — LOW (ref 3.3–5)
ALBUMIN SERPL ELPH-MCNC: 1.8 G/DL — LOW (ref 3.3–5)
ALP SERPL-CCNC: 507 U/L — HIGH (ref 40–120)
ALP SERPL-CCNC: 507 U/L — HIGH (ref 40–120)
ALT FLD-CCNC: 142 U/L — HIGH (ref 4–41)
ALT FLD-CCNC: 142 U/L — HIGH (ref 4–41)
ANION GAP SERPL CALC-SCNC: 16 MMOL/L — HIGH (ref 7–14)
ANION GAP SERPL CALC-SCNC: 16 MMOL/L — HIGH (ref 7–14)
APTT BLD: 40.3 SEC — HIGH (ref 24.5–35.6)
APTT BLD: 40.3 SEC — HIGH (ref 24.5–35.6)
AST SERPL-CCNC: 692 U/L — HIGH (ref 4–40)
AST SERPL-CCNC: 692 U/L — HIGH (ref 4–40)
BASOPHILS # BLD AUTO: 0 K/UL — SIGNIFICANT CHANGE UP (ref 0–0.2)
BASOPHILS # BLD AUTO: 0 K/UL — SIGNIFICANT CHANGE UP (ref 0–0.2)
BASOPHILS NFR BLD AUTO: 0 % — SIGNIFICANT CHANGE UP (ref 0–2)
BASOPHILS NFR BLD AUTO: 0 % — SIGNIFICANT CHANGE UP (ref 0–2)
BILIRUB SERPL-MCNC: 3.5 MG/DL — HIGH (ref 0.2–1.2)
BILIRUB SERPL-MCNC: 3.5 MG/DL — HIGH (ref 0.2–1.2)
BLD GP AB SCN SERPL QL: NEGATIVE — SIGNIFICANT CHANGE UP
BLD GP AB SCN SERPL QL: NEGATIVE — SIGNIFICANT CHANGE UP
BUN SERPL-MCNC: 75 MG/DL — HIGH (ref 7–23)
BUN SERPL-MCNC: 75 MG/DL — HIGH (ref 7–23)
CA-I BLD-SCNC: 1.01 MMOL/L — LOW (ref 1.15–1.29)
CA-I BLD-SCNC: 1.01 MMOL/L — LOW (ref 1.15–1.29)
CALCIUM SERPL-MCNC: 7.8 MG/DL — LOW (ref 8.4–10.5)
CALCIUM SERPL-MCNC: 7.8 MG/DL — LOW (ref 8.4–10.5)
CHLORIDE SERPL-SCNC: 100 MMOL/L — SIGNIFICANT CHANGE UP (ref 98–107)
CHLORIDE SERPL-SCNC: 100 MMOL/L — SIGNIFICANT CHANGE UP (ref 98–107)
CO2 SERPL-SCNC: 18 MMOL/L — LOW (ref 22–31)
CO2 SERPL-SCNC: 18 MMOL/L — LOW (ref 22–31)
CREAT SERPL-MCNC: 3.49 MG/DL — HIGH (ref 0.5–1.3)
CREAT SERPL-MCNC: 3.49 MG/DL — HIGH (ref 0.5–1.3)
CULTURE RESULTS: SIGNIFICANT CHANGE UP
CULTURE RESULTS: SIGNIFICANT CHANGE UP
DIALYSIS INSTRUMENT RESULT - HEPATITIS B SURFACE ANTIGEN: NEGATIVE — SIGNIFICANT CHANGE UP
DIALYSIS INSTRUMENT RESULT - HEPATITIS B SURFACE ANTIGEN: NEGATIVE — SIGNIFICANT CHANGE UP
EGFR: 18 ML/MIN/1.73M2 — LOW
EGFR: 18 ML/MIN/1.73M2 — LOW
EOSINOPHIL # BLD AUTO: 0 K/UL — SIGNIFICANT CHANGE UP (ref 0–0.5)
EOSINOPHIL # BLD AUTO: 0 K/UL — SIGNIFICANT CHANGE UP (ref 0–0.5)
EOSINOPHIL NFR BLD AUTO: 0 % — SIGNIFICANT CHANGE UP (ref 0–6)
EOSINOPHIL NFR BLD AUTO: 0 % — SIGNIFICANT CHANGE UP (ref 0–6)
GLUCOSE BLDC GLUCOMTR-MCNC: 190 MG/DL — HIGH (ref 70–99)
GLUCOSE BLDC GLUCOMTR-MCNC: 190 MG/DL — HIGH (ref 70–99)
GLUCOSE BLDC GLUCOMTR-MCNC: 221 MG/DL — HIGH (ref 70–99)
GLUCOSE BLDC GLUCOMTR-MCNC: 221 MG/DL — HIGH (ref 70–99)
GLUCOSE BLDC GLUCOMTR-MCNC: 231 MG/DL — HIGH (ref 70–99)
GLUCOSE BLDC GLUCOMTR-MCNC: 231 MG/DL — HIGH (ref 70–99)
GLUCOSE BLDC GLUCOMTR-MCNC: 240 MG/DL — HIGH (ref 70–99)
GLUCOSE BLDC GLUCOMTR-MCNC: 240 MG/DL — HIGH (ref 70–99)
GLUCOSE BLDC GLUCOMTR-MCNC: 249 MG/DL — HIGH (ref 70–99)
GLUCOSE BLDC GLUCOMTR-MCNC: 249 MG/DL — HIGH (ref 70–99)
GLUCOSE SERPL-MCNC: 235 MG/DL — HIGH (ref 70–99)
GLUCOSE SERPL-MCNC: 235 MG/DL — HIGH (ref 70–99)
HCT VFR BLD CALC: 21.4 % — LOW (ref 39–50)
HCT VFR BLD CALC: 21.4 % — LOW (ref 39–50)
HGB BLD-MCNC: 7.9 G/DL — LOW (ref 13–17)
HGB BLD-MCNC: 7.9 G/DL — LOW (ref 13–17)
IANC: 5.71 K/UL — SIGNIFICANT CHANGE UP (ref 1.8–7.4)
IANC: 5.71 K/UL — SIGNIFICANT CHANGE UP (ref 1.8–7.4)
IMM GRANULOCYTES NFR BLD AUTO: 0.4 % — SIGNIFICANT CHANGE UP (ref 0–0.9)
IMM GRANULOCYTES NFR BLD AUTO: 0.4 % — SIGNIFICANT CHANGE UP (ref 0–0.9)
INR BLD: 1.52 RATIO — HIGH (ref 0.85–1.18)
INR BLD: 1.52 RATIO — HIGH (ref 0.85–1.18)
LYMPHOCYTES # BLD AUTO: 1.1 K/UL — SIGNIFICANT CHANGE UP (ref 1–3.3)
LYMPHOCYTES # BLD AUTO: 1.1 K/UL — SIGNIFICANT CHANGE UP (ref 1–3.3)
LYMPHOCYTES # BLD AUTO: 14.9 % — SIGNIFICANT CHANGE UP (ref 13–44)
LYMPHOCYTES # BLD AUTO: 14.9 % — SIGNIFICANT CHANGE UP (ref 13–44)
MAGNESIUM SERPL-MCNC: 2.9 MG/DL — HIGH (ref 1.6–2.6)
MAGNESIUM SERPL-MCNC: 2.9 MG/DL — HIGH (ref 1.6–2.6)
MCHC RBC-ENTMCNC: 29.9 PG — SIGNIFICANT CHANGE UP (ref 27–34)
MCHC RBC-ENTMCNC: 29.9 PG — SIGNIFICANT CHANGE UP (ref 27–34)
MCHC RBC-ENTMCNC: 36.9 GM/DL — HIGH (ref 32–36)
MCHC RBC-ENTMCNC: 36.9 GM/DL — HIGH (ref 32–36)
MCV RBC AUTO: 81.1 FL — SIGNIFICANT CHANGE UP (ref 80–100)
MCV RBC AUTO: 81.1 FL — SIGNIFICANT CHANGE UP (ref 80–100)
MONOCYTES # BLD AUTO: 0.55 K/UL — SIGNIFICANT CHANGE UP (ref 0–0.9)
MONOCYTES # BLD AUTO: 0.55 K/UL — SIGNIFICANT CHANGE UP (ref 0–0.9)
MONOCYTES NFR BLD AUTO: 7.4 % — SIGNIFICANT CHANGE UP (ref 2–14)
MONOCYTES NFR BLD AUTO: 7.4 % — SIGNIFICANT CHANGE UP (ref 2–14)
NEUTROPHILS # BLD AUTO: 5.71 K/UL — SIGNIFICANT CHANGE UP (ref 1.8–7.4)
NEUTROPHILS # BLD AUTO: 5.71 K/UL — SIGNIFICANT CHANGE UP (ref 1.8–7.4)
NEUTROPHILS NFR BLD AUTO: 77.3 % — HIGH (ref 43–77)
NEUTROPHILS NFR BLD AUTO: 77.3 % — HIGH (ref 43–77)
NRBC # BLD: 1 /100 WBCS — HIGH (ref 0–0)
NRBC # BLD: 1 /100 WBCS — HIGH (ref 0–0)
NRBC # FLD: 0.1 K/UL — HIGH (ref 0–0)
NRBC # FLD: 0.1 K/UL — HIGH (ref 0–0)
PHOSPHATE SERPL-MCNC: 5.2 MG/DL — HIGH (ref 2.5–4.5)
PHOSPHATE SERPL-MCNC: 5.2 MG/DL — HIGH (ref 2.5–4.5)
PLATELET # BLD AUTO: 38 K/UL — LOW (ref 150–400)
PLATELET # BLD AUTO: 38 K/UL — LOW (ref 150–400)
POTASSIUM SERPL-MCNC: 4.5 MMOL/L — SIGNIFICANT CHANGE UP (ref 3.5–5.3)
POTASSIUM SERPL-MCNC: 4.5 MMOL/L — SIGNIFICANT CHANGE UP (ref 3.5–5.3)
POTASSIUM SERPL-SCNC: 4.5 MMOL/L — SIGNIFICANT CHANGE UP (ref 3.5–5.3)
POTASSIUM SERPL-SCNC: 4.5 MMOL/L — SIGNIFICANT CHANGE UP (ref 3.5–5.3)
PROT SERPL-MCNC: 7.3 G/DL — SIGNIFICANT CHANGE UP (ref 6–8.3)
PROT SERPL-MCNC: 7.3 G/DL — SIGNIFICANT CHANGE UP (ref 6–8.3)
PROTHROM AB SERPL-ACNC: 16.8 SEC — HIGH (ref 9.5–13)
PROTHROM AB SERPL-ACNC: 16.8 SEC — HIGH (ref 9.5–13)
RBC # BLD: 2.64 M/UL — LOW (ref 4.2–5.8)
RBC # BLD: 2.64 M/UL — LOW (ref 4.2–5.8)
RBC # FLD: 24.5 % — HIGH (ref 10.3–14.5)
RBC # FLD: 24.5 % — HIGH (ref 10.3–14.5)
RH IG SCN BLD-IMP: POSITIVE — SIGNIFICANT CHANGE UP
RH IG SCN BLD-IMP: POSITIVE — SIGNIFICANT CHANGE UP
SODIUM SERPL-SCNC: 134 MMOL/L — LOW (ref 135–145)
SODIUM SERPL-SCNC: 134 MMOL/L — LOW (ref 135–145)
SPECIMEN SOURCE: SIGNIFICANT CHANGE UP
SPECIMEN SOURCE: SIGNIFICANT CHANGE UP
T4 SERPL-MCNC: 4.3 UG/DL — SIGNIFICANT CHANGE UP
T4 SERPL-MCNC: 4.3 UG/DL — SIGNIFICANT CHANGE UP
WBC # BLD: 7.39 K/UL — SIGNIFICANT CHANGE UP (ref 3.8–10.5)
WBC # BLD: 7.39 K/UL — SIGNIFICANT CHANGE UP (ref 3.8–10.5)
WBC # FLD AUTO: 7.39 K/UL — SIGNIFICANT CHANGE UP (ref 3.8–10.5)
WBC # FLD AUTO: 7.39 K/UL — SIGNIFICANT CHANGE UP (ref 3.8–10.5)

## 2023-11-13 PROCEDURE — 93306 TTE W/DOPPLER COMPLETE: CPT | Mod: 26

## 2023-11-13 PROCEDURE — 99291 CRITICAL CARE FIRST HOUR: CPT

## 2023-11-13 PROCEDURE — 99233 SBSQ HOSP IP/OBS HIGH 50: CPT | Mod: GC

## 2023-11-13 RX ORDER — INSULIN LISPRO 100/ML
VIAL (ML) SUBCUTANEOUS AT BEDTIME
Refills: 0 | Status: DISCONTINUED | OUTPATIENT
Start: 2023-11-13 | End: 2023-11-25

## 2023-11-13 RX ORDER — HYDROCORTISONE 20 MG
50 TABLET ORAL EVERY 12 HOURS
Refills: 0 | Status: COMPLETED | OUTPATIENT
Start: 2023-11-14 | End: 2023-11-14

## 2023-11-13 RX ORDER — HYDROCORTISONE 20 MG
50 TABLET ORAL DAILY
Refills: 0 | Status: COMPLETED | OUTPATIENT
Start: 2023-11-15 | End: 2023-11-17

## 2023-11-13 RX ORDER — GLUCAGON INJECTION, SOLUTION 0.5 MG/.1ML
1 INJECTION, SOLUTION SUBCUTANEOUS ONCE
Refills: 0 | Status: DISCONTINUED | OUTPATIENT
Start: 2023-11-13 | End: 2023-12-05

## 2023-11-13 RX ORDER — DEXTROSE 50 % IN WATER 50 %
15 SYRINGE (ML) INTRAVENOUS ONCE
Refills: 0 | Status: DISCONTINUED | OUTPATIENT
Start: 2023-11-13 | End: 2023-12-05

## 2023-11-13 RX ORDER — HYDROCORTISONE 20 MG
50 TABLET ORAL EVERY 8 HOURS
Refills: 0 | Status: COMPLETED | OUTPATIENT
Start: 2023-11-13 | End: 2023-11-13

## 2023-11-13 RX ORDER — INSULIN GLARGINE 100 [IU]/ML
4 INJECTION, SOLUTION SUBCUTANEOUS AT BEDTIME
Refills: 0 | Status: DISCONTINUED | OUTPATIENT
Start: 2023-11-13 | End: 2023-11-14

## 2023-11-13 RX ORDER — HYDROCORTISONE 20 MG
TABLET ORAL
Refills: 0 | Status: COMPLETED | OUTPATIENT
Start: 2023-11-13 | End: 2023-11-17

## 2023-11-13 RX ORDER — INSULIN LISPRO 100/ML
VIAL (ML) SUBCUTANEOUS
Refills: 0 | Status: DISCONTINUED | OUTPATIENT
Start: 2023-11-13 | End: 2023-11-25

## 2023-11-13 RX ORDER — ASPIRIN/CALCIUM CARB/MAGNESIUM 324 MG
81 TABLET ORAL DAILY
Refills: 0 | Status: DISCONTINUED | OUTPATIENT
Start: 2023-11-13 | End: 2023-12-05

## 2023-11-13 RX ORDER — SODIUM CHLORIDE 9 MG/ML
1000 INJECTION, SOLUTION INTRAVENOUS
Refills: 0 | Status: DISCONTINUED | OUTPATIENT
Start: 2023-11-13 | End: 2023-12-05

## 2023-11-13 RX ORDER — DROXIDOPA 100 MG/1
200 CAPSULE ORAL EVERY 8 HOURS
Refills: 0 | Status: DISCONTINUED | OUTPATIENT
Start: 2023-11-13 | End: 2023-11-15

## 2023-11-13 RX ADMIN — Medication 0: at 21:37

## 2023-11-13 RX ADMIN — CLOPIDOGREL BISULFATE 75 MILLIGRAM(S): 75 TABLET, FILM COATED ORAL at 15:40

## 2023-11-13 RX ADMIN — DROXIDOPA 200 MILLIGRAM(S): 100 CAPSULE ORAL at 15:39

## 2023-11-13 RX ADMIN — Medication 25 MILLIGRAM(S): at 00:14

## 2023-11-13 RX ADMIN — Medication 50 MILLIGRAM(S): at 21:36

## 2023-11-13 RX ADMIN — Medication 1: at 12:07

## 2023-11-13 RX ADMIN — Medication 2: at 18:01

## 2023-11-13 RX ADMIN — Medication 2: at 05:20

## 2023-11-13 RX ADMIN — Medication 2: at 00:21

## 2023-11-13 RX ADMIN — Medication 81 MILLIGRAM(S): at 15:40

## 2023-11-13 RX ADMIN — CHLORHEXIDINE GLUCONATE 1 APPLICATION(S): 213 SOLUTION TOPICAL at 18:09

## 2023-11-13 RX ADMIN — DROXIDOPA 100 MILLIGRAM(S): 100 CAPSULE ORAL at 05:21

## 2023-11-13 RX ADMIN — Medication 1 MILLIGRAM(S): at 15:40

## 2023-11-13 RX ADMIN — DROXIDOPA 200 MILLIGRAM(S): 100 CAPSULE ORAL at 21:36

## 2023-11-13 RX ADMIN — Medication 25 MILLIGRAM(S): at 05:21

## 2023-11-13 RX ADMIN — MEROPENEM 100 MILLIGRAM(S): 1 INJECTION INTRAVENOUS at 17:20

## 2023-11-13 RX ADMIN — Medication 20 MICROGRAM(S)/KG/MIN: at 09:07

## 2023-11-13 RX ADMIN — Medication 20 MICROGRAM(S)/KG/MIN: at 19:04

## 2023-11-13 RX ADMIN — INSULIN GLARGINE 4 UNIT(S): 100 INJECTION, SOLUTION SUBCUTANEOUS at 21:36

## 2023-11-13 RX ADMIN — Medication 50 MILLIGRAM(S): at 15:39

## 2023-11-13 NOTE — PROGRESS NOTE ADULT - SUBJECTIVE AND OBJECTIVE BOX
Patient is a 73y old  Male who presents with a chief complaint of 72 yo M w Hx of CAD, DM, HTN, s/p renal transplant, currently ESRD/HD TIW with recent hospitalization for bacterial PNA.  Presenting after being found unresponsive at home with agonal breathing, AMS & hypoglycemia.  Pt. intubated for airway protection and acute hypoxic respiratory failure.     (09 Nov 2023 10:21)      SUBJECTIVE / OVERNIGHT EVENTS:  Patient seen and evaluated at bedside.    Denies any fevers, chills, CP, or SOB.    Vital Signs Last 24 Hrs  T(C): 35.5 (13 Nov 2023 05:00), Max: 36.2 (12 Nov 2023 12:00)  T(F): 95.9 (13 Nov 2023 05:00), Max: 97.1 (12 Nov 2023 12:00)  HR: 68 (13 Nov 2023 06:00) (56 - 70)  BP: --  BP(mean): --  RR: 19 (13 Nov 2023 06:00) (11 - 23)  SpO2: 95% (13 Nov 2023 06:00) (94% - 100%)    Parameters below as of 13 Nov 2023 06:00  Patient On (Oxygen Delivery Method): room air    O2 Concentration (%): 21      PHYSICAL EXAM:  GENERAL: NAD, well-developed  CHEST/LUNG: Clear to auscultation bilaterally; No wheeze  HEART: Regular rate and rhythm; Normal S1 S2, No murmurs, rubs, or gallops  ABDOMEN: Soft, Nontender, Nondistended; Bowel sounds present  EXTREMITIES:  2+ Peripheral Pulses, No clubbing, cyanosis, or edema  PSYCH: AAOx3    LABS:                          7.9    7.39  )-----------( 38       ( 13 Nov 2023 00:16 )             21.4     11-13    134<L>  |  100  |  75<H>  ----------------------------<  235<H>  4.5   |  18<L>  |  3.49<H>    Ca    7.8<L>      13 Nov 2023 00:16  Phos  5.2     11-13  Mg     2.90     11-13    TPro  7.3  /  Alb  1.8<L>  /  TBili  3.5<H>  /  DBili  x   /  AST  692<H>  /  ALT  142<H>  /  AlkPhos  507<H>  11-13    PT/INR - ( 13 Nov 2023 00:16 )   PT: 16.8 sec;   INR: 1.52 ratio         PTT - ( 13 Nov 2023 00:16 )  PTT:40.3 sec      Urinalysis Basic - ( 13 Nov 2023 00:16 )    Color: x / Appearance: x / SG: x / pH: x  Gluc: 235 mg/dL / Ketone: x  / Bili: x / Urobili: x   Blood: x / Protein: x / Nitrite: x   Leuk Esterase: x / RBC: x / WBC x   Sq Epi: x / Non Sq Epi: x / Bacteria: x       Patient is a 73y old  Male who presents with a chief complaint of 74 yo M w Hx of CAD, DM, HTN, s/p renal transplant, currently ESRD/HD TIW with recent hospitalization for bacterial PNA.  Presenting after being found unresponsive at home with agonal breathing, AMS & hypoglycemia.  Pt intubated for airway protection and acute hypoxic respiratory failure.        SUBJECTIVE / OVERNIGHT EVENTS:  Patient seen and evaluated at bedside. AOx3. Denies F/C/N/V, no CP, no SOB. Per daughter, patient with intermittent aphasia since this morning, mixing up a date from the 1900 to 1800s. Patient continues to be consistently hypothermic.     Vital Signs Last 24 Hrs  T(C): 35.5 (13 Nov 2023 05:00), Max: 36.2 (12 Nov 2023 12:00)  T(F): 95.9 (13 Nov 2023 05:00), Max: 97.1 (12 Nov 2023 12:00)  HR: 68 (13 Nov 2023 06:00) (56 - 70)  BP: --  BP(mean): --  RR: 19 (13 Nov 2023 06:00) (11 - 23)  SpO2: 95% (13 Nov 2023 06:00) (94% - 100%)    Parameters below as of 13 Nov 2023 06:00  Patient On (Oxygen Delivery Method): room air    O2 Concentration (%): 21      PHYSICAL EXAM:  GENERAL: NAD, well-developed  CHEST/LUNG: Clear to auscultation bilaterally; No wheeze  HEART: Regular rate and rhythm; Normal S1 S2, No murmurs, rubs, or gallops  ABDOMEN: Soft, Nontender, Nondistended; Bowel sounds present  EXTREMITIES:  2+ Peripheral Pulses, No clubbing, cyanosis, or edema  PSYCH: AAOx3    LABS:                          7.9    7.39  )-----------( 38       ( 13 Nov 2023 00:16 )             21.4     11-13    134<L>  |  100  |  75<H>  ----------------------------<  235<H>  4.5   |  18<L>  |  3.49<H>    Ca    7.8<L>      13 Nov 2023 00:16  Phos  5.2     11-13  Mg     2.90     11-13    TPro  7.3  /  Alb  1.8<L>  /  TBili  3.5<H>  /  DBili  x   /  AST  692<H>  /  ALT  142<H>  /  AlkPhos  507<H>  11-13    PT/INR - ( 13 Nov 2023 00:16 )   PT: 16.8 sec;   INR: 1.52 ratio         PTT - ( 13 Nov 2023 00:16 )  PTT:40.3 sec      Urinalysis Basic - ( 13 Nov 2023 00:16 )    Color: x / Appearance: x / SG: x / pH: x  Gluc: 235 mg/dL / Ketone: x  / Bili: x / Urobili: x   Blood: x / Protein: x / Nitrite: x   Leuk Esterase: x / RBC: x / WBC x   Sq Epi: x / Non Sq Epi: x / Bacteria: x

## 2023-11-13 NOTE — CONSULT NOTE ADULT - SUBJECTIVE AND OBJECTIVE BOX
PROGRESS NOTE:     Patient is a 73y old  Male who presents with a chief complaint of 72 yo M w Hx of CAD, DM, HTN, s/p renal transplant, currently ESRD/HD TIW with recent hospitalization for bacterial PNA.  Presenting after being found unresponsive at home with agonal breathing, AMS & hypoglycemia.  Pt. intubated for airway protection and acute hypoxic respiratory failure.     (09 Nov 2023 10:21)      INTERVAL EVENTS: No acute overnight events.     SUBJECTIVE: Patient seen and examined at bedside with daughter present. This morning, the patient is comfortable and doing well. He is AAOx3, but has trouble remembering past medical history. No acute complaints. Denies N/V, chest pain, SOB, abdominal pain.    Daughter reports patient has hx of 6-7 cardiac stents in the past. Both patient and daughter report 100% adherence to DAPT after October stent placement.    ----------------------------------------------------------------------------------  MEDICATIONS  (STANDING):  aspirin  chewable 81 milliGRAM(s) Oral daily  chlorhexidine 2% Cloths 1 Application(s) Topical daily  clopidogrel Tablet 75 milliGRAM(s) Oral daily  dextrose 5%. 1000 milliLiter(s) (100 mL/Hr) IV Continuous <Continuous>  dextrose 5%. 1000 milliLiter(s) (50 mL/Hr) IV Continuous <Continuous>  dextrose 50% Injectable 25 Gram(s) IV Push once  droxidopa 200 milliGRAM(s) Oral every 8 hours  folic acid 1 milliGRAM(s) Oral daily  glucagon  Injectable 1 milliGRAM(s) IntraMuscular once  hydrocortisone sodium succinate Injectable   IV Push   hydrocortisone sodium succinate Injectable 50 milliGRAM(s) IV Push every 8 hours  insulin glargine Injectable (LANTUS) 4 Unit(s) SubCutaneous at bedtime  insulin lispro (ADMELOG) corrective regimen sliding scale   SubCutaneous every 6 hours  meropenem  IVPB 500 milliGRAM(s) IV Intermittent every 24 hours  norepinephrine Infusion 0.329 MICROgram(s)/kG/Min (20 mL/Hr) IV Continuous <Continuous>    MEDICATIONS  (PRN):  dextrose Oral Gel 15 Gram(s) Oral once PRN Blood Glucose LESS THAN 70 milliGRAM(s)/deciliter  lactulose Syrup 20 Gram(s) Oral three times a day PRN 2-3 bm daily  polyethylene glycol 3350 17 Gram(s) Oral two times a day PRN 2-3 bm daily  sodium chloride 0.9% Bolus. 100 milliLiter(s) IV Bolus every 5 minutes PRN SBP LESS THAN or EQUAL to 90 mmHg  sodium chloride 0.9% lock flush 10 milliLiter(s) IV Push every 1 hour PRN Pre/post blood products, medications, blood draw, and to maintain line patency      ----------------------------------------------------------------------------------  CAPILLARY BLOOD GLUCOSE      POCT Blood Glucose.: 190 mg/dL (13 Nov 2023 12:03)  POCT Blood Glucose.: 221 mg/dL (13 Nov 2023 05:19)  POCT Blood Glucose.: 240 mg/dL (13 Nov 2023 00:20)  POCT Blood Glucose.: 201 mg/dL (12 Nov 2023 17:36)    I&O's Summary    12 Nov 2023 07:01  -  13 Nov 2023 07:00  --------------------------------------------------------  IN: 627.2 mL / OUT: 0 mL / NET: 627.2 mL        ----------------------------------------------------------------------------------  PHYSICAL EXAM:  Vital Signs Last 24 Hrs  T(C): 35.6 (13 Nov 2023 11:15), Max: 37.3 (13 Nov 2023 08:00)  T(F): 96 (13 Nov 2023 11:15), Max: 99.1 (13 Nov 2023 08:00)  HR: 68 (13 Nov 2023 13:00) (56 - 69)  BP: --  BP(mean): --  RR: 14 (13 Nov 2023 13:00) (11 - 23)  SpO2: 98% (13 Nov 2023 13:00) (94% - 99%)    Parameters below as of 13 Nov 2023 08:00  Patient On (Oxygen Delivery Method): room air        GENERAL: NAD, lying in bed comfortably  HEAD: Atraumatic, normocephalic, Shiley catheter in place  EYES: EOMI, PERRLA, conjunctiva and sclera clear  ENT: Moist mucous membranes  NECK: Supple, no JVD noted  HEART: S1, S2, Regular rate and rhythm, no murmurs, rubs, or gallops  LUNGS: Unlabored respirations, clear to auscultation bilaterally, no crackles, wheezes, or rhonchi  ABDOMEN: Soft, nontender, nondistended  EXTREMITIES: 2+ peripheral pulses bilaterally. No clubbing, cyanosis, or edema  NERVOUS SYSTEM:  A&Ox3, speech is slow but fluent. Deficits in long term memory, short-term memory intact.   SKIN: No rashes or lesions    ----------------------------------------------------------------------------------  LABS:                        7.9    7.39  )-----------( 38       ( 13 Nov 2023 00:16 )             21.4     11-13    134<L>  |  100  |  75<H>  ----------------------------<  235<H>  4.5   |  18<L>  |  3.49<H>    Ca    7.8<L>      13 Nov 2023 00:16  Phos  5.2     11-13  Mg     2.90     11-13    TPro  7.3  /  Alb  1.8<L>  /  TBili  3.5<H>  /  DBili  x   /  AST  692<H>  /  ALT  142<H>  /  AlkPhos  507<H>  11-13    PT/INR - ( 13 Nov 2023 00:16 )   PT: 16.8 sec;   INR: 1.52 ratio         PTT - ( 13 Nov 2023 00:16 )  PTT:40.3 sec      Urinalysis Basic - ( 13 Nov 2023 00:16 )    Color: x / Appearance: x / SG: x / pH: x  Gluc: 235 mg/dL / Ketone: x  / Bili: x / Urobili: x   Blood: x / Protein: x / Nitrite: x   Leuk Esterase: x / RBC: x / WBC x   Sq Epi: x / Non Sq Epi: x / Bacteria: x          ----------------------------------------------------------------------------------  RADIOLOGY & ADDITIONAL TESTS:  Lab Results Reviewed  Imaging Personally Reviewed  Electrocardiogram Personally Reviewed CONSULT NOTE:     Patient is a 73y old  Male who presents with a chief complaint of 72 yo M w Hx of CAD, DM, HTN, s/p renal transplant, currently ESRD/HD TIW with recent hospitalization for bacterial PNA.  Presenting after being found unresponsive at home with agonal breathing, AMS & hypoglycemia.  Pt. intubated for airway protection and acute hypoxic respiratory failure.     (09 Nov 2023 10:21)      INTERVAL EVENTS: No acute overnight events.     SUBJECTIVE: Patient seen and examined at bedside with daughter present. This morning, the patient is comfortable and doing well. He is AAOx3, but has trouble remembering past medical history. No acute complaints. Denies N/V, chest pain, SOB, abdominal pain.    Daughter reports patient has hx of 6-7 cardiac stents in the past. Both patient and daughter report 100% adherence to DAPT after October stent placement.    ----------------------------------------------------------------------------------  MEDICATIONS  (STANDING):  aspirin  chewable 81 milliGRAM(s) Oral daily  chlorhexidine 2% Cloths 1 Application(s) Topical daily  clopidogrel Tablet 75 milliGRAM(s) Oral daily  dextrose 5%. 1000 milliLiter(s) (100 mL/Hr) IV Continuous <Continuous>  dextrose 5%. 1000 milliLiter(s) (50 mL/Hr) IV Continuous <Continuous>  dextrose 50% Injectable 25 Gram(s) IV Push once  droxidopa 200 milliGRAM(s) Oral every 8 hours  folic acid 1 milliGRAM(s) Oral daily  glucagon  Injectable 1 milliGRAM(s) IntraMuscular once  hydrocortisone sodium succinate Injectable   IV Push   hydrocortisone sodium succinate Injectable 50 milliGRAM(s) IV Push every 8 hours  insulin glargine Injectable (LANTUS) 4 Unit(s) SubCutaneous at bedtime  insulin lispro (ADMELOG) corrective regimen sliding scale   SubCutaneous every 6 hours  meropenem  IVPB 500 milliGRAM(s) IV Intermittent every 24 hours  norepinephrine Infusion 0.329 MICROgram(s)/kG/Min (20 mL/Hr) IV Continuous <Continuous>    MEDICATIONS  (PRN):  dextrose Oral Gel 15 Gram(s) Oral once PRN Blood Glucose LESS THAN 70 milliGRAM(s)/deciliter  lactulose Syrup 20 Gram(s) Oral three times a day PRN 2-3 bm daily  polyethylene glycol 3350 17 Gram(s) Oral two times a day PRN 2-3 bm daily  sodium chloride 0.9% Bolus. 100 milliLiter(s) IV Bolus every 5 minutes PRN SBP LESS THAN or EQUAL to 90 mmHg  sodium chloride 0.9% lock flush 10 milliLiter(s) IV Push every 1 hour PRN Pre/post blood products, medications, blood draw, and to maintain line patency      ----------------------------------------------------------------------------------  CAPILLARY BLOOD GLUCOSE      POCT Blood Glucose.: 190 mg/dL (13 Nov 2023 12:03)  POCT Blood Glucose.: 221 mg/dL (13 Nov 2023 05:19)  POCT Blood Glucose.: 240 mg/dL (13 Nov 2023 00:20)  POCT Blood Glucose.: 201 mg/dL (12 Nov 2023 17:36)    I&O's Summary    12 Nov 2023 07:01  -  13 Nov 2023 07:00  --------------------------------------------------------  IN: 627.2 mL / OUT: 0 mL / NET: 627.2 mL        ----------------------------------------------------------------------------------  PHYSICAL EXAM:  Vital Signs Last 24 Hrs  T(C): 35.6 (13 Nov 2023 11:15), Max: 37.3 (13 Nov 2023 08:00)  T(F): 96 (13 Nov 2023 11:15), Max: 99.1 (13 Nov 2023 08:00)  HR: 68 (13 Nov 2023 13:00) (56 - 69)  BP: --  BP(mean): --  RR: 14 (13 Nov 2023 13:00) (11 - 23)  SpO2: 98% (13 Nov 2023 13:00) (94% - 99%)    Parameters below as of 13 Nov 2023 08:00  Patient On (Oxygen Delivery Method): room air        GENERAL: NAD, lying in bed comfortably  HEAD: Atraumatic, normocephalic, Shiley catheter in place  EYES: EOMI, PERRLA, conjunctiva and sclera clear  ENT: Moist mucous membranes  NECK: Supple, no JVD noted  HEART: S1, S2, Regular rate and rhythm, no murmurs, rubs, or gallops  LUNGS: Unlabored respirations, clear to auscultation bilaterally, no crackles, wheezes, or rhonchi  ABDOMEN: Soft, nontender, nondistended  EXTREMITIES: 2+ peripheral pulses bilaterally. No clubbing, cyanosis, or edema  NERVOUS SYSTEM:  A&Ox3, speech is slow but fluent. Deficits in long term memory, short-term memory intact.   SKIN: No rashes or lesions    ----------------------------------------------------------------------------------  LABS:                        7.9    7.39  )-----------( 38       ( 13 Nov 2023 00:16 )             21.4     11-13    134<L>  |  100  |  75<H>  ----------------------------<  235<H>  4.5   |  18<L>  |  3.49<H>    Ca    7.8<L>      13 Nov 2023 00:16  Phos  5.2     11-13  Mg     2.90     11-13    TPro  7.3  /  Alb  1.8<L>  /  TBili  3.5<H>  /  DBili  x   /  AST  692<H>  /  ALT  142<H>  /  AlkPhos  507<H>  11-13    PT/INR - ( 13 Nov 2023 00:16 )   PT: 16.8 sec;   INR: 1.52 ratio         PTT - ( 13 Nov 2023 00:16 )  PTT:40.3 sec      Urinalysis Basic - ( 13 Nov 2023 00:16 )    Color: x / Appearance: x / SG: x / pH: x  Gluc: 235 mg/dL / Ketone: x  / Bili: x / Urobili: x   Blood: x / Protein: x / Nitrite: x   Leuk Esterase: x / RBC: x / WBC x   Sq Epi: x / Non Sq Epi: x / Bacteria: x          ----------------------------------------------------------------------------------  RADIOLOGY & ADDITIONAL TESTS:  Lab Results Reviewed  Imaging Personally Reviewed  Electrocardiogram Personally Reviewed CONSULT NOTE:     Patient is a 73y old man with PMH of ESRD s/p kidney transplant on HD, CAD s/p multiple stents (most recently, RCA stent on 10/2023), T2DM, and HTN, with recent hospitalization for bacterial PNA, who originally presented to the ED on 11/7 after being found acutely unresponsive with agonal breathing by his daughter. CPR was administered by the daughter. On arrival. EMS administered glucose and started O2 via NRB. In the ED, pt was intubated due to AHRF and started on levo. CT A/P was c/w multifocal PNA. Patient was started on empiric zosyn and azithro. 11/7 stroke code due to AMS was negative (no findings on CTH or CTA head and neck). Patient is now back to baseline mental and respiratory status (AOx3, on RA).    with recent hospitalization for bacterial PNA.  Presenting after being found unresponsive at home with agonal breathing, AMS & hypoglycemia.  Pt. intubated for airway protection and acute hypoxic respiratory failure.      Cardio consulted for POCUS showing thin RV wall and persistent hypotension, raising concern for acute RV dysfunction. R-sided EKG showed no ST changes.    Daughter reports patient has hx of 6-7 cardiac stents in the past. Both patient and daughter report 100% adherence to DAPT after October stent placement.    ----------------------------------------------------------------------------------  MEDICATIONS  (STANDING):  aspirin  chewable 81 milliGRAM(s) Oral daily  chlorhexidine 2% Cloths 1 Application(s) Topical daily  clopidogrel Tablet 75 milliGRAM(s) Oral daily  dextrose 5%. 1000 milliLiter(s) (100 mL/Hr) IV Continuous <Continuous>  dextrose 5%. 1000 milliLiter(s) (50 mL/Hr) IV Continuous <Continuous>  dextrose 50% Injectable 25 Gram(s) IV Push once  droxidopa 200 milliGRAM(s) Oral every 8 hours  folic acid 1 milliGRAM(s) Oral daily  glucagon  Injectable 1 milliGRAM(s) IntraMuscular once  hydrocortisone sodium succinate Injectable   IV Push   hydrocortisone sodium succinate Injectable 50 milliGRAM(s) IV Push every 8 hours  insulin glargine Injectable (LANTUS) 4 Unit(s) SubCutaneous at bedtime  insulin lispro (ADMELOG) corrective regimen sliding scale   SubCutaneous every 6 hours  meropenem  IVPB 500 milliGRAM(s) IV Intermittent every 24 hours  norepinephrine Infusion 0.329 MICROgram(s)/kG/Min (20 mL/Hr) IV Continuous <Continuous>    MEDICATIONS  (PRN):  dextrose Oral Gel 15 Gram(s) Oral once PRN Blood Glucose LESS THAN 70 milliGRAM(s)/deciliter  lactulose Syrup 20 Gram(s) Oral three times a day PRN 2-3 bm daily  polyethylene glycol 3350 17 Gram(s) Oral two times a day PRN 2-3 bm daily  sodium chloride 0.9% Bolus. 100 milliLiter(s) IV Bolus every 5 minutes PRN SBP LESS THAN or EQUAL to 90 mmHg  sodium chloride 0.9% lock flush 10 milliLiter(s) IV Push every 1 hour PRN Pre/post blood products, medications, blood draw, and to maintain line patency      ----------------------------------------------------------------------------------  CAPILLARY BLOOD GLUCOSE      POCT Blood Glucose.: 190 mg/dL (13 Nov 2023 12:03)  POCT Blood Glucose.: 221 mg/dL (13 Nov 2023 05:19)  POCT Blood Glucose.: 240 mg/dL (13 Nov 2023 00:20)  POCT Blood Glucose.: 201 mg/dL (12 Nov 2023 17:36)    I&O's Summary    12 Nov 2023 07:01  -  13 Nov 2023 07:00  --------------------------------------------------------  IN: 627.2 mL / OUT: 0 mL / NET: 627.2 mL        ----------------------------------------------------------------------------------  PHYSICAL EXAM:  Vital Signs Last 24 Hrs  T(C): 35.6 (13 Nov 2023 11:15), Max: 37.3 (13 Nov 2023 08:00)  T(F): 96 (13 Nov 2023 11:15), Max: 99.1 (13 Nov 2023 08:00)  HR: 68 (13 Nov 2023 13:00) (56 - 69)  BP: --  BP(mean): --  RR: 14 (13 Nov 2023 13:00) (11 - 23)  SpO2: 98% (13 Nov 2023 13:00) (94% - 99%)    Parameters below as of 13 Nov 2023 08:00  Patient On (Oxygen Delivery Method): room air        GENERAL: NAD, lying in bed comfortably  HEAD: Atraumatic, normocephalic, Shiley catheter in place  EYES: EOMI, PERRLA, conjunctiva and sclera clear  ENT: Moist mucous membranes  NECK: Supple, no JVD noted  HEART: S1, S2, Regular rate and rhythm, no murmurs, rubs, or gallops  LUNGS: Unlabored respirations, clear to auscultation bilaterally, no crackles, wheezes, or rhonchi  ABDOMEN: Soft, nontender, nondistended  EXTREMITIES: 2+ peripheral pulses bilaterally. No clubbing, cyanosis, or edema  NERVOUS SYSTEM:  A&Ox3, speech is slow but fluent. Deficits in long term memory, short-term memory intact.   SKIN: No rashes or lesions    ----------------------------------------------------------------------------------  LABS:                        7.9    7.39  )-----------( 38       ( 13 Nov 2023 00:16 )             21.4     11-13    134<L>  |  100  |  75<H>  ----------------------------<  235<H>  4.5   |  18<L>  |  3.49<H>    Ca    7.8<L>      13 Nov 2023 00:16  Phos  5.2     11-13  Mg     2.90     11-13    TPro  7.3  /  Alb  1.8<L>  /  TBili  3.5<H>  /  DBili  x   /  AST  692<H>  /  ALT  142<H>  /  AlkPhos  507<H>  11-13    PT/INR - ( 13 Nov 2023 00:16 )   PT: 16.8 sec;   INR: 1.52 ratio         PTT - ( 13 Nov 2023 00:16 )  PTT:40.3 sec      Urinalysis Basic - ( 13 Nov 2023 00:16 )    Color: x / Appearance: x / SG: x / pH: x  Gluc: 235 mg/dL / Ketone: x  / Bili: x / Urobili: x   Blood: x / Protein: x / Nitrite: x   Leuk Esterase: x / RBC: x / WBC x   Sq Epi: x / Non Sq Epi: x / Bacteria: x          ----------------------------------------------------------------------------------  RADIOLOGY & ADDITIONAL TESTS:  Lab Results Reviewed  Imaging Personally Reviewed  Electrocardiogram Personally Reviewed CONSULT NOTE:     Patient is a 73y old man with PMH of ESRD s/p kidney transplant on HD, CAD s/p multiple stents (most recently, RCA stent on 10/2023), T2DM, and HTN, with recent hospitalization for bacterial PNA, who originally presented to the ED on 11/7 after being found acutely unresponsive with agonal breathing by his daughter. CPR was administered by the daughter. On arrival, EMS administered glucose and started O2 via NRB, with no improvement in mental status. In the ED, pt was intubated for airway protection and due to AHRF and started on levo. CT A/P was c/w multifocal PNA. Patient was started on empiric zosyn and azithro. 11/7 stroke code due to AMS was negative (no findings on CTH or CTA head and neck). Patient is now back to baseline mental and respiratory status (AOx3, on RA).     Cardio consulted for POCUS showing thin RV wall and persistent hypotension, raising concern for acute RV dysfunction. R-sided EKG showed no ST changes.    Daughter reports patient has hx of 6-7 cardiac stents in the past. Both patient and daughter endorse 100% adherence to DAPT after October 2023 stent placement.    ----------------------------------------------------------------------------------  MEDICATIONS  (STANDING):  aspirin  chewable 81 milliGRAM(s) Oral daily  chlorhexidine 2% Cloths 1 Application(s) Topical daily  clopidogrel Tablet 75 milliGRAM(s) Oral daily  dextrose 5%. 1000 milliLiter(s) (100 mL/Hr) IV Continuous <Continuous>  dextrose 5%. 1000 milliLiter(s) (50 mL/Hr) IV Continuous <Continuous>  dextrose 50% Injectable 25 Gram(s) IV Push once  droxidopa 200 milliGRAM(s) Oral every 8 hours  folic acid 1 milliGRAM(s) Oral daily  glucagon  Injectable 1 milliGRAM(s) IntraMuscular once  hydrocortisone sodium succinate Injectable   IV Push   hydrocortisone sodium succinate Injectable 50 milliGRAM(s) IV Push every 8 hours  insulin glargine Injectable (LANTUS) 4 Unit(s) SubCutaneous at bedtime  insulin lispro (ADMELOG) corrective regimen sliding scale   SubCutaneous every 6 hours  meropenem  IVPB 500 milliGRAM(s) IV Intermittent every 24 hours  norepinephrine Infusion 0.329 MICROgram(s)/kG/Min (20 mL/Hr) IV Continuous <Continuous>    MEDICATIONS  (PRN):  dextrose Oral Gel 15 Gram(s) Oral once PRN Blood Glucose LESS THAN 70 milliGRAM(s)/deciliter  lactulose Syrup 20 Gram(s) Oral three times a day PRN 2-3 bm daily  polyethylene glycol 3350 17 Gram(s) Oral two times a day PRN 2-3 bm daily  sodium chloride 0.9% Bolus. 100 milliLiter(s) IV Bolus every 5 minutes PRN SBP LESS THAN or EQUAL to 90 mmHg  sodium chloride 0.9% lock flush 10 milliLiter(s) IV Push every 1 hour PRN Pre/post blood products, medications, blood draw, and to maintain line patency      ----------------------------------------------------------------------------------  CAPILLARY BLOOD GLUCOSE      POCT Blood Glucose.: 190 mg/dL (13 Nov 2023 12:03)  POCT Blood Glucose.: 221 mg/dL (13 Nov 2023 05:19)  POCT Blood Glucose.: 240 mg/dL (13 Nov 2023 00:20)  POCT Blood Glucose.: 201 mg/dL (12 Nov 2023 17:36)    I&O's Summary    12 Nov 2023 07:01  -  13 Nov 2023 07:00  --------------------------------------------------------  IN: 627.2 mL / OUT: 0 mL / NET: 627.2 mL        ----------------------------------------------------------------------------------  PHYSICAL EXAM:  Vital Signs Last 24 Hrs  T(C): 35.6 (13 Nov 2023 11:15), Max: 37.3 (13 Nov 2023 08:00)  T(F): 96 (13 Nov 2023 11:15), Max: 99.1 (13 Nov 2023 08:00)  HR: 68 (13 Nov 2023 13:00) (56 - 69)  BP: --  BP(mean): --  RR: 14 (13 Nov 2023 13:00) (11 - 23)  SpO2: 98% (13 Nov 2023 13:00) (94% - 99%)    Parameters below as of 13 Nov 2023 08:00  Patient On (Oxygen Delivery Method): room air        GENERAL: NAD, lying in bed comfortably  HEAD: Atraumatic, normocephalic, Shiley catheter in place  EYES: EOMI, PERRLA, conjunctiva and sclera clear  ENT: Moist mucous membranes  NECK: Supple, no JVD noted  HEART: S1, S2, Regular rate and rhythm, no murmurs, rubs, or gallops  LUNGS: Unlabored respirations, clear to auscultation bilaterally, no crackles, wheezes, or rhonchi  ABDOMEN: Soft, nontender, nondistended  EXTREMITIES: 2+ peripheral pulses bilaterally. No clubbing, cyanosis, or edema  NERVOUS SYSTEM:  A&Ox3, speech is slow but fluent. Deficits in long term memory, short-term memory intact.   SKIN: No rashes or lesions    ----------------------------------------------------------------------------------  LABS:                        7.9    7.39  )-----------( 38       ( 13 Nov 2023 00:16 )             21.4     11-13    134<L>  |  100  |  75<H>  ----------------------------<  235<H>  4.5   |  18<L>  |  3.49<H>    Ca    7.8<L>      13 Nov 2023 00:16  Phos  5.2     11-13  Mg     2.90     11-13    TPro  7.3  /  Alb  1.8<L>  /  TBili  3.5<H>  /  DBili  x   /  AST  692<H>  /  ALT  142<H>  /  AlkPhos  507<H>  11-13    PT/INR - ( 13 Nov 2023 00:16 )   PT: 16.8 sec;   INR: 1.52 ratio         PTT - ( 13 Nov 2023 00:16 )  PTT:40.3 sec      Urinalysis Basic - ( 13 Nov 2023 00:16 )    Color: x / Appearance: x / SG: x / pH: x  Gluc: 235 mg/dL / Ketone: x  / Bili: x / Urobili: x   Blood: x / Protein: x / Nitrite: x   Leuk Esterase: x / RBC: x / WBC x   Sq Epi: x / Non Sq Epi: x / Bacteria: x          ----------------------------------------------------------------------------------  RADIOLOGY & ADDITIONAL TESTS:  Lab Results Reviewed  Imaging Personally Reviewed  Electrocardiogram Personally Reviewed CONSULT NOTE:     Patient is a 73y old man with PMH of ESRD s/p kidney transplant on HD, CAD s/p multiple stents (most recently, RCA stent on 10/2023), T2DM, and HTN, with recent hospitalization for bacterial PNA, who originally presented to the ED on 11/7 after being found acutely unresponsive with agonal breathing by his daughter. CPR was administered by the daughter. On arrival, EMS administered glucose and started O2 via NRB, with no improvement in mental status.     In the ED, pt was intubated for airway protection and due to AHRF and started on levo. CT A/P was c/w multifocal PNA. Patient was started on empiric zosyn and azithro. 11/7 stroke code due to AMS was negative (no findings on CTH or CTA head and neck). Patient is now back to baseline mental and respiratory status (AOx3, on RA).     Cardio consulted for POCUS showing thin RV wall and persistent hypotension, raising concern for acute RV dysfunction. R-sided EKG showed no ST changes.    Daughter reports patient has hx of 6-7 cardiac stents in the past. Both patient and daughter endorse 100% adherence to DAPT after October 2023 stent placement.    ----------------------------------------------------------------------------------  MEDICATIONS  (STANDING):  aspirin  chewable 81 milliGRAM(s) Oral daily  chlorhexidine 2% Cloths 1 Application(s) Topical daily  clopidogrel Tablet 75 milliGRAM(s) Oral daily  dextrose 5%. 1000 milliLiter(s) (100 mL/Hr) IV Continuous <Continuous>  dextrose 5%. 1000 milliLiter(s) (50 mL/Hr) IV Continuous <Continuous>  dextrose 50% Injectable 25 Gram(s) IV Push once  droxidopa 200 milliGRAM(s) Oral every 8 hours  folic acid 1 milliGRAM(s) Oral daily  glucagon  Injectable 1 milliGRAM(s) IntraMuscular once  hydrocortisone sodium succinate Injectable   IV Push   hydrocortisone sodium succinate Injectable 50 milliGRAM(s) IV Push every 8 hours  insulin glargine Injectable (LANTUS) 4 Unit(s) SubCutaneous at bedtime  insulin lispro (ADMELOG) corrective regimen sliding scale   SubCutaneous every 6 hours  meropenem  IVPB 500 milliGRAM(s) IV Intermittent every 24 hours  norepinephrine Infusion 0.329 MICROgram(s)/kG/Min (20 mL/Hr) IV Continuous <Continuous>    MEDICATIONS  (PRN):  dextrose Oral Gel 15 Gram(s) Oral once PRN Blood Glucose LESS THAN 70 milliGRAM(s)/deciliter  lactulose Syrup 20 Gram(s) Oral three times a day PRN 2-3 bm daily  polyethylene glycol 3350 17 Gram(s) Oral two times a day PRN 2-3 bm daily  sodium chloride 0.9% Bolus. 100 milliLiter(s) IV Bolus every 5 minutes PRN SBP LESS THAN or EQUAL to 90 mmHg  sodium chloride 0.9% lock flush 10 milliLiter(s) IV Push every 1 hour PRN Pre/post blood products, medications, blood draw, and to maintain line patency      ----------------------------------------------------------------------------------  CAPILLARY BLOOD GLUCOSE      POCT Blood Glucose.: 190 mg/dL (13 Nov 2023 12:03)  POCT Blood Glucose.: 221 mg/dL (13 Nov 2023 05:19)  POCT Blood Glucose.: 240 mg/dL (13 Nov 2023 00:20)  POCT Blood Glucose.: 201 mg/dL (12 Nov 2023 17:36)    I&O's Summary    12 Nov 2023 07:01  -  13 Nov 2023 07:00  --------------------------------------------------------  IN: 627.2 mL / OUT: 0 mL / NET: 627.2 mL        ----------------------------------------------------------------------------------  PHYSICAL EXAM:  Vital Signs Last 24 Hrs  T(C): 35.6 (13 Nov 2023 11:15), Max: 37.3 (13 Nov 2023 08:00)  T(F): 96 (13 Nov 2023 11:15), Max: 99.1 (13 Nov 2023 08:00)  HR: 68 (13 Nov 2023 13:00) (56 - 69)  BP: --  BP(mean): --  RR: 14 (13 Nov 2023 13:00) (11 - 23)  SpO2: 98% (13 Nov 2023 13:00) (94% - 99%)    Parameters below as of 13 Nov 2023 08:00  Patient On (Oxygen Delivery Method): room air        GENERAL: NAD, lying in bed comfortably  HEAD: Atraumatic, normocephalic, Shiley catheter in place  EYES: EOMI, PERRLA, conjunctiva and sclera clear  ENT: Moist mucous membranes  NECK: Supple, no JVD noted  HEART: S1, S2, Regular rate and rhythm, no murmurs, rubs, or gallops  LUNGS: Unlabored respirations, clear to auscultation bilaterally, no crackles, wheezes, or rhonchi  ABDOMEN: Soft, nontender, nondistended  EXTREMITIES: 2+ peripheral pulses bilaterally. No clubbing, cyanosis, or edema  NERVOUS SYSTEM:  A&Ox3, speech is slow but fluent. Deficits in long term memory, short-term memory intact.   SKIN: No rashes or lesions    ----------------------------------------------------------------------------------  LABS:                        7.9    7.39  )-----------( 38       ( 13 Nov 2023 00:16 )             21.4     11-13    134<L>  |  100  |  75<H>  ----------------------------<  235<H>  4.5   |  18<L>  |  3.49<H>    Ca    7.8<L>      13 Nov 2023 00:16  Phos  5.2     11-13  Mg     2.90     11-13    TPro  7.3  /  Alb  1.8<L>  /  TBili  3.5<H>  /  DBili  x   /  AST  692<H>  /  ALT  142<H>  /  AlkPhos  507<H>  11-13    PT/INR - ( 13 Nov 2023 00:16 )   PT: 16.8 sec;   INR: 1.52 ratio         PTT - ( 13 Nov 2023 00:16 )  PTT:40.3 sec      Urinalysis Basic - ( 13 Nov 2023 00:16 )    Color: x / Appearance: x / SG: x / pH: x  Gluc: 235 mg/dL / Ketone: x  / Bili: x / Urobili: x   Blood: x / Protein: x / Nitrite: x   Leuk Esterase: x / RBC: x / WBC x   Sq Epi: x / Non Sq Epi: x / Bacteria: x          ----------------------------------------------------------------------------------  RADIOLOGY & ADDITIONAL TESTS:  Lab Results Reviewed  Imaging Personally Reviewed  Electrocardiogram Personally Reviewed CONSULT NOTE:     Patient is a 73y old man with PMH of ESRD s/p kidney transplant on HD, CAD s/p multiple stents (most recently, RCA stent on 10/2023), T2DM, and HTN, with recent hospitalization for bacterial PNA, who originally presented to the ED on 11/7 after being found acutely unresponsive with agonal breathing by his daughter. CPR was administered by the daughter. On arrival, EMS administered glucose and started O2 via NRB, with no improvement in mental status.     In the ED, pt was intubated for airway protection and due to AHRF and started on levo. CT A/P was c/w multifocal PNA. Patient was started on empiric zosyn and azithromycin. 11/7 stroke code due to AMS was negative (no findings on CTH or CTA head and neck). Patient is now back to baseline mental and respiratory status (AOx3, on RA).     Cardio consulted for POCUS showing thin RV wall and persistent hypotension, raising concern for acute RV dysfunction as cause of persistent vasopressor requirement. Daughter reports patient has hx of 6-7 cardiac stents in the past. Both patient and daughter endorse 100% adherence to DAPT after October 2023 stent placement. On interview patient denying CP/SOB.    ----------------------------------------------------------------------------------  MEDICATIONS  (STANDING):  aspirin  chewable 81 milliGRAM(s) Oral daily  chlorhexidine 2% Cloths 1 Application(s) Topical daily  clopidogrel Tablet 75 milliGRAM(s) Oral daily  dextrose 5%. 1000 milliLiter(s) (100 mL/Hr) IV Continuous <Continuous>  dextrose 5%. 1000 milliLiter(s) (50 mL/Hr) IV Continuous <Continuous>  dextrose 50% Injectable 25 Gram(s) IV Push once  droxidopa 200 milliGRAM(s) Oral every 8 hours  folic acid 1 milliGRAM(s) Oral daily  glucagon  Injectable 1 milliGRAM(s) IntraMuscular once  hydrocortisone sodium succinate Injectable   IV Push   hydrocortisone sodium succinate Injectable 50 milliGRAM(s) IV Push every 8 hours  insulin glargine Injectable (LANTUS) 4 Unit(s) SubCutaneous at bedtime  insulin lispro (ADMELOG) corrective regimen sliding scale   SubCutaneous every 6 hours  meropenem  IVPB 500 milliGRAM(s) IV Intermittent every 24 hours  norepinephrine Infusion 0.329 MICROgram(s)/kG/Min (20 mL/Hr) IV Continuous <Continuous>    MEDICATIONS  (PRN):  dextrose Oral Gel 15 Gram(s) Oral once PRN Blood Glucose LESS THAN 70 milliGRAM(s)/deciliter  lactulose Syrup 20 Gram(s) Oral three times a day PRN 2-3 bm daily  polyethylene glycol 3350 17 Gram(s) Oral two times a day PRN 2-3 bm daily  sodium chloride 0.9% Bolus. 100 milliLiter(s) IV Bolus every 5 minutes PRN SBP LESS THAN or EQUAL to 90 mmHg  sodium chloride 0.9% lock flush 10 milliLiter(s) IV Push every 1 hour PRN Pre/post blood products, medications, blood draw, and to maintain line patency      ----------------------------------------------------------------------------------  CAPILLARY BLOOD GLUCOSE      POCT Blood Glucose.: 190 mg/dL (13 Nov 2023 12:03)  POCT Blood Glucose.: 221 mg/dL (13 Nov 2023 05:19)  POCT Blood Glucose.: 240 mg/dL (13 Nov 2023 00:20)  POCT Blood Glucose.: 201 mg/dL (12 Nov 2023 17:36)    I&O's Summary    12 Nov 2023 07:01  -  13 Nov 2023 07:00  --------------------------------------------------------  IN: 627.2 mL / OUT: 0 mL / NET: 627.2 mL        ----------------------------------------------------------------------------------  PHYSICAL EXAM:  Vital Signs Last 24 Hrs  T(C): 35.6 (13 Nov 2023 11:15), Max: 37.3 (13 Nov 2023 08:00)  T(F): 96 (13 Nov 2023 11:15), Max: 99.1 (13 Nov 2023 08:00)  HR: 68 (13 Nov 2023 13:00) (56 - 69)  BP: --  BP(mean): --  RR: 14 (13 Nov 2023 13:00) (11 - 23)  SpO2: 98% (13 Nov 2023 13:00) (94% - 99%)    Parameters below as of 13 Nov 2023 08:00  Patient On (Oxygen Delivery Method): room air        GENERAL: NAD, lying in bed comfortably  HEAD: Atraumatic, normocephalic, Shiley catheter in place  EYES: EOMI, PERRLA, conjunctiva and sclera clear  ENT: Moist mucous membranes  NECK: Supple, no JVD noted  HEART: S1, S2, Regular rate and rhythm, no murmurs, rubs, or gallops  LUNGS: Unlabored respirations, clear to auscultation bilaterally, no crackles, wheezes, or rhonchi  ABDOMEN: Soft, nontender, nondistended  EXTREMITIES: 2+ peripheral pulses bilaterally. No clubbing, cyanosis, or edema  NERVOUS SYSTEM:  A&Ox3, speech is slow but fluent. Deficits in long term memory, short-term memory intact.   SKIN: No rashes or lesions    ----------------------------------------------------------------------------------  LABS:                        7.9    7.39  )-----------( 38       ( 13 Nov 2023 00:16 )             21.4     11-13    134<L>  |  100  |  75<H>  ----------------------------<  235<H>  4.5   |  18<L>  |  3.49<H>    Ca    7.8<L>      13 Nov 2023 00:16  Phos  5.2     11-13  Mg     2.90     11-13    TPro  7.3  /  Alb  1.8<L>  /  TBili  3.5<H>  /  DBili  x   /  AST  692<H>  /  ALT  142<H>  /  AlkPhos  507<H>  11-13    PT/INR - ( 13 Nov 2023 00:16 )   PT: 16.8 sec;   INR: 1.52 ratio         PTT - ( 13 Nov 2023 00:16 )  PTT:40.3 sec      Urinalysis Basic - ( 13 Nov 2023 00:16 )    Color: x / Appearance: x / SG: x / pH: x  Gluc: 235 mg/dL / Ketone: x  / Bili: x / Urobili: x   Blood: x / Protein: x / Nitrite: x   Leuk Esterase: x / RBC: x / WBC x   Sq Epi: x / Non Sq Epi: x / Bacteria: x          ----------------------------------------------------------------------------------  RADIOLOGY & ADDITIONAL TESTS:  Lab Results Reviewed  Imaging Personally Reviewed  Electrocardiogram Personally Reviewed

## 2023-11-13 NOTE — PROGRESS NOTE ADULT - ASSESSMENT
73M w/ ESRD on HD MWF, CAD s/p stent 1m ago, BPH, DM, HTN presenting w/ AMS and AHRF; concern for stroke vs toxic metabolic encephalopathy, intubated for airway protection and hypoxic respiratory failure. s/p extubation with improved mental status; pending weaning of IV pressors.      ==============NEURO=============  #Acute Metabolic Encephalopathy:   - baseline independent, AOx3  - CT head w/ moderate chronic ischemic changes  - CTA neck and brain: Patent, ECAs, ICAs, no  hemodynamically significant stenosis at ICA origins by NASCET criteria. Bilateral vertebral arteries are patent without flow limiting stenosis.  - Neurology consulted: recommended MRI, spot EEG, maintaining on DAPT  - Acetaminophen, salicylates negative    =========CARDIOVASCULAR=========  #Shock  - unclear etiology however may be iso sepsis vs vasoplegia vs obstructive  - on levophed for BP support  - started on vasopressin overnight  - on solucortef 100 q8 for presumed adrenal insufficiency state   - bedside POCUS with evidence of enlarged RV  - CTA PE protocol to r/o PE negative    #CAD s/p stent 10/11/2023  #RCA stent with 99% stenosis   - c/w home aspirin 81mg qd  - c/w home plavix 75mg qd  - c/w home atorvastatin 80mg qd  - hold home coreg 12.5mg BID iso shock state     #Severe RV Dysfunction  - Bedside POCUS with evidence of RV dilated and thin RV wall concerning for acute process  - elevated troponin 1472 however downtrending  - R EKG without evidence of ST changes     ============RESPIRATORY==========  #Acute hypoxic respiratory failure  - desat to 80% at home w/ agonal breathing per patient's daughter  - s/p intubation 11/7 and extubation 11/11  - tolerating on    #Bilateral Multifocal PNA  - may be residual from recent hospitalization at [facility] for bacterial PNA  - will treat broadly with zosyn and azithromycin  - s/p bronch 11/8 with gram positive cocci    ============RENAL==============  #ESRD on HD  - Cr 2.54  - s/p HD on 11/6 via L AVF  - started on CRRT on 11/8 for volume removal; stopped on 11/10  - daily assessment of HD needs    ============GI===============  #Ascites  - s/p diagnostic para 11/8 without evidence of SBP     #History of colonic polyps   - s/p polyp removal in 2021/2022 with no residual disease    #Diet and Nutrition  - NPO w/ tube feeds    ============ENDO============  #DM  - home regimen - Humalog 15u qAC, Glargine 6u qhs  - FS q6h w/ ISS while NPO w/ tube feeds    #Elevated TSH   - initial labs concerning for TSH 28.9 and FT4 wnl  - will follow up remainder of thyroid labs   - would recommend follow up of TSH in 1-2 weeks after hospital stay    ===========HEME/ONC===========  #Anemia  - Hgb 7 on admission  - was receiving aranesp outpatient with HD    =============ID=============  #r/o Sepsis  - potential etiologies include multifocal PNA vs colitis vs other   - s/p Azithromycin, Vancomycin, and Zosyn in ED  - receiving meropenem (11/9-) post HD   - MRSA swab negative  - blood cultures (11/7) NGTD  - bronch culture with few gram positive cocci     ===========Lines/Tubes/Iglesias===========  - GOC: FULL CODE    73M w/ ESRD on HD MWF, CAD s/p stent 1m ago, BPH, DM, HTN presenting w/ AMS and AHRF; concern for stroke vs toxic metabolic encephalopathy, intubated for airway protection and hypoxic respiratory failure. s/p extubation with improved mental status; pending weaning of IV pressors.      ==============NEURO=============  #Acute Metabolic Encephalopathy:   - baseline independent, AOx3  - CT head w/ moderate chronic ischemic changes  - CTA neck and brain: Patent, ECAs, ICAs, no  hemodynamically significant stenosis at ICA origins by NASCET criteria. Bilateral vertebral arteries are patent without flow limiting stenosis.  - Neurology consulted: recommended MRI, spot EEG, maintaining on DAPT  - Acetaminophen, salicylates negative  - Ordered MRI Brain to further evaluate aphasia     =========CARDIOVASCULAR=========  #Shock  - unclear etiology however may be iso sepsis vs vasoplegia vs obstructive  - on levophed for BP support, titrating down with droxy  - s/p stress dose steroids, titrating down solucortef, currently 50mg q12  - bedside POCUS with evidence of enlarged RV  - CTA PE protocol to r/o PE negative    #CAD s/p stent 10/11/2023  #RCA stent with 99% stenosis   - Follows with Dr. Ashford and Dr. Herrera at [facility]  - c/w home aspirin 81mg qd  - c/w home plavix 75mg qd  - c/w home atorvastatin 80mg qd  - hold home coreg 12.5mg BID iso shock state     #Severe RV Dysfunction  - Bedside POCUS with evidence of RV dilated and thin RV wall concerning for acute process  - elevated troponin 1472 however downtrending  - R EKG without evidence of ST changes   - Pending official TTE   - troponin peaked in 1300s, downtrended  - consulted cardiology, f/u recs      ============RESPIRATORY==========  #Acute hypoxic respiratory failure  - desat to 80% at home w/ agonal breathing per patient's daughter  - s/p intubation 11/7 and extubation 11/11  - tolerating room air    #Bilateral Multifocal PNA  - may be residual from recent hospitalization at [facility] for bacterial PNA  - s/p  zosyn and azithromycin, now on meropenem for extended coverage   - s/p bronch 11/8 with rare gram positive cocci    ============RENAL==============  #ESRD on HD  - Cr 2.54  - s/p HD on 11/6 via L AVF  - started on CRRT on 11/8 for volume removal; stopped on 11/10  - attempting MWF HD through L fistula    ============GI===============  #Ascites  - s/p diagnostic para 11/8 without evidence of SBP     #History of colonic polyps   - s/p polyp removal in 2021/2022 with no residual disease    #Diet and Nutrition  - soft & bite sized    ============ENDO============  #DM  - home regimen - Humalog 15u qAC, Glargine 6u qhs  - FS q6h w/ ISS   - will restart lantus at 4 units today     #Elevated TSH   - initial labs concerning for TSH 28.9 and FT4 wnl  - will follow up remainder of thyroid labs   - would recommend follow up of TSH in 1-2 weeks after hospital stay    ===========HEME/ONC===========  #Anemia  - Hgb 7 on admission  - was receiving aranesp outpatient with HD    =============ID=============  #r/o Sepsis  - potential etiologies include multifocal PNA vs colitis vs other   - s/p Azithromycin, Vancomycin, and Zosyn in ED  - receiving meropenem (11/9-) post HD   - MRSA swab negative  - blood cultures (11/7) NGTD  - bronch culture with few gram positive cocci     ===========Lines/Tubes/Iglesias===========  - GOC: FULL CODE

## 2023-11-13 NOTE — PROGRESS NOTE ADULT - PROBLEM SELECTOR PLAN 1
Pt. with ESRD on HD TIW (MWF, LUE AVF). Pt. also with history of kidney transplant (not on immunosuppression). Last HD as outpatient was done on 11/6/23 via L AVF. Pt. admitted to MICU with respiratory failure and systemic shock. Pt. initiated on CRRT on 11/8, discontinued on 11/10 as volume status and FiO2 requirements improved. Pt. clinically improved. Labs reviewed. Plan for HD today via LUE AVF. Monitor BP and labs. Dose meds as per HD.

## 2023-11-13 NOTE — CHART NOTE - NSCHARTNOTEFT_GEN_A_CORE
: Da Rhodes MD     INDICATION: Critical Illness     PROCEDURE:  [ X ] LIMITED ECHO  [ X ] LIMITED CHEST  [ ] LIMITED RETROPERITONEAL  [ ] LIMITED ABDOMINAL  [ ] LIMITED DVT  [ ] NEEDLE GUIDANCE VASCULAR  [ ] NEEDLE GUIDANCE THORACENTESIS  [ ] NEEDLE GUIDANCE PARACENTESIS  [ ] NEEDLE GUIDANCE PERICARDIOCENTESIS  [ ] OTHER    FINDINGS: A-line predominant, few scattered b-lines b/l. Small pleural effusions and ascites noted in L lower lung field. Mildly reduced LV systolic function, RV dilation, mildly hypokinetic interventricular wall. LVOT VTI 16.9cm. IVC 2.1cm, poor collapsibility.      INTERPRETATION: Small L-sided pleural effusion otherwise Normal aeration pattern. Mild LV systolic dysfunction as above. : Da Rhodes MD     INDICATION: Critical Illness     PROCEDURE:  [ X ] LIMITED ECHO  [ X ] LIMITED CHEST  [ ] LIMITED RETROPERITONEAL  [ ] LIMITED ABDOMINAL  [ ] LIMITED DVT  [ ] NEEDLE GUIDANCE VASCULAR  [ ] NEEDLE GUIDANCE THORACENTESIS  [ ] NEEDLE GUIDANCE PARACENTESIS  [ ] NEEDLE GUIDANCE PERICARDIOCENTESIS  [ ] OTHER    FINDINGS: A-line predominant, few scattered b-lines b/l. Small pleural effusions and ascites noted in L lower lung field. Mildly reduced LV systolic function, RV dilation, mildly hypokinetic interventricular wall. LVOT VTI 16.9cm. IVC 2.1cm, poor collapsibility.      INTERPRETATION: Small L-sided pleural effusion otherwise Normal aeration pattern. Mild LV systolic dysfunction as above.    Attending Addendum:  Agree with above. at bedside for procedure.   SANDRA Gaytan DO, [facility]

## 2023-11-13 NOTE — CONSULT NOTE ADULT - PROBLEM SELECTOR RECOMMENDATION 9
Pt. with ESRD on HD TIW (MWF, AVF). He also has a hx of renal transplant (not on immunosuppression). Presenting to MICU with respiratory failure and systemic shock, etiology somewhat unclear. Last HD as outpatient was done on 11/6/23 via L AVF. Labs reviewed. Pt. critically ill, requiring multiple vasopressors and he is intubated/sedated. Due to requiring multiple vasopressors, plan is to initiate CRRT. MICU to place dialysis catheter. Discussed with the patient's family that he will require RRT/HD, risks and benefits associated with RRT/HD explained at length. HD consent obtained and kept in patients chart. Monitor labs. Dose meds as per HD.
- Patient hypotensive i/s/o attempts to wean levo, despite signs of sepsis improving. POCUS suspicious for acute RV dysfunction (RV dilation, thin RV wall), however pt lacking signs of RV failure on exam.    - Obtain TTE to assess RV function  - CTM

## 2023-11-13 NOTE — CONSULT NOTE ADULT - PROBLEM SELECTOR RECOMMENDATION 2
- Unclear stent history  - Obtain collateral (prog notes, echo reports, cath lab reports) from pt's cardiologist (Dr. Ashford, Dr. Herrera @[facility])

## 2023-11-13 NOTE — CONSULT NOTE ADULT - ASSESSMENT
73 y.o. man with PMH of ESRD s/p kidney transplant on HD, CAD s/p multiple stents (most recently, RCA stent on 10/2023), T2DM, and HTN, originally presenting to the ED on 11/7 after being found acutely unresponsive, CPR was administered by daughter. In ED, pt was intubated due to AHRF and started on levo, CT A/P c/w multifocal PNA. S/p empiric zosyn and azithro, currently on meropenem. 11/7 stroke code due to AMS was negative, patient now back to baseline mental and respiratory status (AOx3, on RA).     Cardio consulted for POCUS showing thin RV wall and persistent hypotension, raising concern for acute RV dysfunction. R-sided EKG showed no ST changes. 73 y.o. man with PMH of ESRD s/p kidney transplant on HD, CAD s/p multiple stents (most recently, RCA stent on 10/2023), T2DM, and HTN, originally presenting to the ED on 11/7 after being found acutely unresponsive, CPR was administered by daughter. In ED, pt was intubated due to AHRF and started on levo, CT A/P c/w multifocal PNA. S/p empiric zosyn and azithro, currently on meropenem. 11/7 stroke code due to AMS was negative, patient now back to baseline mental and respiratory status (AOx3, on RA).     Cardio consulted for POCUS showing thin RV wall and persistent hypotension, raising concern for acute RV dysfunction. R-sided EKG showed no ST changes.      Recommendations:  - Obtain TTE to assess RV function  - Obtain collateral to clarify stent history (progress notes, echo reports, cath lab reports) from pt's cardiologist (Dr. Ashford, Dr. Herrera @[facility])  - Continue to monitor on tele    All recommendations pending attending attestation. We will continue to follow with you.    73 y.o. man with PMH of ESRD s/p kidney transplant on HD, CAD s/p multiple stents (most recently, RCA stent on 10/2023), T2DM, and HTN, originally presenting to the ED on 11/7 after being found acutely unresponsive, CPR was administered by daughter. In ED, pt was intubated for airway protection and due to AHRF and started on levo, CT A/P c/w multifocal PNA. S/p empiric zosyn and azithro, currently on meropenem. Patient now back to baseline mental and respiratory status (AOx3, on RA), pending MRI brain.     POCUS demonstrated thin RV wall and RV dilation. Patient also with persistent hypotension in setting of attempts to wean levo, raising concern for acute RV dysfunction. R-sided EKG showed no ST changes. On exam, patient has no signs of acute RV dysfunction, as there is no peripheral edema, elevated JVP, or palpable thrill. Low suspicion for hypotension due to RV dysfunction due to this absence of clinical symptoms, however need to evaluate further on echo.       Recommendations:  - Obtain TTE to assess RV function  - Obtain collateral to clarify stent history (progress notes, echo reports, cath lab reports) from pt's cardiologist (Dr. Ashford, Dr. Herrera @[facility])  - Continue to monitor on tele    All recommendations pending attending attestation. We will continue to follow with you.    73 y.o. man with PMH of ESRD s/p kidney transplant on HD, CAD s/p multiple stents (most recently, RCA stent on 10/2023), T2DM, and HTN, originally presenting to the ED on 11/7 after being found acutely unresponsive, CPR was administered by daughter. In ED, pt was intubated for airway protection and due to AHRF and started on levo, CT A/P c/w multifocal PNA. S/p empiric zosyn and azithro, currently on meropenem. Patient now back to baseline mental and respiratory status (AOx3, on RA), pending MRI brain.     POCUS demonstrated thin RV wall and RV dilation. Patient also with persistent hypotension in setting of attempts to wean levo, raising concern for acute RV dysfunction. R-sided EKG showed no ST changes. On exam, patient has no signs of acute RV dysfunction, as there is no peripheral edema, elevated JVP, or palpable heave. Low suspicion for hypotension due to RV dysfunction due to this absence of clinical symptoms, however need to evaluate further on echo.       Recommendations:  - Obtain TTE to assess RV function  - Obtain collateral to clarify stent history (progress notes, echo reports, cath lab reports) from pt's cardiologist (Dr. Ashford, Dr. Herrera @[facility])  - Continue to monitor on tele    All recommendations pending attending attestation. We will continue to follow with you.    73 y.o. man with PMH of ESRD s/p kidney transplant on HD, CAD s/p multiple stents (most recently, RCA stent on 10/2023), T2DM, and HTN, originally presenting to the ED on 11/7 after being found acutely unresponsive, CPR was administered by daughter. In ED, pt was intubated for airway protection and due to AHRF and started on levo, CT A/P c/w multifocal PNA. S/p empiric zosyn and azithro, currently on meropenem. Patient now back to baseline mental and respiratory status (AOx3, on RA), pending MRI brain. Cardiology consulted for persistent pressor requirement with bedside POCUS showing thin RV wall and RV dilation raising concern for acute RV dysfunction. On exam, patient appeared grossly euvolemic making decompensated RV failure unlikely cause for persistent vasopressor requirement. However will need to gather more data to evaluate cardiac function.     Recommendations:  - Obtain TTE to assess RV function  - Obtain collateral to clarify stent history (progress notes, echo reports, cath lab reports) from pt's cardiologist (Dr. Ashford, Dr. Herrera @[facility])  - please transduce CVP from RIJ catheter  - Continue to monitor on tele    All recommendations pending attending attestation. We will continue to follow with you.     Note reviewed and edited as above by  Mis Stevenson MD  PGY-4, Cardiology  Available on TEAMS    For all new consults  [email or web address]  Login: [email or web address]

## 2023-11-14 LAB
A1C WITH ESTIMATED AVERAGE GLUCOSE RESULT: 5.8 % — HIGH (ref 4–5.6)
A1C WITH ESTIMATED AVERAGE GLUCOSE RESULT: 5.8 % — HIGH (ref 4–5.6)
ALBUMIN SERPL ELPH-MCNC: 1.8 G/DL — LOW (ref 3.3–5)
ALBUMIN SERPL ELPH-MCNC: 1.8 G/DL — LOW (ref 3.3–5)
ALP SERPL-CCNC: 517 U/L — HIGH (ref 40–120)
ALP SERPL-CCNC: 517 U/L — HIGH (ref 40–120)
ALT FLD-CCNC: 103 U/L — HIGH (ref 4–41)
ALT FLD-CCNC: 103 U/L — HIGH (ref 4–41)
ANION GAP SERPL CALC-SCNC: 13 MMOL/L — SIGNIFICANT CHANGE UP (ref 7–14)
ANION GAP SERPL CALC-SCNC: 13 MMOL/L — SIGNIFICANT CHANGE UP (ref 7–14)
APTT BLD: 37.3 SEC — HIGH (ref 24.5–35.6)
APTT BLD: 37.3 SEC — HIGH (ref 24.5–35.6)
AST SERPL-CCNC: 660 U/L — HIGH (ref 4–40)
AST SERPL-CCNC: 660 U/L — HIGH (ref 4–40)
BASOPHILS # BLD AUTO: 0 K/UL — SIGNIFICANT CHANGE UP (ref 0–0.2)
BASOPHILS # BLD AUTO: 0 K/UL — SIGNIFICANT CHANGE UP (ref 0–0.2)
BASOPHILS # BLD AUTO: 0.01 K/UL — SIGNIFICANT CHANGE UP (ref 0–0.2)
BASOPHILS # BLD AUTO: 0.01 K/UL — SIGNIFICANT CHANGE UP (ref 0–0.2)
BASOPHILS NFR BLD AUTO: 0 % — SIGNIFICANT CHANGE UP (ref 0–2)
BASOPHILS NFR BLD AUTO: 0 % — SIGNIFICANT CHANGE UP (ref 0–2)
BASOPHILS NFR BLD AUTO: 0.1 % — SIGNIFICANT CHANGE UP (ref 0–2)
BASOPHILS NFR BLD AUTO: 0.1 % — SIGNIFICANT CHANGE UP (ref 0–2)
BILIRUB SERPL-MCNC: 3 MG/DL — HIGH (ref 0.2–1.2)
BILIRUB SERPL-MCNC: 3 MG/DL — HIGH (ref 0.2–1.2)
BUN SERPL-MCNC: 57 MG/DL — HIGH (ref 7–23)
BUN SERPL-MCNC: 57 MG/DL — HIGH (ref 7–23)
CA-I BLD-SCNC: 0.98 MMOL/L — LOW (ref 1.15–1.29)
CA-I BLD-SCNC: 0.98 MMOL/L — LOW (ref 1.15–1.29)
CALCIUM SERPL-MCNC: 7.7 MG/DL — LOW (ref 8.4–10.5)
CALCIUM SERPL-MCNC: 7.7 MG/DL — LOW (ref 8.4–10.5)
CHLORIDE SERPL-SCNC: 99 MMOL/L — SIGNIFICANT CHANGE UP (ref 98–107)
CHLORIDE SERPL-SCNC: 99 MMOL/L — SIGNIFICANT CHANGE UP (ref 98–107)
CO2 SERPL-SCNC: 23 MMOL/L — SIGNIFICANT CHANGE UP (ref 22–31)
CO2 SERPL-SCNC: 23 MMOL/L — SIGNIFICANT CHANGE UP (ref 22–31)
CREAT SERPL-MCNC: 2.62 MG/DL — HIGH (ref 0.5–1.3)
CREAT SERPL-MCNC: 2.62 MG/DL — HIGH (ref 0.5–1.3)
EGFR: 25 ML/MIN/1.73M2 — LOW
EGFR: 25 ML/MIN/1.73M2 — LOW
EOSINOPHIL # BLD AUTO: 0 K/UL — SIGNIFICANT CHANGE UP (ref 0–0.5)
EOSINOPHIL NFR BLD AUTO: 0 % — SIGNIFICANT CHANGE UP (ref 0–6)
ESTIMATED AVERAGE GLUCOSE: 120 — SIGNIFICANT CHANGE UP
ESTIMATED AVERAGE GLUCOSE: 120 — SIGNIFICANT CHANGE UP
GLUCOSE BLDC GLUCOMTR-MCNC: 256 MG/DL — HIGH (ref 70–99)
GLUCOSE BLDC GLUCOMTR-MCNC: 256 MG/DL — HIGH (ref 70–99)
GLUCOSE BLDC GLUCOMTR-MCNC: 265 MG/DL — HIGH (ref 70–99)
GLUCOSE BLDC GLUCOMTR-MCNC: 265 MG/DL — HIGH (ref 70–99)
GLUCOSE BLDC GLUCOMTR-MCNC: 275 MG/DL — HIGH (ref 70–99)
GLUCOSE BLDC GLUCOMTR-MCNC: 275 MG/DL — HIGH (ref 70–99)
GLUCOSE BLDC GLUCOMTR-MCNC: 280 MG/DL — HIGH (ref 70–99)
GLUCOSE BLDC GLUCOMTR-MCNC: 280 MG/DL — HIGH (ref 70–99)
GLUCOSE SERPL-MCNC: 272 MG/DL — HIGH (ref 70–99)
GLUCOSE SERPL-MCNC: 272 MG/DL — HIGH (ref 70–99)
HAV IGM SER-ACNC: SIGNIFICANT CHANGE UP
HAV IGM SER-ACNC: SIGNIFICANT CHANGE UP
HBV CORE AB SER-ACNC: REACTIVE
HBV CORE AB SER-ACNC: REACTIVE
HBV CORE IGM SER-ACNC: SIGNIFICANT CHANGE UP
HBV CORE IGM SER-ACNC: SIGNIFICANT CHANGE UP
HBV SURFACE AB SER-ACNC: REACTIVE
HBV SURFACE AB SER-ACNC: REACTIVE
HBV SURFACE AG SER-ACNC: SIGNIFICANT CHANGE UP
HBV SURFACE AG SER-ACNC: SIGNIFICANT CHANGE UP
HCT VFR BLD CALC: 20.2 % — CRITICAL LOW (ref 39–50)
HCT VFR BLD CALC: 20.2 % — CRITICAL LOW (ref 39–50)
HCT VFR BLD CALC: 20.4 % — CRITICAL LOW (ref 39–50)
HCT VFR BLD CALC: 20.4 % — CRITICAL LOW (ref 39–50)
HCV AB S/CO SERPL IA: 0.16 S/CO — SIGNIFICANT CHANGE UP (ref 0–0.99)
HCV AB S/CO SERPL IA: 0.16 S/CO — SIGNIFICANT CHANGE UP (ref 0–0.99)
HCV AB SERPL-IMP: SIGNIFICANT CHANGE UP
HCV AB SERPL-IMP: SIGNIFICANT CHANGE UP
HGB BLD-MCNC: 7.2 G/DL — LOW (ref 13–17)
HGB BLD-MCNC: 7.2 G/DL — LOW (ref 13–17)
HGB BLD-MCNC: 7.3 G/DL — LOW (ref 13–17)
HGB BLD-MCNC: 7.3 G/DL — LOW (ref 13–17)
IANC: 5.48 K/UL — SIGNIFICANT CHANGE UP (ref 1.8–7.4)
IANC: 5.48 K/UL — SIGNIFICANT CHANGE UP (ref 1.8–7.4)
IANC: 5.6 K/UL — SIGNIFICANT CHANGE UP (ref 1.8–7.4)
IANC: 5.6 K/UL — SIGNIFICANT CHANGE UP (ref 1.8–7.4)
IMM GRANULOCYTES NFR BLD AUTO: 0.6 % — SIGNIFICANT CHANGE UP (ref 0–0.9)
INR BLD: 1.51 RATIO — HIGH (ref 0.85–1.18)
INR BLD: 1.51 RATIO — HIGH (ref 0.85–1.18)
LYMPHOCYTES # BLD AUTO: 0.87 K/UL — LOW (ref 1–3.3)
LYMPHOCYTES # BLD AUTO: 0.87 K/UL — LOW (ref 1–3.3)
LYMPHOCYTES # BLD AUTO: 1.19 K/UL — SIGNIFICANT CHANGE UP (ref 1–3.3)
LYMPHOCYTES # BLD AUTO: 1.19 K/UL — SIGNIFICANT CHANGE UP (ref 1–3.3)
LYMPHOCYTES # BLD AUTO: 12.5 % — LOW (ref 13–44)
LYMPHOCYTES # BLD AUTO: 12.5 % — LOW (ref 13–44)
LYMPHOCYTES # BLD AUTO: 16.7 % — SIGNIFICANT CHANGE UP (ref 13–44)
LYMPHOCYTES # BLD AUTO: 16.7 % — SIGNIFICANT CHANGE UP (ref 13–44)
MAGNESIUM SERPL-MCNC: 2.5 MG/DL — SIGNIFICANT CHANGE UP (ref 1.6–2.6)
MAGNESIUM SERPL-MCNC: 2.5 MG/DL — SIGNIFICANT CHANGE UP (ref 1.6–2.6)
MCHC RBC-ENTMCNC: 29.8 PG — SIGNIFICANT CHANGE UP (ref 27–34)
MCHC RBC-ENTMCNC: 29.8 PG — SIGNIFICANT CHANGE UP (ref 27–34)
MCHC RBC-ENTMCNC: 29.9 PG — SIGNIFICANT CHANGE UP (ref 27–34)
MCHC RBC-ENTMCNC: 29.9 PG — SIGNIFICANT CHANGE UP (ref 27–34)
MCHC RBC-ENTMCNC: 35.6 GM/DL — SIGNIFICANT CHANGE UP (ref 32–36)
MCHC RBC-ENTMCNC: 35.6 GM/DL — SIGNIFICANT CHANGE UP (ref 32–36)
MCHC RBC-ENTMCNC: 35.8 GM/DL — SIGNIFICANT CHANGE UP (ref 32–36)
MCHC RBC-ENTMCNC: 35.8 GM/DL — SIGNIFICANT CHANGE UP (ref 32–36)
MCV RBC AUTO: 83.3 FL — SIGNIFICANT CHANGE UP (ref 80–100)
MCV RBC AUTO: 83.3 FL — SIGNIFICANT CHANGE UP (ref 80–100)
MCV RBC AUTO: 83.8 FL — SIGNIFICANT CHANGE UP (ref 80–100)
MCV RBC AUTO: 83.8 FL — SIGNIFICANT CHANGE UP (ref 80–100)
MONOCYTES # BLD AUTO: 0.4 K/UL — SIGNIFICANT CHANGE UP (ref 0–0.9)
MONOCYTES # BLD AUTO: 0.4 K/UL — SIGNIFICANT CHANGE UP (ref 0–0.9)
MONOCYTES # BLD AUTO: 0.43 K/UL — SIGNIFICANT CHANGE UP (ref 0–0.9)
MONOCYTES # BLD AUTO: 0.43 K/UL — SIGNIFICANT CHANGE UP (ref 0–0.9)
MONOCYTES NFR BLD AUTO: 5.6 % — SIGNIFICANT CHANGE UP (ref 2–14)
MONOCYTES NFR BLD AUTO: 5.6 % — SIGNIFICANT CHANGE UP (ref 2–14)
MONOCYTES NFR BLD AUTO: 6.2 % — SIGNIFICANT CHANGE UP (ref 2–14)
MONOCYTES NFR BLD AUTO: 6.2 % — SIGNIFICANT CHANGE UP (ref 2–14)
NEUTROPHILS # BLD AUTO: 5.48 K/UL — SIGNIFICANT CHANGE UP (ref 1.8–7.4)
NEUTROPHILS # BLD AUTO: 5.48 K/UL — SIGNIFICANT CHANGE UP (ref 1.8–7.4)
NEUTROPHILS # BLD AUTO: 5.6 K/UL — SIGNIFICANT CHANGE UP (ref 1.8–7.4)
NEUTROPHILS # BLD AUTO: 5.6 K/UL — SIGNIFICANT CHANGE UP (ref 1.8–7.4)
NEUTROPHILS NFR BLD AUTO: 77.1 % — HIGH (ref 43–77)
NEUTROPHILS NFR BLD AUTO: 77.1 % — HIGH (ref 43–77)
NEUTROPHILS NFR BLD AUTO: 80.6 % — HIGH (ref 43–77)
NEUTROPHILS NFR BLD AUTO: 80.6 % — HIGH (ref 43–77)
NRBC # BLD: 0 /100 WBCS — SIGNIFICANT CHANGE UP (ref 0–0)
NRBC # BLD: 0 /100 WBCS — SIGNIFICANT CHANGE UP (ref 0–0)
NRBC # BLD: 1 /100 WBCS — HIGH (ref 0–0)
NRBC # BLD: 1 /100 WBCS — HIGH (ref 0–0)
NRBC # FLD: 0.06 K/UL — HIGH (ref 0–0)
NRBC # FLD: 0.06 K/UL — HIGH (ref 0–0)
NRBC # FLD: 0.09 K/UL — HIGH (ref 0–0)
NRBC # FLD: 0.09 K/UL — HIGH (ref 0–0)
PHOSPHATE SERPL-MCNC: 4.1 MG/DL — SIGNIFICANT CHANGE UP (ref 2.5–4.5)
PHOSPHATE SERPL-MCNC: 4.1 MG/DL — SIGNIFICANT CHANGE UP (ref 2.5–4.5)
PLATELET # BLD AUTO: 39 K/UL — LOW (ref 150–400)
PLATELET # BLD AUTO: 39 K/UL — LOW (ref 150–400)
PLATELET # BLD AUTO: 55 K/UL — LOW (ref 150–400)
PLATELET # BLD AUTO: 55 K/UL — LOW (ref 150–400)
POTASSIUM SERPL-MCNC: 3.8 MMOL/L — SIGNIFICANT CHANGE UP (ref 3.5–5.3)
POTASSIUM SERPL-MCNC: 3.8 MMOL/L — SIGNIFICANT CHANGE UP (ref 3.5–5.3)
POTASSIUM SERPL-SCNC: 3.8 MMOL/L — SIGNIFICANT CHANGE UP (ref 3.5–5.3)
POTASSIUM SERPL-SCNC: 3.8 MMOL/L — SIGNIFICANT CHANGE UP (ref 3.5–5.3)
PROT SERPL-MCNC: 7.3 G/DL — SIGNIFICANT CHANGE UP (ref 6–8.3)
PROT SERPL-MCNC: 7.3 G/DL — SIGNIFICANT CHANGE UP (ref 6–8.3)
PROTHROM AB SERPL-ACNC: 16.9 SEC — HIGH (ref 9.5–13)
PROTHROM AB SERPL-ACNC: 16.9 SEC — HIGH (ref 9.5–13)
RBC # BLD: 2.41 M/UL — LOW (ref 4.2–5.8)
RBC # BLD: 2.41 M/UL — LOW (ref 4.2–5.8)
RBC # BLD: 2.45 M/UL — LOW (ref 4.2–5.8)
RBC # BLD: 2.45 M/UL — LOW (ref 4.2–5.8)
RBC # FLD: 24.4 % — HIGH (ref 10.3–14.5)
RBC # FLD: 24.4 % — HIGH (ref 10.3–14.5)
RBC # FLD: 24.7 % — HIGH (ref 10.3–14.5)
RBC # FLD: 24.7 % — HIGH (ref 10.3–14.5)
SODIUM SERPL-SCNC: 135 MMOL/L — SIGNIFICANT CHANGE UP (ref 135–145)
SODIUM SERPL-SCNC: 135 MMOL/L — SIGNIFICANT CHANGE UP (ref 135–145)
WBC # BLD: 6.95 K/UL — SIGNIFICANT CHANGE UP (ref 3.8–10.5)
WBC # BLD: 6.95 K/UL — SIGNIFICANT CHANGE UP (ref 3.8–10.5)
WBC # BLD: 7.11 K/UL — SIGNIFICANT CHANGE UP (ref 3.8–10.5)
WBC # BLD: 7.11 K/UL — SIGNIFICANT CHANGE UP (ref 3.8–10.5)
WBC # FLD AUTO: 6.95 K/UL — SIGNIFICANT CHANGE UP (ref 3.8–10.5)
WBC # FLD AUTO: 6.95 K/UL — SIGNIFICANT CHANGE UP (ref 3.8–10.5)
WBC # FLD AUTO: 7.11 K/UL — SIGNIFICANT CHANGE UP (ref 3.8–10.5)
WBC # FLD AUTO: 7.11 K/UL — SIGNIFICANT CHANGE UP (ref 3.8–10.5)

## 2023-11-14 PROCEDURE — 99233 SBSQ HOSP IP/OBS HIGH 50: CPT

## 2023-11-14 PROCEDURE — 99291 CRITICAL CARE FIRST HOUR: CPT

## 2023-11-14 PROCEDURE — 99232 SBSQ HOSP IP/OBS MODERATE 35: CPT | Mod: GC

## 2023-11-14 RX ORDER — CALCIUM GLUCONATE 100 MG/ML
1 VIAL (ML) INTRAVENOUS ONCE
Refills: 0 | Status: COMPLETED | OUTPATIENT
Start: 2023-11-14 | End: 2023-11-14

## 2023-11-14 RX ORDER — INSULIN LISPRO 100/ML
3 VIAL (ML) SUBCUTANEOUS
Refills: 0 | Status: DISCONTINUED | OUTPATIENT
Start: 2023-11-14 | End: 2023-11-23

## 2023-11-14 RX ORDER — INSULIN GLARGINE 100 [IU]/ML
6 INJECTION, SOLUTION SUBCUTANEOUS AT BEDTIME
Refills: 0 | Status: DISCONTINUED | OUTPATIENT
Start: 2023-11-14 | End: 2023-11-20

## 2023-11-14 RX ORDER — CALCIUM GLUCONATE 100 MG/ML
2 VIAL (ML) INTRAVENOUS ONCE
Refills: 0 | Status: COMPLETED | OUTPATIENT
Start: 2023-11-14 | End: 2023-11-14

## 2023-11-14 RX ADMIN — INSULIN GLARGINE 6 UNIT(S): 100 INJECTION, SOLUTION SUBCUTANEOUS at 22:43

## 2023-11-14 RX ADMIN — DROXIDOPA 200 MILLIGRAM(S): 100 CAPSULE ORAL at 22:39

## 2023-11-14 RX ADMIN — Medication 81 MILLIGRAM(S): at 12:08

## 2023-11-14 RX ADMIN — Medication 3 UNIT(S): at 12:09

## 2023-11-14 RX ADMIN — Medication 200 GRAM(S): at 05:12

## 2023-11-14 RX ADMIN — Medication 1: at 22:43

## 2023-11-14 RX ADMIN — CLOPIDOGREL BISULFATE 75 MILLIGRAM(S): 75 TABLET, FILM COATED ORAL at 12:08

## 2023-11-14 RX ADMIN — MEROPENEM 100 MILLIGRAM(S): 1 INJECTION INTRAVENOUS at 18:08

## 2023-11-14 RX ADMIN — DROXIDOPA 200 MILLIGRAM(S): 100 CAPSULE ORAL at 05:11

## 2023-11-14 RX ADMIN — CHLORHEXIDINE GLUCONATE 1 APPLICATION(S): 213 SOLUTION TOPICAL at 12:10

## 2023-11-14 RX ADMIN — Medication 3: at 12:09

## 2023-11-14 RX ADMIN — Medication 3: at 18:09

## 2023-11-14 RX ADMIN — Medication 50 MILLIGRAM(S): at 10:54

## 2023-11-14 RX ADMIN — DROXIDOPA 200 MILLIGRAM(S): 100 CAPSULE ORAL at 14:13

## 2023-11-14 RX ADMIN — Medication 3: at 08:46

## 2023-11-14 RX ADMIN — Medication 3 UNIT(S): at 18:09

## 2023-11-14 RX ADMIN — Medication 1 MILLIGRAM(S): at 12:08

## 2023-11-14 RX ADMIN — Medication 50 MILLIGRAM(S): at 22:46

## 2023-11-14 NOTE — PROGRESS NOTE ADULT - SUBJECTIVE AND OBJECTIVE BOX
CARDIOLOGY PROGRESS NOTE:     Patient is a 73y old  Male who presents with a chief complaint of Altered Mental Status (13 Nov 2023 14:12)      INTERVAL EVENTS: Levo stopped around 2am last night. Received 1u of platelets at 8pm last night.    SUBJECTIVE: Patient seen and examined at bedside with sister present. This morning, the patient is comfortable and doing well. No acute complaints, denies chest pain, lightheadedness, SOB, abdominal pain.    ----------------------------------------------------------------------------------  MEDICATIONS  (STANDING):  aspirin enteric coated 81 milliGRAM(s) Oral daily  chlorhexidine 2% Cloths 1 Application(s) Topical daily  clopidogrel Tablet 75 milliGRAM(s) Oral daily  dextrose 5%. 1000 milliLiter(s) (100 mL/Hr) IV Continuous <Continuous>  dextrose 5%. 1000 milliLiter(s) (50 mL/Hr) IV Continuous <Continuous>  dextrose 50% Injectable 25 Gram(s) IV Push once  droxidopa 200 milliGRAM(s) Oral every 8 hours  folic acid 1 milliGRAM(s) Oral daily  glucagon  Injectable 1 milliGRAM(s) IntraMuscular once  hydrocortisone sodium succinate Injectable 50 milliGRAM(s) IV Push every 12 hours  hydrocortisone sodium succinate Injectable   IV Push   insulin glargine Injectable (LANTUS) 6 Unit(s) SubCutaneous at bedtime  insulin lispro (ADMELOG) corrective regimen sliding scale   SubCutaneous three times a day before meals  insulin lispro (ADMELOG) corrective regimen sliding scale   SubCutaneous at bedtime  insulin lispro Injectable (ADMELOG) 3 Unit(s) SubCutaneous three times a day before meals  meropenem  IVPB 500 milliGRAM(s) IV Intermittent every 24 hours    MEDICATIONS  (PRN):  dextrose Oral Gel 15 Gram(s) Oral once PRN Blood Glucose LESS THAN 70 milliGRAM(s)/deciliter  lactulose Syrup 20 Gram(s) Oral three times a day PRN 2-3 bm daily  polyethylene glycol 3350 17 Gram(s) Oral two times a day PRN 2-3 bm daily  sodium chloride 0.9% Bolus. 100 milliLiter(s) IV Bolus every 5 minutes PRN SBP LESS THAN or EQUAL to 90 mmHg  sodium chloride 0.9% lock flush 10 milliLiter(s) IV Push every 1 hour PRN Pre/post blood products, medications, blood draw, and to maintain line patency      ----------------------------------------------------------------------------------  CAPILLARY BLOOD GLUCOSE      POCT Blood Glucose.: 280 mg/dL (14 Nov 2023 08:42)  POCT Blood Glucose.: 249 mg/dL (13 Nov 2023 21:35)  POCT Blood Glucose.: 231 mg/dL (13 Nov 2023 17:59)  POCT Blood Glucose.: 190 mg/dL (13 Nov 2023 12:03)    I&O's Summary    13 Nov 2023 07:01  -  14 Nov 2023 07:00  --------------------------------------------------------  IN: 1209.3 mL / OUT: 2000 mL / NET: -790.7 mL        ----------------------------------------------------------------------------------  PHYSICAL EXAM:  Vital Signs Last 24 Hrs  T(C): 35.2 (14 Nov 2023 08:00), Max: 37 (14 Nov 2023 04:00)  T(F): 95.4 (14 Nov 2023 08:00), Max: 98.6 (14 Nov 2023 04:00)  HR: 62 (14 Nov 2023 11:00) (59 - 76)  BP: --  BP(mean): --  RR: 12 (14 Nov 2023 11:00) (10 - 41)  SpO2: 95% (14 Nov 2023 11:00) (90% - 100%)    Parameters below as of 14 Nov 2023 08:00  Patient On (Oxygen Delivery Method): room air        GENERAL: NAD, lying in bed comfortably  HEAD: Atraumatic, normocephalic, Shiley in place  EYES: EOMI, PERRLA, conjunctiva and sclera clear  ENT: Moist mucous membranes  NECK: Supple, no JVD  HEART: S1, S2, Regular rate and rhythm, no murmurs, rubs, or gallops  LUNGS: Unlabored respirations, clear to auscultation bilaterally, no crackles, wheezes, or rhonchi  ABDOMEN: Soft, nontender, mildly distended  EXTREMITIES: 1+ peripheral pulses bilaterally. No clubbing, cyanosis, or edema  NERVOUS SYSTEM:  A&Ox3, neuro exam unchanged from prior. no focal deficits   SKIN: No rashes or lesions    ----------------------------------------------------------------------------------  LABS:                        7.3    6.95  )-----------( 39       ( 14 Nov 2023 00:55 )             20.4     11-14    135  |  99  |  57<H>  ----------------------------<  272<H>  3.8   |  23  |  2.62<H>    Ca    7.7<L>      14 Nov 2023 00:55  Phos  4.1     11-14  Mg     2.50     11-14    TPro  7.3  /  Alb  1.8<L>  /  TBili  3.0<H>  /  DBili  x   /  AST  660<H>  /  ALT  103<H>  /  AlkPhos  517<H>  11-14    PT/INR - ( 14 Nov 2023 00:55 )   PT: 16.9 sec;   INR: 1.51 ratio         PTT - ( 14 Nov 2023 00:55 )  PTT:37.3 sec      Urinalysis Basic - ( 14 Nov 2023 00:55 )    Color: x / Appearance: x / SG: x / pH: x  Gluc: 272 mg/dL / Ketone: x  / Bili: x / Urobili: x   Blood: x / Protein: x / Nitrite: x   Leuk Esterase: x / RBC: x / WBC x   Sq Epi: x / Non Sq Epi: x / Bacteria: x          ----------------------------------------------------------------------------------  RADIOLOGY & ADDITIONAL TESTS:  Results Reviewed:   Imaging Personally Reviewed:  Electrocardiogram Personally Reviewed:  Tele: CARDIOLOGY PROGRESS NOTE:     Patient is a 73y old  Male who presents with a chief complaint of Altered Mental Status (13 Nov 2023 14:12)      INTERVAL EVENTS: Norepinephrine stopped around 2am last night. Received 1u of platelets at 8pm last night.    SUBJECTIVE: Patient seen and examined at bedside with sister present. This morning, the patient is comfortable and doing well. No acute complaints, denies chest pain, lightheadedness, SOB, abdominal pain.    ----------------------------------------------------------------------------------  MEDICATIONS  (STANDING):  aspirin enteric coated 81 milliGRAM(s) Oral daily  chlorhexidine 2% Cloths 1 Application(s) Topical daily  clopidogrel Tablet 75 milliGRAM(s) Oral daily  dextrose 5%. 1000 milliLiter(s) (100 mL/Hr) IV Continuous <Continuous>  dextrose 5%. 1000 milliLiter(s) (50 mL/Hr) IV Continuous <Continuous>  dextrose 50% Injectable 25 Gram(s) IV Push once  droxidopa 200 milliGRAM(s) Oral every 8 hours  folic acid 1 milliGRAM(s) Oral daily  glucagon  Injectable 1 milliGRAM(s) IntraMuscular once  hydrocortisone sodium succinate Injectable 50 milliGRAM(s) IV Push every 12 hours  hydrocortisone sodium succinate Injectable   IV Push   insulin glargine Injectable (LANTUS) 6 Unit(s) SubCutaneous at bedtime  insulin lispro (ADMELOG) corrective regimen sliding scale   SubCutaneous three times a day before meals  insulin lispro (ADMELOG) corrective regimen sliding scale   SubCutaneous at bedtime  insulin lispro Injectable (ADMELOG) 3 Unit(s) SubCutaneous three times a day before meals  meropenem  IVPB 500 milliGRAM(s) IV Intermittent every 24 hours    MEDICATIONS  (PRN):  dextrose Oral Gel 15 Gram(s) Oral once PRN Blood Glucose LESS THAN 70 milliGRAM(s)/deciliter  lactulose Syrup 20 Gram(s) Oral three times a day PRN 2-3 bm daily  polyethylene glycol 3350 17 Gram(s) Oral two times a day PRN 2-3 bm daily  sodium chloride 0.9% Bolus. 100 milliLiter(s) IV Bolus every 5 minutes PRN SBP LESS THAN or EQUAL to 90 mmHg  sodium chloride 0.9% lock flush 10 milliLiter(s) IV Push every 1 hour PRN Pre/post blood products, medications, blood draw, and to maintain line patency      ----------------------------------------------------------------------------------  CAPILLARY BLOOD GLUCOSE      POCT Blood Glucose.: 280 mg/dL (14 Nov 2023 08:42)  POCT Blood Glucose.: 249 mg/dL (13 Nov 2023 21:35)  POCT Blood Glucose.: 231 mg/dL (13 Nov 2023 17:59)  POCT Blood Glucose.: 190 mg/dL (13 Nov 2023 12:03)    I&O's Summary    13 Nov 2023 07:01  -  14 Nov 2023 07:00  --------------------------------------------------------  IN: 1209.3 mL / OUT: 2000 mL / NET: -790.7 mL        ----------------------------------------------------------------------------------  PHYSICAL EXAM:  Vital Signs Last 24 Hrs  T(C): 35.2 (14 Nov 2023 08:00), Max: 37 (14 Nov 2023 04:00)  T(F): 95.4 (14 Nov 2023 08:00), Max: 98.6 (14 Nov 2023 04:00)  HR: 62 (14 Nov 2023 11:00) (59 - 76)  BP: --  BP(mean): --  RR: 12 (14 Nov 2023 11:00) (10 - 41)  SpO2: 95% (14 Nov 2023 11:00) (90% - 100%)    Parameters below as of 14 Nov 2023 08:00  Patient On (Oxygen Delivery Method): room air        GENERAL: NAD, lying in bed comfortably  HEAD: Atraumatic, normocephalic, Shiley in place  EYES: EOMI, PERRLA, conjunctiva and sclera clear  ENT: Moist mucous membranes  NECK: Supple, no JVD  HEART: S1, S2, Regular rate and rhythm, no murmurs, rubs, or gallops  LUNGS: Unlabored respirations, clear to auscultation bilaterally, no crackles, wheezes, or rhonchi  ABDOMEN: Soft, nontender, mildly distended  EXTREMITIES: 1+ peripheral pulses bilaterally. No clubbing, cyanosis, or edema  NERVOUS SYSTEM:  A&Ox3, neuro exam unchanged from prior. no focal deficits   SKIN: No rashes or lesions    ----------------------------------------------------------------------------------  LABS:                        7.3    6.95  )-----------( 39       ( 14 Nov 2023 00:55 )             20.4     11-14    135  |  99  |  57<H>  ----------------------------<  272<H>  3.8   |  23  |  2.62<H>    Ca    7.7<L>      14 Nov 2023 00:55  Phos  4.1     11-14  Mg     2.50     11-14    TPro  7.3  /  Alb  1.8<L>  /  TBili  3.0<H>  /  DBili  x   /  AST  660<H>  /  ALT  103<H>  /  AlkPhos  517<H>  11-14    PT/INR - ( 14 Nov 2023 00:55 )   PT: 16.9 sec;   INR: 1.51 ratio         PTT - ( 14 Nov 2023 00:55 )  PTT:37.3 sec      Urinalysis Basic - ( 14 Nov 2023 00:55 )    Color: x / Appearance: x / SG: x / pH: x  Gluc: 272 mg/dL / Ketone: x  / Bili: x / Urobili: x   Blood: x / Protein: x / Nitrite: x   Leuk Esterase: x / RBC: x / WBC x   Sq Epi: x / Non Sq Epi: x / Bacteria: x          ----------------------------------------------------------------------------------  RADIOLOGY & ADDITIONAL TESTS:  Results Reviewed:   Imaging Personally Reviewed:  Electrocardiogram Personally Reviewed:  Tele:

## 2023-11-14 NOTE — PROGRESS NOTE ADULT - ASSESSMENT
73M w/ ESRD on HD MWF, CAD s/p stent 1m ago, BPH, DM, HTN presenting w/ AMS and AHRF; concern for stroke vs toxic metabolic encephalopathy, intubated for airway protection and hypoxic respiratory failure. s/p extubation with improved mental status; pending weaning of IV pressors.      ==============NEURO=============  #Acute Metabolic Encephalopathy:   - baseline independent, AOx3  - CT head w/ moderate chronic ischemic changes  - CTA neck and brain: Patent, ECAs, ICAs, no  hemodynamically significant stenosis at ICA origins by NASCET criteria. Bilateral vertebral arteries are patent without flow limiting stenosis.  - Neurology consulted: recommended MRI, spot EEG, maintaining on DAPT  - Acetaminophen, salicylates negative  - Ordered MRI Brain to further evaluate aphasia     =========CARDIOVASCULAR=========  #Shock  - unclear etiology however may be iso sepsis vs vasoplegia vs obstructive  - on levophed for BP support, titrating down with droxy  - s/p stress dose steroids, titrating down solucortef, currently 50mg q12  - bedside POCUS with evidence of enlarged RV  - CTA PE protocol to r/o PE negative    #CAD s/p stent 10/11/2023  #RCA stent with 99% stenosis   - Follows with Dr. Ashford and Dr. Herrera at [facility]  - c/w home aspirin 81mg qd  - c/w home plavix 75mg qd  - c/w home atorvastatin 80mg qd  - hold home coreg 12.5mg BID iso shock state     #Severe RV Dysfunction  - Bedside POCUS with evidence of RV dilated and thin RV wall concerning for acute process  - elevated troponin 1472 however downtrending  - R EKG without evidence of ST changes   - troponin peaked in 1300s, downtrended  - TTE w/ EF 38 %, decreased LV and RV systolic function, global hypokinesis, MR, TR  - received records from [facility]  - cardiology recs apprciated    ============RESPIRATORY==========  #Acute hypoxic respiratory failure  - desat to 80% at home w/ agonal breathing per patient's daughter  - s/p intubation 11/7 and extubation 11/11  - tolerating room air    #Bilateral Multifocal PNA  - may be residual from recent hospitalization at [facility] for bacterial PNA  - s/p  zosyn and azithromycin, now on meropenem for extended coverage   - s/p bronch 11/8 with rare gram positive cocci    ============RENAL==============  #ESRD on HD  - Cr 2.54  - s/p HD on 11/6 via L AVF  - started on CRRT on 11/8 for volume removal; stopped on 11/10  - attempting MWF HD through L fistula    ============GI===============  #Ascites  - s/p diagnostic para 11/8 without evidence of SBP     #History of colonic polyps   - s/p polyp removal in 2021/2022 with no residual disease    #Diet and Nutrition  - soft & bite sized    ============ENDO============  #DM  - home regimen - Humalog 15u qAC, Glargine 6u qhs  - FS q6h w/ ISS   - restart home dose 6u today     #Elevated TSH   - initial labs concerning for TSH 28.9 and FT4 wnl  - will follow up remainder of thyroid labs   - would recommend follow up of TSH in 1-2 weeks after hospital stay    ===========HEME/ONC===========  #Anemia  - Hgb 7 on admission  - was receiving aranesp outpatient with HD    =============ID=============  #r/o Sepsis  - potential etiologies include multifocal PNA vs colitis vs other   - s/p Azithromycin, Vancomycin, and Zosyn in ED  - receiving meropenem (11/9-) post HD   - MRSA swab negative  - blood cultures (11/7) NGTD  - bronch culture with few gram positive cocci     ===========Lines/Tubes/Iglesias===========  - GOC: FULL CODE

## 2023-11-14 NOTE — CHART NOTE - NSCHARTNOTEFT_GEN_A_CORE
MICU Transfer Note  ---------------------------    Transfer from: MICU  Transfer to:  (  ) Medicine    (  ) Telemetry    (  ) RCU    (  ) Palliative    (  ) Stroke Unit    (  ) _______________  Accepting Physician:      MICU COURSE  73y old man with PMH of ESRD s/p kidney transplant on HD, CAD s/p multiple stents (most recently, RCA stent on 10/2023), T2DM, and HTN, with recent hospitalization for bacterial PNA, who originally presented to the ED on 11/7 after being found acutely unresponsive with agonal breathing by his daughter. CPR was administered by the daughter. On arrival, EMS administered glucose and started O2 via NRB, with no improvement in mental status. In the ED, a code due to AMS was negative (no findings on CTH or CTA head and neck). Pt was intubated for airway protection and due to AHRF and started on levophed.     CT A/P was c/w multifocal PNA. Patient was started on empiric zosyn and azithromycin, then transitioned to meropenem for broader coverage.  Patient is now back to baseline mental status, with occasional episodes of aphasia reported by daughter. Pt was extubated 11/11, now on room air. Pressors weaned off 11/14. Durign icu stay, pt was noted to have enlarged RV on POCUS. Official TTE w/ EF 38%, decreased LV and RV systolic function, global hypokinesis, MR, TR. Cardiology was consulted to evaluate whether there was a component of cardiogenic shock contributing to pressor requirements. Patient continued to receive MWF HD.       Follow up  [ ] cardiology recs, start GDMT if BP remains stable  [ ] renal recs for HD  [ ] f/u MRI head   [ ] f/u PT recs  [ ] adjust insulin regimen as needed     OBJECTIVE --  Vital Signs Last 24 Hrs  T(C): 35.3 (14 Nov 2023 12:00), Max: 37 (14 Nov 2023 04:00)  T(F): 95.6 (14 Nov 2023 12:00), Max: 98.6 (14 Nov 2023 04:00)  HR: 70 (14 Nov 2023 14:00) (59 - 76)  BP: --  BP(mean): --  RR: 14 (14 Nov 2023 14:00) (10 - 41)  SpO2: 95% (14 Nov 2023 14:00) (90% - 100%)    Parameters below as of 14 Nov 2023 08:00  Patient On (Oxygen Delivery Method): room air        I&O's Summary    13 Nov 2023 07:01  -  14 Nov 2023 07:00  --------------------------------------------------------  IN: 1209.3 mL / OUT: 2000 mL / NET: -790.7 mL        MEDICATIONS  (STANDING):  aspirin enteric coated 81 milliGRAM(s) Oral daily  chlorhexidine 2% Cloths 1 Application(s) Topical daily  clopidogrel Tablet 75 milliGRAM(s) Oral daily  dextrose 5%. 1000 milliLiter(s) (100 mL/Hr) IV Continuous <Continuous>  dextrose 5%. 1000 milliLiter(s) (50 mL/Hr) IV Continuous <Continuous>  dextrose 50% Injectable 25 Gram(s) IV Push once  droxidopa 200 milliGRAM(s) Oral every 8 hours  folic acid 1 milliGRAM(s) Oral daily  glucagon  Injectable 1 milliGRAM(s) IntraMuscular once  hydrocortisone sodium succinate Injectable   IV Push   hydrocortisone sodium succinate Injectable 50 milliGRAM(s) IV Push every 12 hours  insulin glargine Injectable (LANTUS) 6 Unit(s) SubCutaneous at bedtime  insulin lispro (ADMELOG) corrective regimen sliding scale   SubCutaneous three times a day before meals  insulin lispro (ADMELOG) corrective regimen sliding scale   SubCutaneous at bedtime  insulin lispro Injectable (ADMELOG) 3 Unit(s) SubCutaneous three times a day before meals  meropenem  IVPB 500 milliGRAM(s) IV Intermittent every 24 hours    MEDICATIONS  (PRN):  dextrose Oral Gel 15 Gram(s) Oral once PRN Blood Glucose LESS THAN 70 milliGRAM(s)/deciliter  lactulose Syrup 20 Gram(s) Oral three times a day PRN 2-3 bm daily  polyethylene glycol 3350 17 Gram(s) Oral two times a day PRN 2-3 bm daily  sodium chloride 0.9% Bolus. 100 milliLiter(s) IV Bolus every 5 minutes PRN SBP LESS THAN or EQUAL to 90 mmHg  sodium chloride 0.9% lock flush 10 milliLiter(s) IV Push every 1 hour PRN Pre/post blood products, medications, blood draw, and to maintain line patency        LABS                                            7.2                   Neurophils% (auto):   77.1   (11-14 @ 11:40):    7.11 )-----------(55           Lymphocytes% (auto):  16.7                                          20.2                   Eosinphils% (auto):   0.0      Manual%: Neutrophils x    ; Lymphocytes x    ; Eosinophils x    ; Bands%: x    ; Blasts x                                    135    |  99     |  57                  Calcium: 7.7   / iCa: 0.98   (11-14 @ 00:55)    ----------------------------<  272       Magnesium: 2.50                             3.8     |  23     |  2.62             Phosphorous: 4.1      TPro  7.3    /  Alb  1.8    /  TBili  3.0    /  DBili  x      /  AST  660    /  ALT  103    /  AlkPhos  517    14 Nov 2023 00:55    ( 11-14 @ 00:55 )   PT: 16.9 sec;   INR: 1.51 ratio  aPTT: 37.3 sec      ==============NEURO=============  #Acute Metabolic Encephalopathy:   - baseline independent, AOx3  - CT head w/ moderate chronic ischemic changes  - CTA neck and brain: Patent, ECAs, ICAs, no  hemodynamically significant stenosis at ICA origins by NASCET criteria. Bilateral vertebral arteries are patent without flow limiting stenosis.  - Neurology consulted: recommended MRI, spot EEG, maintaining on DAPT  - Acetaminophen, salicylates negative  - Ordered MRI Brain to further evaluate aphasia     =========CARDIOVASCULAR=========  #Shock  - unclear etiology however may be iso sepsis vs vasoplegia vs obstructive  - on levophed for BP support, titrating down with droxy  - s/p stress dose steroids, titrating down solucortef, currently 50mg q12  - bedside POCUS with evidence of enlarged RV  - CTA PE protocol to r/o PE negative    #CAD s/p stent 10/11/2023  #RCA stent with 99% stenosis   - Follows with Dr. Ashford and Dr. Herrera at [facility]  - c/w home aspirin 81mg qd  - c/w home plavix 75mg qd  - c/w home atorvastatin 80mg qd  - hold home coreg 12.5mg BID iso shock state     #Severe RV Dysfunction  - Bedside POCUS with evidence of RV dilated and thin RV wall concerning for acute process  - elevated troponin 1472 however downtrending  - R EKG without evidence of ST changes   - troponin peaked in 1300s, downtrended  - TTE w/ EF 38 %, decreased LV and RV systolic function, global hypokinesis, MR, TR  - received records from [facility]  - cardiology recs apprciated    ============RESPIRATORY==========  #Acute hypoxic respiratory failure  - desat to 80% at home w/ agonal breathing per patient's daughter  - s/p intubation 11/7 and extubation 11/11  - tolerating room air    #Bilateral Multifocal PNA  - may be residual from recent hospitalization at [facility] for bacterial PNA  - s/p  zosyn and azithromycin, now on meropenem for extended coverage   - s/p bronch 11/8 with rare gram positive cocci    ============RENAL==============  #ESRD on HD  - Cr 2.54  - s/p HD on 11/6 via L AVF  - started on CRRT on 11/8 for volume removal; stopped on 11/10  - attempting MWF HD through L fistula    ============GI===============  #Ascites  - s/p diagnostic para 11/8 without evidence of SBP     #History of colonic polyps   - s/p polyp removal in 2021/2022 with no residual disease    #Diet and Nutrition  - soft & bite sized    ============ENDO============  #DM  - home regimen - Humalog 15u qAC, Glargine 6u qhs  - FS q6h w/ ISS   - restart home dose 6u today and 3 units premeal    #Elevated TSH   - initial labs concerning for TSH 28.9 and FT4 wnl  - will follow up remainder of thyroid labs   - would recommend follow up of TSH in 1-2 weeks after hospital stay    ===========HEME/ONC===========  #Anemia  - Hgb 7 on admission  - was receiving aranesp outpatient with HD  - transfuse for above 8    =============ID=============  #r/o Sepsis  - potential etiologies include multifocal PNA vs colitis vs other   - s/p Azithromycin, Vancomycin, and Zosyn in ED  - receiving meropenem (11/9-) post HD   - MRSA swab negative  - blood cultures (11/7) NGTD  - bronch culture with few gram positive cocci     ===========Lines/Tubes/Iglesias===========  - GOC: FULL CODE MICU Transfer Note  ---------------------------    Transfer from: MICU  Transfer to:  (  ) Medicine    (  ) Telemetry    (  ) RCU    (  ) Palliative    (  ) Stroke Unit    (  ) _______________  Accepting Physician: Dr. Beckie MELGOZA COURSE  73y old man with PMH of ESRD s/p kidney transplant on HD, CAD s/p multiple stents (most recently, RCA stent on 10/2023), T2DM, and HTN, with recent hospitalization for bacterial PNA, who originally presented to the ED on 11/7 after being found acutely unresponsive with agonal breathing by his daughter. CPR was administered by the daughter. On arrival, EMS administered glucose and started O2 via NRB, with no improvement in mental status. In the ED, a code due to AMS was negative (no findings on CTH or CTA head and neck). Pt was intubated for airway protection and due to AHRF and started on levophed.     CT A/P was c/w multifocal PNA. Patient was started on empiric zosyn and azithromycin, then transitioned to meropenem for broader coverage.  Patient is now back to baseline mental status, with occasional episodes of aphasia reported by daughter. Pt was extubated 11/11, now on room air. Pressors weaned off 11/14. Durign icu stay, pt was noted to have enlarged RV on POCUS. Official TTE w/ EF 38%, decreased LV and RV systolic function, global hypokinesis, MR, TR. Cardiology was consulted to evaluate whether there was a component of cardiogenic shock contributing to pressor requirements. Patient continued to receive MWF HD.       Follow up  [ ] cardiology recs, start GDMT if BP remains stable  [ ] renal recs for HD  [ ] f/u MRI head   [ ] f/u PT recs  [ ] adjust insulin regimen as needed     OBJECTIVE --  Vital Signs Last 24 Hrs  T(C): 35.3 (14 Nov 2023 12:00), Max: 37 (14 Nov 2023 04:00)  T(F): 95.6 (14 Nov 2023 12:00), Max: 98.6 (14 Nov 2023 04:00)  HR: 70 (14 Nov 2023 14:00) (59 - 76)  BP: --  BP(mean): --  RR: 14 (14 Nov 2023 14:00) (10 - 41)  SpO2: 95% (14 Nov 2023 14:00) (90% - 100%)    Parameters below as of 14 Nov 2023 08:00  Patient On (Oxygen Delivery Method): room air        I&O's Summary    13 Nov 2023 07:01  -  14 Nov 2023 07:00  --------------------------------------------------------  IN: 1209.3 mL / OUT: 2000 mL / NET: -790.7 mL        MEDICATIONS  (STANDING):  aspirin enteric coated 81 milliGRAM(s) Oral daily  chlorhexidine 2% Cloths 1 Application(s) Topical daily  clopidogrel Tablet 75 milliGRAM(s) Oral daily  dextrose 5%. 1000 milliLiter(s) (100 mL/Hr) IV Continuous <Continuous>  dextrose 5%. 1000 milliLiter(s) (50 mL/Hr) IV Continuous <Continuous>  dextrose 50% Injectable 25 Gram(s) IV Push once  droxidopa 200 milliGRAM(s) Oral every 8 hours  folic acid 1 milliGRAM(s) Oral daily  glucagon  Injectable 1 milliGRAM(s) IntraMuscular once  hydrocortisone sodium succinate Injectable   IV Push   hydrocortisone sodium succinate Injectable 50 milliGRAM(s) IV Push every 12 hours  insulin glargine Injectable (LANTUS) 6 Unit(s) SubCutaneous at bedtime  insulin lispro (ADMELOG) corrective regimen sliding scale   SubCutaneous three times a day before meals  insulin lispro (ADMELOG) corrective regimen sliding scale   SubCutaneous at bedtime  insulin lispro Injectable (ADMELOG) 3 Unit(s) SubCutaneous three times a day before meals  meropenem  IVPB 500 milliGRAM(s) IV Intermittent every 24 hours    MEDICATIONS  (PRN):  dextrose Oral Gel 15 Gram(s) Oral once PRN Blood Glucose LESS THAN 70 milliGRAM(s)/deciliter  lactulose Syrup 20 Gram(s) Oral three times a day PRN 2-3 bm daily  polyethylene glycol 3350 17 Gram(s) Oral two times a day PRN 2-3 bm daily  sodium chloride 0.9% Bolus. 100 milliLiter(s) IV Bolus every 5 minutes PRN SBP LESS THAN or EQUAL to 90 mmHg  sodium chloride 0.9% lock flush 10 milliLiter(s) IV Push every 1 hour PRN Pre/post blood products, medications, blood draw, and to maintain line patency        LABS                                            7.2                   Neurophils% (auto):   77.1   (11-14 @ 11:40):    7.11 )-----------(55           Lymphocytes% (auto):  16.7                                          20.2                   Eosinphils% (auto):   0.0      Manual%: Neutrophils x    ; Lymphocytes x    ; Eosinophils x    ; Bands%: x    ; Blasts x                                    135    |  99     |  57                  Calcium: 7.7   / iCa: 0.98   (11-14 @ 00:55)    ----------------------------<  272       Magnesium: 2.50                             3.8     |  23     |  2.62             Phosphorous: 4.1      TPro  7.3    /  Alb  1.8    /  TBili  3.0    /  DBili  x      /  AST  660    /  ALT  103    /  AlkPhos  517    14 Nov 2023 00:55    ( 11-14 @ 00:55 )   PT: 16.9 sec;   INR: 1.51 ratio  aPTT: 37.3 sec      ==============NEURO=============  #Acute Metabolic Encephalopathy:   - baseline independent, AOx3  - CT head w/ moderate chronic ischemic changes  - CTA neck and brain: Patent, ECAs, ICAs, no  hemodynamically significant stenosis at ICA origins by NASCET criteria. Bilateral vertebral arteries are patent without flow limiting stenosis.  - Neurology consulted: recommended MRI, spot EEG, maintaining on DAPT  - Acetaminophen, salicylates negative  - Ordered MRI Brain to further evaluate aphasia     =========CARDIOVASCULAR=========  #Shock  - unclear etiology however may be iso sepsis vs vasoplegia vs obstructive  - on levophed for BP support, titrating down with droxy  - s/p stress dose steroids, titrating down solucortef, currently 50mg q12  - bedside POCUS with evidence of enlarged RV  - CTA PE protocol to r/o PE negative    #CAD s/p stent 10/11/2023  #RCA stent with 99% stenosis   - Follows with Dr. Ashford and Dr. Herrera at [facility]  - c/w home aspirin 81mg qd  - c/w home plavix 75mg qd  - c/w home atorvastatin 80mg qd  - hold home coreg 12.5mg BID iso shock state     #Severe RV Dysfunction  - Bedside POCUS with evidence of RV dilated and thin RV wall concerning for acute process  - elevated troponin 1472 however downtrending  - R EKG without evidence of ST changes   - troponin peaked in 1300s, downtrended  - TTE w/ EF 38 %, decreased LV and RV systolic function, global hypokinesis, MR, TR  - received records from [facility]  - cardiology recs apprciated    ============RESPIRATORY==========  #Acute hypoxic respiratory failure  - desat to 80% at home w/ agonal breathing per patient's daughter  - s/p intubation 11/7 and extubation 11/11  - tolerating room air    #Bilateral Multifocal PNA  - may be residual from recent hospitalization at [facility] for bacterial PNA  - s/p  zosyn and azithromycin, now on meropenem for extended coverage   - s/p bronch 11/8 with rare gram positive cocci    ============RENAL==============  #ESRD on HD  - Cr 2.54  - s/p HD on 11/6 via L AVF  - started on CRRT on 11/8 for volume removal; stopped on 11/10  - attempting MWF HD through L fistula    ============GI===============  #Ascites  - s/p diagnostic para 11/8 without evidence of SBP     #History of colonic polyps   - s/p polyp removal in 2021/2022 with no residual disease    #Diet and Nutrition  - soft & bite sized    ============ENDO============  #DM  - home regimen - Humalog 15u qAC, Glargine 6u qhs  - FS q6h w/ ISS   - restart home dose 6u today and 3 units premeal    #Elevated TSH   - initial labs concerning for TSH 28.9 and FT4 wnl  - will follow up remainder of thyroid labs   - would recommend follow up of TSH in 1-2 weeks after hospital stay    ===========HEME/ONC===========  #Anemia  - Hgb 7 on admission  - was receiving aranesp outpatient with HD  - transfuse for above 8    =============ID=============  #r/o Sepsis  - potential etiologies include multifocal PNA vs colitis vs other   - s/p Azithromycin, Vancomycin, and Zosyn in ED  - receiving meropenem (11/9-) post HD   - MRSA swab negative  - blood cultures (11/7) NGTD  - bronch culture with few gram positive cocci     ===========Lines/Tubes/Iglesias===========  - GOC: FULL CODE

## 2023-11-14 NOTE — PROGRESS NOTE ADULT - SUBJECTIVE AND OBJECTIVE BOX
[facility] DIVISION OF KIDNEY DISEASES AND HYPERTENSION   FOLLOW UP NOTE  --------------------------------------------------------------------------------  CC: ESRD on HD, history of kidney transplantation    24 hour events/subjective: Pt. was seen and examined in MICU today, family at bedside. Last HD on 11/13/23 via LUE AVF. He did require IV vasopressors while on HD but is currently off of pressors. Denied shortness of breath, fevers, chills, nausea.     PAST HISTORY  --------------------------------------------------------------------------------  No significant changes to PMH, PSH, FHx, SHx, unless otherwise noted    ALLERGIES & MEDICATIONS  --------------------------------------------------------------------------------  Allergies  No Known Allergies    Intolerances    Standing Inpatient Medications  aspirin enteric coated 81 milliGRAM(s) Oral daily  chlorhexidine 2% Cloths 1 Application(s) Topical daily  clopidogrel Tablet 75 milliGRAM(s) Oral daily  dextrose 5%. 1000 milliLiter(s) IV Continuous <Continuous>  dextrose 5%. 1000 milliLiter(s) IV Continuous <Continuous>  dextrose 50% Injectable 25 Gram(s) IV Push once  droxidopa 200 milliGRAM(s) Oral every 8 hours  folic acid 1 milliGRAM(s) Oral daily  glucagon  Injectable 1 milliGRAM(s) IntraMuscular once  hydrocortisone sodium succinate Injectable   IV Push   hydrocortisone sodium succinate Injectable 50 milliGRAM(s) IV Push every 12 hours  insulin glargine Injectable (LANTUS) 4 Unit(s) SubCutaneous at bedtime  insulin lispro (ADMELOG) corrective regimen sliding scale   SubCutaneous three times a day before meals  insulin lispro (ADMELOG) corrective regimen sliding scale   SubCutaneous at bedtime  meropenem  IVPB 500 milliGRAM(s) IV Intermittent every 24 hours  norepinephrine Infusion 0.329 MICROgram(s)/kG/Min IV Continuous <Continuous>    PRN Inpatient Medications  dextrose Oral Gel 15 Gram(s) Oral once PRN  lactulose Syrup 20 Gram(s) Oral three times a day PRN  polyethylene glycol 3350 17 Gram(s) Oral two times a day PRN  sodium chloride 0.9% Bolus. 100 milliLiter(s) IV Bolus every 5 minutes PRN  sodium chloride 0.9% lock flush 10 milliLiter(s) IV Push every 1 hour PRN    REVIEW OF SYSTEMS  --------------------------------------------------------------------------------  All other systems were reviewed and are negative, except as noted.    VITALS/PHYSICAL EXAM  --------------------------------------------------------------------------------  T(C): 35.2 (11-14-23 @ 08:00), Max: 37 (11-14-23 @ 04:00)  HR: 59 (11-14-23 @ 08:00) (59 - 76)  BP: --  RR: 13 (11-14-23 @ 08:00) (10 - 41)  SpO2: 96% (11-14-23 @ 08:00) (90% - 100%)  Wt(kg): --    11-13-23 @ 07:01  -  11-14-23 @ 07:00  --------------------------------------------------------  IN: 1209.3 mL / OUT: 2000 mL / NET: -790.7 mL    Physical Exam:  	Gen: resting, NAD  	HEENT: Anicteric  	Pulm: Fair entry B/L, CTA B/L  	CV: S1S2+  	Abd: Soft, +BS    	Ext: No LE edema B/L  	Neuro: Awake and alert  	Skin: Warm and dry  	Dialysis access: LUE AVF+, R IJ non tunneled HD catheter+     LABS/STUDIES  --------------------------------------------------------------------------------              7.3    6.95  >-----------<  39       [11-14-23 @ 00:55]              20.4     135  |  99  |  57  ----------------------------<  272      [11-14-23 @ 00:55]  3.8   |  23  |  2.62        Ca     7.7     [11-14-23 @ 00:55]      iCa    0.98     [11-14 @ 00:55]      Mg     2.50     [11-14-23 @ 00:55]      Phos  4.1     [11-14-23 @ 00:55]    TPro  7.3  /  Alb  1.8  /  TBili  3.0  /  DBili  x   /  AST  660  /  ALT  103  /  AlkPhos  517  [11-14-23 @ 00:55]    PT/INR: PT 16.9 , INR 1.51       [11-14-23 @ 00:55]  PTT: 37.3       [11-14-23 @ 00:55]    Creatinine Trend:  SCr 2.62 [11-14 @ 00:55]  SCr 3.49 [11-13 @ 00:16]  SCr 2.63 [11-12 @ 00:20]  SCr 1.69 [11-10 @ 22:50]  SCr 1.35 [11-10 @ 17:10]    HBsAb Reactive      [11-13-23 @ 14:30]  HBsAg Nonreact      [11-13-23 @ 14:30]  HBcAb Reactive      [11-13-23 @ 14:30]  HCV 0.16, Nonreact      [11-13-23 @ 14:30]

## 2023-11-14 NOTE — PROGRESS NOTE ADULT - PROBLEM SELECTOR PLAN 1
Pt. with ESRD on HD TIW (MWF, LUE AVF). Pt. also with history of kidney transplant (not on immunosuppression). Last HD as outpatient was done on 11/6/23 via L AVF. Pt. admitted to MICU with respiratory failure and systemic shock. Pt. initiated on CRRT on 11/8, discontinued on 11/10 as volume status and FiO2 requirements improved. Last HD on 11/13/23 via LUE AVF. Did require short term IV vasopressors while on HD. Labs reviewed. Pt appears clinically stable and is currently off of IV vasopressors. No plans for HD. Will assess need for HD daily. Monitor BP and labs. Dose meds as per HD.

## 2023-11-14 NOTE — PROGRESS NOTE ADULT - SUBJECTIVE AND OBJECTIVE BOX
Patient is a 73y old  Male who presents with a chief complaint of 74 yo M w Hx of CAD, DM, HTN, s/p renal transplant, currently ESRD/HD TIW with recent hospitalization for bacterial PNA.  Presenting after being found unresponsive at home with agonal breathing, AMS & hypoglycemia.  Pt intubated for airway protection and acute hypoxic respiratory failure.        SUBJECTIVE / OVERNIGHT EVENTS:  Patient seen and evaluated at bedside. AOx3. Denies F/C/N/V, no CP, no SOB. Per daughter, patient with intermittent aphasia since this morning, mixing up a date from the 1900 to 1800s. Patient continues to be consistently hypothermic.     Vital Signs Last 24 Hrs  T(C): 37 (14 Nov 2023 04:00), Max: 37.3 (13 Nov 2023 08:00)  T(F): 98.6 (14 Nov 2023 04:00), Max: 99.1 (13 Nov 2023 08:00)  HR: 60 (14 Nov 2023 06:00) (60 - 76)  BP: --  BP(mean): --  RR: 10 (14 Nov 2023 06:00) (10 - 41)  SpO2: 93% (14 Nov 2023 06:00) (90% - 100%)    Parameters below as of 14 Nov 2023 06:00  Patient On (Oxygen Delivery Method): room air        PHYSICAL EXAM:  GENERAL: NAD, well-developed  CHEST/LUNG: Clear to auscultation bilaterally; No wheeze  HEART: Regular rate and rhythm; Normal S1 S2, No murmurs, rubs, or gallops  ABDOMEN: Soft, Nontender, Nondistended; Bowel sounds present  EXTREMITIES:  2+ Peripheral Pulses, No clubbing, cyanosis, or edema  PSYCH: AAOx3      LABS:                        7.3    6.95  )-----------( 39       ( 14 Nov 2023 00:55 )             20.4     11-14    135  |  99  |  57<H>  ----------------------------<  272<H>  3.8   |  23  |  2.62<H>    Ca    7.7<L>      14 Nov 2023 00:55  Phos  4.1     11-14  Mg     2.50     11-14    TPro  7.3  /  Alb  1.8<L>  /  TBili  3.0<H>  /  DBili  x   /  AST  660<H>  /  ALT  103<H>  /  AlkPhos  517<H>  11-14    PT/INR - ( 14 Nov 2023 00:55 )   PT: 16.9 sec;   INR: 1.51 ratio         PTT - ( 14 Nov 2023 00:55 )  PTT:37.3 sec      Urinalysis Basic - ( 14 Nov 2023 00:55 )    Color: x / Appearance: x / SG: x / pH: x  Gluc: 272 mg/dL / Ketone: x  / Bili: x / Urobili: x   Blood: x / Protein: x / Nitrite: x   Leuk Esterase: x / RBC: x / WBC x   Sq Epi: x / Non Sq Epi: x / Bacteria: x

## 2023-11-14 NOTE — PROGRESS NOTE ADULT - ASSESSMENT
73 y.o. man with PMH of ESRD s/p kidney transplant on HD, CAD s/p multiple stents (most recently, RCA stent on 10/2023), T2DM, and HTN, originally presenting to the ED on 11/7 after being found acutely unresponsive, CPR was administered by daughter. In ED, pt was intubated for airway protection and due to AHRF and started on levo, CT A/P c/w multifocal PNA. S/p empiric zosyn and azithro, currently on meropenem. Patient now back to baseline mental and respiratory status (AOx3, on RA), pending MRI brain. Cardiology consulted for persistent pressor requirement with bedside POCUS showing thin RV wall and RV dilation raising concern for acute RV dysfunction. Collateral from Dr. Sheridan ([facility]) showed hx of at least 6 stent placements, normal LV and RV function on 3/2023 echo (full collateral in pt chart). 11/13 TTE demonstrated moderately decreased LV function (EF 38%), enlarged RV with reduced systolic function, severe TR, and mod-severe MR. All changed from 3/2023 echo, however on exam, patient continues to appear grossly euvolemic. This clinical presentation, along with the absence of more significant echo findings, makes decompensated RV failure an unlikely cause for the prior persistent vasopressor requirement.     Recommendations:  - Low suspicion of cardiogenic shock, no need for inotropes  - please transduce CVP from RIJ catheter  - Continue to monitor on tele  - If HD stable, start GDMT in 2-3 days    All recommendations pending attending attestation. We will continue to follow with you.  73 y.o. man with PMH of ESRD s/p kidney transplant on HD, CAD s/p multiple stents (most recently, RCA stent on 10/2023), T2DM, and HTN, originally presenting to the ED on 11/7 after being found acutely unresponsive, CPR was administered by daughter. In ED, pt was intubated for airway protection and due to AHRF and started on levo, CT A/P c/w multifocal PNA. S/p empiric zosyn and azithro, currently on meropenem. Patient now back to baseline mental and respiratory status (AOx3, on RA), pending MRI brain. Cardiology consulted for persistent pressor requirement with bedside POCUS showing thin RV wall and RV dilation raising concern for acute RV dysfunction. Collateral from Dr. Sheridan ([facility]) showed hx of at least 6 stent placements, normal LV and RV function on 3/2023 echo (full collateral in pt chart). 11/13 TTE demonstrated new moderately decreased LV function (EF 38%), enlarged RV with reduced systolic function, severe TR, and mod-severe MR. All new from 3/2023 echo, however on exam, patient continues to appear grossly euvolemic. This clinical presentation, along with the absence of more significant echo findings, makes decompensated RV failure an unlikely cause for the prior persistent vasopressor requirement which has now resolved.     Recommendations:  - Low suspicion of cardiogenic shock, no indication for inotropes at this time  - Continue to monitor on tele  - If BPs continue to remain stable, plan to initiate GDMT in 2-3 days with carvedilol 3.125mg PO BID    All recommendations pending attending attestation. We will continue to follow with you.     Note reviewed and edited as above.   Mis Stevenson MD  PGY-4, Cardiology  Available on TEAMS    For all new consults  [email or web address]  Login: [email or web address]

## 2023-11-15 DIAGNOSIS — G93.41 METABOLIC ENCEPHALOPATHY: ICD-10-CM

## 2023-11-15 DIAGNOSIS — I25.10 ATHEROSCLEROTIC HEART DISEASE OF NATIVE CORONARY ARTERY WITHOUT ANGINA PECTORIS: ICD-10-CM

## 2023-11-15 DIAGNOSIS — E11.9 TYPE 2 DIABETES MELLITUS WITHOUT COMPLICATIONS: ICD-10-CM

## 2023-11-15 DIAGNOSIS — Z29.9 ENCOUNTER FOR PROPHYLACTIC MEASURES, UNSPECIFIED: ICD-10-CM

## 2023-11-15 DIAGNOSIS — N18.9 CHRONIC KIDNEY DISEASE, UNSPECIFIED: ICD-10-CM

## 2023-11-15 DIAGNOSIS — A41.9 SEPSIS, UNSPECIFIED ORGANISM: ICD-10-CM

## 2023-11-15 DIAGNOSIS — R79.89 OTHER SPECIFIED ABNORMAL FINDINGS OF BLOOD CHEMISTRY: ICD-10-CM

## 2023-11-15 LAB
ALBUMIN SERPL ELPH-MCNC: 2.1 G/DL — LOW (ref 3.3–5)
ALBUMIN SERPL ELPH-MCNC: 2.1 G/DL — LOW (ref 3.3–5)
ALP SERPL-CCNC: 477 U/L — HIGH (ref 40–120)
ALP SERPL-CCNC: 477 U/L — HIGH (ref 40–120)
ALT FLD-CCNC: 61 U/L — HIGH (ref 4–41)
ALT FLD-CCNC: 61 U/L — HIGH (ref 4–41)
ANION GAP SERPL CALC-SCNC: 14 MMOL/L — SIGNIFICANT CHANGE UP (ref 7–14)
ANION GAP SERPL CALC-SCNC: 14 MMOL/L — SIGNIFICANT CHANGE UP (ref 7–14)
AST SERPL-CCNC: 537 U/L — HIGH (ref 4–40)
AST SERPL-CCNC: 537 U/L — HIGH (ref 4–40)
BILIRUB SERPL-MCNC: 2.7 MG/DL — HIGH (ref 0.2–1.2)
BILIRUB SERPL-MCNC: 2.7 MG/DL — HIGH (ref 0.2–1.2)
BUN SERPL-MCNC: 78 MG/DL — HIGH (ref 7–23)
BUN SERPL-MCNC: 78 MG/DL — HIGH (ref 7–23)
CALCIUM SERPL-MCNC: 8.1 MG/DL — LOW (ref 8.4–10.5)
CALCIUM SERPL-MCNC: 8.1 MG/DL — LOW (ref 8.4–10.5)
CHLORIDE SERPL-SCNC: 100 MMOL/L — SIGNIFICANT CHANGE UP (ref 98–107)
CHLORIDE SERPL-SCNC: 100 MMOL/L — SIGNIFICANT CHANGE UP (ref 98–107)
CO2 SERPL-SCNC: 22 MMOL/L — SIGNIFICANT CHANGE UP (ref 22–31)
CO2 SERPL-SCNC: 22 MMOL/L — SIGNIFICANT CHANGE UP (ref 22–31)
CREAT SERPL-MCNC: 3.46 MG/DL — HIGH (ref 0.5–1.3)
CREAT SERPL-MCNC: 3.46 MG/DL — HIGH (ref 0.5–1.3)
EGFR: 18 ML/MIN/1.73M2 — LOW
EGFR: 18 ML/MIN/1.73M2 — LOW
GLUCOSE BLDC GLUCOMTR-MCNC: 179 MG/DL — HIGH (ref 70–99)
GLUCOSE BLDC GLUCOMTR-MCNC: 179 MG/DL — HIGH (ref 70–99)
GLUCOSE BLDC GLUCOMTR-MCNC: 184 MG/DL — HIGH (ref 70–99)
GLUCOSE BLDC GLUCOMTR-MCNC: 184 MG/DL — HIGH (ref 70–99)
GLUCOSE BLDC GLUCOMTR-MCNC: 203 MG/DL — HIGH (ref 70–99)
GLUCOSE BLDC GLUCOMTR-MCNC: 203 MG/DL — HIGH (ref 70–99)
GLUCOSE BLDC GLUCOMTR-MCNC: 207 MG/DL — HIGH (ref 70–99)
GLUCOSE BLDC GLUCOMTR-MCNC: 207 MG/DL — HIGH (ref 70–99)
GLUCOSE SERPL-MCNC: 264 MG/DL — HIGH (ref 70–99)
GLUCOSE SERPL-MCNC: 264 MG/DL — HIGH (ref 70–99)
HCT VFR BLD CALC: 23 % — LOW (ref 39–50)
HCT VFR BLD CALC: 23 % — LOW (ref 39–50)
HGB BLD-MCNC: 8.5 G/DL — LOW (ref 13–17)
HGB BLD-MCNC: 8.5 G/DL — LOW (ref 13–17)
MAGNESIUM SERPL-MCNC: 2.6 MG/DL — SIGNIFICANT CHANGE UP (ref 1.6–2.6)
MAGNESIUM SERPL-MCNC: 2.6 MG/DL — SIGNIFICANT CHANGE UP (ref 1.6–2.6)
MCHC RBC-ENTMCNC: 30.2 PG — SIGNIFICANT CHANGE UP (ref 27–34)
MCHC RBC-ENTMCNC: 30.2 PG — SIGNIFICANT CHANGE UP (ref 27–34)
MCHC RBC-ENTMCNC: 37 GM/DL — HIGH (ref 32–36)
MCHC RBC-ENTMCNC: 37 GM/DL — HIGH (ref 32–36)
MCV RBC AUTO: 81.9 FL — SIGNIFICANT CHANGE UP (ref 80–100)
MCV RBC AUTO: 81.9 FL — SIGNIFICANT CHANGE UP (ref 80–100)
NRBC # BLD: 1 /100 WBCS — HIGH (ref 0–0)
NRBC # BLD: 1 /100 WBCS — HIGH (ref 0–0)
NRBC # FLD: 0.1 K/UL — HIGH (ref 0–0)
NRBC # FLD: 0.1 K/UL — HIGH (ref 0–0)
PHOSPHATE SERPL-MCNC: 4.7 MG/DL — HIGH (ref 2.5–4.5)
PHOSPHATE SERPL-MCNC: 4.7 MG/DL — HIGH (ref 2.5–4.5)
PLATELET # BLD AUTO: 50 K/UL — LOW (ref 150–400)
PLATELET # BLD AUTO: 50 K/UL — LOW (ref 150–400)
POTASSIUM SERPL-MCNC: 4 MMOL/L — SIGNIFICANT CHANGE UP (ref 3.5–5.3)
POTASSIUM SERPL-MCNC: 4 MMOL/L — SIGNIFICANT CHANGE UP (ref 3.5–5.3)
POTASSIUM SERPL-SCNC: 4 MMOL/L — SIGNIFICANT CHANGE UP (ref 3.5–5.3)
POTASSIUM SERPL-SCNC: 4 MMOL/L — SIGNIFICANT CHANGE UP (ref 3.5–5.3)
PROT SERPL-MCNC: 7.6 G/DL — SIGNIFICANT CHANGE UP (ref 6–8.3)
PROT SERPL-MCNC: 7.6 G/DL — SIGNIFICANT CHANGE UP (ref 6–8.3)
RBC # BLD: 2.81 M/UL — LOW (ref 4.2–5.8)
RBC # BLD: 2.81 M/UL — LOW (ref 4.2–5.8)
RBC # FLD: 22.5 % — HIGH (ref 10.3–14.5)
RBC # FLD: 22.5 % — HIGH (ref 10.3–14.5)
SODIUM SERPL-SCNC: 136 MMOL/L — SIGNIFICANT CHANGE UP (ref 135–145)
SODIUM SERPL-SCNC: 136 MMOL/L — SIGNIFICANT CHANGE UP (ref 135–145)
WBC # BLD: 7.42 K/UL — SIGNIFICANT CHANGE UP (ref 3.8–10.5)
WBC # BLD: 7.42 K/UL — SIGNIFICANT CHANGE UP (ref 3.8–10.5)
WBC # FLD AUTO: 7.42 K/UL — SIGNIFICANT CHANGE UP (ref 3.8–10.5)
WBC # FLD AUTO: 7.42 K/UL — SIGNIFICANT CHANGE UP (ref 3.8–10.5)

## 2023-11-15 PROCEDURE — 99233 SBSQ HOSP IP/OBS HIGH 50: CPT

## 2023-11-15 PROCEDURE — 93010 ELECTROCARDIOGRAM REPORT: CPT

## 2023-11-15 PROCEDURE — 99232 SBSQ HOSP IP/OBS MODERATE 35: CPT

## 2023-11-15 RX ORDER — CARVEDILOL PHOSPHATE 80 MG/1
3.12 CAPSULE, EXTENDED RELEASE ORAL EVERY 12 HOURS
Refills: 0 | Status: DISCONTINUED | OUTPATIENT
Start: 2023-11-15 | End: 2023-11-15

## 2023-11-15 RX ORDER — DROXIDOPA 100 MG/1
100 CAPSULE ORAL EVERY 8 HOURS
Refills: 0 | Status: DISCONTINUED | OUTPATIENT
Start: 2023-11-15 | End: 2023-11-16

## 2023-11-15 RX ORDER — DROXIDOPA 100 MG/1
100 CAPSULE ORAL EVERY 8 HOURS
Refills: 0 | Status: DISCONTINUED | OUTPATIENT
Start: 2023-11-15 | End: 2023-11-15

## 2023-11-15 RX ADMIN — DROXIDOPA 200 MILLIGRAM(S): 100 CAPSULE ORAL at 05:52

## 2023-11-15 RX ADMIN — Medication 1: at 17:25

## 2023-11-15 RX ADMIN — DROXIDOPA 100 MILLIGRAM(S): 100 CAPSULE ORAL at 22:13

## 2023-11-15 RX ADMIN — Medication 1 MILLIGRAM(S): at 12:40

## 2023-11-15 RX ADMIN — Medication 2: at 07:48

## 2023-11-15 RX ADMIN — CHLORHEXIDINE GLUCONATE 1 APPLICATION(S): 213 SOLUTION TOPICAL at 12:40

## 2023-11-15 RX ADMIN — Medication 3 UNIT(S): at 07:48

## 2023-11-15 RX ADMIN — Medication 3 UNIT(S): at 17:25

## 2023-11-15 RX ADMIN — Medication 3 UNIT(S): at 12:36

## 2023-11-15 RX ADMIN — Medication 81 MILLIGRAM(S): at 12:40

## 2023-11-15 RX ADMIN — Medication 1: at 12:36

## 2023-11-15 RX ADMIN — CLOPIDOGREL BISULFATE 75 MILLIGRAM(S): 75 TABLET, FILM COATED ORAL at 12:40

## 2023-11-15 RX ADMIN — INSULIN GLARGINE 6 UNIT(S): 100 INJECTION, SOLUTION SUBCUTANEOUS at 22:10

## 2023-11-15 RX ADMIN — MEROPENEM 100 MILLIGRAM(S): 1 INJECTION INTRAVENOUS at 17:25

## 2023-11-15 NOTE — PROGRESS NOTE ADULT - PROBLEM SELECTOR PLAN 3
Appreciate cardiology recs  - c/w home aspirin 81mg qd  - c/w home plavix 75mg qd  - c/w home atorvastatin 80mg qd  - holding home coreg 12.5mg BID iso shock state; titrate back as BP tolerates after droxydopa is tapered off

## 2023-11-15 NOTE — PROGRESS NOTE ADULT - SUBJECTIVE AND OBJECTIVE BOX
[facility] Division of Kidney Diseases & Hypertension  FOLLOW UP NOTE  --------------------------------------------------------------------------------  Chief Complaint: ESRD on going dialysis requirement    24 hour events/subjective: Patient seen and evaluated at bedside this morning with family member at bedside.  No complaints feeling better overall    PAST HISTORY  --------------------------------------------------------------------------------  No significant changes to PMH, PSH, FHx, SHx, unless otherwise noted    ALLERGIES & MEDICATIONS  --------------------------------------------------------------------------------  Allergies    No Known Allergies    Intolerances      Standing Inpatient Medications  aspirin enteric coated 81 milliGRAM(s) Oral daily  chlorhexidine 2% Cloths 1 Application(s) Topical daily  clopidogrel Tablet 75 milliGRAM(s) Oral daily  dextrose 5%. 1000 milliLiter(s) IV Continuous <Continuous>  dextrose 5%. 1000 milliLiter(s) IV Continuous <Continuous>  dextrose 50% Injectable 25 Gram(s) IV Push once  droxidopa 200 milliGRAM(s) Oral every 8 hours  folic acid 1 milliGRAM(s) Oral daily  glucagon  Injectable 1 milliGRAM(s) IntraMuscular once  hydrocortisone sodium succinate Injectable 50 milliGRAM(s) IV Push daily  hydrocortisone sodium succinate Injectable   IV Push   insulin glargine Injectable (LANTUS) 6 Unit(s) SubCutaneous at bedtime  insulin lispro (ADMELOG) corrective regimen sliding scale   SubCutaneous three times a day before meals  insulin lispro (ADMELOG) corrective regimen sliding scale   SubCutaneous at bedtime  insulin lispro Injectable (ADMELOG) 3 Unit(s) SubCutaneous three times a day before meals  meropenem  IVPB 500 milliGRAM(s) IV Intermittent every 24 hours    PRN Inpatient Medications  dextrose Oral Gel 15 Gram(s) Oral once PRN  lactulose Syrup 20 Gram(s) Oral three times a day PRN  polyethylene glycol 3350 17 Gram(s) Oral two times a day PRN  sodium chloride 0.9% Bolus. 100 milliLiter(s) IV Bolus every 5 minutes PRN      REVIEW OF SYSTEMS  --------------------------------------------------------------------------------  Gen: no fever  Respiratory: no sob  CV: no cp  GI: no ab pain  : no complaints  MSK: no pain    VITALS/PHYSICAL EXAM  --------------------------------------------------------------------------------  T(C): 36.3 (11-15-23 @ 05:52), Max: 36.7 (11-14-23 @ 14:00)  HR: 65 (11-15-23 @ 05:52) (62 - 72)  BP: 125/70 (11-15-23 @ 05:52) (125/70 - 145/69)  ABP: 128/67 (11-14-23 @ 21:00) (96/43 - 128/67)  ABP(mean): 92 (11-14-23 @ 21:00) (61 - 92)  RR: 17 (11-15-23 @ 05:52) (10 - 22)  SpO2: 98% (11-15-23 @ 05:52) (94% - 100%)  CVP(mm Hg): --    Physical Exam:  	Gen: resting, NAD  	HEENT: Anicteric  	Pulm: Fair entry B/L, CTA B/L  	CV: S1S2+  	Abd: Soft, +BS    	Ext: No LE edema B/L  	Neuro: Awake and alert  	Skin: Warm and dry  	Dialysis access: LUE AVF+, R IJ non tunneled HD catheter+     LABS/STUDIES  --------------------------------------------------------------------------------              8.5    7.42  >-----------<  50       [11-15-23 @ 03:00]              23.0     136  |  100  |  78  ----------------------------<  264      [11-15-23 @ 03:10]  4.0   |  22  |  3.46        Ca     8.1     [11-15-23 @ 03:10]      iCa    0.98     [11-14 @ 00:55]      Mg     2.60     [11-15-23 @ 03:10]      Phos  4.7     [11-15-23 @ 03:10]    TPro  7.6  /  Alb  2.1  /  TBili  2.7  /  DBili  x   /  AST  537  /  ALT  61  /  AlkPhos  477  [11-15-23 @ 03:10]    PT/INR: PT 16.9 , INR 1.51       [11-14-23 @ 00:55]  PTT: 37.3       [11-14-23 @ 00:55]      Creatinine Trend:  SCr 3.46 [11-15 @ 03:10]  SCr 2.62 [11-14 @ 00:55]  SCr 3.49 [11-13 @ 00:16]  SCr 2.63 [11-12 @ 00:20]  SCr 1.69 [11-10 @ 22:50]    Urinalysis - [11-15-23 @ 03:10]      Color  / Appearance  / SG  / pH       Gluc 264 / Ketone   / Bili  / Urobili        Blood  / Protein  / Leuk Est  / Nitrite       RBC  / WBC  / Hyaline  / Gran  / Sq Epi  / Non Sq Epi  / Bacteria         HBsAb Reactive      [11-13-23 @ 14:30]  HBsAg Nonreact      [11-13-23 @ 14:30]  HBcAb Reactive      [11-13-23 @ 14:30]  HCV 0.16, Nonreact      [11-13-23 @ 14:30]

## 2023-11-15 NOTE — CHART NOTE - NSCHARTNOTEFT_GEN_A_CORE
MAR Accept Note  Transfer to: [facility]    Accepting Attending Physician: Dr. Butts  Assigned Room:  N 60     Patient seen and examined.   Labs and data reviewed.   No findings precluding transfer of service.       HPI/MICU COURSE:   Please refer to MICU transfer note for full details. Briefly, this is a 73y old man with PMH of ESRD s/p kidney transplant on HD, CAD s/p multiple stents (most recently, RCA stent on 10/2023), T2DM, and HTN, with recent hospitalization for bacterial PNA, who originally presented to the ED on 11/7 after being found acutely unresponsive with agonal breathing by his daughter. CPR was administered by the daughter. On arrival, EMS administered glucose and started O2 via NRB, with no improvement in mental status. In the ED, a code due to AMS was negative (no findings on CTH or CTA head and neck). Pt was intubated for airway protection and due to AHRF and started on levophed.     CT A/P was c/w multifocal PNA. Patient was started on empiric zosyn and azithromycin, then transitioned to meropenem for broader coverage.  Patient is now back to baseline mental status, with occasional episodes of aphasia reported by daughter. Pt was extubated 11/11, now on room air. Pressors weaned off 11/14.  TTE w/ EF 38%, decreased LV and RV systolic function, global hypokinesis, MR, TR. Cardiology was consulted to evaluate whether there was a component of cardiogenic shock contributing to pressor requirements.    Follow up  [ ] cardiology recs  [ ] renal recs for HD  [ ] f/u MRI head   [ ] f/u PT recs  [ ] adjust insulin regimen as needed     Kaleigh Hollis, PGY-3

## 2023-11-15 NOTE — PROGRESS NOTE ADULT - ASSESSMENT
73y old man with PMH of ESRD s/p kidney transplant on HD, CAD s/p multiple stents (most recently, RCA stent on 10/2023), T2DM, and HTN, with recent hospitalization for bacterial PNA, who originally presented to the ED on 11/7 after being found acutely unresponsive with agonal breathing by his daughter. CPR was administered by the daughter. On arrival, EMS administered glucose and started O2 via NRB, with no improvement in mental status. In the ED, a code stroke due to AMS was negative (no findings on CTH or CTA head and neck). Pt was intubated for airway protection and due to AHRF and started on levophed; septic shock due to multifocal pneumonia was present on admission.   Patient was started on empiric zosyn and azithromycin, then transitioned to meropenem for broader coverage.  Patient is now back to baseline mental status, with occasional episodes of aphasia reported by daughter. Pt was extubated 11/11, now on room air. Pressors weaned off 11/14, trsnaferred to medical floor 11/15.

## 2023-11-15 NOTE — PROGRESS NOTE ADULT - PROBLEM SELECTOR PLAN 1
Pt. with ESRD on HD TIW (MWF, LUE AVF). Pt. also with history of kidney transplant (not on immunosuppression). Last HD as outpatient was done on 11/6/23 via L AVF. Pt. admitted to MICU with respiratory failure and systemic shock. Pt. initiated on CRRT on 11/8, discontinued on 11/10 as volume status and FiO2 requirements improved. Last HD on 11/13/23 via LUE AVF. Did require short term IV vasopressors while on HD. Labs reviewed. Pt appears clinically stable and is currently off of IV vasopressors and now out of the ICU.  Will plan to do UF with maintenance HD today cautious given prior hypotension requiring pressor support.  Monitor BP, dose medications for dialysis.

## 2023-11-15 NOTE — PROGRESS NOTE ADULT - SUBJECTIVE AND OBJECTIVE BOX
CARDIOLOGY PROGRESS NOTE:     Patient is a 73y old  Male who presents with a chief complaint of Altered Mental Status (13 Nov 2023 14:12)      INTERVAL EVENTS: Received 1u pRBC yesterday afternoon. Transferred from MICU to 6N yesterday.    SUBJECTIVE: Patient seen and examined at bedside with daughter present. This morning, patient is comfortable and doing well. No acute complaints, denies CP, fatigue, lightheadedness, SOB, or abdominal pain.    ----------------------------------------------------------------------------------  MEDICATIONS  (STANDING):  aspirin enteric coated 81 milliGRAM(s) Oral daily  chlorhexidine 2% Cloths 1 Application(s) Topical daily  clopidogrel Tablet 75 milliGRAM(s) Oral daily  dextrose 5%. 1000 milliLiter(s) (100 mL/Hr) IV Continuous <Continuous>  dextrose 5%. 1000 milliLiter(s) (50 mL/Hr) IV Continuous <Continuous>  dextrose 50% Injectable 25 Gram(s) IV Push once  droxidopa 200 milliGRAM(s) Oral every 8 hours  folic acid 1 milliGRAM(s) Oral daily  glucagon  Injectable 1 milliGRAM(s) IntraMuscular once  hydrocortisone sodium succinate Injectable 50 milliGRAM(s) IV Push every 12 hours  hydrocortisone sodium succinate Injectable   IV Push   insulin glargine Injectable (LANTUS) 6 Unit(s) SubCutaneous at bedtime  insulin lispro (ADMELOG) corrective regimen sliding scale   SubCutaneous three times a day before meals  insulin lispro (ADMELOG) corrective regimen sliding scale   SubCutaneous at bedtime  insulin lispro Injectable (ADMELOG) 3 Unit(s) SubCutaneous three times a day before meals  meropenem  IVPB 500 milliGRAM(s) IV Intermittent every 24 hours    MEDICATIONS  (PRN):  dextrose Oral Gel 15 Gram(s) Oral once PRN Blood Glucose LESS THAN 70 milliGRAM(s)/deciliter  lactulose Syrup 20 Gram(s) Oral three times a day PRN 2-3 bm daily  polyethylene glycol 3350 17 Gram(s) Oral two times a day PRN 2-3 bm daily  sodium chloride 0.9% Bolus. 100 milliLiter(s) IV Bolus every 5 minutes PRN SBP LESS THAN or EQUAL to 90 mmHg  sodium chloride 0.9% lock flush 10 milliLiter(s) IV Push every 1 hour PRN Pre/post blood products, medications, blood draw, and to maintain line patency      ----------------------------------------------------------------------------------  CAPILLARY BLOOD GLUCOSE      POCT Blood Glucose.: 280 mg/dL (14 Nov 2023 08:42)  POCT Blood Glucose.: 249 mg/dL (13 Nov 2023 21:35)  POCT Blood Glucose.: 231 mg/dL (13 Nov 2023 17:59)  POCT Blood Glucose.: 190 mg/dL (13 Nov 2023 12:03)    I&O's Summary    13 Nov 2023 07:01  -  14 Nov 2023 07:00  --------------------------------------------------------  IN: 1209.3 mL / OUT: 2000 mL / NET: -790.7 mL        ----------------------------------------------------------------------------------  PHYSICAL EXAM:  Vital Signs Last 24 Hrs  T(C): 35.2 (14 Nov 2023 08:00), Max: 37 (14 Nov 2023 04:00)  T(F): 95.4 (14 Nov 2023 08:00), Max: 98.6 (14 Nov 2023 04:00)  HR: 62 (14 Nov 2023 11:00) (59 - 76)  BP: --  BP(mean): --  RR: 12 (14 Nov 2023 11:00) (10 - 41)  SpO2: 95% (14 Nov 2023 11:00) (90% - 100%)    Parameters below as of 14 Nov 2023 08:00  Patient On (Oxygen Delivery Method): room air        GENERAL: NAD, lying in bed comfortably  HEAD: Atraumatic, normocephalic. Shiley removed, dressing in place on R neck  EYES: EOMI, PERRLA, conjunctiva and sclera clear  ENT: Moist mucous membranes  NECK: Supple, no JVD  HEART: S1, S2, Regular rate and rhythm, no murmurs, rubs, or gallops  LUNGS: Unlabored respirations, clear to auscultation bilaterally, no crackles, wheezes, or rhonchi  ABDOMEN: Soft, nontender, mildly distended, unchanged from prior exams  EXTREMITIES: 1+ peripheral pulses bilaterally. No clubbing, cyanosis, or edema  NERVOUS SYSTEM:  A&Ox3, neuro exam unchanged from prior. no focal deficits   SKIN: No rashes or lesions    ----------------------------------------------------------------------------------  LABS:                        7.3    6.95  )-----------( 39       ( 14 Nov 2023 00:55 )             20.4     11-14    135  |  99  |  57<H>  ----------------------------<  272<H>  3.8   |  23  |  2.62<H>    Ca    7.7<L>      14 Nov 2023 00:55  Phos  4.1     11-14  Mg     2.50     11-14    TPro  7.3  /  Alb  1.8<L>  /  TBili  3.0<H>  /  DBili  x   /  AST  660<H>  /  ALT  103<H>  /  AlkPhos  517<H>  11-14    PT/INR - ( 14 Nov 2023 00:55 )   PT: 16.9 sec;   INR: 1.51 ratio         PTT - ( 14 Nov 2023 00:55 )  PTT:37.3 sec      Urinalysis Basic - ( 14 Nov 2023 00:55 )    Color: x / Appearance: x / SG: x / pH: x  Gluc: 272 mg/dL / Ketone: x  / Bili: x / Urobili: x   Blood: x / Protein: x / Nitrite: x   Leuk Esterase: x / RBC: x / WBC x   Sq Epi: x / Non Sq Epi: x / Bacteria: x          ----------------------------------------------------------------------------------  RADIOLOGY & ADDITIONAL TESTS:  Results Reviewed:   Imaging Personally Reviewed:  Electrocardiogram Personally Reviewed:  Tele:

## 2023-11-15 NOTE — PHYSICAL THERAPY INITIAL EVALUATION ADULT - ADDITIONAL COMMENTS
Pt lives in a private house with Wife and Daughter, +3 steps to enter, resides in the basement. Pt was independent with all functional mobility and ADL performance using a rolling walker.     Pt left semi-supine in bed, all lines intact, all needs in reach, bed alarm set, in NAD. LANRE Hyman aware. Heart rate 67 beats per minute.

## 2023-11-15 NOTE — PROGRESS NOTE ADULT - SUBJECTIVE AND OBJECTIVE BOX
[facility] Division of Hospital Medicine  Douglas Villegas MD  Pager [phone]    Patient is a 73y old  Male who presents with a chief complaint of Altered Mental Status (15 Nov 2023 09:58)      SUBJECTIVE / OVERNIGHT EVENTS:      MEDICATIONS  (STANDING):  aspirin enteric coated 81 milliGRAM(s) Oral daily  chlorhexidine 2% Cloths 1 Application(s) Topical daily  clopidogrel Tablet 75 milliGRAM(s) Oral daily  dextrose 5%. 1000 milliLiter(s) (100 mL/Hr) IV Continuous <Continuous>  dextrose 5%. 1000 milliLiter(s) (50 mL/Hr) IV Continuous <Continuous>  dextrose 50% Injectable 25 Gram(s) IV Push once  droxidopa 200 milliGRAM(s) Oral every 8 hours  folic acid 1 milliGRAM(s) Oral daily  glucagon  Injectable 1 milliGRAM(s) IntraMuscular once  hydrocortisone sodium succinate Injectable 50 milliGRAM(s) IV Push daily  hydrocortisone sodium succinate Injectable   IV Push   insulin glargine Injectable (LANTUS) 6 Unit(s) SubCutaneous at bedtime  insulin lispro (ADMELOG) corrective regimen sliding scale   SubCutaneous three times a day before meals  insulin lispro (ADMELOG) corrective regimen sliding scale   SubCutaneous at bedtime  insulin lispro Injectable (ADMELOG) 3 Unit(s) SubCutaneous three times a day before meals  meropenem  IVPB 500 milliGRAM(s) IV Intermittent every 24 hours    MEDICATIONS  (PRN):  dextrose Oral Gel 15 Gram(s) Oral once PRN Blood Glucose LESS THAN 70 milliGRAM(s)/deciliter  lactulose Syrup 20 Gram(s) Oral three times a day PRN 2-3 bm daily  polyethylene glycol 3350 17 Gram(s) Oral two times a day PRN 2-3 bm daily  sodium chloride 0.9% Bolus. 100 milliLiter(s) IV Bolus every 5 minutes PRN SBP LESS THAN or EQUAL to 90 mmHg      CAPILLARY BLOOD GLUCOSE      POCT Blood Glucose.: 184 mg/dL (15 Nov 2023 11:37)  POCT Blood Glucose.: 207 mg/dL (15 Nov 2023 07:44)  POCT Blood Glucose.: 275 mg/dL (14 Nov 2023 22:38)  POCT Blood Glucose.: 265 mg/dL (14 Nov 2023 18:01)    I&O's Summary      PHYSICAL EXAM:  Vital Signs Last 24 Hrs  T(C): 35.9 (15 Nov 2023 13:25), Max: 36.3 (14 Nov 2023 23:10)  T(F): 96.6 (15 Nov 2023 13:25), Max: 97.4 (15 Nov 2023 01:35)  HR: 66 (15 Nov 2023 13:25) (64 - 72)  BP: 117/65 (15 Nov 2023 13:25) (117/65 - 145/69)  BP(mean): --  RR: 18 (15 Nov 2023 13:25) (14 - 22)  SpO2: 100% (15 Nov 2023 10:30) (95% - 100%)    Parameters below as of 15 Nov 2023 10:30  Patient On (Oxygen Delivery Method): room air      CONSTITUTIONAL: NAD  EYES: EOMI, conjunctiva and sclera clear  ENMT: Moist oral mucosa  NECK: Supple  RESPIRATORY: Breathing unlabored, CTAB  CARDIOVASCULAR: S1S2 no MRG  ABDOMEN: Nontender to palpation, normoactive bowel sounds, no rebound/guarding  MUSCULOSKELETAL: no clubbing or cyanosis of digits  NEUROLOGY: No focal deficits   SKIN: No rashes or lesions    LABS:                        8.5    7.42  )-----------( 50       ( 15 Nov 2023 03:00 )             23.0     11-15    136  |  100  |  78<H>  ----------------------------<  264<H>  4.0   |  22  |  3.46<H>    Ca    8.1<L>      15 Nov 2023 03:10  Phos  4.7     11-15  Mg     2.60     11-15    TPro  7.6  /  Alb  2.1<L>  /  TBili  2.7<H>  /  DBili  x   /  AST  537<H>  /  ALT  61<H>  /  AlkPhos  477<H>  11-15    PT/INR - ( 14 Nov 2023 00:55 )   PT: 16.9 sec;   INR: 1.51 ratio         PTT - ( 14 Nov 2023 00:55 )  PTT:37.3 sec      Urinalysis Basic - ( 15 Nov 2023 03:10 )    Color: x / Appearance: x / SG: x / pH: x  Gluc: 264 mg/dL / Ketone: x  / Bili: x / Urobili: x   Blood: x / Protein: x / Nitrite: x   Leuk Esterase: x / RBC: x / WBC x   Sq Epi: x / Non Sq Epi: x / Bacteria: x      CODE: FULL

## 2023-11-15 NOTE — PHYSICAL THERAPY INITIAL EVALUATION ADULT - PERTINENT HX OF CURRENT PROBLEM, REHAB EVAL
73 year old Male with PMH stated below, presenting with altered mental status and acute hypoxic respiratory failure; concern for stroke vs toxic metabolic encephalopathy, intubated for airway protection and hypoxic respiratory failure 11/7. weaning of IV pressors status post extubation 11/11. Treated for multifocal PNA. MICU downgrade 11/14

## 2023-11-15 NOTE — PROGRESS NOTE ADULT - PROBLEM SELECTOR PLAN 1
Septic shock due to multifocal pneumonia present on admission  Pt was intubated for airway protection, now extubated  Diagnostic paracentesis negative for SBP  Appreciate cardiology recs--low suspicion for cardiogenic component to shock  Continue IV meropenem  Stress dose steroids being tapered  Taper droxydopa 200 tid-->100 Tid on 11/15  Sepsis has resolved

## 2023-11-15 NOTE — PROGRESS NOTE ADULT - ASSESSMENT
73 y.o. man with PMH of ESRD s/p kidney transplant on HD, CAD s/p multiple stents (most recently, RCA stent on 10/2023), T2DM, and HTN, originally presenting to the ED on 11/7 after being found acutely unresponsive, CPR was administered by daughter. In ED, pt was intubated for airway protection and due to AHRF and started on levo, CT A/P c/w multifocal PNA. S/p empiric zosyn and azithro, currently on meropenem. Patient now back to baseline mental and respiratory status (AOx3, on RA), pending MRI brain. Cardiology consulted for persistent pressor requirement with bedside POCUS showing thin RV wall and RV dilation raising concern for acute RV dysfunction. Collateral from Dr. Sheridan ([facility]) showed hx of at least 6 stent placements, normal LV and RV function on 3/2023 echo (full collateral in pt chart). 11/13 TTE demonstrated new moderately decreased LV function (EF 38%), enlarged RV with reduced systolic function, severe TR, and mod-severe MR. All new from 3/2023 echo, however on exam, patient continues to appear grossly euvolemic. This clinical presentation, along with the absence of more significant echo findings, makes decompensated RV failure an unlikely cause for the persistent vasopressor requirement. Patient is no longer on levo gtt, now on droxidopa 200mg q8.    Recommendations:  - F/u with OP cardio for new-onset HF  - Continue to monitor on tele  - If BPs continue to remain stable when off droxidopa, plan to initiate carvedilol 3.125mg PO BID tomorrow and Entresto 24mg/26mg PO BID in 2 days    All recommendations pending attending attestation. We will continue to follow with you.  73 y.o. man with PMH of ESRD s/p kidney transplant on HD, CAD s/p multiple stents (most recently, RCA stent on 10/2023), T2DM, and HTN, originally presenting to the ED on 11/7 after being found acutely unresponsive, CPR was administered by daughter. In ED, pt was intubated for airway protection and due to AHRF and started on levo, CT A/P c/w multifocal PNA. S/p empiric zosyn and azithro, currently on meropenem. Patient now back to baseline mental and respiratory status (AOx3, on RA), pending MRI brain. Cardiology consulted for persistent pressor requirement with bedside POCUS showing thin RV wall and RV dilation raising concern for acute RV dysfunction. Collateral from Dr. Sheridan ([facility]) showed hx of at least 6 stent placements, normal LV and RV function on 3/2023 echo (full collateral in pt chart). 11/13 TTE demonstrated new moderately decreased LV function (EF 38%), enlarged RV with reduced systolic function, severe TR, and mod-severe MR. All new from 3/2023 echo, however on exam, patient continues to appear grossly euvolemic. This clinical presentation, along with the absence of more significant echo findings, makes decompensated RV failure an unlikely cause for the persistent vasopressor requirement. Patient is no longer on levo gtt, now on droxidopa 200mg q8.    Recommendations:  - F/u with OP cardio for new-onset HF  - Continue to monitor on tele  - If BPs continue to remain stable when off droxidopa, plan to initiate GDMT with carvedilol 3.125mg PO BID     All recommendations pending attending attestation. We will continue to follow with you.     Noted reviewed and edited as above.   Mis Stevenson MD  PGY-4, Cardiology  Available on TEAMS    For all new consults  [email or web address]  Login: [email or web address]

## 2023-11-15 NOTE — PHYSICAL THERAPY INITIAL EVALUATION ADULT - LEVEL OF INDEPENDENCE: SIT/STAND, REHAB EVAL
secondary to bilateral LE weakness and difficulty maintain static sitting position/unable to perform

## 2023-11-16 LAB
ALBUMIN SERPL ELPH-MCNC: 2.2 G/DL — LOW (ref 3.3–5)
ALBUMIN SERPL ELPH-MCNC: 2.2 G/DL — LOW (ref 3.3–5)
ALP SERPL-CCNC: 465 U/L — HIGH (ref 40–120)
ALP SERPL-CCNC: 465 U/L — HIGH (ref 40–120)
ALT FLD-CCNC: 42 U/L — HIGH (ref 4–41)
ALT FLD-CCNC: 42 U/L — HIGH (ref 4–41)
ANION GAP SERPL CALC-SCNC: 10 MMOL/L — SIGNIFICANT CHANGE UP (ref 7–14)
ANION GAP SERPL CALC-SCNC: 10 MMOL/L — SIGNIFICANT CHANGE UP (ref 7–14)
AST SERPL-CCNC: 453 U/L — HIGH (ref 4–40)
AST SERPL-CCNC: 453 U/L — HIGH (ref 4–40)
BILIRUB SERPL-MCNC: 2.5 MG/DL — HIGH (ref 0.2–1.2)
BILIRUB SERPL-MCNC: 2.5 MG/DL — HIGH (ref 0.2–1.2)
BUN SERPL-MCNC: 57 MG/DL — HIGH (ref 7–23)
BUN SERPL-MCNC: 57 MG/DL — HIGH (ref 7–23)
CALCIUM SERPL-MCNC: 8.2 MG/DL — LOW (ref 8.4–10.5)
CALCIUM SERPL-MCNC: 8.2 MG/DL — LOW (ref 8.4–10.5)
CHLORIDE SERPL-SCNC: 99 MMOL/L — SIGNIFICANT CHANGE UP (ref 98–107)
CHLORIDE SERPL-SCNC: 99 MMOL/L — SIGNIFICANT CHANGE UP (ref 98–107)
CO2 SERPL-SCNC: 27 MMOL/L — SIGNIFICANT CHANGE UP (ref 22–31)
CO2 SERPL-SCNC: 27 MMOL/L — SIGNIFICANT CHANGE UP (ref 22–31)
CREAT SERPL-MCNC: 2.81 MG/DL — HIGH (ref 0.5–1.3)
CREAT SERPL-MCNC: 2.81 MG/DL — HIGH (ref 0.5–1.3)
EGFR: 23 ML/MIN/1.73M2 — LOW
EGFR: 23 ML/MIN/1.73M2 — LOW
GLUCOSE BLDC GLUCOMTR-MCNC: 120 MG/DL — HIGH (ref 70–99)
GLUCOSE BLDC GLUCOMTR-MCNC: 120 MG/DL — HIGH (ref 70–99)
GLUCOSE BLDC GLUCOMTR-MCNC: 130 MG/DL — HIGH (ref 70–99)
GLUCOSE BLDC GLUCOMTR-MCNC: 130 MG/DL — HIGH (ref 70–99)
GLUCOSE BLDC GLUCOMTR-MCNC: 136 MG/DL — HIGH (ref 70–99)
GLUCOSE BLDC GLUCOMTR-MCNC: 136 MG/DL — HIGH (ref 70–99)
GLUCOSE BLDC GLUCOMTR-MCNC: 139 MG/DL — HIGH (ref 70–99)
GLUCOSE BLDC GLUCOMTR-MCNC: 139 MG/DL — HIGH (ref 70–99)
GLUCOSE SERPL-MCNC: 146 MG/DL — HIGH (ref 70–99)
GLUCOSE SERPL-MCNC: 146 MG/DL — HIGH (ref 70–99)
HCT VFR BLD CALC: 25.1 % — LOW (ref 39–50)
HCT VFR BLD CALC: 25.1 % — LOW (ref 39–50)
HGB BLD-MCNC: 8.7 G/DL — LOW (ref 13–17)
HGB BLD-MCNC: 8.7 G/DL — LOW (ref 13–17)
INR BLD: 1.44 RATIO — HIGH (ref 0.85–1.18)
INR BLD: 1.44 RATIO — HIGH (ref 0.85–1.18)
MAGNESIUM SERPL-MCNC: 2.4 MG/DL — SIGNIFICANT CHANGE UP (ref 1.6–2.6)
MAGNESIUM SERPL-MCNC: 2.4 MG/DL — SIGNIFICANT CHANGE UP (ref 1.6–2.6)
MCHC RBC-ENTMCNC: 29.5 PG — SIGNIFICANT CHANGE UP (ref 27–34)
MCHC RBC-ENTMCNC: 29.5 PG — SIGNIFICANT CHANGE UP (ref 27–34)
MCHC RBC-ENTMCNC: 34.7 GM/DL — SIGNIFICANT CHANGE UP (ref 32–36)
MCHC RBC-ENTMCNC: 34.7 GM/DL — SIGNIFICANT CHANGE UP (ref 32–36)
MCV RBC AUTO: 85.1 FL — SIGNIFICANT CHANGE UP (ref 80–100)
MCV RBC AUTO: 85.1 FL — SIGNIFICANT CHANGE UP (ref 80–100)
NRBC # BLD: 1 /100 WBCS — HIGH (ref 0–0)
NRBC # BLD: 1 /100 WBCS — HIGH (ref 0–0)
NRBC # FLD: 0.09 K/UL — HIGH (ref 0–0)
NRBC # FLD: 0.09 K/UL — HIGH (ref 0–0)
PHOSPHATE SERPL-MCNC: 3.6 MG/DL — SIGNIFICANT CHANGE UP (ref 2.5–4.5)
PHOSPHATE SERPL-MCNC: 3.6 MG/DL — SIGNIFICANT CHANGE UP (ref 2.5–4.5)
PLATELET # BLD AUTO: 52 K/UL — LOW (ref 150–400)
PLATELET # BLD AUTO: 52 K/UL — LOW (ref 150–400)
POTASSIUM SERPL-MCNC: 3.6 MMOL/L — SIGNIFICANT CHANGE UP (ref 3.5–5.3)
POTASSIUM SERPL-MCNC: 3.6 MMOL/L — SIGNIFICANT CHANGE UP (ref 3.5–5.3)
POTASSIUM SERPL-SCNC: 3.6 MMOL/L — SIGNIFICANT CHANGE UP (ref 3.5–5.3)
POTASSIUM SERPL-SCNC: 3.6 MMOL/L — SIGNIFICANT CHANGE UP (ref 3.5–5.3)
PROT SERPL-MCNC: 7.4 G/DL — SIGNIFICANT CHANGE UP (ref 6–8.3)
PROT SERPL-MCNC: 7.4 G/DL — SIGNIFICANT CHANGE UP (ref 6–8.3)
PROTHROM AB SERPL-ACNC: 16.1 SEC — HIGH (ref 9.5–13)
PROTHROM AB SERPL-ACNC: 16.1 SEC — HIGH (ref 9.5–13)
RBC # BLD: 2.95 M/UL — LOW (ref 4.2–5.8)
RBC # BLD: 2.95 M/UL — LOW (ref 4.2–5.8)
RBC # FLD: 24 % — HIGH (ref 10.3–14.5)
RBC # FLD: 24 % — HIGH (ref 10.3–14.5)
SODIUM SERPL-SCNC: 136 MMOL/L — SIGNIFICANT CHANGE UP (ref 135–145)
SODIUM SERPL-SCNC: 136 MMOL/L — SIGNIFICANT CHANGE UP (ref 135–145)
WBC # BLD: 7.58 K/UL — SIGNIFICANT CHANGE UP (ref 3.8–10.5)
WBC # BLD: 7.58 K/UL — SIGNIFICANT CHANGE UP (ref 3.8–10.5)
WBC # FLD AUTO: 7.58 K/UL — SIGNIFICANT CHANGE UP (ref 3.8–10.5)
WBC # FLD AUTO: 7.58 K/UL — SIGNIFICANT CHANGE UP (ref 3.8–10.5)

## 2023-11-16 PROCEDURE — 99233 SBSQ HOSP IP/OBS HIGH 50: CPT

## 2023-11-16 PROCEDURE — 99223 1ST HOSP IP/OBS HIGH 75: CPT

## 2023-11-16 RX ADMIN — INSULIN GLARGINE 6 UNIT(S): 100 INJECTION, SOLUTION SUBCUTANEOUS at 21:25

## 2023-11-16 RX ADMIN — Medication 3 UNIT(S): at 16:51

## 2023-11-16 RX ADMIN — Medication 3 UNIT(S): at 07:41

## 2023-11-16 RX ADMIN — DROXIDOPA 100 MILLIGRAM(S): 100 CAPSULE ORAL at 05:30

## 2023-11-16 RX ADMIN — Medication 50 MILLIGRAM(S): at 05:31

## 2023-11-16 NOTE — CONSULT NOTE ADULT - SUBJECTIVE AND OBJECTIVE BOX
Vascular Surgery Consult  Consulting surgical team: C Team  Consulting attending: Dr. Leiva    HPI:  73y old man with PMH of ESRD s/p kidney transplant on HD, CAD s/p multiple stents (most recently, RCA stent on 10/2023), T2DM, and HTN, with recent hospitalization for bacterial PNA, who originally presented to the ED on 11/7 after being found acutely unresponsive with agonal breathing by his daughter. CPR was administered by the daughter. On arrival, EMS administered glucose and started O2 via NRB, with no improvement in mental status. In the ED, a code stroke due to AMS was negative (no findings on CTH or CTA head and neck). Pt was intubated for airway protection and due to AHRF and started on levophed; septic shock due to multifocal pneumonia was present on admission.   Patient was started on empiric zosyn and azithromycin, then transitioned to meropenem for broader coverage.  Patient is now back to baseline mental status, with occasional episodes of aphasia reported by daughter. Pt was extubated 11/11, now on room air. Pressors weaned off 11/14, transferred to medical floor 11/15.    Vascular surgery consulted 2/2 oozing LUE AVF. Patient received dialysis through LUE fistula on 11/15 and was thereafter noted to have oozing therefrom. Patient's family present bedside, deny any history of stenting of b/l UEs or any procedure or intervention on the LUE fistula after its creation.     PAST MEDICAL HISTORY:  End Stage Renal Disease on Dialysis    Diabetes    H/O: Hypertension    Coronary artery disease    Transplanted kidney        PAST SURGICAL HISTORY:  Status Post Coronary Artery Stent Placement    S/P kidney transplant        MEDICATIONS:  aspirin enteric coated 81 milliGRAM(s) Oral daily  chlorhexidine 2% Cloths 1 Application(s) Topical daily  clopidogrel Tablet 75 milliGRAM(s) Oral daily  dextrose 5%. 1000 milliLiter(s) IV Continuous <Continuous>  dextrose 5%. 1000 milliLiter(s) IV Continuous <Continuous>  dextrose 50% Injectable 25 Gram(s) IV Push once  dextrose Oral Gel 15 Gram(s) Oral once PRN  folic acid 1 milliGRAM(s) Oral daily  glucagon  Injectable 1 milliGRAM(s) IntraMuscular once  hydrocortisone sodium succinate Injectable   IV Push   hydrocortisone sodium succinate Injectable 50 milliGRAM(s) IV Push daily  insulin glargine Injectable (LANTUS) 6 Unit(s) SubCutaneous at bedtime  insulin lispro (ADMELOG) corrective regimen sliding scale   SubCutaneous three times a day before meals  insulin lispro (ADMELOG) corrective regimen sliding scale   SubCutaneous at bedtime  insulin lispro Injectable (ADMELOG) 3 Unit(s) SubCutaneous three times a day before meals  lactulose Syrup 20 Gram(s) Oral three times a day PRN  polyethylene glycol 3350 17 Gram(s) Oral two times a day PRN  sodium chloride 0.9% Bolus. 100 milliLiter(s) IV Bolus every 5 minutes PRN      ALLERGIES:  No Known Allergies      VITALS & I/Os:  Vital Signs Last 24 Hrs  T(C): 36.4 (16 Nov 2023 17:12), Max: 36.6 (16 Nov 2023 13:00)  T(F): 97.6 (16 Nov 2023 17:12), Max: 97.8 (16 Nov 2023 13:00)  HR: 72 (16 Nov 2023 17:12) (66 - 72)  BP: 142/78 (16 Nov 2023 17:12) (119/66 - 144/70)  BP(mean): --  RR: 18 (16 Nov 2023 17:12) (16 - 18)  SpO2: 98% (16 Nov 2023 17:12) (98% - 100%)    Parameters below as of 16 Nov 2023 17:12  Patient On (Oxygen Delivery Method): room air        I&O's Summary    15 Nov 2023 07:01  -  16 Nov 2023 07:00  --------------------------------------------------------  IN: 400 mL / OUT: 1400 mL / NET: -1000 mL        PHYSICAL EXAM:  General: Not acutely distressed  Respiratory: Nonlabored respirations  Cardiovascular: Pulse present  Abdominal: Soft, nondistended, nontender. No rebound or guarding. No organomegaly, no palpable mass  Extremities: Warm. LUE fistula with palpable thrill. Light ooze noted at fistula cannulation site    LABS:                        8.7    7.58  )-----------( 52       ( 16 Nov 2023 05:55 )             25.1     11-16    136  |  99  |  57<H>  ----------------------------<  146<H>  3.6   |  27  |  2.81<H>    Ca    8.2<L>      16 Nov 2023 05:55  Phos  3.6     11-16  Mg     2.40     11-16    TPro  7.4  /  Alb  2.2<L>  /  TBili  2.5<H>  /  DBili  x   /  AST  453<H>  /  ALT  42<H>  /  AlkPhos  465<H>  11-16    Lactate:    PT/INR - ( 16 Nov 2023 05:55 )   PT: 16.1 sec;   INR: 1.44 ratio                   Urinalysis Basic - ( 16 Nov 2023 05:55 )    Color: x / Appearance: x / SG: x / pH: x  Gluc: 146 mg/dL / Ketone: x  / Bili: x / Urobili: x   Blood: x / Protein: x / Nitrite: x   Leuk Esterase: x / RBC: x / WBC x   Sq Epi: x / Non Sq Epi: x / Bacteria: x        IMAGING:

## 2023-11-16 NOTE — CHART NOTE - NSCHARTNOTEFT_GEN_A_CORE
Called by nurse to assess LUE AVF site, dressing saturated w/ blood. HD RN had seem patient this am for saturated dressing as well. Site undressed and noted to have a slow persistent ooze at puncture site, +thrill.  pressure applied for approx 5min, oozing continued d/w HD nurse recommended calling vascular to assess AVF. D/w Dr. Villegas, vascular c/s called.

## 2023-11-16 NOTE — CONSULT NOTE ADULT - ASSESSMENT
73M with PMH of ESRD s/p kidney transplant on HD, CAD s/p multiple stents (most recently, RCA stent on 10/2023), T2DM, and HTN, with recent hospitalization for bacterial PNA, who originally presented to the ED on 11/7 after being found acutely unresponsive with agonal breathing by his daughter. Pt was intubated in ED for airway protection and due to AHRF and started on levophed; septic shock due to multifocal pneumonia was present on admission. Patient started on abx and is now back to baseline mental status, with occasional episodes of aphasia reported by daughter. Pt was extubated 11/11, now on room air. Pressors weaned off 11/14, transferred to medical floor 11/15.    Vascular surgery consulted 2/2 oozing LUE AVF. Patient received dialysis through LUE fistula on 11/15 and was thereafter noted to have oozing therefrom. Patient's family present bedside, deny any history of stenting of b/l UEs or any procedure or intervention on the LUE fistula after its creation.     Recommendations:  - No acute vascular surgery intervention at this time.  - Suspect ooze may be secondary to venous outflow stenosis.  - Obtain LUE duplex.  - May require fistulagram.  - Apply compression dressing to LUE and keep elevated.  - Fistula may be used for dialysis tomorrow, however fistula should be cannulated at a different site than where currently oozing.    Seen and discussed with senior vascular surgery resident on call.    C Team/Vascular Surgery  Please page [phone] for all questions. Not available on [employer] Teams.   73M with PMH of ESRD s/p kidney transplant on HD, CAD s/p multiple stents (most recently, RCA stent on 10/2023), T2DM, and HTN, with recent hospitalization for bacterial PNA, who originally presented to the ED on 11/7 after being found acutely unresponsive with agonal breathing by his daughter. Pt was intubated in ED for airway protection and due to AHRF and started on levophed; septic shock due to multifocal pneumonia was present on admission. Patient started on abx and is now back to baseline mental status, with occasional episodes of aphasia reported by daughter. Pt was extubated 11/11, now on room air. Pressors weaned off 11/14, transferred to medical floor 11/15.    Vascular surgery consulted 2/2 oozing LUE AVF. Patient received dialysis through LUE fistula on 11/15 and was thereafter noted to have oozing therefrom. Patient's family present bedside, deny any history of stenting of b/l UEs or any procedure or intervention on the LUE fistula after its creation.     Recommendations:  - LUE fistula duplex  - will need fistulagram, this is likely 2/2 outflow stenosis    Seen and discussed with senior vascular surgery resident on call.    C Team/Vascular Surgery  Please page [phone] for all questions. Not available on [employer] Teams.

## 2023-11-16 NOTE — PROGRESS NOTE ADULT - PROBLEM SELECTOR PLAN 1
Septic shock due to multifocal pneumonia present on admission  Pt was intubated for airway protection, now extubated  Diagnostic paracentesis negative for SBP  Appreciate cardiology recs--low suspicion for cardiogenic component to shock  Continue IV meropenem  Stress dose steroids being tapered  Taper droxydopa 200 tid-->100 Tid on 11/15, then d/c 11/16  Monitor BP  Sepsis has resolved

## 2023-11-16 NOTE — PROGRESS NOTE ADULT - ASSESSMENT
73y old man with PMH of ESRD s/p kidney transplant on HD, CAD s/p multiple stents (most recently, RCA stent on 10/2023), T2DM, and HTN, with recent hospitalization for bacterial PNA, who originally presented to the ED on 11/7 after being found acutely unresponsive with agonal breathing by his daughter. CPR was administered by the daughter. On arrival, EMS administered glucose and started O2 via NRB, with no improvement in mental status. In the ED, a code stroke due to AMS was negative (no findings on CTH or CTA head and neck). Pt was intubated for airway protection and due to AHRF and started on levophed; septic shock due to multifocal pneumonia was present on admission.   Patient was started on empiric zosyn and azithromycin, then transitioned to meropenem for broader coverage.  Patient is now back to baseline mental status, with occasional episodes of aphasia reported by daughter. Pt was extubated 11/11, now on room air. Pressors weaned off 11/14, transferred to medical floor 11/15.

## 2023-11-16 NOTE — PROGRESS NOTE ADULT - SUBJECTIVE AND OBJECTIVE BOX
[facility] Division of Hospital Medicine  Douglas Villegas MD  Pager [phone]    Patient is a 73y old  Male who presents with a chief complaint of Altered Mental Status (16 Nov 2023 09:54)      SUBJECTIVE / OVERNIGHT EVENTS: Feeling better      MEDICATIONS  (STANDING):  aspirin enteric coated 81 milliGRAM(s) Oral daily  chlorhexidine 2% Cloths 1 Application(s) Topical daily  clopidogrel Tablet 75 milliGRAM(s) Oral daily  dextrose 5%. 1000 milliLiter(s) (50 mL/Hr) IV Continuous <Continuous>  dextrose 5%. 1000 milliLiter(s) (100 mL/Hr) IV Continuous <Continuous>  dextrose 50% Injectable 25 Gram(s) IV Push once  folic acid 1 milliGRAM(s) Oral daily  glucagon  Injectable 1 milliGRAM(s) IntraMuscular once  hydrocortisone sodium succinate Injectable   IV Push   hydrocortisone sodium succinate Injectable 50 milliGRAM(s) IV Push daily  insulin glargine Injectable (LANTUS) 6 Unit(s) SubCutaneous at bedtime  insulin lispro (ADMELOG) corrective regimen sliding scale   SubCutaneous three times a day before meals  insulin lispro (ADMELOG) corrective regimen sliding scale   SubCutaneous at bedtime  insulin lispro Injectable (ADMELOG) 3 Unit(s) SubCutaneous three times a day before meals    MEDICATIONS  (PRN):  dextrose Oral Gel 15 Gram(s) Oral once PRN Blood Glucose LESS THAN 70 milliGRAM(s)/deciliter  lactulose Syrup 20 Gram(s) Oral three times a day PRN 2-3 bm daily  polyethylene glycol 3350 17 Gram(s) Oral two times a day PRN 2-3 bm daily  sodium chloride 0.9% Bolus. 100 milliLiter(s) IV Bolus every 5 minutes PRN SBP LESS THAN or EQUAL to 90 mmHg      CAPILLARY BLOOD GLUCOSE      POCT Blood Glucose.: 130 mg/dL (16 Nov 2023 11:20)  POCT Blood Glucose.: 120 mg/dL (16 Nov 2023 07:38)  POCT Blood Glucose.: 203 mg/dL (15 Nov 2023 21:17)  POCT Blood Glucose.: 179 mg/dL (15 Nov 2023 17:00)    I&O's Summary    15 Nov 2023 07:01  -  16 Nov 2023 07:00  --------------------------------------------------------  IN: 400 mL / OUT: 1400 mL / NET: -1000 mL        PHYSICAL EXAM:  Vital Signs Last 24 Hrs  T(C): 36.3 (16 Nov 2023 09:00), Max: 36.4 (16 Nov 2023 05:29)  T(F): 97.3 (16 Nov 2023 09:00), Max: 97.5 (16 Nov 2023 05:29)  HR: 69 (16 Nov 2023 09:00) (63 - 69)  BP: 138/73 (16 Nov 2023 09:00) (119/66 - 144/70)  BP(mean): --  RR: 18 (16 Nov 2023 09:00) (16 - 18)  SpO2: 100% (16 Nov 2023 09:00) (98% - 100%)    Parameters below as of 16 Nov 2023 09:00  Patient On (Oxygen Delivery Method): room air      CONSTITUTIONAL: NAD  EYES: EOMI, conjunctiva and sclera clear  ENMT: Moist oral mucosa  NECK: Supple  RESPIRATORY: Breathing unlabored, CTAB  CARDIOVASCULAR: S1S2 no MRG  ABDOMEN: Nontender to palpation, normoactive bowel sounds, no rebound/guarding  MUSCULOSKELETAL: no clubbing or cyanosis of digits  NEUROLOGY: weakness  SKIN: No rashes or lesions    LABS:                        8.7    7.58  )-----------( 52       ( 16 Nov 2023 05:55 )             25.1     11-16    136  |  99  |  57<H>  ----------------------------<  146<H>  3.6   |  27  |  2.81<H>    Ca    8.2<L>      16 Nov 2023 05:55  Phos  3.6     11-16  Mg     2.40     11-16    TPro  7.4  /  Alb  2.2<L>  /  TBili  2.5<H>  /  DBili  x   /  AST  453<H>  /  ALT  42<H>  /  AlkPhos  465<H>  11-16    PT/INR - ( 16 Nov 2023 05:55 )   PT: 16.1 sec;   INR: 1.44 ratio               Urinalysis Basic - ( 16 Nov 2023 05:55 )    Color: x / Appearance: x / SG: x / pH: x  Gluc: 146 mg/dL / Ketone: x  / Bili: x / Urobili: x   Blood: x / Protein: x / Nitrite: x   Leuk Esterase: x / RBC: x / WBC x   Sq Epi: x / Non Sq Epi: x / Bacteria: x        CODE: FULL

## 2023-11-16 NOTE — PROGRESS NOTE ADULT - PROBLEM SELECTOR PLAN 3
Appreciate cardiology recs  - c/w home aspirin 81mg qd  - c/w home plavix 75mg qd  - c/w home atorvastatin 80mg qd  - holding home coreg 12.5mg BID; titrate back as BP tolerates after droxydopa and HC are tapered off

## 2023-11-16 NOTE — PROGRESS NOTE ADULT - ASSESSMENT
73 y.o. man with PMH of ESRD s/p kidney transplant on HD, CAD s/p multiple stents (most recently, RCA stent on 10/2023), T2DM, and HTN, originally presenting to the ED on 11/7 after being found acutely unresponsive, CPR was administered by daughter. In ED, pt was intubated for airway protection and due to AHRF and started on levo, CT A/P c/w multifocal PNA. S/p empiric zosyn and azithro, currently on meropenem. Patient now back to baseline mental and respiratory status (AOx3, on RA), pending MRI brain. Cardiology consulted for persistent pressor requirement with bedside POCUS showing thin RV wall and RV dilation raising concern for acute RV dysfunction. Collateral from Dr. Sheridan ([facility]) showed hx of at least 6 stent placements, normal LV and RV function on 3/2023 echo (full collateral in pt chart). 11/13 TTE demonstrated new moderately decreased LV function (EF 38%), enlarged RV with reduced systolic function, severe TR, and mod-severe MR. All new from 3/2023 echo, however on exam, patient continues to appear grossly euvolemic. This clinical presentation, along with the absence of more significant echo findings, makes decompensated RV failure an unlikely cause for the persistent vasopressor requirement. Patient is no longer on levo gtt, now on droxidopa 100mg q8 (weaned from 200mg q8 yesterday).    Recommendations:  - F/u with OP cardio for new-onset HF  - Continue to monitor on tele  - If BPs continue to remain stable when completely off droxidopa, plan to initiate GDMT with carvedilol 3.125mg PO BID     All recommendations pending attending attestation. We will continue to follow with you.  73 y.o. man with PMH of ESRD s/p kidney transplant on HD, CAD s/p multiple stents (most recently, RCA stent on 10/2023), T2DM, and HTN, originally presented to the ED on 11/7 after being found acutely unresponsive, CPR was administered by daughter. In ED, pt was intubated for airway protection and due to AHRF and started on levo, CT A/P c/w multifocal PNA. S/p empiric zosyn and azithro, currently on meropenem. Patient now back to baseline mental and respiratory status (AOx3, on RA), pending MRI brain. Cardiology consulted for persistent pressor requirement with bedside POCUS showing thin RV wall and RV dilation raising concern for acute RV dysfunction. Collateral from Dr. Sheridan ([facility]) showed hx of at least 6 stent placements, normal LV and RV function on 3/2023 echo (full collateral in pt chart). 11/13 TTE demonstrated new moderately decreased LV function (EF 38%), enlarged RV with reduced systolic function, severe TR, and mod-severe MR. All new from 3/2023 echo, however on exam, patient continues to appear grossly euvolemic. This clinical presentation, along with the absence of more significant echo findings, makes decompensated RV failure an unlikely cause for the persistent vasopressor requirement. Patient is no longer on levo gtt, now tolerating droxidopa 100mg q8 (weaned from 200mg q8 yesterday).    Recommendations:  - F/u with OP cardio for new-onset HF  - Continue to monitor on tele  - If BPs continue to remain stable when completely off droxidopa, plan to initiate GDMT with carvedilol 3.125mg PO BID     All recommendations pending attending attestation. We will continue to follow with you.  73 y.o. man with PMH of ESRD s/p kidney transplant on HD, CAD s/p multiple stents (most recently, RCA stent on 10/2023), T2DM, and HTN, originally presented to the ED on 11/7 after being found acutely unresponsive, CPR was administered by daughter. In ED, pt was intubated for airway protection and due to AHRF and started on levo, CT A/P c/w multifocal PNA. S/p empiric zosyn and azithro, currently on meropenem. Patient now back to baseline mental and respiratory status (AOx3, on RA), pending MRI brain. Cardiology consulted for persistent pressor requirement with bedside POCUS showing thin RV wall and RV dilation raising concern for acute RV dysfunction. Collateral from Dr. Sheridan ([facility]) showed hx of at least 6 stent placements, normal LV and RV function on 3/2023 echo (full collateral in pt chart). 11/13 TTE demonstrated new moderately decreased LV function (EF 38%), enlarged RV with reduced systolic function, severe TR, and mod-severe MR. All new from 3/2023 echo, however on exam, patient continues to appear grossly euvolemic. This clinical presentation, along with the absence of more significant echo findings, makes decompensated RV failure an unlikely cause for the persistent vasopressor requirement. Patient is no longer on levo gtt, now tolerating droxidopa 100mg q8 (weaned from 200mg q8 yesterday).    Recommendations:  - F/u with OP cardio for new-onset HF  - Continue to monitor on tele  - If BPs continue to remain stable when completely off droxidopa, plan to initiate GDMT with carvedilol 3.125mg PO BID     Note reviewed and edited as above.   All recommendations pending attending attestation. We will continue to follow with you.     Mis Stevenson MD  PGY-4, Cardiology  Available on TEAMS    For all new consults  [email or web address]  Login: [email or web address]

## 2023-11-16 NOTE — PROGRESS NOTE ADULT - SUBJECTIVE AND OBJECTIVE BOX
CARDIOLOGY PROGRESS NOTE:     Patient is a 73y old  Male who presents with a chief complaint of Altered Mental Status (13 Nov 2023 14:12)      INTERVAL EVENTS: Droxidopa weaned from 200mg TID to 100mg TID yesterday.    SUBJECTIVE: Patient seen and examined at bedside. This morning, patient is comfortable and doing well after droxidopa dose was tapered yesterday. No acute complaints, denies CP, fatigue, lightheadedness, SOB, or abdominal pain.    ----------------------------------------------------------------------------------  MEDICATIONS  (STANDING):  aspirin enteric coated 81 milliGRAM(s) Oral daily  chlorhexidine 2% Cloths 1 Application(s) Topical daily  clopidogrel Tablet 75 milliGRAM(s) Oral daily  dextrose 5%. 1000 milliLiter(s) (100 mL/Hr) IV Continuous <Continuous>  dextrose 5%. 1000 milliLiter(s) (50 mL/Hr) IV Continuous <Continuous>  dextrose 50% Injectable 25 Gram(s) IV Push once  droxidopa 200 milliGRAM(s) Oral every 8 hours  folic acid 1 milliGRAM(s) Oral daily  glucagon  Injectable 1 milliGRAM(s) IntraMuscular once  hydrocortisone sodium succinate Injectable 50 milliGRAM(s) IV Push every 12 hours  hydrocortisone sodium succinate Injectable   IV Push   insulin glargine Injectable (LANTUS) 6 Unit(s) SubCutaneous at bedtime  insulin lispro (ADMELOG) corrective regimen sliding scale   SubCutaneous three times a day before meals  insulin lispro (ADMELOG) corrective regimen sliding scale   SubCutaneous at bedtime  insulin lispro Injectable (ADMELOG) 3 Unit(s) SubCutaneous three times a day before meals  meropenem  IVPB 500 milliGRAM(s) IV Intermittent every 24 hours    MEDICATIONS  (PRN):  dextrose Oral Gel 15 Gram(s) Oral once PRN Blood Glucose LESS THAN 70 milliGRAM(s)/deciliter  lactulose Syrup 20 Gram(s) Oral three times a day PRN 2-3 bm daily  polyethylene glycol 3350 17 Gram(s) Oral two times a day PRN 2-3 bm daily  sodium chloride 0.9% Bolus. 100 milliLiter(s) IV Bolus every 5 minutes PRN SBP LESS THAN or EQUAL to 90 mmHg  sodium chloride 0.9% lock flush 10 milliLiter(s) IV Push every 1 hour PRN Pre/post blood products, medications, blood draw, and to maintain line patency      ----------------------------------------------------------------------------------  CAPILLARY BLOOD GLUCOSE      POCT Blood Glucose.: 280 mg/dL (14 Nov 2023 08:42)  POCT Blood Glucose.: 249 mg/dL (13 Nov 2023 21:35)  POCT Blood Glucose.: 231 mg/dL (13 Nov 2023 17:59)  POCT Blood Glucose.: 190 mg/dL (13 Nov 2023 12:03)    I&O's Summary    13 Nov 2023 07:01  -  14 Nov 2023 07:00  --------------------------------------------------------  IN: 1209.3 mL / OUT: 2000 mL / NET: -790.7 mL        ----------------------------------------------------------------------------------  PHYSICAL EXAM:  Vital Signs Last 24 Hrs  T(C): 35.2 (14 Nov 2023 08:00), Max: 37 (14 Nov 2023 04:00)  T(F): 95.4 (14 Nov 2023 08:00), Max: 98.6 (14 Nov 2023 04:00)  HR: 62 (14 Nov 2023 11:00) (59 - 76)  BP: --  BP(mean): --  RR: 12 (14 Nov 2023 11:00) (10 - 41)  SpO2: 95% (14 Nov 2023 11:00) (90% - 100%)    Parameters below as of 14 Nov 2023 08:00  Patient On (Oxygen Delivery Method): room air        GENERAL: NAD, lying in bed comfortably  HEAD: Atraumatic, normocephalic.   EYES: EOMI, PERRLA, conjunctiva and sclera clear  ENT: Moist mucous membranes  NECK: Supple, no JVD  HEART: S1, S2, Regular rate and rhythm, no murmurs, rubs, or gallops  LUNGS: Unlabored respirations, clear to auscultation bilaterally, no crackles, wheezes, or rhonchi  ABDOMEN: Soft, nontender, mildly distended, unchanged from prior exams  EXTREMITIES: 1+ peripheral pulses bilaterally. No clubbing, cyanosis, or edema  NERVOUS SYSTEM:  A&Ox3, neuro exam unchanged from prior. no focal deficits   SKIN: No rashes or lesions    ----------------------------------------------------------------------------------  LABS:                        7.3    6.95  )-----------( 39       ( 14 Nov 2023 00:55 )             20.4     11-14    135  |  99  |  57<H>  ----------------------------<  272<H>  3.8   |  23  |  2.62<H>    Ca    7.7<L>      14 Nov 2023 00:55  Phos  4.1     11-14  Mg     2.50     11-14    TPro  7.3  /  Alb  1.8<L>  /  TBili  3.0<H>  /  DBili  x   /  AST  660<H>  /  ALT  103<H>  /  AlkPhos  517<H>  11-14    PT/INR - ( 14 Nov 2023 00:55 )   PT: 16.9 sec;   INR: 1.51 ratio         PTT - ( 14 Nov 2023 00:55 )  PTT:37.3 sec      Urinalysis Basic - ( 14 Nov 2023 00:55 )    Color: x / Appearance: x / SG: x / pH: x  Gluc: 272 mg/dL / Ketone: x  / Bili: x / Urobili: x   Blood: x / Protein: x / Nitrite: x   Leuk Esterase: x / RBC: x / WBC x   Sq Epi: x / Non Sq Epi: x / Bacteria: x          ----------------------------------------------------------------------------------  RADIOLOGY & ADDITIONAL TESTS:  Lab Results Reviewed  Imaging Personally Reviewed  Electrocardiogram Personally Reviewed

## 2023-11-17 ENCOUNTER — TRANSCRIPTION ENCOUNTER (OUTPATIENT)
Age: 73
End: 2023-11-17

## 2023-11-17 DIAGNOSIS — R74.01 ELEVATION OF LEVELS OF LIVER TRANSAMINASE LEVELS: ICD-10-CM

## 2023-11-17 DIAGNOSIS — D69.6 THROMBOCYTOPENIA, UNSPECIFIED: ICD-10-CM

## 2023-11-17 LAB
ALBUMIN SERPL ELPH-MCNC: 1.9 G/DL — LOW (ref 3.3–5)
ALBUMIN SERPL ELPH-MCNC: 1.9 G/DL — LOW (ref 3.3–5)
ALP SERPL-CCNC: 426 U/L — HIGH (ref 40–120)
ALP SERPL-CCNC: 426 U/L — HIGH (ref 40–120)
ALT FLD-CCNC: 25 U/L — SIGNIFICANT CHANGE UP (ref 4–41)
ALT FLD-CCNC: 25 U/L — SIGNIFICANT CHANGE UP (ref 4–41)
ANION GAP SERPL CALC-SCNC: 12 MMOL/L — SIGNIFICANT CHANGE UP (ref 7–14)
ANION GAP SERPL CALC-SCNC: 12 MMOL/L — SIGNIFICANT CHANGE UP (ref 7–14)
AST SERPL-CCNC: 360 U/L — HIGH (ref 4–40)
AST SERPL-CCNC: 360 U/L — HIGH (ref 4–40)
BILIRUB SERPL-MCNC: 2.1 MG/DL — HIGH (ref 0.2–1.2)
BILIRUB SERPL-MCNC: 2.1 MG/DL — HIGH (ref 0.2–1.2)
BUN SERPL-MCNC: 75 MG/DL — HIGH (ref 7–23)
BUN SERPL-MCNC: 75 MG/DL — HIGH (ref 7–23)
CALCIUM SERPL-MCNC: 8.3 MG/DL — LOW (ref 8.4–10.5)
CALCIUM SERPL-MCNC: 8.3 MG/DL — LOW (ref 8.4–10.5)
CHLORIDE SERPL-SCNC: 99 MMOL/L — SIGNIFICANT CHANGE UP (ref 98–107)
CHLORIDE SERPL-SCNC: 99 MMOL/L — SIGNIFICANT CHANGE UP (ref 98–107)
CO2 SERPL-SCNC: 26 MMOL/L — SIGNIFICANT CHANGE UP (ref 22–31)
CO2 SERPL-SCNC: 26 MMOL/L — SIGNIFICANT CHANGE UP (ref 22–31)
CREAT SERPL-MCNC: 3.71 MG/DL — HIGH (ref 0.5–1.3)
CREAT SERPL-MCNC: 3.71 MG/DL — HIGH (ref 0.5–1.3)
EGFR: 17 ML/MIN/1.73M2 — LOW
EGFR: 17 ML/MIN/1.73M2 — LOW
GLUCOSE BLDC GLUCOMTR-MCNC: 153 MG/DL — HIGH (ref 70–99)
GLUCOSE BLDC GLUCOMTR-MCNC: 153 MG/DL — HIGH (ref 70–99)
GLUCOSE BLDC GLUCOMTR-MCNC: 218 MG/DL — HIGH (ref 70–99)
GLUCOSE BLDC GLUCOMTR-MCNC: 218 MG/DL — HIGH (ref 70–99)
GLUCOSE BLDC GLUCOMTR-MCNC: 72 MG/DL — SIGNIFICANT CHANGE UP (ref 70–99)
GLUCOSE BLDC GLUCOMTR-MCNC: 72 MG/DL — SIGNIFICANT CHANGE UP (ref 70–99)
GLUCOSE BLDC GLUCOMTR-MCNC: 87 MG/DL — SIGNIFICANT CHANGE UP (ref 70–99)
GLUCOSE BLDC GLUCOMTR-MCNC: 87 MG/DL — SIGNIFICANT CHANGE UP (ref 70–99)
GLUCOSE BLDC GLUCOMTR-MCNC: 91 MG/DL — SIGNIFICANT CHANGE UP (ref 70–99)
GLUCOSE BLDC GLUCOMTR-MCNC: 91 MG/DL — SIGNIFICANT CHANGE UP (ref 70–99)
GLUCOSE SERPL-MCNC: 66 MG/DL — LOW (ref 70–99)
GLUCOSE SERPL-MCNC: 66 MG/DL — LOW (ref 70–99)
HCT VFR BLD CALC: 25.2 % — LOW (ref 39–50)
HCT VFR BLD CALC: 25.2 % — LOW (ref 39–50)
HGB BLD-MCNC: 8.9 G/DL — LOW (ref 13–17)
HGB BLD-MCNC: 8.9 G/DL — LOW (ref 13–17)
MAGNESIUM SERPL-MCNC: 2.4 MG/DL — SIGNIFICANT CHANGE UP (ref 1.6–2.6)
MAGNESIUM SERPL-MCNC: 2.4 MG/DL — SIGNIFICANT CHANGE UP (ref 1.6–2.6)
MCHC RBC-ENTMCNC: 30.3 PG — SIGNIFICANT CHANGE UP (ref 27–34)
MCHC RBC-ENTMCNC: 30.3 PG — SIGNIFICANT CHANGE UP (ref 27–34)
MCHC RBC-ENTMCNC: 35.3 GM/DL — SIGNIFICANT CHANGE UP (ref 32–36)
MCHC RBC-ENTMCNC: 35.3 GM/DL — SIGNIFICANT CHANGE UP (ref 32–36)
MCV RBC AUTO: 85.7 FL — SIGNIFICANT CHANGE UP (ref 80–100)
MCV RBC AUTO: 85.7 FL — SIGNIFICANT CHANGE UP (ref 80–100)
NRBC # BLD: 1 /100 WBCS — HIGH (ref 0–0)
NRBC # BLD: 1 /100 WBCS — HIGH (ref 0–0)
NRBC # FLD: 0.08 K/UL — HIGH (ref 0–0)
NRBC # FLD: 0.08 K/UL — HIGH (ref 0–0)
PHOSPHATE SERPL-MCNC: 4.1 MG/DL — SIGNIFICANT CHANGE UP (ref 2.5–4.5)
PHOSPHATE SERPL-MCNC: 4.1 MG/DL — SIGNIFICANT CHANGE UP (ref 2.5–4.5)
PLATELET # BLD AUTO: 62 K/UL — LOW (ref 150–400)
PLATELET # BLD AUTO: 62 K/UL — LOW (ref 150–400)
POTASSIUM SERPL-MCNC: 3.7 MMOL/L — SIGNIFICANT CHANGE UP (ref 3.5–5.3)
POTASSIUM SERPL-MCNC: 3.7 MMOL/L — SIGNIFICANT CHANGE UP (ref 3.5–5.3)
POTASSIUM SERPL-SCNC: 3.7 MMOL/L — SIGNIFICANT CHANGE UP (ref 3.5–5.3)
POTASSIUM SERPL-SCNC: 3.7 MMOL/L — SIGNIFICANT CHANGE UP (ref 3.5–5.3)
PROT SERPL-MCNC: 6.9 G/DL — SIGNIFICANT CHANGE UP (ref 6–8.3)
PROT SERPL-MCNC: 6.9 G/DL — SIGNIFICANT CHANGE UP (ref 6–8.3)
RBC # BLD: 2.94 M/UL — LOW (ref 4.2–5.8)
RBC # BLD: 2.94 M/UL — LOW (ref 4.2–5.8)
RBC # FLD: 24.2 % — HIGH (ref 10.3–14.5)
RBC # FLD: 24.2 % — HIGH (ref 10.3–14.5)
SODIUM SERPL-SCNC: 137 MMOL/L — SIGNIFICANT CHANGE UP (ref 135–145)
SODIUM SERPL-SCNC: 137 MMOL/L — SIGNIFICANT CHANGE UP (ref 135–145)
WBC # BLD: 7.7 K/UL — SIGNIFICANT CHANGE UP (ref 3.8–10.5)
WBC # BLD: 7.7 K/UL — SIGNIFICANT CHANGE UP (ref 3.8–10.5)
WBC # FLD AUTO: 7.7 K/UL — SIGNIFICANT CHANGE UP (ref 3.8–10.5)
WBC # FLD AUTO: 7.7 K/UL — SIGNIFICANT CHANGE UP (ref 3.8–10.5)

## 2023-11-17 PROCEDURE — 99233 SBSQ HOSP IP/OBS HIGH 50: CPT

## 2023-11-17 PROCEDURE — 99222 1ST HOSP IP/OBS MODERATE 55: CPT

## 2023-11-17 PROCEDURE — 90935 HEMODIALYSIS ONE EVALUATION: CPT

## 2023-11-17 PROCEDURE — 93931 UPPER EXTREMITY STUDY: CPT | Mod: 26,LT

## 2023-11-17 RX ORDER — CARVEDILOL PHOSPHATE 80 MG/1
3.12 CAPSULE, EXTENDED RELEASE ORAL EVERY 12 HOURS
Refills: 0 | Status: DISCONTINUED | OUTPATIENT
Start: 2023-11-17 | End: 2023-11-19

## 2023-11-17 RX ADMIN — CHLORHEXIDINE GLUCONATE 1 APPLICATION(S): 213 SOLUTION TOPICAL at 11:28

## 2023-11-17 RX ADMIN — INSULIN GLARGINE 6 UNIT(S): 100 INJECTION, SOLUTION SUBCUTANEOUS at 21:17

## 2023-11-17 RX ADMIN — Medication 1 MILLIGRAM(S): at 11:28

## 2023-11-17 RX ADMIN — Medication 1: at 17:23

## 2023-11-17 RX ADMIN — Medication 50 MILLIGRAM(S): at 11:33

## 2023-11-17 RX ADMIN — Medication 81 MILLIGRAM(S): at 11:28

## 2023-11-17 RX ADMIN — CLOPIDOGREL BISULFATE 75 MILLIGRAM(S): 75 TABLET, FILM COATED ORAL at 11:28

## 2023-11-17 RX ADMIN — Medication 3 UNIT(S): at 17:24

## 2023-11-17 RX ADMIN — CARVEDILOL PHOSPHATE 3.12 MILLIGRAM(S): 80 CAPSULE, EXTENDED RELEASE ORAL at 17:22

## 2023-11-17 RX ADMIN — Medication 1: at 17:22

## 2023-11-17 NOTE — DISCHARGE NOTE PROVIDER - NSDCCPCAREPLAN_GEN_ALL_CORE_FT
PRINCIPAL DISCHARGE DIAGNOSIS  Diagnosis: Altered mental status  Assessment and Plan of Treatment: You were admitted to the hospital due to being unresponsive. You were found to have multifocal pneumnia and you were intubated to protect your airway. You were treated with IV antibitoics with improvment in your symptoms.      SECONDARY DISCHARGE DIAGNOSES  Diagnosis: ESRD on dialysis  Assessment and Plan of Treatment: You were Noted to have oozing from fistula, you had fistulogram on 11/29 which demonstrated good flow without outflow or central stenosis, therefore no intervention was needed. You had succsesful dialyisis sessions after that. Please continue your Dialysis as instructed.       Diagnosis: Elevated TSH  Assessment and Plan of Treatment: One of your thyroid levels was elevated. You need to continue your thyroid medication and you also need to have your levels checked again in 4-6 weeks.    Diagnosis: Thrombocytopenia  Assessment and Plan of Treatment: your platelets were noted to be low, please have cbc done with your primary care doctor to monitor your levels, if you have any severe bleeding please seek immediate medical care.

## 2023-11-17 NOTE — PROGRESS NOTE ADULT - PROBLEM SELECTOR PLAN 1
Pt. with ESRD on HD TIW (MWF, LUE AVF). Pt. also with history of kidney transplant (not on immunosuppression). Last HD as outpatient was done on 11/6/23 via L AVF. Pt. admitted to MICU with respiratory failure and systemic shock. Pt. initiated on CRRT on 11/8, discontinued on 11/10 as volume status and FiO2 requirements improved. Last HD on 11/15/23 via LUE AVF. Had some oozing from AVF after treatment - vascular following for evaluation of stenosis.  Labs reviewed. Pt appears clinically stable and tolerating HD treatment today.  Plan next HD treatment on  dose medications for dialysis.

## 2023-11-17 NOTE — DISCHARGE NOTE PROVIDER - ATTENDING DISCHARGE PHYSICAL EXAMINATION:
General: No acute distress  Eyes: PERRL, EOMI   ENT: MMM, no oropharyngeal lesions or erythema appreciated   Pulm: No increased WOB. CTAB. No wheezing.   CV: RRR. S1&S2+. No M/R/G appreciated.   Abdomen: distended abdomen- improved, nontender    MSK: Nml ROM   Extremities: No peripheral edema or cyanosis.    Neuro: A&Ox3, no focal deficits    Skin: Warm and dry. No visible rash.

## 2023-11-17 NOTE — PROGRESS NOTE ADULT - SUBJECTIVE AND OBJECTIVE BOX
Surgery Progress Note    SUBJECTIVE: Pt seen and examined at bedside. Patient comfortable and in no-apparent distress.     Vital Signs Last 24 Hrs  T(C): 36.2 (17 Nov 2023 06:45), Max: 37.1 (16 Nov 2023 21:10)  T(F): 97.2 (17 Nov 2023 06:45), Max: 98.8 (16 Nov 2023 21:10)  HR: 65 (17 Nov 2023 06:45) (65 - 84)  BP: 136/67 (17 Nov 2023 06:45) (128/72 - 142/78)  BP(mean): --  RR: 18 (17 Nov 2023 06:45) (17 - 18)  SpO2: 100% (17 Nov 2023 05:50) (98% - 100%)    Parameters below as of 17 Nov 2023 06:45  Patient On (Oxygen Delivery Method): room air    Physical Exam:  General Appearance: Appears well, NAD  Respiratory: No labored breathing  CV: Pulse regularly present  Vascular: LUE fistula with slight oozing, palpable thrill     LABS:                        8.9    7.70  )-----------( 62       ( 17 Nov 2023 06:52 )             25.2     11-17    137  |  99  |  75<H>  ----------------------------<  66<L>  3.7   |  26  |  3.71<H>    Ca    8.3<L>      17 Nov 2023 06:52  Phos  4.1     11-17  Mg     2.40     11-17    TPro  6.9  /  Alb  1.9<L>  /  TBili  2.1<H>  /  DBili  x   /  AST  360<H>  /  ALT  25  /  AlkPhos  426<H>  11-17    PT/INR - ( 16 Nov 2023 05:55 )   PT: 16.1 sec;   INR: 1.44 ratio           Urinalysis Basic - ( 17 Nov 2023 06:52 )    Color: x / Appearance: x / SG: x / pH: x  Gluc: 66 mg/dL / Ketone: x  / Bili: x / Urobili: x   Blood: x / Protein: x / Nitrite: x   Leuk Esterase: x / RBC: x / WBC x   Sq Epi: x / Non Sq Epi: x / Bacteria: x        INs and OUTs:

## 2023-11-17 NOTE — PROGRESS NOTE ADULT - SUBJECTIVE AND OBJECTIVE BOX
[facility] Division of Hospital Medicine  Douglas Villegas MD  Pager [phone]    Patient is a 73y old  Male who presents with a chief complaint of PNA/sepsis (17 Nov 2023 12:03)      SUBJECTIVE / OVERNIGHT EVENTS:  oozing from Av fistula last night      MEDICATIONS  (STANDING):  aspirin enteric coated 81 milliGRAM(s) Oral daily  chlorhexidine 2% Cloths 1 Application(s) Topical daily  clopidogrel Tablet 75 milliGRAM(s) Oral daily  dextrose 5%. 1000 milliLiter(s) (50 mL/Hr) IV Continuous <Continuous>  dextrose 5%. 1000 milliLiter(s) (100 mL/Hr) IV Continuous <Continuous>  dextrose 50% Injectable 25 Gram(s) IV Push once  folic acid 1 milliGRAM(s) Oral daily  glucagon  Injectable 1 milliGRAM(s) IntraMuscular once  insulin glargine Injectable (LANTUS) 6 Unit(s) SubCutaneous at bedtime  insulin lispro (ADMELOG) corrective regimen sliding scale   SubCutaneous three times a day before meals  insulin lispro (ADMELOG) corrective regimen sliding scale   SubCutaneous at bedtime  insulin lispro Injectable (ADMELOG) 3 Unit(s) SubCutaneous three times a day before meals    MEDICATIONS  (PRN):  dextrose Oral Gel 15 Gram(s) Oral once PRN Blood Glucose LESS THAN 70 milliGRAM(s)/deciliter  lactulose Syrup 20 Gram(s) Oral three times a day PRN 2-3 bm daily  polyethylene glycol 3350 17 Gram(s) Oral two times a day PRN 2-3 bm daily  sodium chloride 0.9% Bolus. 100 milliLiter(s) IV Bolus every 5 minutes PRN SBP LESS THAN or EQUAL to 90 mmHg      CAPILLARY BLOOD GLUCOSE      POCT Blood Glucose.: 72 mg/dL (17 Nov 2023 11:31)  POCT Blood Glucose.: 87 mg/dL (17 Nov 2023 09:31)  POCT Blood Glucose.: 91 mg/dL (17 Nov 2023 05:36)  POCT Blood Glucose.: 139 mg/dL (16 Nov 2023 21:13)  POCT Blood Glucose.: 136 mg/dL (16 Nov 2023 16:32)    I&O's Summary      PHYSICAL EXAM:  Vital Signs Last 24 Hrs  T(C): 36.2 (17 Nov 2023 06:45), Max: 37.1 (16 Nov 2023 21:10)  T(F): 97.2 (17 Nov 2023 06:45), Max: 98.8 (16 Nov 2023 21:10)  HR: 65 (17 Nov 2023 06:45) (65 - 84)  BP: 136/67 (17 Nov 2023 06:45) (128/72 - 142/78)  BP(mean): --  RR: 18 (17 Nov 2023 06:45) (17 - 18)  SpO2: 100% (17 Nov 2023 05:50) (98% - 100%)    Parameters below as of 17 Nov 2023 06:45  Patient On (Oxygen Delivery Method): room air      CONSTITUTIONAL: NAD  EYES: EOMI, conjunctiva and sclera clear  ENMT: Moist oral mucosa  NECK: Supple  RESPIRATORY: Breathing unlabored, CTAB  CARDIOVASCULAR: S1S2 no MRG  ABDOMEN: Nontender to palpation, normoactive bowel sounds, no rebound/guarding  MUSCULOSKELETAL: no clubbing or cyanosis of digits  NEUROLOGY:weakness  SKIN: No rashes or lesions    LABS:                        8.9    7.70  )-----------( 62       ( 17 Nov 2023 06:52 )             25.2     11-17    137  |  99  |  75<H>  ----------------------------<  66<L>  3.7   |  26  |  3.71<H>    Ca    8.3<L>      17 Nov 2023 06:52  Phos  4.1     11-17  Mg     2.40     11-17    TPro  6.9  /  Alb  1.9<L>  /  TBili  2.1<H>  /  DBili  x   /  AST  360<H>  /  ALT  25  /  AlkPhos  426<H>  11-17    PT/INR - ( 16 Nov 2023 05:55 )   PT: 16.1 sec;   INR: 1.44 ratio               Urinalysis Basic - ( 17 Nov 2023 06:52 )    Color: x / Appearance: x / SG: x / pH: x  Gluc: 66 mg/dL / Ketone: x  / Bili: x / Urobili: x   Blood: x / Protein: x / Nitrite: x   Leuk Esterase: x / RBC: x / WBC x   Sq Epi: x / Non Sq Epi: x / Bacteria: x      CODE: FULL

## 2023-11-17 NOTE — PROVIDER CONTACT NOTE (CHANGE IN STATUS NOTIFICATION) - SITUATION
Pt had suture in fistula at 4pm due to bleeding, then vascular surgery took out suture at 6pm. Upon change of shift, bright red bleeding noted.

## 2023-11-17 NOTE — PROGRESS NOTE ADULT - SUBJECTIVE AND OBJECTIVE BOX
CARDIOLOGY PROGRESS NOTE:     Patient is a 73y old  Male who presents with a chief complaint of Altered Mental Status (13 Nov 2023 14:12)      INTERVAL EVENTS: Droxidopa 100mg TID stopped yesterday, patient tolerating well (SBP range 128-142).    SUBJECTIVE: Patient seen and examined at bedside. This morning, patient is comfortable and doing well after droxidopa was stopped yesterday. No acute complaints, denies CP, fatigue, lightheadedness, SOB, or abdominal pain.    ----------------------------------------------------------------------------------  MEDICATIONS  (STANDING):  aspirin enteric coated 81 milliGRAM(s) Oral daily  chlorhexidine 2% Cloths 1 Application(s) Topical daily  clopidogrel Tablet 75 milliGRAM(s) Oral daily  dextrose 5%. 1000 milliLiter(s) (100 mL/Hr) IV Continuous <Continuous>  dextrose 5%. 1000 milliLiter(s) (50 mL/Hr) IV Continuous <Continuous>  dextrose 50% Injectable 25 Gram(s) IV Push once  droxidopa 200 milliGRAM(s) Oral every 8 hours  folic acid 1 milliGRAM(s) Oral daily  glucagon  Injectable 1 milliGRAM(s) IntraMuscular once  hydrocortisone sodium succinate Injectable 50 milliGRAM(s) IV Push every 12 hours  hydrocortisone sodium succinate Injectable   IV Push   insulin glargine Injectable (LANTUS) 6 Unit(s) SubCutaneous at bedtime  insulin lispro (ADMELOG) corrective regimen sliding scale   SubCutaneous three times a day before meals  insulin lispro (ADMELOG) corrective regimen sliding scale   SubCutaneous at bedtime  insulin lispro Injectable (ADMELOG) 3 Unit(s) SubCutaneous three times a day before meals  meropenem  IVPB 500 milliGRAM(s) IV Intermittent every 24 hours    MEDICATIONS  (PRN):  dextrose Oral Gel 15 Gram(s) Oral once PRN Blood Glucose LESS THAN 70 milliGRAM(s)/deciliter  lactulose Syrup 20 Gram(s) Oral three times a day PRN 2-3 bm daily  polyethylene glycol 3350 17 Gram(s) Oral two times a day PRN 2-3 bm daily  sodium chloride 0.9% Bolus. 100 milliLiter(s) IV Bolus every 5 minutes PRN SBP LESS THAN or EQUAL to 90 mmHg  sodium chloride 0.9% lock flush 10 milliLiter(s) IV Push every 1 hour PRN Pre/post blood products, medications, blood draw, and to maintain line patency      ----------------------------------------------------------------------------------  CAPILLARY BLOOD GLUCOSE      POCT Blood Glucose.: 280 mg/dL (14 Nov 2023 08:42)  POCT Blood Glucose.: 249 mg/dL (13 Nov 2023 21:35)  POCT Blood Glucose.: 231 mg/dL (13 Nov 2023 17:59)  POCT Blood Glucose.: 190 mg/dL (13 Nov 2023 12:03)    I&O's Summary    13 Nov 2023 07:01  -  14 Nov 2023 07:00  --------------------------------------------------------  IN: 1209.3 mL / OUT: 2000 mL / NET: -790.7 mL        ----------------------------------------------------------------------------------  PHYSICAL EXAM:  Vital Signs Last 24 Hrs  T(C): 35.2 (14 Nov 2023 08:00), Max: 37 (14 Nov 2023 04:00)  T(F): 95.4 (14 Nov 2023 08:00), Max: 98.6 (14 Nov 2023 04:00)  HR: 62 (14 Nov 2023 11:00) (59 - 76)  BP: --  BP(mean): --  RR: 12 (14 Nov 2023 11:00) (10 - 41)  SpO2: 95% (14 Nov 2023 11:00) (90% - 100%)    Parameters below as of 14 Nov 2023 08:00  Patient On (Oxygen Delivery Method): room air        GENERAL: NAD, lying in bed comfortably  HEAD: Atraumatic, normocephalic.   EYES: EOMI, PERRLA, conjunctiva and sclera clear  ENT: Moist mucous membranes  NECK: Supple, no JVD  HEART: S1, S2, Regular rate and rhythm, no murmurs, rubs, or gallops  LUNGS: Unlabored respirations, clear to auscultation bilaterally, no crackles, wheezes, or rhonchi  ABDOMEN: Soft, nontender, mildly distended, unchanged from prior exams  EXTREMITIES: 1+ peripheral pulses bilaterally. No clubbing, cyanosis, or edema  NERVOUS SYSTEM:  A&Ox3, neuro exam unchanged from prior. no focal deficits   SKIN: No rashes or lesions    ----------------------------------------------------------------------------------  LABS:                        7.3    6.95  )-----------( 39       ( 14 Nov 2023 00:55 )             20.4     11-14    135  |  99  |  57<H>  ----------------------------<  272<H>  3.8   |  23  |  2.62<H>    Ca    7.7<L>      14 Nov 2023 00:55  Phos  4.1     11-14  Mg     2.50     11-14    TPro  7.3  /  Alb  1.8<L>  /  TBili  3.0<H>  /  DBili  x   /  AST  660<H>  /  ALT  103<H>  /  AlkPhos  517<H>  11-14    PT/INR - ( 14 Nov 2023 00:55 )   PT: 16.9 sec;   INR: 1.51 ratio         PTT - ( 14 Nov 2023 00:55 )  PTT:37.3 sec      Urinalysis Basic - ( 14 Nov 2023 00:55 )    Color: x / Appearance: x / SG: x / pH: x  Gluc: 272 mg/dL / Ketone: x  / Bili: x / Urobili: x   Blood: x / Protein: x / Nitrite: x   Leuk Esterase: x / RBC: x / WBC x   Sq Epi: x / Non Sq Epi: x / Bacteria: x          ----------------------------------------------------------------------------------  RADIOLOGY & ADDITIONAL TESTS:  Lab Results Reviewed.  Imaging Personally Reviewed.  Electrocardiogram Personally Reviewed.   CARDIOLOGY PROGRESS NOTE:     Patient is a 73y old  Male who presents with a chief complaint of Altered Mental Status (13 Nov 2023 14:12)      INTERVAL EVENTS: Droxidopa 100mg TID stopped yesterday, patient tolerating well (SBP range 128-142). Received HD this morning.    SUBJECTIVE: Patient seen and examined at bedside. This morning, patient is comfortable and doing well after droxidopa was stopped yesterday. No acute complaints, denies CP, fatigue, lightheadedness, SOB, or abdominal pain.    ----------------------------------------------------------------------------------  MEDICATIONS  (STANDING):  aspirin enteric coated 81 milliGRAM(s) Oral daily  chlorhexidine 2% Cloths 1 Application(s) Topical daily  clopidogrel Tablet 75 milliGRAM(s) Oral daily  dextrose 5%. 1000 milliLiter(s) (100 mL/Hr) IV Continuous <Continuous>  dextrose 5%. 1000 milliLiter(s) (50 mL/Hr) IV Continuous <Continuous>  dextrose 50% Injectable 25 Gram(s) IV Push once  droxidopa 200 milliGRAM(s) Oral every 8 hours  folic acid 1 milliGRAM(s) Oral daily  glucagon  Injectable 1 milliGRAM(s) IntraMuscular once  hydrocortisone sodium succinate Injectable 50 milliGRAM(s) IV Push every 12 hours  hydrocortisone sodium succinate Injectable   IV Push   insulin glargine Injectable (LANTUS) 6 Unit(s) SubCutaneous at bedtime  insulin lispro (ADMELOG) corrective regimen sliding scale   SubCutaneous three times a day before meals  insulin lispro (ADMELOG) corrective regimen sliding scale   SubCutaneous at bedtime  insulin lispro Injectable (ADMELOG) 3 Unit(s) SubCutaneous three times a day before meals  meropenem  IVPB 500 milliGRAM(s) IV Intermittent every 24 hours    MEDICATIONS  (PRN):  dextrose Oral Gel 15 Gram(s) Oral once PRN Blood Glucose LESS THAN 70 milliGRAM(s)/deciliter  lactulose Syrup 20 Gram(s) Oral three times a day PRN 2-3 bm daily  polyethylene glycol 3350 17 Gram(s) Oral two times a day PRN 2-3 bm daily  sodium chloride 0.9% Bolus. 100 milliLiter(s) IV Bolus every 5 minutes PRN SBP LESS THAN or EQUAL to 90 mmHg  sodium chloride 0.9% lock flush 10 milliLiter(s) IV Push every 1 hour PRN Pre/post blood products, medications, blood draw, and to maintain line patency      ----------------------------------------------------------------------------------  CAPILLARY BLOOD GLUCOSE      POCT Blood Glucose.: 280 mg/dL (14 Nov 2023 08:42)  POCT Blood Glucose.: 249 mg/dL (13 Nov 2023 21:35)  POCT Blood Glucose.: 231 mg/dL (13 Nov 2023 17:59)  POCT Blood Glucose.: 190 mg/dL (13 Nov 2023 12:03)    I&O's Summary    13 Nov 2023 07:01  -  14 Nov 2023 07:00  --------------------------------------------------------  IN: 1209.3 mL / OUT: 2000 mL / NET: -790.7 mL        ----------------------------------------------------------------------------------  PHYSICAL EXAM:  Vital Signs Last 24 Hrs  T(C): 35.2 (14 Nov 2023 08:00), Max: 37 (14 Nov 2023 04:00)  T(F): 95.4 (14 Nov 2023 08:00), Max: 98.6 (14 Nov 2023 04:00)  HR: 62 (14 Nov 2023 11:00) (59 - 76)  BP: --  BP(mean): --  RR: 12 (14 Nov 2023 11:00) (10 - 41)  SpO2: 95% (14 Nov 2023 11:00) (90% - 100%)    Parameters below as of 14 Nov 2023 08:00  Patient On (Oxygen Delivery Method): room air        GENERAL: NAD, lying in bed comfortably  HEAD: Atraumatic, normocephalic.   EYES: EOMI, PERRLA, conjunctiva and sclera clear  ENT: Moist mucous membranes  NECK: Supple, no JVD  HEART: S1, S2, Regular rate and rhythm, no murmurs, rubs, or gallops  LUNGS: Unlabored respirations, clear to auscultation bilaterally, no crackles, wheezes, or rhonchi  ABDOMEN: Soft, nontender, mildly distended, unchanged from prior exams  EXTREMITIES: 1+ peripheral pulses bilaterally. No clubbing, cyanosis, or edema  NERVOUS SYSTEM:  A&Ox3, neuro exam unchanged from prior. no focal deficits   SKIN: No rashes or lesions    ----------------------------------------------------------------------------------  LABS:                        7.3    6.95  )-----------( 39       ( 14 Nov 2023 00:55 )             20.4     11-14    135  |  99  |  57<H>  ----------------------------<  272<H>  3.8   |  23  |  2.62<H>    Ca    7.7<L>      14 Nov 2023 00:55  Phos  4.1     11-14  Mg     2.50     11-14    TPro  7.3  /  Alb  1.8<L>  /  TBili  3.0<H>  /  DBili  x   /  AST  660<H>  /  ALT  103<H>  /  AlkPhos  517<H>  11-14    PT/INR - ( 14 Nov 2023 00:55 )   PT: 16.9 sec;   INR: 1.51 ratio         PTT - ( 14 Nov 2023 00:55 )  PTT:37.3 sec      Urinalysis Basic - ( 14 Nov 2023 00:55 )    Color: x / Appearance: x / SG: x / pH: x  Gluc: 272 mg/dL / Ketone: x  / Bili: x / Urobili: x   Blood: x / Protein: x / Nitrite: x   Leuk Esterase: x / RBC: x / WBC x   Sq Epi: x / Non Sq Epi: x / Bacteria: x          ----------------------------------------------------------------------------------  RADIOLOGY & ADDITIONAL TESTS:  Lab Results Reviewed.  Imaging Personally Reviewed.  Electrocardiogram Personally Reviewed.

## 2023-11-17 NOTE — PROGRESS NOTE ADULT - ASSESSMENT
73M with PMH of ESRD s/p kidney transplant on HD, CAD s/p multiple stents (most recently, RCA stent on 10/2023), T2DM, and HTN, with recent hospitalization for bacterial PNA, who originally presented to the ED on 11/7 after being found acutely unresponsive with agonal breathing by his daughter. Pt was intubated in ED for airway protection and due to AHRF and started on levophed; septic shock due to multifocal pneumonia was present on admission. Patient started on abx and is now back to baseline mental status, with occasional episodes of aphasia reported by daughter. Pt was extubated 11/11, now on room air. Pressors weaned off 11/14, transferred to medical floor 11/15. Vascular surgery consulted 2/2 oozing LUE AVF. Patient received dialysis through LUE fistula on 11/15 and was thereafter noted to have oozing therefrom. Patient's family present bedside, deny any history of stenting of b/l UEs or any procedure or intervention on the LUE fistula after its creation.     RECS:  -Pending LUE duplex   -Will likely need fistulogram during this admission   -Will continue to follow     Vascular Surgery, [id number]

## 2023-11-17 NOTE — PROGRESS NOTE ADULT - ASSESSMENT
73 y.o. man with PMH of ESRD s/p kidney transplant on HD, CAD s/p multiple stents (most recently, RCA stent on 10/2023), T2DM, and HTN, originally presented to the ED on 11/7 after being found acutely unresponsive, CPR was administered by daughter. In ED, pt was intubated for airway protection and due to AHRF and started on levo, CT A/P c/w multifocal PNA. S/p empiric zosyn and azithro, currently on meropenem. Patient now back to baseline mental and respiratory status (AOx3, on RA), pending MRI brain. Cardiology consulted for persistent pressor requirement with bedside POCUS showing thin RV wall and RV dilation raising concern for acute RV dysfunction. Collateral from Dr. Sheridan ([facility]) showed hx of at least 6 stent placements, normal LV and RV function on 3/2023 echo (full collateral in pt chart). 11/13 TTE demonstrated new moderately decreased LV function (EF 38%), enlarged RV with reduced systolic function, severe TR, and mod-severe MR. All new from 3/2023 echo, however on exam, patient continues to appear grossly euvolemic. This clinical presentation, along with the absence of more significant echo findings, makes decompensated RV failure an unlikely cause for the prior persistent vasopressor requirement. Patient is no longer on levo gtt, now completely off droxidopa and HD stable.    Recommendations:  - F/u with OP cardio for new-onset HF  - Continue to monitor on tele  - Initiate GDMT with carvedilol 3.125mg PO BID     All recommendations pending attending attestation. We will continue to follow with you.  73 y.o. man with PMH of ESRD s/p kidney transplant on HD, CAD s/p multiple stents (most recently, RCA stent on 10/2023), T2DM, and HTN, originally presented to the ED on 11/7 after being found acutely unresponsive, CPR was administered by daughter. In ED, pt was intubated for airway protection and due to AHRF and started on levo, CT A/P c/w multifocal PNA. S/p empiric zosyn and azithro, as well as meropenem. Patient now back to baseline mental and respiratory status (AOx3, on RA), pending MRI brain. Cardiology consulted for persistent pressor requirement with bedside POCUS showing thin RV wall and RV dilation raising concern for acute RV dysfunction. Collateral from Dr. Sheridan ([facility]) showed hx of at least 6 stent placements, normal LV and RV function on 3/2023 echo (full collateral in pt chart). 11/13 TTE demonstrated new moderately decreased LV function (EF 38%), enlarged RV with reduced systolic function, severe TR, and mod-severe MR. All new from 3/2023 echo, however on exam, patient continues to appear grossly euvolemic. This clinical presentation, along with the absence of more significant echo findings, makes decompensated RV failure an unlikely cause for the prior persistent vasopressor requirement. Patient is no longer on levo gtt, now completely off droxidopa and HD stable.    Recommendations:  - Initiate GDMT with carvedilol 3.125mg PO BID   - F/u with OP cardio for new-onset HF  - Continue to monitor on tele    All recommendations pending attending attestation. We will continue to follow with you.

## 2023-11-17 NOTE — DISCHARGE NOTE PROVIDER - NSDCMRMEDTOKEN_GEN_ALL_CORE_FT
aspirin 81 mg oral tablet: 1 tab(s) orally once a day  atorvastatin 80 mg oral tablet: 1 tab(s) orally once a day  clopidogrel 75 mg oral tablet: 1 tab(s) orally once a day  Coreg 12.5 mg oral tablet: 1 tab(s) orally 2 times a day  folic acid 1 mg oral tablet: 1 tab(s) orally once a day  HumaLOG KwikPen 100 units/mL injectable solution: 15 international unit(s) injectable 3 times a day (with meals)  insulin glargine 100 units/mL subcutaneous solution: 6 unit(s) subcutaneous once a day (at bedtime)  nitroglycerin 0.4 mg sublingual tablet: 1 tab(s) sublingually every 5 minutes as needed for  chest pain up to 3 doses  tamsulosin 0.4 mg oral capsule: 1 cap(s) orally once a day   aspirin 81 mg oral delayed release tablet: 1 tab(s) orally once a day  benzonatate 100 mg oral capsule: 1 cap(s) orally 3 times a day As needed Cough  carvedilol 3.125 mg oral tablet: 1 tab(s) orally every 12 hours  clopidogrel 75 mg oral tablet: 1 tab(s) orally once a day  folic acid 1 mg oral tablet: 1 tab(s) orally once a day  gabapentin 100 mg oral capsule: 1 cap(s) orally 3 times a week Take at 8pm on Mon, Weds, Thurs.  guaiFENesin 600 mg oral tablet, extended release: 1 tab(s) orally every 12 hours  lactulose 10 g/15 mL oral syrup: 30 milliliter(s) orally 3 times a day As needed 2-3 bm daily  levothyroxine 25 mcg (0.025 mg) oral tablet: 1 tab(s) orally once a day  lidocaine 4% topical film: Apply topically to affected area once a day as needed for  mild pain  polyethylene glycol 3350 oral powder for reconstitution: 17 gram(s) orally 2 times a day As needed 2-3 bm daily  sacubitril-valsartan 24 mg-26 mg oral tablet: 1 tab(s) orally 2 times a day  traMADol 50 mg oral tablet: 0.5 tab(s) orally every 12 hours As needed Moderate Pain (4 - 6)-Severe pain (7-10)

## 2023-11-17 NOTE — PROVIDER CONTACT NOTE (OTHER) - ACTION/TREATMENT ORDERED:
spoke to Nora emery [place] [id number], stitch placed by Dr Garcia vascular-to be removed in 2 hrs, will continue to monitor for bleeding

## 2023-11-17 NOTE — DISCHARGE NOTE PROVIDER - HOSPITAL COURSE
Admission HPI: 73-year-old male history of ESRD on HD, left Monday Wednesday Friday, last HD yesterday, CAD status post stents last stent was 1 month ago, BPH, diabetes, hypertensionPresents with EMS for altered mental status.  Per EMS on their arrival patient was found on his bed unresponsive, agonal breathing, hypoglycemia, given glucose with appropriate increase in fingerstick without any change in his mental status.  Put him on a nonrebreather, found him to be mildly hypotensive.  On arrival to the ED, Patient unresponsive, initially maintaining respiratory rate of approximately 18 with 100% saturation on nonrebreather however breathing becomes intermittently agonal. Minimally responsive to pain only (groans to IV placement), no other meaningful interaction.  	Per EMS family was on the way and patient as far as they know is full code. Given respiratory failure and altered mental status, decision was made to intubate patient, code stroke was called for acute altered mental status.    Daughter arrived after initial interventions, confirms patient is full code, reports that he was at his baseline state of health yesterday, had a full dialysis session, blood pressure did drop a bit during dialysis which it often does however it has been more persistently low recently so he was told not to take his antihypertensive medications.  Then yesterday evening he had episode of transient hypoxia to 90% after going up the stairs which she has experienced intermittently since having pneumonia approximately 1 month ago.  Daughter did chest PT breathing exercises, he felt better his oxygen normalized and he ate a full meal before going to sleep.  She last heard him in the bathroom this morning at 7 AM. When she arrived to his room to check on him, she found him sprawled on his bed looking as if he had just made it to the bed before collapsing.  She called EMS, given she reported agonal breathing they instructed her to perform CPR, she is concerned she might of cracked a rib, then EMS arrived.    ED course:   Given vanc, azithro, cefepime, 500cc NS bolus. Intubated for airway protection, hypoxemic respiratory failure - done w/ etomidate, prop. Started on levo gtt.   Code stroke activated - brain imaging negative for intracranial bleeding; CTA neck and brain w/ patent vasculature, no flow limiting stenoses.   CT A/P w/ b/l lower lung consolidative opacities c/w MF PNA, right pleural thickening w/ loculated small effusion, intrathoracic LAD.   Moderate ascites and anasarca, diffuse mural thickening of stomach, small bowel, colon.     Hospital course:  In the ED, a code stroke due to AMS was negative (no findings on CTH or CTA head and neck). Neurology ascribed mental status change to metabolic encephalopathy from sepsis and no further neuro imaging was recommended.  Pt was intubated for airway protection and due to AHRF and started on levophed; septic shock due to multifocal pneumonia was present on admission.   Patient was started on empiric zosyn and azithromycin, then transitioned to meropenem for broader coverage.  Patient is now back to baseline mental status, with occasional episodes of aphasia reported by daughter. Pt was extubated 11/11, now on room air. Pressors weaned off 11/14, transferred to medical floor 11/15. Droxydopa and hydrocortisone were weaned off and carvedilol resumed.  Patient had oozing from AV fistula and was seen by vascular surgery.     73-year-old male history of ESRD on HD, MWF, CAD s/p stents last stent  1 month ago, BPH, diabetes, hypertension p/w altered mental status; found unresponsive w/ agonal breathing. Code stroke activated on ED arrival. CT head w/o acute pathology. CTA neck and brain w/ patent vasculature, no flow limiting stenoses. Course c/b hypotension w/ c/f septic shock 2/2 multifocal PNA. Intubated for airway protection and started on pressors. Transferred to MICU for further management. Now s/p extubation 11/11. Pt treated w/ empiric zosyn and azithromycin, then transitioned to meropenem for broader coverage. Now s/p abx. Mental status now back to baseline AAO x 3 though w intermittent episodes of confusion. Transferred to medicine for further mgt. Hospital course c.b L arm swelling around fistula/ oozing from AV fistula. Evaluated by vascular surgery.  Initially Planned for fistulogram 11/22, pt refused. Re-addressed w/ patient and family. Now in agreement. Per vasc, no plan for inpatient fistulogram at this time. Discussed w/ family, family would prefer inpatient fistulogram. Surg to speak w/ family   73-year-old male history of ESRD on HD, MWF, CAD s/p stents last stent  1 month ago, BPH, diabetes, hypertension p/w altered mental status; found unresponsive w/ agonal breathing. Code stroke activated on ED arrival. CT head w/o acute pathology. CTA neck and brain w/ patent vasculature, no flow limiting stenoses. Course c/b hypotension w/ c/f septic shock 2/2 multifocal PNA. Intubated for airway protection and started on pressors. Transferred to MICU for further management. Now s/p extubation 11/11. Pt treated w/ empiric zosyn and azithromycin, then transitioned to meropenem for broader coverage. Now s/p abx. Mental status now back to baseline AAO x 3 though w intermittent episodes of confusion. Transferred to medicine for further mgt. Hospital course c.b L arm swelling around fistula/ oozing from AV fistula. Evaluated by vascular surgery, patient underwent fistulogram and subsequently, patient had resolution of symptoms. 73-year-old male history of ESRD on HD, MWF, CAD s/p stents last stent  1 month ago, BPH, diabetes, hypertension p/w altered mental status; found unresponsive w/ agonal breathing. Code stroke activated on ED arrival. CT head w/o acute pathology. CTA neck and brain w/ patent vasculature, no flow limiting stenoses. Course c/b hypotension w/ c/f septic shock 2/2 multifocal PNA. Intubated for airway protection and started on pressors. Transferred to MICU for further management. Now s/p extubation 11/11. Pt treated w/ empiric zosyn and azithromycin, then transitioned to meropenem for broader coverage. Now s/p abx. Mental status now back to baseline AAO x 3 though w intermittent episodes of confusion. Transferred to medicine for further mgt. Hospital course c.b L arm swelling around fistula/ oozing from AV fistula. Evaluated by vascular surgery, patient underwent fistulogram and subsequently, patient had resolution of symptoms.     ·  Problem: Anemia.   ·  Plan: Pt with anemia. likely AOCD. No e/o bleeding.   -Goal hgb >8 due to CAD and recent stents   -S/p 1U PRBC on 12/1 with good response   -EPO per Nephro   -CTM    #abdominal distention  f/up abdominal xray ; ascites - known.     Problem/Plan - 2:  ·  Problem: Complication of AV dialysis fistula.   ·  Plan: Pt p/w oozing from AV fistula.   -Vascular consulted, s/p fistulogram  -Fistula functioning well during HD on 11/30 and 12/2 and 12/5   -Oozing now resolved.     Problem/Plan - 3:  ·  Problem: ESRD on dialysis.   ·  Plan: HD per nephrology  anemia due to ESRD, aranesp at outpt HD per renal    Appreciate vasc surg eval of oozing fistula, s/p fistulogram 11/29. tolerating HD well through fistula, no oozing noted today.   Avoid nephrotoxins /renally dose meds.     Problem/Plan - 4:  ·  Problem: Thrombocytopenia.   ·  Plan: Unclear etiology. Presume to be 2/2 infection. No s/s of bleeding.   Transfuse as indicated  continue to monitor  -Starting to stabilize  should have hematology f/up.     Problem/Plan - 5:  ·  Problem: Transaminitis.   ·  Plan: Presumed due to shock liver  improving  continue to monitor.     Problem/Plan - 6:  ·  Problem: Severe sepsis.   ·  Plan: Septic shock due to multifocal pneumonia present on admission  Pt was intubated for airway protection, now extubated  Diagnostic paracentesis negative for SBP  Appreciate cardiology recs--low suspicion for cardiogenic component to shock  s/p IV meropenem  Stress dose steroids tapered off  Tapered droxydopa 200 tid-->100 Tid on 11/15,  d/carlos 11/16  Monitor BP  Sepsis now resolved  Appreciate PM&R eval, ELICIA recommended  -NOW RESOLVED.     Problem/Plan - 7:  ·  Problem: Chest pain.   ·  Plan: Pt w/ intermittent reproducible chest pain, unclear etiology. Suspect MSK  --Lower suspicion for cardiac etiology  --EKGs non ischemic  --Trops downtrending  --Continue tele monitoring  --Lidocaine patch/ PRN Pain control  -NOW RESOLVED.     Problem/Plan - 8:  ·  Problem: Metabolic encephalopathy.   ·  Plan: Present on admission, due to sepsis  Appreciate neurology eval  No evidence CVA  No further neurology w/u needed  -NOW RESOLVED.     Problem/Plan - 9:  ·  Problem: CAD (coronary artery disease).   ·  Plan: Appreciate cardiology recs. Recent stent ~ 1 months ago.   - c/w home aspirin 81mg qd  - c/w home plavix 75mg qd  - c/w coreg - decreased to 3.125mg BID  - HOLD statin given transaminitis  - Continue tele monitoring.     Problem/Plan - 10:  ·  Problem: Type 2 diabetes mellitus.   ·  Plan; Intermittent hypoglycemia noted.   -D/carlos insulin and sliding scale.   -A1C 5.8  -Will continue to monitor qAC fingersticks with hypoglycemia protocol   -Discontinue home diabetic meds on discharge.     Problem/Plan - 11:  ·  Problem: Elevated TSH.   ·  Plan: Normal FT4  Concerning for subclinical hypothyroidism   Discussed w/. endo, c/w levothyroxine 25mcg QD

## 2023-11-17 NOTE — CONSULT NOTE ADULT - CONSULT REASON
Rehabilitation evaluation
Oozing AVF
AMS
ESRD on HD
Evaluation for persistent hypotension, possible RV dysfunction

## 2023-11-17 NOTE — PROGRESS NOTE ADULT - SUBJECTIVE AND OBJECTIVE BOX
[facility] Division of Kidney Diseases & Hypertension  FOLLOW UP NOTE  --------------------------------------------------------------------------------  Chief Complaint: ESRD on going dialysis requirement    24 hour events/subjective: Patient seen and evaluated at bedside this morning at dialysis tolerating treatment well without complaints.    PAST HISTORY  --------------------------------------------------------------------------------  No significant changes to PMH, PSH, FHx, SHx, unless otherwise noted    ALLERGIES & MEDICATIONS  --------------------------------------------------------------------------------  Allergies    No Known Allergies    Intolerances    Standing Inpatient Medications  aspirin enteric coated 81 milliGRAM(s) Oral daily  chlorhexidine 2% Cloths 1 Application(s) Topical daily  clopidogrel Tablet 75 milliGRAM(s) Oral daily  dextrose 5%. 1000 milliLiter(s) IV Continuous <Continuous>  dextrose 5%. 1000 milliLiter(s) IV Continuous <Continuous>  dextrose 50% Injectable 25 Gram(s) IV Push once  folic acid 1 milliGRAM(s) Oral daily  glucagon  Injectable 1 milliGRAM(s) IntraMuscular once  hydrocortisone sodium succinate Injectable   IV Push   hydrocortisone sodium succinate Injectable 50 milliGRAM(s) IV Push daily  insulin glargine Injectable (LANTUS) 6 Unit(s) SubCutaneous at bedtime  insulin lispro (ADMELOG) corrective regimen sliding scale   SubCutaneous three times a day before meals  insulin lispro (ADMELOG) corrective regimen sliding scale   SubCutaneous at bedtime  insulin lispro Injectable (ADMELOG) 3 Unit(s) SubCutaneous three times a day before meals    PRN Inpatient Medications  dextrose Oral Gel 15 Gram(s) Oral once PRN  lactulose Syrup 20 Gram(s) Oral three times a day PRN  polyethylene glycol 3350 17 Gram(s) Oral two times a day PRN  sodium chloride 0.9% Bolus. 100 milliLiter(s) IV Bolus every 5 minutes PRN      REVIEW OF SYSTEMS  --------------------------------------------------------------------------------  Gen: no distress  Respiratory: no sob  CV: no cp  GI: no pain  : no complaints  MSK: no pain    VITALS/PHYSICAL EXAM  --------------------------------------------------------------------------------  T(C): 36.2 (11-17-23 @ 06:45), Max: 37.1 (11-16-23 @ 21:10)  HR: 65 (11-17-23 @ 06:45) (65 - 84)  BP: 136/67 (11-17-23 @ 06:45) (128/72 - 142/78)  ABP: --  ABP(mean): --  RR: 18 (11-17-23 @ 06:45) (17 - 18)  SpO2: 100% (11-17-23 @ 05:50) (98% - 100%)  CVP(mm Hg): --    Physical Exam:  	Gen: resting, NAD  	HEENT: Anicteric  	Pulm: Fair entry B/L, CTA B/L  	CV: S1S2+  	Abd: Soft, +BS    	Ext: No LE edema B/L  	Neuro: Awake and alert  	Skin: Warm and dry  	Dialysis access: LUE AVF+    LABS/STUDIES  --------------------------------------------------------------------------------              8.9    7.70  >-----------<  62       [11-17-23 @ 06:52]              25.2     137  |  99  |  75  ----------------------------<  66      [11-17-23 @ 06:52]  3.7   |  26  |  3.71        Ca     8.3     [11-17-23 @ 06:52]      Mg     2.40     [11-17-23 @ 06:52]      Phos  4.1     [11-17-23 @ 06:52]    TPro  6.9  /  Alb  1.9  /  TBili  2.1  /  DBili  x   /  AST  360  /  ALT  25  /  AlkPhos  426  [11-17-23 @ 06:52]    PT/INR: PT 16.1 , INR 1.44       [11-16-23 @ 05:55]      Creatinine Trend:  SCr 3.71 [11-17 @ 06:52]  SCr 2.81 [11-16 @ 05:55]  SCr 3.46 [11-15 @ 03:10]  SCr 2.62 [11-14 @ 00:55]  SCr 3.49 [11-13 @ 00:16]    Urinalysis - [11-17-23 @ 06:52]      Color  / Appearance  / SG  / pH       Gluc 66 / Ketone   / Bili  / Urobili        Blood  / Protein  / Leuk Est  / Nitrite       RBC  / WBC  / Hyaline  / Gran  / Sq Epi  / Non Sq Epi  / Bacteria         HBsAb Reactive      [11-13-23 @ 14:30]  HBsAg Nonreact      [11-13-23 @ 14:30]  HBcAb Reactive      [11-13-23 @ 14:30]  HCV 0.16, Nonreact      [11-13-23 @ 14:30]

## 2023-11-17 NOTE — PROGRESS NOTE ADULT - PROBLEM SELECTOR PLAN 4
HD per nephrology  anemia due to ESRD, aranesp at outpt HD per renal    Appreciate vasc surg eval of oozing fistula, for LUE duplex and fistulogram

## 2023-11-17 NOTE — PROVIDER CONTACT NOTE (CHANGE IN STATUS NOTIFICATION) - ACTION/TREATMENT ORDERED:
per Dr. Maldondao- will call general surgery triage resident to come to bedside and evaluate patient  2048- Dr. BRAIN Vasquez at bedside evaluating patient

## 2023-11-17 NOTE — PROGRESS NOTE ADULT - PROBLEM SELECTOR PLAN 1
Septic shock due to multifocal pneumonia present on admission  Pt was intubated for airway protection, now extubated  Diagnostic paracentesis negative for SBP  Appreciate cardiology recs--low suspicion for cardiogenic component to shock  Continue IV meropenem  Stress dose steroids tapered off  Taper droxydopa 200 tid-->100 Tid on 11/15, then d/c 11/16  Monitor BP  Sepsis has resolved  Appreciate PM&R shad, ELICIA recommended

## 2023-11-17 NOTE — CONSULT NOTE ADULT - SUBJECTIVE AND OBJECTIVE BOX
HPI:  73-year-old male history of ESRD on HD, left Monday Wednesday Friday, last HD yesterday, CAD status post stents last stent was 1 month ago, BPH, diabetes, hypertensionPresents with EMS for altered mental status.  Per EMS on their arrival patient was found on his bed unresponsive, agonal breathing, hypoglycemia, given glucose with appropriate increase in fingerstick without any change in his mental status.  Put him on a nonrebreather, found him to be mildly hypotensive.  On arrival to the ED, Patient unresponsive, initially maintaining respiratory rate of approximately 18 with 100% saturation on nonrebreather however breathing becomes intermittently agonal. Minimally responsive to pain only (groans to IV placement), no other meaningful interaction.  	Per EMS family was on the way and patient as far as they know is full code. Given respiratory failure and altered mental status, decision was made to intubate patient, code stroke was called for acute altered mental status.    Daughter arrived after initial interventions, confirms patient is full code, reports that he was at his baseline state of health yesterday, had a full dialysis session, blood pressure did drop a bit during dialysis which it often does however it has been more persistently low recently so he was told not to take his antihypertensive medications.  Then yesterday evening he had episode of transient hypoxia to 90% after going up the stairs which she has experienced intermittently since having pneumonia approximately 1 month ago.  Daughter did chest PT breathing exercises, he felt better his oxygen normalized and he ate a full meal before going to sleep.  She last heard him in the bathroom this morning at 7 AM. When she arrived to his room to check on him, she found him sprawled on his bed looking as if he had just made it to the bed before collapsing.  She called EMS, given she reported agonal breathing they instructed her to perform CPR, she is concerned she might of cracked a rib, then EMS arrived.    ED course:   Given vanc, azithro, cefepime, 500cc NS bolus. Intubated for airway protection, hypoxemic respiratory failure - done w/ etomidate, prop. Started on levo gtt.   Code stroke activated - brain imaging negative for intracranial bleeding; CTA neck and brain w/ patent vasculature, no flow limiting stenoses.   CT A/P w/ b/l lower lung consolidative opacities c/w MF PNA, right pleural thickening w/ loculated small effusion, intrathoracic LAD.   Moderate ascites and anasarca, diffuse mural thickening of stomach, small bowel, colon.  (07 Nov 2023 12:39)      REVIEW OF SYSTEMS/Subjective: Patient in bed in NAD, no SOB, no n/v, no pain  Constitutional - No fever, (+) fatigue  HEENT - No visual disturbances, No neck pain  Respiratory - No cough, No shortness of breath  Cardiovascular - No chest pain  Gastrointestinal - No abdominal pain  Genitourinary - No dysuria,  No incontinence  Neurological - No headaches, No loss of strength, No numbness  Musculoskeletal - No joint pain, No joint swelling, No muscle pain  Psychiatric - No depression, No anxiety  All other review of systems negative    PAST MEDICAL & SURGICAL HISTORY  Altered mental status    Handoff    MEWS Score    End Stage Renal Disease on Dialysis    Diabetes    H/O: Hypertension    Coronary artery disease    Transplanted kidney    Altered mental status    ESRD on dialysis    Acute hypotension    CAD (coronary artery disease)    Anemia secondary to renal failure    Severe sepsis    CAD (coronary artery disease)    Type 2 diabetes mellitus    Elevated TSH    Need for prophylactic measure    Metabolic encephalopathy    Status Post Coronary Artery Stent Placement    S/P kidney transplant    NOTE    Dysfunction of right cardiac ventricle    90+    Acute respiratory failure, unspecified whether with hypoxia or hypercapnia    [id number]        SOCIAL HISTORY    Smoking - Denied  EtOH - Denied   Drugs - Denied    FUNCTIONAL HISTORY  Lives with spouse and adult children in  a pvt house with 1 flight steps to basement apt. (-)HHA  Independent in ambulation with RW/SAC, ADL's, transfers prior to hospitalization        FAMILY HISTORY   Reviewed and non-contributory    ALLERGIES  No Known Allergies    VITALS  T(C): 36.2 (11-17-23 @ 06:45)  T(F): 97.2 (11-17-23 @ 06:45), Max: 98.8 (11-16-23 @ 21:10)  HR: 65 (11-17-23 @ 06:45) (65 - 84)  BP: 136/67 (11-17-23 @ 06:45) (128/72 - 142/78)  RR:  (17 - 18)  SpO2:  (98% - 100%)  Wt(kg): --    PHYSICAL EXAM  Constitutional - NAD, Comfortable  HEENT - NCAT, MMM  Neck - Supple  Chest - CTA bilaterally  Cardiovascular - RRR, S1S2  Abdomen - BS+, Soft, NTND  Extremities - No C/C/E, No calf tenderness   Neurologic Exam -                    Cognitive - Awake, Alert, Oriented to self, place, date, year, situation     Communication - Fluent, No dysarthria        Cranial Nerves - EOMI, tongue midline, no facial asymmetry     Motor -                     LEFT    UE - ShAB 4+/5, EF 5/5, EE 5/5, WE 5/5,  5/5                    RIGHT UE - ShAB 4+/5, EF 5/5, EE 5/5, WE 5/5,  5/5                    LEFT    LE - HF 4+/5, KE 5/5, DF 5/5, PF 5/5                    RIGHT LE - HF 4+/5, KE 5/5, DF 5/5, PF 5/5        Sensory - Intact to light touch diffusely     Reflexes - DTR intact and symmetrical, Negative Babinski's bilaterally      Coordination - Finger-to-nose intact bilaterally   Psychiatric - Affect WNL    CURRENT FUNCTIONAL STATUS  Bed mobility - (A)  Transfers - deferred    RECENT LABS/IMAGING                        8.9    7.70  )-----------( 62       ( 17 Nov 2023 06:52 )             25.2     11-17    137  |  99  |  75<H>  ----------------------------<  66<L>  3.7   |  26  |  3.71<H>    Ca    8.3<L>      17 Nov 2023 06:52  Phos  4.1     11-17  Mg     2.40     11-17    TPro  6.9  /  Alb  1.9<L>  /  TBili  2.1<H>  /  DBili  x   /  AST  360<H>  /  ALT  25  /  AlkPhos  426<H>  11-17    PT/INR - ( 16 Nov 2023 05:55 )   PT: 16.1 sec;   INR: 1.44 ratio           Urinalysis Basic - ( 17 Nov 2023 06:52 )    Color: x / Appearance: x / SG: x / pH: x  Gluc: 66 mg/dL / Ketone: x  / Bili: x / Urobili: x   Blood: x / Protein: x / Nitrite: x   Leuk Esterase: x / RBC: x / WBC x   Sq Epi: x / Non Sq Epi: x / Bacteria: x          MEDICATIONS   MEDICATIONS  (STANDING):  aspirin enteric coated 81 milliGRAM(s) Oral daily  chlorhexidine 2% Cloths 1 Application(s) Topical daily  clopidogrel Tablet 75 milliGRAM(s) Oral daily  dextrose 5%. 1000 milliLiter(s) (50 mL/Hr) IV Continuous <Continuous>  dextrose 5%. 1000 milliLiter(s) (100 mL/Hr) IV Continuous <Continuous>  dextrose 50% Injectable 25 Gram(s) IV Push once  folic acid 1 milliGRAM(s) Oral daily  glucagon  Injectable 1 milliGRAM(s) IntraMuscular once  insulin glargine Injectable (LANTUS) 6 Unit(s) SubCutaneous at bedtime  insulin lispro (ADMELOG) corrective regimen sliding scale   SubCutaneous three times a day before meals  insulin lispro (ADMELOG) corrective regimen sliding scale   SubCutaneous at bedtime  insulin lispro Injectable (ADMELOG) 3 Unit(s) SubCutaneous three times a day before meals    MEDICATIONS  (PRN):  dextrose Oral Gel 15 Gram(s) Oral once PRN Blood Glucose LESS THAN 70 milliGRAM(s)/deciliter  lactulose Syrup 20 Gram(s) Oral three times a day PRN 2-3 bm daily  polyethylene glycol 3350 17 Gram(s) Oral two times a day PRN 2-3 bm daily  sodium chloride 0.9% Bolus. 100 milliLiter(s) IV Bolus every 5 minutes PRN SBP LESS THAN or EQUAL to 90 mmHg      ACC: [id number] EXAM: CT BRAIN STROKE PROTOCOL ORDERED BY: THEODORA ANGULO    PROCEDURE DATE: 11/07/2023        INTERPRETATION: CT OF THE HEAD WITHOUT CONTRAST    CLINICAL INDICATION: Stroke Code.    TECHNIQUE: Volumetric CT acquisition was performed through the brain and reviewed using brain and bone window technique.      COMPARISON: CT head 6/4/2013    FINDINGS:    The ventricular and sulcal size and configuration is age appropriate. There is no acute loss of gray-white differentiation. There are moderate patchy areas of hypodensity in the periventricular and subcortical white matter which are likely related to chronic microangiopathic changes.    There is no evidence of mass effect, midline shift, acute intracranial hemorrhage, or extra-axial collections.     The calvarium is intact. Left maxillary sinus moderate mucosal thickening.The mastoid air cells are predominantly clear. There has been prior bilateral cataract surgery.      IMPRESSION:  No acute intracranial hemorrhage or acute territorial infarction. Moderate chronic microvascular ischemic changes.    Notification to clinician of alert:  Dr. Jimenez was notified about the noncontrast head CT findings at 9:01 AM on 11/7/2023 with readback confirmation. The opportunity for questions was provided and all questions asked were answered.    --- End of Report ---      ASSESSMENT/PLAN  The patient is a 72y/o PMHx ESRD s/p kidney transplant on HD, CAD s/p multiple stents (most recently, RCA stent on 10/2023), T2DM, and HTN, with recent hospitalization for bacterial PNA s/p intubated in ED for airway protection and due to AHRF and started on levophed; septic shock due to multifocal pneumonia with functional, gait, ADL impairments.    Disposition - Patient is a candidate for restorative inpatient rehab, subacute unit. Follow progress with bedside PT  PT - ROM, Bed mobility, Transfers, Ambulation with assistive device  OT - ADLs, ROM

## 2023-11-18 LAB
ANION GAP SERPL CALC-SCNC: 10 MMOL/L — SIGNIFICANT CHANGE UP (ref 7–14)
ANION GAP SERPL CALC-SCNC: 10 MMOL/L — SIGNIFICANT CHANGE UP (ref 7–14)
BUN SERPL-MCNC: 50 MG/DL — HIGH (ref 7–23)
BUN SERPL-MCNC: 50 MG/DL — HIGH (ref 7–23)
CALCIUM SERPL-MCNC: 7.9 MG/DL — LOW (ref 8.4–10.5)
CALCIUM SERPL-MCNC: 7.9 MG/DL — LOW (ref 8.4–10.5)
CHLORIDE SERPL-SCNC: 100 MMOL/L — SIGNIFICANT CHANGE UP (ref 98–107)
CHLORIDE SERPL-SCNC: 100 MMOL/L — SIGNIFICANT CHANGE UP (ref 98–107)
CO2 SERPL-SCNC: 28 MMOL/L — SIGNIFICANT CHANGE UP (ref 22–31)
CO2 SERPL-SCNC: 28 MMOL/L — SIGNIFICANT CHANGE UP (ref 22–31)
CREAT SERPL-MCNC: 2.94 MG/DL — HIGH (ref 0.5–1.3)
CREAT SERPL-MCNC: 2.94 MG/DL — HIGH (ref 0.5–1.3)
EGFR: 22 ML/MIN/1.73M2 — LOW
EGFR: 22 ML/MIN/1.73M2 — LOW
GLUCOSE BLDC GLUCOMTR-MCNC: 102 MG/DL — HIGH (ref 70–99)
GLUCOSE BLDC GLUCOMTR-MCNC: 102 MG/DL — HIGH (ref 70–99)
GLUCOSE BLDC GLUCOMTR-MCNC: 113 MG/DL — HIGH (ref 70–99)
GLUCOSE BLDC GLUCOMTR-MCNC: 113 MG/DL — HIGH (ref 70–99)
GLUCOSE BLDC GLUCOMTR-MCNC: 138 MG/DL — HIGH (ref 70–99)
GLUCOSE BLDC GLUCOMTR-MCNC: 138 MG/DL — HIGH (ref 70–99)
GLUCOSE BLDC GLUCOMTR-MCNC: 165 MG/DL — HIGH (ref 70–99)
GLUCOSE BLDC GLUCOMTR-MCNC: 165 MG/DL — HIGH (ref 70–99)
GLUCOSE BLDC GLUCOMTR-MCNC: 57 MG/DL — LOW (ref 70–99)
GLUCOSE BLDC GLUCOMTR-MCNC: 57 MG/DL — LOW (ref 70–99)
GLUCOSE SERPL-MCNC: 72 MG/DL — SIGNIFICANT CHANGE UP (ref 70–99)
GLUCOSE SERPL-MCNC: 72 MG/DL — SIGNIFICANT CHANGE UP (ref 70–99)
HCT VFR BLD CALC: 30 % — LOW (ref 39–50)
HCT VFR BLD CALC: 30 % — LOW (ref 39–50)
HGB BLD-MCNC: 10.4 G/DL — LOW (ref 13–17)
HGB BLD-MCNC: 10.4 G/DL — LOW (ref 13–17)
MAGNESIUM SERPL-MCNC: 2.2 MG/DL — SIGNIFICANT CHANGE UP (ref 1.6–2.6)
MAGNESIUM SERPL-MCNC: 2.2 MG/DL — SIGNIFICANT CHANGE UP (ref 1.6–2.6)
MCHC RBC-ENTMCNC: 30.1 PG — SIGNIFICANT CHANGE UP (ref 27–34)
MCHC RBC-ENTMCNC: 30.1 PG — SIGNIFICANT CHANGE UP (ref 27–34)
MCHC RBC-ENTMCNC: 34.7 GM/DL — SIGNIFICANT CHANGE UP (ref 32–36)
MCHC RBC-ENTMCNC: 34.7 GM/DL — SIGNIFICANT CHANGE UP (ref 32–36)
MCV RBC AUTO: 86.7 FL — SIGNIFICANT CHANGE UP (ref 80–100)
MCV RBC AUTO: 86.7 FL — SIGNIFICANT CHANGE UP (ref 80–100)
NRBC # BLD: 0 /100 WBCS — SIGNIFICANT CHANGE UP (ref 0–0)
NRBC # BLD: 0 /100 WBCS — SIGNIFICANT CHANGE UP (ref 0–0)
NRBC # FLD: 0.05 K/UL — HIGH (ref 0–0)
NRBC # FLD: 0.05 K/UL — HIGH (ref 0–0)
PHOSPHATE SERPL-MCNC: 3.5 MG/DL — SIGNIFICANT CHANGE UP (ref 2.5–4.5)
PHOSPHATE SERPL-MCNC: 3.5 MG/DL — SIGNIFICANT CHANGE UP (ref 2.5–4.5)
PLATELET # BLD AUTO: 65 K/UL — LOW (ref 150–400)
PLATELET # BLD AUTO: 65 K/UL — LOW (ref 150–400)
POTASSIUM SERPL-MCNC: 3.3 MMOL/L — LOW (ref 3.5–5.3)
POTASSIUM SERPL-MCNC: 3.3 MMOL/L — LOW (ref 3.5–5.3)
POTASSIUM SERPL-SCNC: 3.3 MMOL/L — LOW (ref 3.5–5.3)
POTASSIUM SERPL-SCNC: 3.3 MMOL/L — LOW (ref 3.5–5.3)
RBC # BLD: 3.46 M/UL — LOW (ref 4.2–5.8)
RBC # BLD: 3.46 M/UL — LOW (ref 4.2–5.8)
RBC # FLD: 27.1 % — HIGH (ref 10.3–14.5)
RBC # FLD: 27.1 % — HIGH (ref 10.3–14.5)
SODIUM SERPL-SCNC: 138 MMOL/L — SIGNIFICANT CHANGE UP (ref 135–145)
SODIUM SERPL-SCNC: 138 MMOL/L — SIGNIFICANT CHANGE UP (ref 135–145)
WBC # BLD: 7.04 K/UL — SIGNIFICANT CHANGE UP (ref 3.8–10.5)
WBC # BLD: 7.04 K/UL — SIGNIFICANT CHANGE UP (ref 3.8–10.5)
WBC # FLD AUTO: 7.04 K/UL — SIGNIFICANT CHANGE UP (ref 3.8–10.5)
WBC # FLD AUTO: 7.04 K/UL — SIGNIFICANT CHANGE UP (ref 3.8–10.5)

## 2023-11-18 PROCEDURE — 99233 SBSQ HOSP IP/OBS HIGH 50: CPT

## 2023-11-18 RX ORDER — POTASSIUM CHLORIDE 20 MEQ
40 PACKET (EA) ORAL ONCE
Refills: 0 | Status: COMPLETED | OUTPATIENT
Start: 2023-11-18 | End: 2023-11-18

## 2023-11-18 RX ADMIN — Medication 3 UNIT(S): at 11:48

## 2023-11-18 RX ADMIN — INSULIN GLARGINE 6 UNIT(S): 100 INJECTION, SOLUTION SUBCUTANEOUS at 21:25

## 2023-11-18 RX ADMIN — CARVEDILOL PHOSPHATE 3.12 MILLIGRAM(S): 80 CAPSULE, EXTENDED RELEASE ORAL at 17:29

## 2023-11-18 RX ADMIN — Medication 3 UNIT(S): at 17:28

## 2023-11-18 RX ADMIN — CLOPIDOGREL BISULFATE 75 MILLIGRAM(S): 75 TABLET, FILM COATED ORAL at 11:48

## 2023-11-18 RX ADMIN — Medication 81 MILLIGRAM(S): at 11:48

## 2023-11-18 RX ADMIN — POLYETHYLENE GLYCOL 3350 17 GRAM(S): 17 POWDER, FOR SOLUTION ORAL at 05:48

## 2023-11-18 RX ADMIN — CHLORHEXIDINE GLUCONATE 1 APPLICATION(S): 213 SOLUTION TOPICAL at 17:29

## 2023-11-18 RX ADMIN — Medication 1 MILLIGRAM(S): at 11:48

## 2023-11-18 RX ADMIN — CARVEDILOL PHOSPHATE 3.12 MILLIGRAM(S): 80 CAPSULE, EXTENDED RELEASE ORAL at 05:47

## 2023-11-18 RX ADMIN — Medication 40 MILLIEQUIVALENT(S): at 07:55

## 2023-11-18 NOTE — PROGRESS NOTE ADULT - PROBLEM SELECTOR PLAN 4
HD per nephrology  anemia due to ESRD, aranesp at outpt HD per renal    Appreciate vasc surg eval of oozing fistula, for fistulogram  LUE doppler patent fistula HD per nephrology  anemia due to ESRD, aranesp at outpt HD per renal    Appreciate vasc surg eval of oozing fistula, for fistulogram  Patient is medically optimized for fistulogram, will need to coordinate with HD to have dialysis after the fistulogram  LUE doppler patent fistula

## 2023-11-18 NOTE — PROVIDER CONTACT NOTE (HYPOGLYCEMIA EVENT) - NS PROVIDER CONTACT RECOMMEND-HYPO
Patients breakfast tray arrived to unit, patient actively eating. Given 4oz orange juice at 0738 on 11/18/2023. Provider paged, awaiting response back

## 2023-11-18 NOTE — PROGRESS NOTE ADULT - SUBJECTIVE AND OBJECTIVE BOX
[facility] Division of Hospital Medicine  Douglas Villegas MD  Pager [phone]    Patient is a 73y old  Male who presents with a chief complaint of resp failure (17 Nov 2023 13:54)      SUBJECTIVE / OVERNIGHT EVENTS: No events.  Oozing at fistula stopped      MEDICATIONS  (STANDING):  aspirin enteric coated 81 milliGRAM(s) Oral daily  carvedilol 3.125 milliGRAM(s) Oral every 12 hours  chlorhexidine 2% Cloths 1 Application(s) Topical daily  clopidogrel Tablet 75 milliGRAM(s) Oral daily  dextrose 5%. 1000 milliLiter(s) (50 mL/Hr) IV Continuous <Continuous>  dextrose 5%. 1000 milliLiter(s) (100 mL/Hr) IV Continuous <Continuous>  dextrose 50% Injectable 25 Gram(s) IV Push once  folic acid 1 milliGRAM(s) Oral daily  glucagon  Injectable 1 milliGRAM(s) IntraMuscular once  insulin glargine Injectable (LANTUS) 6 Unit(s) SubCutaneous at bedtime  insulin lispro (ADMELOG) corrective regimen sliding scale   SubCutaneous three times a day before meals  insulin lispro (ADMELOG) corrective regimen sliding scale   SubCutaneous at bedtime  insulin lispro Injectable (ADMELOG) 3 Unit(s) SubCutaneous three times a day before meals    MEDICATIONS  (PRN):  dextrose Oral Gel 15 Gram(s) Oral once PRN Blood Glucose LESS THAN 70 milliGRAM(s)/deciliter  lactulose Syrup 20 Gram(s) Oral three times a day PRN 2-3 bm daily  polyethylene glycol 3350 17 Gram(s) Oral two times a day PRN 2-3 bm daily  sodium chloride 0.9% Bolus. 100 milliLiter(s) IV Bolus every 5 minutes PRN SBP LESS THAN or EQUAL to 90 mmHg      CAPILLARY BLOOD GLUCOSE      POCT Blood Glucose.: 138 mg/dL (18 Nov 2023 11:18)  POCT Blood Glucose.: 102 mg/dL (18 Nov 2023 08:26)  POCT Blood Glucose.: 57 mg/dL (18 Nov 2023 07:35)  POCT Blood Glucose.: 218 mg/dL (17 Nov 2023 21:04)  POCT Blood Glucose.: 153 mg/dL (17 Nov 2023 16:52)    I&O's Summary    17 Nov 2023 07:01  -  18 Nov 2023 07:00  --------------------------------------------------------  IN: 400 mL / OUT: 1400 mL / NET: -1000 mL        PHYSICAL EXAM:  Vital Signs Last 24 Hrs  T(C): 36.6 (18 Nov 2023 09:25), Max: 37.1 (17 Nov 2023 21:15)  T(F): 97.9 (18 Nov 2023 09:25), Max: 98.8 (17 Nov 2023 21:15)  HR: 73 (18 Nov 2023 09:25) (72 - 78)  BP: 109/57 (18 Nov 2023 09:25) (109/57 - 144/78)  BP(mean): --  RR: 17 (18 Nov 2023 09:25) (13 - 17)  SpO2: 100% (18 Nov 2023 09:25) (99% - 100%)    Parameters below as of 18 Nov 2023 09:25  Patient On (Oxygen Delivery Method): room air      CONSTITUTIONAL: NAD  EYES: EOMI, conjunctiva and sclera clear  ENMT: Moist oral mucosa  NECK: Supple  RESPIRATORY: Breathing unlabored, CTAB  CARDIOVASCULAR: S1S2 no MRG  ABDOMEN: Nontender to palpation, normoactive bowel sounds, no rebound/guarding  MUSCULOSKELETAL: no clubbing or cyanosis of digits  NEUROLOGY: No focal deficits   SKIN: No rashes or lesions    LABS:                        10.4   7.04  )-----------( 65       ( 18 Nov 2023 06:11 )             30.0     11-18    138  |  100  |  50<H>  ----------------------------<  72  3.3<L>   |  28  |  2.94<H>    Ca    7.9<L>      18 Nov 2023 06:11  Phos  3.5     11-18  Mg     2.20     11-18    TPro  6.9  /  Alb  1.9<L>  /  TBili  2.1<H>  /  DBili  x   /  AST  360<H>  /  ALT  25  /  AlkPhos  426<H>  11-17          Urinalysis Basic - ( 18 Nov 2023 06:11 )    Color: x / Appearance: x / SG: x / pH: x  Gluc: 72 mg/dL / Ketone: x  / Bili: x / Urobili: x   Blood: x / Protein: x / Nitrite: x   Leuk Esterase: x / RBC: x / WBC x   Sq Epi: x / Non Sq Epi: x / Bacteria: x        CODE: FULL

## 2023-11-18 NOTE — PROVIDER CONTACT NOTE (HYPOGLYCEMIA EVENT) - NS PROVIDER CONTACT BACKGROUND-HYPO
Age: 73y    Gender: Male    POCT Blood Glucose:  57 mg/dL (11-18-23 @ 07:35)  218 mg/dL (11-17-23 @ 21:04)  153 mg/dL (11-17-23 @ 16:52)  72 mg/dL (11-17-23 @ 11:31)  87 mg/dL (11-17-23 @ 09:31)      eMAR:  hydrocortisone sodium succinate Injectable   50 milliGRAM(s) IV Push (11-17-23 @ 11:33)    insulin glargine Injectable (LANTUS)   6 Unit(s) SubCutaneous (11-17-23 @ 21:17)    insulin lispro (ADMELOG) corrective regimen sliding scale   1 Unit(s) SubCutaneous (11-17-23 @ 17:23)   1 Unit(s) SubCutaneous (11-17-23 @ 17:22)    insulin lispro Injectable (ADMELOG)   3 Unit(s) SubCutaneous (11-17-23 @ 17:24)

## 2023-11-18 NOTE — PROGRESS NOTE ADULT - SUBJECTIVE AND OBJECTIVE BOX
SURGERY DAILY PROGRESS NOTE    SUBJECTIVE: Patient seen and examined on AM rounds. Patient reports continued oozing from the fistula after dialysis yesterday. A stitch was placed, however when continued oozing noted after stitch removal 1 hour later a stitch was replaced. Denies pain or numbness in the hand.      OBJECTIVE:  Vital Signs Last 24 Hrs  T(C): 36.6 (18 Nov 2023 09:25), Max: 37.1 (17 Nov 2023 21:15)  T(F): 97.9 (18 Nov 2023 09:25), Max: 98.8 (17 Nov 2023 21:15)  HR: 73 (18 Nov 2023 09:25) (72 - 78)  BP: 109/57 (18 Nov 2023 09:25) (109/57 - 144/78)  BP(mean): --  RR: 17 (18 Nov 2023 09:25) (13 - 17)  SpO2: 100% (18 Nov 2023 09:25) (99% - 100%)    Parameters below as of 18 Nov 2023 09:25  Patient On (Oxygen Delivery Method): room air        I&O's Summary    17 Nov 2023 07:01  -  18 Nov 2023 07:00  --------------------------------------------------------  IN: 400 mL / OUT: 1400 mL / NET: -1000 mL        Physical Exam:  General Appearance: Appears well, NAD  Chest: Nonlabored breathing on RA  CV: Hemodynamically stable  Extremities: Grossly symmetric, LUE protected, dressings C/D/I  Vascular: 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 Stitch in place at more proximal needle site. Distal needle site with evidence of oozing on bandages, very slow    LABS:                        10.4   7.04  )-----------( 65       ( 18 Nov 2023 06:11 )             30.0     11-18    138  |  100  |  50<H>  ----------------------------<  72  3.3<L>   |  28  |  2.94<H>    Ca    7.9<L>      18 Nov 2023 06:11  Phos  3.5     11-18  Mg     2.20     11-18    TPro  6.9  /  Alb  1.9<L>  /  TBili  2.1<H>  /  DBili  x   /  AST  360<H>  /  ALT  25  /  AlkPhos  426<H>  11-17      Urinalysis Basic - ( 18 Nov 2023 06:11 )    Color: x / Appearance: x / SG: x / pH: x  Gluc: 72 mg/dL / Ketone: x  / Bili: x / Urobili: x   Blood: x / Protein: x / Nitrite: x   Leuk Esterase: x / RBC: x / WBC x   Sq Epi: x / Non Sq Epi: x / Bacteria: x        RADIOLOGY & ADDITIONAL STUDIES:  AVF duplex 11/17 findings reviewed.   "CONCLUSIONS:      1. Left upper extremity radiocephalic arteriovenous fistula noted. Significantly elevated flow velocities and color Doppler turbulence were noted at the anastomotic site. Heterogeneous plaque noted, however, segment appears otherwise patent on grayscale imaging.         2. The outflow vein of the usable segment appears patent, without evidence of thrombosis. Aneurysmal dilatation is noted at the mid forearm outflow vein segment.            The maximal volume flow velocity obtained within the fistula is 939 ml/min at the distal (proximal forearm) segment.         3. The inflow artery is patent. Hetergeneous plaque is noted throughout vessel." SURGERY DAILY PROGRESS NOTE    SUBJECTIVE: Patient seen and examined on AM rounds. Patient reports continued oozing from the fistula after dialysis yesterday. A stitch was placed, however when continued oozing noted after stitch removal 1 hour later a stitch was replaced. Denies pain or numbness in the hand.      OBJECTIVE:  Vital Signs Last 24 Hrs  T(C): 36.6 (18 Nov 2023 09:25), Max: 37.1 (17 Nov 2023 21:15)  T(F): 97.9 (18 Nov 2023 09:25), Max: 98.8 (17 Nov 2023 21:15)  HR: 73 (18 Nov 2023 09:25) (72 - 78)  BP: 109/57 (18 Nov 2023 09:25) (109/57 - 144/78)  BP(mean): --  RR: 17 (18 Nov 2023 09:25) (13 - 17)  SpO2: 100% (18 Nov 2023 09:25) (99% - 100%)    Parameters below as of 18 Nov 2023 09:25  Patient On (Oxygen Delivery Method): room air        I&O's Summary    17 Nov 2023 07:01  -  18 Nov 2023 07:00  --------------------------------------------------------  IN: 400 mL / OUT: 1400 mL / NET: -1000 mL        Physical Exam:  General Appearance: Appears well, NAD  Chest: Nonlabored breathing on RA  CV: Hemodynamically stable  Extremities: Grossly symmetric, LUE protected, dressings C/D/I  Vascular: 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 Stitch in place at more proximal needle site. Distal needle site with evidence of oozing on bandages, very slow. Palpable thrill over AVF. Hand warm and well perfused    LABS:                        10.4   7.04  )-----------( 65       ( 18 Nov 2023 06:11 )             30.0     11-18    138  |  100  |  50<H>  ----------------------------<  72  3.3<L>   |  28  |  2.94<H>    Ca    7.9<L>      18 Nov 2023 06:11  Phos  3.5     11-18  Mg     2.20     11-18    TPro  6.9  /  Alb  1.9<L>  /  TBili  2.1<H>  /  DBili  x   /  AST  360<H>  /  ALT  25  /  AlkPhos  426<H>  11-17      Urinalysis Basic - ( 18 Nov 2023 06:11 )    Color: x / Appearance: x / SG: x / pH: x  Gluc: 72 mg/dL / Ketone: x  / Bili: x / Urobili: x   Blood: x / Protein: x / Nitrite: x   Leuk Esterase: x / RBC: x / WBC x   Sq Epi: x / Non Sq Epi: x / Bacteria: x        RADIOLOGY & ADDITIONAL STUDIES:  AVF duplex 11/17 findings reviewed.   "CONCLUSIONS:      1. Left upper extremity radiocephalic arteriovenous fistula noted. Significantly elevated flow velocities and color Doppler turbulence were noted at the anastomotic site. Heterogeneous plaque noted, however, segment appears otherwise patent on grayscale imaging.         2. The outflow vein of the usable segment appears patent, without evidence of thrombosis. Aneurysmal dilatation is noted at the mid forearm outflow vein segment.            The maximal volume flow velocity obtained within the fistula is 939 ml/min at the distal (proximal forearm) segment.         3. The inflow artery is patent. Hetergeneous plaque is noted throughout vessel." SURGERY DAILY PROGRESS NOTE    SUBJECTIVE: Patient seen and examined on AM rounds. Patient reports continued oozing from the fistula after dialysis yesterday. A stitch was placed, however when continued oozing noted after stitch removal 1 hour later a stitch was replaced. Denies pain or numbness in the hand.      OBJECTIVE:  Vital Signs Last 24 Hrs  T(C): 36.6 (18 Nov 2023 09:25), Max: 37.1 (17 Nov 2023 21:15)  T(F): 97.9 (18 Nov 2023 09:25), Max: 98.8 (17 Nov 2023 21:15)  HR: 73 (18 Nov 2023 09:25) (72 - 78)  BP: 109/57 (18 Nov 2023 09:25) (109/57 - 144/78)  BP(mean): --  RR: 17 (18 Nov 2023 09:25) (13 - 17)  SpO2: 100% (18 Nov 2023 09:25) (99% - 100%)    Parameters below as of 18 Nov 2023 09:25  Patient On (Oxygen Delivery Method): room air        I&O's Summary    17 Nov 2023 07:01  -  18 Nov 2023 07:00  --------------------------------------------------------  IN: 400 mL / OUT: 1400 mL / NET: -1000 mL        Physical Exam:  General Appearance: Appears well, NAD  Chest: Nonlabored breathing on RA  CV: Hemodynamically stable  Extremities: Grossly symmetric, LUE protected, dressings C/D/I  Vascular: 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 Stitch in place at more proximal needle site. Distal needle site with evidence of oozing on bandages, very slow; Surgicel placed over area of bleed and dressed with gauze, tape, and 6" Ace. Palpable thrill over AVF. Hand warm and well perfused    LABS:                        10.4   7.04  )-----------( 65       ( 18 Nov 2023 06:11 )             30.0     11-18    138  |  100  |  50<H>  ----------------------------<  72  3.3<L>   |  28  |  2.94<H>    Ca    7.9<L>      18 Nov 2023 06:11  Phos  3.5     11-18  Mg     2.20     11-18    TPro  6.9  /  Alb  1.9<L>  /  TBili  2.1<H>  /  DBili  x   /  AST  360<H>  /  ALT  25  /  AlkPhos  426<H>  11-17      Urinalysis Basic - ( 18 Nov 2023 06:11 )    Color: x / Appearance: x / SG: x / pH: x  Gluc: 72 mg/dL / Ketone: x  / Bili: x / Urobili: x   Blood: x / Protein: x / Nitrite: x   Leuk Esterase: x / RBC: x / WBC x   Sq Epi: x / Non Sq Epi: x / Bacteria: x        RADIOLOGY & ADDITIONAL STUDIES:  AVF duplex 11/17 findings reviewed.   "CONCLUSIONS:      1. Left upper extremity radiocephalic arteriovenous fistula noted. Significantly elevated flow velocities and color Doppler turbulence were noted at the anastomotic site. Heterogeneous plaque noted, however, segment appears otherwise patent on grayscale imaging.         2. The outflow vein of the usable segment appears patent, without evidence of thrombosis. Aneurysmal dilatation is noted at the mid forearm outflow vein segment.            The maximal volume flow velocity obtained within the fistula is 939 ml/min at the distal (proximal forearm) segment.         3. The inflow artery is patent. Hetergeneous plaque is noted throughout vessel."

## 2023-11-18 NOTE — PROGRESS NOTE ADULT - ASSESSMENT
73M with PMH of ESRD s/p kidney transplant on HD, CAD s/p multiple stents (most recently, RCA stent on 10/2023), T2DM, and HTN, with recent hospitalization for bacterial PNA, who originally presented to the ED on 11/7 after being found acutely unresponsive with agonal breathing by his daughter. Pt was intubated in ED for airway protection and due to AHRF and started on levophed; septic shock due to multifocal pneumonia was present on admission. Patient started on abx and is now back to baseline mental status, with occasional episodes of aphasia reported by daughter. Pt was extubated 11/11, now on room air. Pressors weaned off 11/14, transferred to medical floor 11/15. Vascular surgery consulted 2/2 oozing LUE AVF. Patient received dialysis through LUE fistula on 11/15 and was thereafter noted to have oozing therefrom. No historyof stenting in either arm or intervention on the AVF since creation per patient's family. Continues to have oozing after dialysis requiring suture overnight, no evidence of outflow stenosis on 11/17 duplex.    Recommendations:  -Will likely need fistulogram during this admission   -Please ensure and document medical/cardiac optimization for fistulogram  -Remainder of care per primary team    Vascular Surgery  [phone]

## 2023-11-19 LAB
ALBUMIN SERPL ELPH-MCNC: 2.2 G/DL — LOW (ref 3.3–5)
ALBUMIN SERPL ELPH-MCNC: 2.2 G/DL — LOW (ref 3.3–5)
ALP SERPL-CCNC: 405 U/L — HIGH (ref 40–120)
ALP SERPL-CCNC: 405 U/L — HIGH (ref 40–120)
ALT FLD-CCNC: 25 U/L — SIGNIFICANT CHANGE UP (ref 4–41)
ALT FLD-CCNC: 25 U/L — SIGNIFICANT CHANGE UP (ref 4–41)
ANION GAP SERPL CALC-SCNC: 14 MMOL/L — SIGNIFICANT CHANGE UP (ref 7–14)
ANION GAP SERPL CALC-SCNC: 14 MMOL/L — SIGNIFICANT CHANGE UP (ref 7–14)
AST SERPL-CCNC: 362 U/L — HIGH (ref 4–40)
AST SERPL-CCNC: 362 U/L — HIGH (ref 4–40)
BILIRUB DIRECT SERPL-MCNC: 1.2 MG/DL — HIGH (ref 0–0.3)
BILIRUB DIRECT SERPL-MCNC: 1.2 MG/DL — HIGH (ref 0–0.3)
BILIRUB INDIRECT FLD-MCNC: 0.8 MG/DL — SIGNIFICANT CHANGE UP (ref 0–1)
BILIRUB INDIRECT FLD-MCNC: 0.8 MG/DL — SIGNIFICANT CHANGE UP (ref 0–1)
BILIRUB SERPL-MCNC: 2 MG/DL — HIGH (ref 0.2–1.2)
BILIRUB SERPL-MCNC: 2 MG/DL — HIGH (ref 0.2–1.2)
BUN SERPL-MCNC: 58 MG/DL — HIGH (ref 7–23)
BUN SERPL-MCNC: 58 MG/DL — HIGH (ref 7–23)
CALCIUM SERPL-MCNC: 7.9 MG/DL — LOW (ref 8.4–10.5)
CALCIUM SERPL-MCNC: 7.9 MG/DL — LOW (ref 8.4–10.5)
CHLORIDE SERPL-SCNC: 100 MMOL/L — SIGNIFICANT CHANGE UP (ref 98–107)
CHLORIDE SERPL-SCNC: 100 MMOL/L — SIGNIFICANT CHANGE UP (ref 98–107)
CO2 SERPL-SCNC: 23 MMOL/L — SIGNIFICANT CHANGE UP (ref 22–31)
CO2 SERPL-SCNC: 23 MMOL/L — SIGNIFICANT CHANGE UP (ref 22–31)
CREAT SERPL-MCNC: 3.66 MG/DL — HIGH (ref 0.5–1.3)
CREAT SERPL-MCNC: 3.66 MG/DL — HIGH (ref 0.5–1.3)
EGFR: 17 ML/MIN/1.73M2 — LOW
EGFR: 17 ML/MIN/1.73M2 — LOW
GLUCOSE BLDC GLUCOMTR-MCNC: 126 MG/DL — HIGH (ref 70–99)
GLUCOSE BLDC GLUCOMTR-MCNC: 126 MG/DL — HIGH (ref 70–99)
GLUCOSE BLDC GLUCOMTR-MCNC: 136 MG/DL — HIGH (ref 70–99)
GLUCOSE BLDC GLUCOMTR-MCNC: 136 MG/DL — HIGH (ref 70–99)
GLUCOSE BLDC GLUCOMTR-MCNC: 141 MG/DL — HIGH (ref 70–99)
GLUCOSE BLDC GLUCOMTR-MCNC: 141 MG/DL — HIGH (ref 70–99)
GLUCOSE BLDC GLUCOMTR-MCNC: 168 MG/DL — HIGH (ref 70–99)
GLUCOSE BLDC GLUCOMTR-MCNC: 168 MG/DL — HIGH (ref 70–99)
GLUCOSE BLDC GLUCOMTR-MCNC: 91 MG/DL — SIGNIFICANT CHANGE UP (ref 70–99)
GLUCOSE BLDC GLUCOMTR-MCNC: 91 MG/DL — SIGNIFICANT CHANGE UP (ref 70–99)
GLUCOSE BLDC GLUCOMTR-MCNC: 92 MG/DL — SIGNIFICANT CHANGE UP (ref 70–99)
GLUCOSE BLDC GLUCOMTR-MCNC: 92 MG/DL — SIGNIFICANT CHANGE UP (ref 70–99)
GLUCOSE SERPL-MCNC: 136 MG/DL — HIGH (ref 70–99)
GLUCOSE SERPL-MCNC: 136 MG/DL — HIGH (ref 70–99)
HCT VFR BLD CALC: 28.8 % — LOW (ref 39–50)
HCT VFR BLD CALC: 28.8 % — LOW (ref 39–50)
HGB BLD-MCNC: 10.2 G/DL — LOW (ref 13–17)
HGB BLD-MCNC: 10.2 G/DL — LOW (ref 13–17)
MAGNESIUM SERPL-MCNC: 2.2 MG/DL — SIGNIFICANT CHANGE UP (ref 1.6–2.6)
MAGNESIUM SERPL-MCNC: 2.2 MG/DL — SIGNIFICANT CHANGE UP (ref 1.6–2.6)
MCHC RBC-ENTMCNC: 30.4 PG — SIGNIFICANT CHANGE UP (ref 27–34)
MCHC RBC-ENTMCNC: 30.4 PG — SIGNIFICANT CHANGE UP (ref 27–34)
MCHC RBC-ENTMCNC: 35.4 GM/DL — SIGNIFICANT CHANGE UP (ref 32–36)
MCHC RBC-ENTMCNC: 35.4 GM/DL — SIGNIFICANT CHANGE UP (ref 32–36)
MCV RBC AUTO: 86 FL — SIGNIFICANT CHANGE UP (ref 80–100)
MCV RBC AUTO: 86 FL — SIGNIFICANT CHANGE UP (ref 80–100)
NRBC # BLD: 0 /100 WBCS — SIGNIFICANT CHANGE UP (ref 0–0)
NRBC # BLD: 0 /100 WBCS — SIGNIFICANT CHANGE UP (ref 0–0)
NRBC # FLD: 0 K/UL — SIGNIFICANT CHANGE UP (ref 0–0)
NRBC # FLD: 0 K/UL — SIGNIFICANT CHANGE UP (ref 0–0)
PHOSPHATE SERPL-MCNC: 4.4 MG/DL — SIGNIFICANT CHANGE UP (ref 2.5–4.5)
PHOSPHATE SERPL-MCNC: 4.4 MG/DL — SIGNIFICANT CHANGE UP (ref 2.5–4.5)
PLATELET # BLD AUTO: 69 K/UL — LOW (ref 150–400)
PLATELET # BLD AUTO: 69 K/UL — LOW (ref 150–400)
POTASSIUM SERPL-MCNC: 4.6 MMOL/L — SIGNIFICANT CHANGE UP (ref 3.5–5.3)
POTASSIUM SERPL-MCNC: 4.6 MMOL/L — SIGNIFICANT CHANGE UP (ref 3.5–5.3)
POTASSIUM SERPL-SCNC: 4.6 MMOL/L — SIGNIFICANT CHANGE UP (ref 3.5–5.3)
POTASSIUM SERPL-SCNC: 4.6 MMOL/L — SIGNIFICANT CHANGE UP (ref 3.5–5.3)
PROT SERPL-MCNC: 6.9 G/DL — SIGNIFICANT CHANGE UP (ref 6–8.3)
PROT SERPL-MCNC: 6.9 G/DL — SIGNIFICANT CHANGE UP (ref 6–8.3)
RBC # BLD: 3.35 M/UL — LOW (ref 4.2–5.8)
RBC # BLD: 3.35 M/UL — LOW (ref 4.2–5.8)
RBC # FLD: 26.5 % — HIGH (ref 10.3–14.5)
RBC # FLD: 26.5 % — HIGH (ref 10.3–14.5)
SODIUM SERPL-SCNC: 137 MMOL/L — SIGNIFICANT CHANGE UP (ref 135–145)
SODIUM SERPL-SCNC: 137 MMOL/L — SIGNIFICANT CHANGE UP (ref 135–145)
WBC # BLD: 7.86 K/UL — SIGNIFICANT CHANGE UP (ref 3.8–10.5)
WBC # BLD: 7.86 K/UL — SIGNIFICANT CHANGE UP (ref 3.8–10.5)
WBC # FLD AUTO: 7.86 K/UL — SIGNIFICANT CHANGE UP (ref 3.8–10.5)
WBC # FLD AUTO: 7.86 K/UL — SIGNIFICANT CHANGE UP (ref 3.8–10.5)

## 2023-11-19 PROCEDURE — 90935 HEMODIALYSIS ONE EVALUATION: CPT | Mod: GC

## 2023-11-19 PROCEDURE — 99233 SBSQ HOSP IP/OBS HIGH 50: CPT

## 2023-11-19 RX ORDER — SACUBITRIL AND VALSARTAN 24; 26 MG/1; MG/1
1 TABLET, FILM COATED ORAL
Refills: 0 | Status: DISCONTINUED | OUTPATIENT
Start: 2023-11-19 | End: 2023-12-05

## 2023-11-19 RX ORDER — CARVEDILOL PHOSPHATE 80 MG/1
6.25 CAPSULE, EXTENDED RELEASE ORAL EVERY 12 HOURS
Refills: 0 | Status: DISCONTINUED | OUTPATIENT
Start: 2023-11-19 | End: 2023-11-22

## 2023-11-19 RX ADMIN — CLOPIDOGREL BISULFATE 75 MILLIGRAM(S): 75 TABLET, FILM COATED ORAL at 12:00

## 2023-11-19 RX ADMIN — Medication 1 MILLIGRAM(S): at 12:00

## 2023-11-19 RX ADMIN — Medication 3 UNIT(S): at 11:58

## 2023-11-19 RX ADMIN — INSULIN GLARGINE 6 UNIT(S): 100 INJECTION, SOLUTION SUBCUTANEOUS at 21:46

## 2023-11-19 RX ADMIN — SACUBITRIL AND VALSARTAN 1 TABLET(S): 24; 26 TABLET, FILM COATED ORAL at 17:18

## 2023-11-19 RX ADMIN — Medication 3 UNIT(S): at 16:43

## 2023-11-19 RX ADMIN — CARVEDILOL PHOSPHATE 6.25 MILLIGRAM(S): 80 CAPSULE, EXTENDED RELEASE ORAL at 17:20

## 2023-11-19 RX ADMIN — Medication 81 MILLIGRAM(S): at 12:00

## 2023-11-19 RX ADMIN — CHLORHEXIDINE GLUCONATE 1 APPLICATION(S): 213 SOLUTION TOPICAL at 11:58

## 2023-11-19 NOTE — PROGRESS NOTE ADULT - SUBJECTIVE AND OBJECTIVE BOX
[facility] Division of Kidney Diseases & Hypertension  FOLLOW UP NOTE  [phone]--------------------------------------------------------------------------------    Chief Complaint: ESRD on going dialysis requirement    24 hour events/subjective: Patient seen and evaluated in the HD unit earlier this morning. Pt. receiving HD, tolerating well, and vitals stable. Pt. reports feeling well, offers no complaints. Denies any headaches, fevers/chills, chest pain, SOB, abdominal pain, and LE edema.      PAST HISTORY  --------------------------------------------------------------------------------  No significant changes to PMH, PSH, FHx, SHx, unless otherwise noted    ALLERGIES & MEDICATIONS  --------------------------------------------------------------------------------  Allergies    No Known Allergies    Intolerances      Standing Inpatient Medications  aspirin enteric coated 81 milliGRAM(s) Oral daily  carvedilol 3.125 milliGRAM(s) Oral every 12 hours  chlorhexidine 2% Cloths 1 Application(s) Topical daily  clopidogrel Tablet 75 milliGRAM(s) Oral daily  dextrose 5%. 1000 milliLiter(s) IV Continuous <Continuous>  dextrose 5%. 1000 milliLiter(s) IV Continuous <Continuous>  dextrose 50% Injectable 25 Gram(s) IV Push once  folic acid 1 milliGRAM(s) Oral daily  glucagon  Injectable 1 milliGRAM(s) IntraMuscular once  insulin glargine Injectable (LANTUS) 6 Unit(s) SubCutaneous at bedtime  insulin lispro (ADMELOG) corrective regimen sliding scale   SubCutaneous three times a day before meals  insulin lispro (ADMELOG) corrective regimen sliding scale   SubCutaneous at bedtime  insulin lispro Injectable (ADMELOG) 3 Unit(s) SubCutaneous three times a day before meals    PRN Inpatient Medications  dextrose Oral Gel 15 Gram(s) Oral once PRN  lactulose Syrup 20 Gram(s) Oral three times a day PRN  polyethylene glycol 3350 17 Gram(s) Oral two times a day PRN  sodium chloride 0.9% Bolus. 100 milliLiter(s) IV Bolus every 5 minutes PRN      REVIEW OF SYSTEMS  --------------------------------------------------------------------------------  See HPI    VITALS/PHYSICAL EXAM  --------------------------------------------------------------------------------  T(C): 36.8 (11-19-23 @ 08:27), Max: 36.8 (11-19-23 @ 08:27)  HR: 75 (11-19-23 @ 08:27) (72 - 75)  BP: 165/81 (11-19-23 @ 08:27) (132/57 - 165/81)  RR: 17 (11-19-23 @ 08:27) (17 - 18)  SpO2: 100% (11-19-23 @ 05:43) (98% - 100%)  Wt(kg): --    Physical Exam:  Gen: resting, NAD  HEENT: Anicteric  Pulm: Fair entry B/L, CTA B/L  CV: S1S2+  Abd: Soft, +BS    Ext: No LE edema B/L  Neuro: Awake and alert  Skin: Warm and dry  Dialysis access: LUE AVF+ being used for HD    LABS/STUDIES  --------------------------------------------------------------------------------              10.2   7.86  >-----------<  69       [11-19-23 @ 05:07]              28.8     137  |  100  |  58  ----------------------------<  136      [11-19-23 @ 05:07]  4.6   |  23  |  3.66        Ca     7.9     [11-19-23 @ 05:07]      Mg     2.20     [11-19-23 @ 05:07]      Phos  4.4     [11-19-23 @ 05:07]    TPro  6.9  /  Alb  2.2  /  TBili  2.0  /  DBili  1.2  /  AST  362  /  ALT  25  /  AlkPhos  405  [11-19-23 @ 05:07]    Creatinine Trend:  SCr 3.66 [11-19 @ 05:07]  SCr 2.94 [11-18 @ 06:11]  SCr 3.71 [11-17 @ 06:52]  SCr 2.81 [11-16 @ 05:55]  SCr 3.46 [11-15 @ 03:10]    HBsAb Reactive      [11-13-23 @ 14:30]  HBsAg Nonreact      [11-13-23 @ 14:30]  HBcAb Reactive      [11-13-23 @ 14:30]  HCV 0.16, Nonreact      [11-13-23 @ 14:30]

## 2023-11-19 NOTE — PROGRESS NOTE ADULT - PROBLEM SELECTOR PLAN 4
HD per nephrology  anemia due to ESRD, aranesp at outpt HD per renal    Appreciate vasc surg eval of oozing fistula, for fistulogram  Patient is medically optimized for fistulogram, will need to coordinate with HD to have dialysis after the fistulogram  LUE doppler patent fistula

## 2023-11-19 NOTE — PROGRESS NOTE ADULT - ASSESSMENT
73M with PMH of ESRD s/p kidney transplant on HD, CAD s/p multiple stents (most recently, RCA stent on 10/2023), T2DM, and HTN, originally presented to the ED on 11/7 after being found acutely unresponsive, CPR was administered by daughter. In ED, pt was intubated for airway protection and due to AHRF and started on levo, CT A/P c/w multifocal PNA. S/p empiric zosyn and azithro, as well as meropenem. Patient now back to baseline mental and respiratory status (AOx3, on RA), pending MRI brain. Cardiology consulted for persistent pressor requirement with bedside POCUS showing thin RV wall and RV dilation raising concern for acute RV dysfunction. Collateral from Dr. Sheridan ([facility]) showed hx of at least 6 stent placements, normal LV and RV function on 3/2023 echo (full collateral in pt chart). 11/13 TTE demonstrated new moderately decreased LV function (EF 38%), enlarged RV with reduced systolic function, severe TR, and mod-severe MR. All new from 3/2023 echo, however on exam, patient continues to appear grossly euvolemic. This clinical presentation, along with the absence of more significant echo findings, makes decompensated RV failure an unlikely cause for the prior persistent vasopressor requirement. Patient is no longer on levo gtt, now completely off droxidopa and HD stable.    #Biventricular failure, CHF - Currently appears euvolemic   #Severe TR, mod-severe MR  #Septic shock 2/2 PNA - resolved   #CAD s/p multiple stents   #ESRD on HD - ongoing HD today  #DM2    Recommendations:  -Continue Coreg 3.125mg BID  -Start Entresto 24/26 BID  -Will likely benefit from addition of MRA and SGLT2i prior to discharge

## 2023-11-19 NOTE — PROGRESS NOTE ADULT - SUBJECTIVE AND OBJECTIVE BOX
[facility] Division of Hospital Medicine  Douglas Villegas MD  Pager [phone]    Patient is a 73y old  Male who presents with a chief complaint of resp failure (18 Nov 2023 13:41)      SUBJECTIVE / OVERNIGHT EVENTS:  Swelling in arm      MEDICATIONS  (STANDING):  aspirin enteric coated 81 milliGRAM(s) Oral daily  carvedilol 3.125 milliGRAM(s) Oral every 12 hours  chlorhexidine 2% Cloths 1 Application(s) Topical daily  clopidogrel Tablet 75 milliGRAM(s) Oral daily  dextrose 5%. 1000 milliLiter(s) (50 mL/Hr) IV Continuous <Continuous>  dextrose 5%. 1000 milliLiter(s) (100 mL/Hr) IV Continuous <Continuous>  dextrose 50% Injectable 25 Gram(s) IV Push once  folic acid 1 milliGRAM(s) Oral daily  glucagon  Injectable 1 milliGRAM(s) IntraMuscular once  insulin glargine Injectable (LANTUS) 6 Unit(s) SubCutaneous at bedtime  insulin lispro (ADMELOG) corrective regimen sliding scale   SubCutaneous three times a day before meals  insulin lispro (ADMELOG) corrective regimen sliding scale   SubCutaneous at bedtime  insulin lispro Injectable (ADMELOG) 3 Unit(s) SubCutaneous three times a day before meals  sacubitril 24 mG/valsartan 26 mG 1 Tablet(s) Oral two times a day    MEDICATIONS  (PRN):  dextrose Oral Gel 15 Gram(s) Oral once PRN Blood Glucose LESS THAN 70 milliGRAM(s)/deciliter  lactulose Syrup 20 Gram(s) Oral three times a day PRN 2-3 bm daily  polyethylene glycol 3350 17 Gram(s) Oral two times a day PRN 2-3 bm daily  sodium chloride 0.9% Bolus. 100 milliLiter(s) IV Bolus every 5 minutes PRN SBP LESS THAN or EQUAL to 90 mmHg      CAPILLARY BLOOD GLUCOSE      POCT Blood Glucose.: 126 mg/dL (19 Nov 2023 11:52)  POCT Blood Glucose.: 92 mg/dL (19 Nov 2023 10:55)  POCT Blood Glucose.: 168 mg/dL (19 Nov 2023 05:40)  POCT Blood Glucose.: 165 mg/dL (18 Nov 2023 21:05)  POCT Blood Glucose.: 113 mg/dL (18 Nov 2023 16:43)    I&O's Summary    19 Nov 2023 07:01  -  19 Nov 2023 14:33  --------------------------------------------------------  IN: 400 mL / OUT: 1400 mL / NET: -1000 mL        PHYSICAL EXAM:  Vital Signs Last 24 Hrs  T(C): 36.4 (19 Nov 2023 12:08), Max: 36.8 (19 Nov 2023 08:27)  T(F): 97.5 (19 Nov 2023 12:08), Max: 98.2 (19 Nov 2023 08:27)  HR: 80 (19 Nov 2023 12:08) (72 - 80)  BP: 141/69 (19 Nov 2023 12:08) (132/57 - 165/81)  BP(mean): --  RR: 17 (19 Nov 2023 12:08) (17 - 18)  SpO2: 100% (19 Nov 2023 12:08) (98% - 100%)    Parameters below as of 19 Nov 2023 12:08  Patient On (Oxygen Delivery Method): room air      CONSTITUTIONAL: NAD  EYES: EOMI, conjunctiva and sclera clear  ENMT: Moist oral mucosa  NECK: Supple  RESPIRATORY: Breathing unlabored, CTAB  CARDIOVASCULAR: S1S2 no MRG  ABDOMEN: Nontender to palpation, normoactive bowel sounds, no rebound/guarding  MUSCULOSKELETAL: L arm swollen, + thrill in AV fistula  NEUROLOGY: No focal deficits   SKIN: No rashes or lesions    LABS:                        10.2   7.86  )-----------( 69       ( 19 Nov 2023 05:07 )             28.8     11-19    137  |  100  |  58<H>  ----------------------------<  136<H>  4.6   |  23  |  3.66<H>    Ca    7.9<L>      19 Nov 2023 05:07  Phos  4.4     11-19  Mg     2.20     11-19    TPro  6.9  /  Alb  2.2<L>  /  TBili  2.0<H>  /  DBili  1.2<H>  /  AST  362<H>  /  ALT  25  /  AlkPhos  405<H>  11-19          Urinalysis Basic - ( 19 Nov 2023 05:07 )    Color: x / Appearance: x / SG: x / pH: x  Gluc: 136 mg/dL / Ketone: x  / Bili: x / Urobili: x   Blood: x / Protein: x / Nitrite: x   Leuk Esterase: x / RBC: x / WBC x   Sq Epi: x / Non Sq Epi: x / Bacteria: x          RADIOLOGY & ADDITIONAL TESTS:  Results Reviewed:   Imaging Personally Reviewed:  Electrocardiogram Personally Reviewed:    COORDINATION OF CARE:  Care Discussed with Consultants/Other Providers [Y/N]:  Prior or Outpatient Records Reviewed [Y/N]:    CODE: FULL

## 2023-11-19 NOTE — PROGRESS NOTE ADULT - PROBLEM SELECTOR PLAN 1
Pt. with ESRD on HD TIW (MWF, LUE AVF). Pt. also with history of kidney transplant (not on immunosuppression). Last HD as outpatient was done on 11/6/23 via L AVF. Pt. admitted to MICU with respiratory failure and systemic shock. Pt. initiated on CRRT on 11/8, discontinued on 11/10 as volume status and FiO2 requirements improved. Last HD on 11/17/23 via LUE AVF. Had some oozing from AVF after treatment - vascular following for evaluation of stenosis (will likely need fistulogram during current admission). Labs reviewed. Pt appears clinically stable and tolerating HD treatment today.  Plan next HD treatment on Tuesday. Dose medications as per ESRD/HD.

## 2023-11-19 NOTE — PROGRESS NOTE ADULT - SUBJECTIVE AND OBJECTIVE BOX
Patient seen and examined at bedside.    Overnight Events:   No events. Denies CP, SOB.     REVIEW OF SYSTEMS:  Denies any chest pain, dizziness, headache, vision changes, sore throat, oral lesions, dysphagia, SOB, palpitations, abdominal pain, dysuria, diarrhea, or MSK pain. All other review of systems negative.           Current Meds:  aspirin enteric coated 81 milliGRAM(s) Oral daily  carvedilol 3.125 milliGRAM(s) Oral every 12 hours  chlorhexidine 2% Cloths 1 Application(s) Topical daily  clopidogrel Tablet 75 milliGRAM(s) Oral daily  dextrose 5%. 1000 milliLiter(s) IV Continuous <Continuous>  dextrose 5%. 1000 milliLiter(s) IV Continuous <Continuous>  dextrose 50% Injectable 25 Gram(s) IV Push once  dextrose Oral Gel 15 Gram(s) Oral once PRN  folic acid 1 milliGRAM(s) Oral daily  glucagon  Injectable 1 milliGRAM(s) IntraMuscular once  insulin glargine Injectable (LANTUS) 6 Unit(s) SubCutaneous at bedtime  insulin lispro (ADMELOG) corrective regimen sliding scale   SubCutaneous three times a day before meals  insulin lispro (ADMELOG) corrective regimen sliding scale   SubCutaneous at bedtime  insulin lispro Injectable (ADMELOG) 3 Unit(s) SubCutaneous three times a day before meals  lactulose Syrup 20 Gram(s) Oral three times a day PRN  polyethylene glycol 3350 17 Gram(s) Oral two times a day PRN  sodium chloride 0.9% Bolus. 100 milliLiter(s) IV Bolus every 5 minutes PRN      Vitals:  T(F): 98.2 (11-19), Max: 98.2 (11-19)  HR: 75 (11-19) (72 - 75)  BP: 165/81 (11-19) (132/57 - 165/81)  RR: 17 (11-19)  SpO2: 100% (11-19)  I&O's Summary    PHYSICAL EXAM:  Appearance: No acute distress  Eyes: EOMI, no scleral icterus   HEENT: Normal oral mucosa  Cardiovascular: RRR with occasional premature beats, S1, S2, no murmurs, rubs, or gallops; no edema; no JVD  Respiratory: CTAB, no respiratory distress  Gastrointestinal: soft, non-tender, non-distended with normal bowel sounds  Musculoskeletal: no joint deformity   Neurologic: Moving all 4 extremities spontaneously, sensation grossly intact  Psychiatry: AAOx3, mood & affect appropriate  Skin: No rashes, ecchymoses, or cyanosis                          10.2   7.86  )-----------( 69       ( 19 Nov 2023 05:07 )             28.8     11-19    137  |  100  |  58<H>  ----------------------------<  136<H>  4.6   |  23  |  3.66<H>    Ca    7.9<L>      19 Nov 2023 05:07  Phos  4.4     11-19  Mg     2.20     11-19    TPro  6.9  /  Alb  2.2<L>  /  TBili  2.0<H>  /  DBili  1.2<H>  /  AST  362<H>  /  ALT  25  /  AlkPhos  405<H>  11-19                  DIAGNOSTIC/IMAGING:

## 2023-11-20 LAB
ANION GAP SERPL CALC-SCNC: 10 MMOL/L — SIGNIFICANT CHANGE UP (ref 7–14)
ANION GAP SERPL CALC-SCNC: 10 MMOL/L — SIGNIFICANT CHANGE UP (ref 7–14)
BUN SERPL-MCNC: 40 MG/DL — HIGH (ref 7–23)
BUN SERPL-MCNC: 40 MG/DL — HIGH (ref 7–23)
CALCIUM SERPL-MCNC: 7.4 MG/DL — LOW (ref 8.4–10.5)
CALCIUM SERPL-MCNC: 7.4 MG/DL — LOW (ref 8.4–10.5)
CHLORIDE SERPL-SCNC: 100 MMOL/L — SIGNIFICANT CHANGE UP (ref 98–107)
CHLORIDE SERPL-SCNC: 100 MMOL/L — SIGNIFICANT CHANGE UP (ref 98–107)
CO2 SERPL-SCNC: 27 MMOL/L — SIGNIFICANT CHANGE UP (ref 22–31)
CO2 SERPL-SCNC: 27 MMOL/L — SIGNIFICANT CHANGE UP (ref 22–31)
CREAT SERPL-MCNC: 2.88 MG/DL — HIGH (ref 0.5–1.3)
CREAT SERPL-MCNC: 2.88 MG/DL — HIGH (ref 0.5–1.3)
EGFR: 23 ML/MIN/1.73M2 — LOW
EGFR: 23 ML/MIN/1.73M2 — LOW
GLUCOSE BLDC GLUCOMTR-MCNC: 105 MG/DL — HIGH (ref 70–99)
GLUCOSE BLDC GLUCOMTR-MCNC: 105 MG/DL — HIGH (ref 70–99)
GLUCOSE BLDC GLUCOMTR-MCNC: 140 MG/DL — HIGH (ref 70–99)
GLUCOSE BLDC GLUCOMTR-MCNC: 140 MG/DL — HIGH (ref 70–99)
GLUCOSE BLDC GLUCOMTR-MCNC: 178 MG/DL — HIGH (ref 70–99)
GLUCOSE BLDC GLUCOMTR-MCNC: 178 MG/DL — HIGH (ref 70–99)
GLUCOSE BLDC GLUCOMTR-MCNC: 51 MG/DL — CRITICAL LOW (ref 70–99)
GLUCOSE BLDC GLUCOMTR-MCNC: 51 MG/DL — CRITICAL LOW (ref 70–99)
GLUCOSE BLDC GLUCOMTR-MCNC: 57 MG/DL — LOW (ref 70–99)
GLUCOSE BLDC GLUCOMTR-MCNC: 57 MG/DL — LOW (ref 70–99)
GLUCOSE BLDC GLUCOMTR-MCNC: 97 MG/DL — SIGNIFICANT CHANGE UP (ref 70–99)
GLUCOSE BLDC GLUCOMTR-MCNC: 97 MG/DL — SIGNIFICANT CHANGE UP (ref 70–99)
GLUCOSE SERPL-MCNC: 80 MG/DL — SIGNIFICANT CHANGE UP (ref 70–99)
GLUCOSE SERPL-MCNC: 80 MG/DL — SIGNIFICANT CHANGE UP (ref 70–99)
HCT VFR BLD CALC: 26.8 % — LOW (ref 39–50)
HCT VFR BLD CALC: 26.8 % — LOW (ref 39–50)
HGB BLD-MCNC: 9.1 G/DL — LOW (ref 13–17)
HGB BLD-MCNC: 9.1 G/DL — LOW (ref 13–17)
MAGNESIUM SERPL-MCNC: 2 MG/DL — SIGNIFICANT CHANGE UP (ref 1.6–2.6)
MAGNESIUM SERPL-MCNC: 2 MG/DL — SIGNIFICANT CHANGE UP (ref 1.6–2.6)
MCHC RBC-ENTMCNC: 30.5 PG — SIGNIFICANT CHANGE UP (ref 27–34)
MCHC RBC-ENTMCNC: 30.5 PG — SIGNIFICANT CHANGE UP (ref 27–34)
MCHC RBC-ENTMCNC: 34 GM/DL — SIGNIFICANT CHANGE UP (ref 32–36)
MCHC RBC-ENTMCNC: 34 GM/DL — SIGNIFICANT CHANGE UP (ref 32–36)
MCV RBC AUTO: 89.9 FL — SIGNIFICANT CHANGE UP (ref 80–100)
MCV RBC AUTO: 89.9 FL — SIGNIFICANT CHANGE UP (ref 80–100)
NRBC # BLD: 0 /100 WBCS — SIGNIFICANT CHANGE UP (ref 0–0)
NRBC # BLD: 0 /100 WBCS — SIGNIFICANT CHANGE UP (ref 0–0)
NRBC # FLD: 0 K/UL — SIGNIFICANT CHANGE UP (ref 0–0)
NRBC # FLD: 0 K/UL — SIGNIFICANT CHANGE UP (ref 0–0)
PHOSPHATE SERPL-MCNC: 3.7 MG/DL — SIGNIFICANT CHANGE UP (ref 2.5–4.5)
PHOSPHATE SERPL-MCNC: 3.7 MG/DL — SIGNIFICANT CHANGE UP (ref 2.5–4.5)
PLATELET # BLD AUTO: 69 K/UL — LOW (ref 150–400)
PLATELET # BLD AUTO: 69 K/UL — LOW (ref 150–400)
POTASSIUM SERPL-MCNC: 3.9 MMOL/L — SIGNIFICANT CHANGE UP (ref 3.5–5.3)
POTASSIUM SERPL-MCNC: 3.9 MMOL/L — SIGNIFICANT CHANGE UP (ref 3.5–5.3)
POTASSIUM SERPL-SCNC: 3.9 MMOL/L — SIGNIFICANT CHANGE UP (ref 3.5–5.3)
POTASSIUM SERPL-SCNC: 3.9 MMOL/L — SIGNIFICANT CHANGE UP (ref 3.5–5.3)
RBC # BLD: 2.98 M/UL — LOW (ref 4.2–5.8)
RBC # BLD: 2.98 M/UL — LOW (ref 4.2–5.8)
RBC # FLD: 26.4 % — HIGH (ref 10.3–14.5)
RBC # FLD: 26.4 % — HIGH (ref 10.3–14.5)
SODIUM SERPL-SCNC: 137 MMOL/L — SIGNIFICANT CHANGE UP (ref 135–145)
SODIUM SERPL-SCNC: 137 MMOL/L — SIGNIFICANT CHANGE UP (ref 135–145)
T4 FREE SERPL-MCNC: 0.9 NG/DL — SIGNIFICANT CHANGE UP (ref 0.9–1.8)
T4 FREE SERPL-MCNC: 0.9 NG/DL — SIGNIFICANT CHANGE UP (ref 0.9–1.8)
TSH SERPL-MCNC: 12.6 UIU/ML — HIGH (ref 0.27–4.2)
TSH SERPL-MCNC: 12.6 UIU/ML — HIGH (ref 0.27–4.2)
WBC # BLD: 6.21 K/UL — SIGNIFICANT CHANGE UP (ref 3.8–10.5)
WBC # BLD: 6.21 K/UL — SIGNIFICANT CHANGE UP (ref 3.8–10.5)
WBC # FLD AUTO: 6.21 K/UL — SIGNIFICANT CHANGE UP (ref 3.8–10.5)
WBC # FLD AUTO: 6.21 K/UL — SIGNIFICANT CHANGE UP (ref 3.8–10.5)

## 2023-11-20 PROCEDURE — 99233 SBSQ HOSP IP/OBS HIGH 50: CPT

## 2023-11-20 PROCEDURE — 99232 SBSQ HOSP IP/OBS MODERATE 35: CPT | Mod: FS

## 2023-11-20 RX ORDER — INSULIN GLARGINE 100 [IU]/ML
2 INJECTION, SOLUTION SUBCUTANEOUS AT BEDTIME
Refills: 0 | Status: DISCONTINUED | OUTPATIENT
Start: 2023-11-20 | End: 2023-11-21

## 2023-11-20 RX ORDER — ACETAMINOPHEN 500 MG
325 TABLET ORAL ONCE
Refills: 0 | Status: COMPLETED | OUTPATIENT
Start: 2023-11-20 | End: 2023-11-20

## 2023-11-20 RX ORDER — LEVOTHYROXINE SODIUM 125 MCG
25 TABLET ORAL DAILY
Refills: 0 | Status: DISCONTINUED | OUTPATIENT
Start: 2023-11-20 | End: 2023-12-05

## 2023-11-20 RX ORDER — DEXTROSE 50 % IN WATER 50 %
25 SYRINGE (ML) INTRAVENOUS ONCE
Refills: 0 | Status: COMPLETED | OUTPATIENT
Start: 2023-11-20 | End: 2023-11-20

## 2023-11-20 RX ORDER — HYDROMORPHONE HYDROCHLORIDE 2 MG/ML
0.25 INJECTION INTRAMUSCULAR; INTRAVENOUS; SUBCUTANEOUS ONCE
Refills: 0 | Status: DISCONTINUED | OUTPATIENT
Start: 2023-11-20 | End: 2023-11-20

## 2023-11-20 RX ORDER — LEVOTHYROXINE SODIUM 125 MCG
50 TABLET ORAL DAILY
Refills: 0 | Status: DISCONTINUED | OUTPATIENT
Start: 2023-11-20 | End: 2023-11-20

## 2023-11-20 RX ADMIN — SACUBITRIL AND VALSARTAN 1 TABLET(S): 24; 26 TABLET, FILM COATED ORAL at 05:37

## 2023-11-20 RX ADMIN — CARVEDILOL PHOSPHATE 6.25 MILLIGRAM(S): 80 CAPSULE, EXTENDED RELEASE ORAL at 05:37

## 2023-11-20 RX ADMIN — Medication 3 UNIT(S): at 16:55

## 2023-11-20 RX ADMIN — SACUBITRIL AND VALSARTAN 1 TABLET(S): 24; 26 TABLET, FILM COATED ORAL at 17:20

## 2023-11-20 RX ADMIN — HYDROMORPHONE HYDROCHLORIDE 0.25 MILLIGRAM(S): 2 INJECTION INTRAMUSCULAR; INTRAVENOUS; SUBCUTANEOUS at 22:53

## 2023-11-20 RX ADMIN — CHLORHEXIDINE GLUCONATE 1 APPLICATION(S): 213 SOLUTION TOPICAL at 11:31

## 2023-11-20 RX ADMIN — Medication 25 GRAM(S): at 08:03

## 2023-11-20 RX ADMIN — Medication 1 MILLIGRAM(S): at 11:30

## 2023-11-20 RX ADMIN — Medication 3 UNIT(S): at 08:47

## 2023-11-20 RX ADMIN — Medication 325 MILLIGRAM(S): at 22:00

## 2023-11-20 RX ADMIN — INSULIN GLARGINE 2 UNIT(S): 100 INJECTION, SOLUTION SUBCUTANEOUS at 22:55

## 2023-11-20 RX ADMIN — Medication 3 UNIT(S): at 11:54

## 2023-11-20 RX ADMIN — Medication 81 MILLIGRAM(S): at 11:29

## 2023-11-20 RX ADMIN — Medication 325 MILLIGRAM(S): at 21:11

## 2023-11-20 RX ADMIN — CARVEDILOL PHOSPHATE 6.25 MILLIGRAM(S): 80 CAPSULE, EXTENDED RELEASE ORAL at 17:20

## 2023-11-20 RX ADMIN — HYDROMORPHONE HYDROCHLORIDE 0.25 MILLIGRAM(S): 2 INJECTION INTRAMUSCULAR; INTRAVENOUS; SUBCUTANEOUS at 23:23

## 2023-11-20 RX ADMIN — CLOPIDOGREL BISULFATE 75 MILLIGRAM(S): 75 TABLET, FILM COATED ORAL at 11:30

## 2023-11-20 NOTE — PROGRESS NOTE ADULT - SUBJECTIVE AND OBJECTIVE BOX
[facility] DIVISION OF KIDNEY DISEASES AND HYPERTENSION -- FOLLOW UP NOTE  --------------------------------------------------------------------------------  Chief Complaint: ESRD    24 hour events/subjective:  PT seen and examined this AM. No complaints. Would like to go to rehab soon.       PAST HISTORY  --------------------------------------------------------------------------------  No significant changes to PMH, PSH, FHx, SHx, unless otherwise noted    ALLERGIES & MEDICATIONS  --------------------------------------------------------------------------------  Allergies    No Known Allergies    Intolerances      Standing Inpatient Medications  aspirin enteric coated 81 milliGRAM(s) Oral daily  carvedilol 6.25 milliGRAM(s) Oral every 12 hours  chlorhexidine 2% Cloths 1 Application(s) Topical daily  clopidogrel Tablet 75 milliGRAM(s) Oral daily  dextrose 5%. 1000 milliLiter(s) IV Continuous <Continuous>  dextrose 5%. 1000 milliLiter(s) IV Continuous <Continuous>  dextrose 50% Injectable 25 Gram(s) IV Push once  folic acid 1 milliGRAM(s) Oral daily  glucagon  Injectable 1 milliGRAM(s) IntraMuscular once  insulin glargine Injectable (LANTUS) 2 Unit(s) SubCutaneous at bedtime  insulin lispro (ADMELOG) corrective regimen sliding scale   SubCutaneous three times a day before meals  insulin lispro (ADMELOG) corrective regimen sliding scale   SubCutaneous at bedtime  insulin lispro Injectable (ADMELOG) 3 Unit(s) SubCutaneous three times a day before meals  levothyroxine 25 MICROGram(s) Oral daily  sacubitril 24 mG/valsartan 26 mG 1 Tablet(s) Oral two times a day    PRN Inpatient Medications  dextrose Oral Gel 15 Gram(s) Oral once PRN  lactulose Syrup 20 Gram(s) Oral three times a day PRN  polyethylene glycol 3350 17 Gram(s) Oral two times a day PRN  sodium chloride 0.9% Bolus. 100 milliLiter(s) IV Bolus every 5 minutes PRN        VITALS/PHYSICAL EXAM  --------------------------------------------------------------------------------  T(C): 36.5 (11-20-23 @ 10:06), Max: 36.8 (11-20-23 @ 01:45)  HR: 77 (11-20-23 @ 10:06) (75 - 82)  BP: 123/60 (11-20-23 @ 10:06) (113/53 - 157/72)  RR: 16 (11-20-23 @ 10:06) (16 - 19)  SpO2: 98% (11-20-23 @ 10:06) (98% - 100%)  Wt(kg): --        11-19-23 @ 07:01  -  11-20-23 @ 07:00  --------------------------------------------------------  IN: 400 mL / OUT: 1400 mL / NET: -1000 mL      Physical Exam:  Gen: resting, NAD  HEENT: Anicteric  Pulm: Fair entry B/L, CTA B/L  CV: S1S2+  Abd: Soft, +BS    Ext: No LE edema B/L  Neuro: Awake and alert  Skin: Warm and dry  Dialysis access: LUE AVF +thrill + bruit      LABS/STUDIES  --------------------------------------------------------------------------------              9.1    6.21  >-----------<  69       [11-20-23 @ 05:30]              26.8     137  |  100  |  40  ----------------------------<  80      [11-20-23 @ 05:30]  3.9   |  27  |  2.88        Ca     7.4     [11-20-23 @ 05:30]      Mg     2.00     [11-20-23 @ 05:30]      Phos  3.7     [11-20-23 @ 05:30]    TPro  6.9  /  Alb  2.2  /  TBili  2.0  /  DBili  1.2  /  AST  362  /  ALT  25  /  AlkPhos  405  [11-19-23 @ 05:07]          Creatinine Trend:  SCr 2.88 [11-20 @ 05:30]  SCr 3.66 [11-19 @ 05:07]  SCr 2.94 [11-18 @ 06:11]  SCr 3.71 [11-17 @ 06:52]  SCr 2.81 [11-16 @ 05:55]    Urinalysis - [11-20-23 @ 05:30]      Color  / Appearance  / SG  / pH       Gluc 80 / Ketone   / Bili  / Urobili        Blood  / Protein  / Leuk Est  / Nitrite       RBC  / WBC  / Hyaline  / Gran  / Sq Epi  / Non Sq Epi  / Bacteria       TSH 12.60      [11-20-23 @ 05:30]    HBsAb Reactive      [11-13-23 @ 14:30]  HBsAg Nonreact      [11-13-23 @ 14:30]  HBcAb Reactive      [11-13-23 @ 14:30]  HCV 0.16, Nonreact      [11-13-23 @ 14:30]

## 2023-11-20 NOTE — PROGRESS NOTE ADULT - PROBLEM SELECTOR PLAN 1
Pt. with ESRD on HD TIW (MWF, LUE AVF). Pt. also with history of kidney transplant (not on immunosuppression). Last HD as outpatient was done on 11/6/23 via L AVF. Pt. admitted to MICU with respiratory failure and systemic shock. Pt. initiated on CRRT on 11/8, discontinued on 11/10 as volume status and FiO2 requirements improved. Had some oozing from AVF after treatment - vascular following for evaluation of stenosis (will likely need fistulogram during current admission). Labs reviewed. Pt appears clinically stable and tolerating HD treatment today.  Plan next HD treatment on 11/21/23. Dose medications as per ESRD/HD.

## 2023-11-20 NOTE — PROGRESS NOTE ADULT - PROBLEM SELECTOR PLAN 1
Septic shock due to multifocal pneumonia present on admission  Pt was intubated for airway protection, now extubated  Diagnostic paracentesis negative for SBP  Appreciate cardiology recs--low suspicion for cardiogenic component to shock  s/p IV meropenem  Stress dose steroids tapered off  Tapered droxydopa 200 tid-->100 Tid on 11/15,  d/c 11/16  Monitor BP  Sepsis now resolved  Appreciate PM&R shad, ELICIA recommended

## 2023-11-20 NOTE — CHART NOTE - NSCHARTNOTEFT_GEN_A_CORE
Source: Patient [X]    Family [ ]     Other (RN, Chart Review) [X]    Medical Course: Per chart review, patient is a 73y Male with PMH ESRD status post kidney transplant on HD, coronary artery disease status post multiple stents, type 2 diabetes mellitus, and HTN, with recent hospitalization for bacterial PNA, who originally presented to the ED on 11/7 after being found acutely unresponsive with agonal breathing by his daughter. CPR was administered by the daughter. On arrival, EMS administered glucose and started O2 via NRB, with no improvement in mental status. In the ED, a code stroke due to altered mental status was negative. Patient was intubated for airway protection and due to AHRF and started on levophed; septic shock due to multifocal pneumonia was present on admission. Patient is now back to baseline mental status, with occasional episodes of aphasia reported by daughter. Patient was extubated 11/11, now on room air. Pressors weaned off 11/14, transferred to medical floor 11/15.     Nutrition Course: Noted SLP evaluation (11/15), recommendations made for soft and bite sized, thin liquids. Patient is currently ordered for a PO diet in alignment with SLP recommendations. Patient seen at bedside this AM. Patient reports a good appetite and PO intake at meals during course of admission. RN confirmed patient has been consuming >75% of meals x2 days. Documented on RN flowsheet with variable PO intake, ranging 0-100% meal completion. Patient denies any chewing or swallowing difficulty with current diet order. No report of GI distress (nausea, vomiting, diarrhea, constipation). Noted HbA1c 5.8% (11/14/23).    Diet : Soft and Bite Sized  Consistent Carbohydrate {No Snacks}  DASH/TLC (cholesterol and sodium restricted)   For patients receiving Renal Replacement - No protein restr, no conc K, no conc Phos, Low Sodium    Anthropometrics  Height: 180.3cm/71inches  Weights per RN flowsheet: 68.6kg (11/14), 68.7kg (11/13), 68.7kg (11/12), 69.5kg (11/11), 64.1kg (11/10), 64.1kg (11/9), 64.4kg (11/8)  BMI: 21.05kg/m^2  % Weight Change: +4.2kg (6%) over 1 week period of time; weight change may be attributable to edema (see documentation below)    Pertinent Medications: MEDICATIONS  (STANDING):  aspirin enteric coated 81 milliGRAM(s) Oral daily  carvedilol 6.25 milliGRAM(s) Oral every 12 hours  chlorhexidine 2% Cloths 1 Application(s) Topical daily  clopidogrel Tablet 75 milliGRAM(s) Oral daily  dextrose 5%. 1000 milliLiter(s) (100 mL/Hr) IV Continuous <Continuous>  dextrose 5%. 1000 milliLiter(s) (50 mL/Hr) IV Continuous <Continuous>  dextrose 50% Injectable 25 Gram(s) IV Push once  folic acid 1 milliGRAM(s) Oral daily  glucagon  Injectable 1 milliGRAM(s) IntraMuscular once  insulin glargine Injectable (LANTUS) 2 Unit(s) SubCutaneous at bedtime  insulin lispro (ADMELOG) corrective regimen sliding scale   SubCutaneous three times a day before meals  insulin lispro (ADMELOG) corrective regimen sliding scale   SubCutaneous at bedtime  insulin lispro Injectable (ADMELOG) 3 Unit(s) SubCutaneous three times a day before meals  levothyroxine 25 MICROGram(s) Oral daily  sacubitril 24 mG/valsartan 26 mG 1 Tablet(s) Oral two times a day    MEDICATIONS  (PRN):  dextrose Oral Gel 15 Gram(s) Oral once PRN Blood Glucose LESS THAN 70 milliGRAM(s)/deciliter  lactulose Syrup 20 Gram(s) Oral three times a day PRN 2-3 bm daily  polyethylene glycol 3350 17 Gram(s) Oral two times a day PRN 2-3 bm daily  sodium chloride 0.9% Bolus. 100 milliLiter(s) IV Bolus every 5 minutes PRN SBP LESS THAN or EQUAL to 90 mmHg    Pertinent Labs:  11-20 Na137 mmol/L Glu 80 mg/dL K+ 3.9 mmol/L Cr  2.88 mg/dL<H> BUN 40 mg/dL<H> 11-20 Phos 3.7 mg/dL 11-19 Alb 2.2 g/dL<L>    Skin: No pressure ulcers/injuries documented per RN flowsheets     Fluid: Edema 1+ (left arm) per RN flowsheet    GI: Last BM 11/14/23 per RN flowsheet documentation. Noted to be on a bowel regimen.     Estimated Needs:   [X] no change since previous assessment  Weight Used: Ideal body weight 172lb/78kg  Estimated energy needs: 1560-1950kcal (based on 20-25kcal/kg)  Estimated protein needs: 109.2-124.8gms (based on 1.4-1.6gms/kg)    Previous Nutrition Diagnosis: Severe malnutrition    Nutrition Diagnosis is [X] ongoing    New Nutrition Diagnosis: Not applicable    Education: [X] Given today    Type of education provided: Writer provided verbal education regarding current diet order and nutrition recommendations for after discharge, including a low sugar dietary pattern and nutrition in the context of dialysis. Patient verbalized understanding to the discussion.     Nutrition Recommendations  - Consider liberalization of diet to Soft and Bite Sized, consistent carbohydrate {evening snack}, renal restrict  --> Please encourage PO intake, assist with meals and menu selections, provide alternatives PRN  - Monitor weights, labs, BM's, skin integrity, p.o. intake.   - Please monitor % PO intake on flowsheets   - Honor food preferences as able within therapeutic diet order.     Monitoring and Evaluation:   [X] PO intake [ ] Tolerance to diet prescription [X] weights [X] follow up per protocol    Laura Jones MS RDN CDN  On [employer] Teams or Pager #[phone] Source: Patient [X]    Family [ ]     Other (RN, Chart Review) [X]    Medical Course: Per chart review, patient is a 73y Male with PMH ESRD status post kidney transplant on HD, coronary artery disease status post multiple stents, type 2 diabetes mellitus, and HTN, with recent hospitalization for bacterial PNA, who originally presented to the ED on 11/7 after being found acutely unresponsive with agonal breathing by his daughter. CPR was administered by the daughter. On arrival, EMS administered glucose and started O2 via NRB, with no improvement in mental status. In the ED, a code stroke due to altered mental status was negative. Patient was intubated for airway protection and due to AHRF and started on levophed; septic shock due to multifocal pneumonia was present on admission. Patient is now back to baseline mental status, with occasional episodes of aphasia reported by daughter. Patient was extubated 11/11, now on room air. Pressors weaned off 11/14, transferred to medical floor 11/15.     Nutrition Course: Noted SLP evaluation (11/15), recommendations made for soft and bite sized, thin liquids. Patient is currently ordered for a PO diet in alignment with SLP recommendations. Patient seen at bedside this AM. Patient reports a good appetite and PO intake at meals during course of admission. RN confirmed patient has been consuming >75% of meals x2 days. Documented on RN flowsheet with variable PO intake, ranging 0-100% meal completion. Patient denies any chewing or swallowing difficulty with current diet order. No report of GI distress (nausea, vomiting, diarrhea, constipation). Noted HbA1c 5.8% (11/14/23).    Diet : Soft and Bite Sized  Consistent Carbohydrate {No Snacks}  DASH/TLC (cholesterol and sodium restricted)   For patients receiving Renal Replacement - No protein restr, no conc K, no conc Phos, Low Sodium    Anthropometrics  Height: 180.3cm/71inches  Weights per RN flowsheet: 68.6kg (11/14), 68.7kg (11/13), 68.7kg (11/12), 69.5kg (11/11), 64.1kg (11/10), 64.1kg (11/9), 64.4kg (11/8)  BMI: 21.05kg/m^2  % Weight Change: +4.2kg (6%) over 1 week period of time; weight change may be attributable to edema (see documentation below)    Pertinent Medications: MEDICATIONS  (STANDING):  aspirin enteric coated 81 milliGRAM(s) Oral daily  carvedilol 6.25 milliGRAM(s) Oral every 12 hours  chlorhexidine 2% Cloths 1 Application(s) Topical daily  clopidogrel Tablet 75 milliGRAM(s) Oral daily  dextrose 5%. 1000 milliLiter(s) (100 mL/Hr) IV Continuous <Continuous>  dextrose 5%. 1000 milliLiter(s) (50 mL/Hr) IV Continuous <Continuous>  dextrose 50% Injectable 25 Gram(s) IV Push once  folic acid 1 milliGRAM(s) Oral daily  glucagon  Injectable 1 milliGRAM(s) IntraMuscular once  insulin glargine Injectable (LANTUS) 2 Unit(s) SubCutaneous at bedtime  insulin lispro (ADMELOG) corrective regimen sliding scale   SubCutaneous three times a day before meals  insulin lispro (ADMELOG) corrective regimen sliding scale   SubCutaneous at bedtime  insulin lispro Injectable (ADMELOG) 3 Unit(s) SubCutaneous three times a day before meals  levothyroxine 25 MICROGram(s) Oral daily  sacubitril 24 mG/valsartan 26 mG 1 Tablet(s) Oral two times a day    MEDICATIONS  (PRN):  dextrose Oral Gel 15 Gram(s) Oral once PRN Blood Glucose LESS THAN 70 milliGRAM(s)/deciliter  lactulose Syrup 20 Gram(s) Oral three times a day PRN 2-3 bm daily  polyethylene glycol 3350 17 Gram(s) Oral two times a day PRN 2-3 bm daily  sodium chloride 0.9% Bolus. 100 milliLiter(s) IV Bolus every 5 minutes PRN SBP LESS THAN or EQUAL to 90 mmHg    Pertinent Labs:  11-20 Na137 mmol/L Glu 80 mg/dL K+ 3.9 mmol/L Cr  2.88 mg/dL<H> BUN 40 mg/dL<H> 11-20 Phos 3.7 mg/dL 11-19 Alb 2.2 g/dL<L>    Skin: No pressure ulcers/injuries documented per RN flowsheets     Fluid: Edema 1+ (left arm) per RN flowsheet    GI: Last BM 11/14/23 per RN flowsheet documentation. Noted to be on a bowel regimen.     Estimated Needs:   [X] no change since previous assessment  Weight Used: Ideal body weight 172lb/78kg  Estimated energy needs: 1560-1950kcal (based on 20-25kcal/kg)  Estimated protein needs: 109.2-124.8gms (based on 1.4-1.6gms/kg)    Previous Nutrition Diagnosis: Severe malnutrition    Nutrition Diagnosis is [X] ongoing    New Nutrition Diagnosis: Not applicable    Education: [X] Given today    Type of education provided: Writer provided verbal education regarding current diet order and nutrition recommendations for after discharge, including a low sugar dietary pattern and nutrition in the context of dialysis. Patient verbalized understanding to the discussion.     Nutrition Recommendations  - Consider liberalization of diet to Soft and Bite Sized, consistent carbohydrate {evening snack}, renal restrict  --> Please encourage PO intake, assist with meals, provide alternatives PRN  - Monitor weights, labs, BM's, skin integrity, p.o. intake.   - Please monitor % PO intake on flowsheets   - Honor food preferences as able within therapeutic diet order.     Monitoring and Evaluation:   [X] PO intake [ ] Tolerance to diet prescription [X] weights [X] follow up per protocol    Laura Jones MS RDN CDN  On [employer] Teams or Pager #[phone]

## 2023-11-20 NOTE — CHART NOTE - NSCHARTNOTEFT_GEN_A_CORE
Brief Endocrinology Note:    Endocrinology curbsided for interpretation of abnormal TFTs     This is a 73-year-old male history of ESRD on HD, left Monday Wednesday Friday, last HD yesterday, CAD status post stents last stent was 1 month ago, BPH, diabetes, hypertension presenting with AMS    Patient had TFTs drawn on admission with TSH 28.95 and Ft4 1.2, TPO negative    Repeat TFts today show TSH 12.8 and FT4 0.9    Recommendations:  Can start levothyroxine 25mcg daily for subclinical hypothyroidism given initial TSH 28.95 and now down-trending FT4 and TSH remains >10  repeat TFTs in 4-6 weeks - ideally with PCP  will arrange follow up with [facility] [place]: [address] [place] [address]; Ph # [phone]    Chong Roque MD  Endocrine Fellow  Can be reached via teams. For follow up questions, discharge recommendations, or new consults, please call answering service at [phone] (weekdays); [phone] (nights/weekends)

## 2023-11-20 NOTE — PROGRESS NOTE ADULT - SUBJECTIVE AND OBJECTIVE BOX
Vida Carbajal        Patient is a 73y old  Male who presents with a chief complaint of resp failure (19 Nov 2023 14:33)      SUBJECTIVE / OVERNIGHT EVENTS: No acute overnight events. This morning pt doing well. No complaints. Denies fevers, chills, nausea, vomiting, chest pain, SOB. Family at bedside.      MEDICATIONS  (STANDING):  aspirin enteric coated 81 milliGRAM(s) Oral daily  carvedilol 6.25 milliGRAM(s) Oral every 12 hours  chlorhexidine 2% Cloths 1 Application(s) Topical daily  clopidogrel Tablet 75 milliGRAM(s) Oral daily  dextrose 5%. 1000 milliLiter(s) (100 mL/Hr) IV Continuous <Continuous>  dextrose 5%. 1000 milliLiter(s) (50 mL/Hr) IV Continuous <Continuous>  dextrose 50% Injectable 25 Gram(s) IV Push once  folic acid 1 milliGRAM(s) Oral daily  glucagon  Injectable 1 milliGRAM(s) IntraMuscular once  insulin glargine Injectable (LANTUS) 2 Unit(s) SubCutaneous at bedtime  insulin lispro (ADMELOG) corrective regimen sliding scale   SubCutaneous three times a day before meals  insulin lispro (ADMELOG) corrective regimen sliding scale   SubCutaneous at bedtime  insulin lispro Injectable (ADMELOG) 3 Unit(s) SubCutaneous three times a day before meals  levothyroxine 25 MICROGram(s) Oral daily  sacubitril 24 mG/valsartan 26 mG 1 Tablet(s) Oral two times a day    MEDICATIONS  (PRN):  dextrose Oral Gel 15 Gram(s) Oral once PRN Blood Glucose LESS THAN 70 milliGRAM(s)/deciliter  lactulose Syrup 20 Gram(s) Oral three times a day PRN 2-3 bm daily  polyethylene glycol 3350 17 Gram(s) Oral two times a day PRN 2-3 bm daily  sodium chloride 0.9% Bolus. 100 milliLiter(s) IV Bolus every 5 minutes PRN SBP LESS THAN or EQUAL to 90 mmHg    Allergies    No Known Allergies    Intolerances        Vital Signs Last 24 Hrs  T(C): 36.5 (20 Nov 2023 10:06), Max: 36.8 (20 Nov 2023 01:45)  T(F): 97.7 (20 Nov 2023 10:06), Max: 98.2 (20 Nov 2023 01:45)  HR: 77 (20 Nov 2023 10:06) (75 - 82)  BP: 123/60 (20 Nov 2023 10:06) (113/53 - 157/72)  BP(mean): --  RR: 16 (20 Nov 2023 10:06) (16 - 19)  SpO2: 98% (20 Nov 2023 10:06) (98% - 100%)    Parameters below as of 20 Nov 2023 10:06  Patient On (Oxygen Delivery Method): room air      Daily     Daily   CAPILLARY BLOOD GLUCOSE      POCT Blood Glucose.: 140 mg/dL (20 Nov 2023 11:51)  POCT Blood Glucose.: 178 mg/dL (20 Nov 2023 08:17)  POCT Blood Glucose.: 57 mg/dL (20 Nov 2023 07:53)  POCT Blood Glucose.: 51 mg/dL (20 Nov 2023 07:31)  POCT Blood Glucose.: 141 mg/dL (19 Nov 2023 21:09)  POCT Blood Glucose.: 136 mg/dL (19 Nov 2023 20:04)  POCT Blood Glucose.: 91 mg/dL (19 Nov 2023 16:37)    I&O's Summary    19 Nov 2023 07:01  -  20 Nov 2023 07:00  --------------------------------------------------------  IN: 400 mL / OUT: 1400 mL / NET: -1000 mL        PHYSICAL EXAM:    CONSTITUTIONAL: NAD  EYES: EOMI, conjunctiva and sclera clear  ENMT: Moist oral mucosa  NECK: Supple  RESPIRATORY: Breathing unlabored, CTAB  CARDIOVASCULAR: S1S2 no MRG  ABDOMEN: Nontender to palpation, normoactive bowel sounds, no rebound/guarding  MUSCULOSKELETAL: L arm swollen, + thrill in AV fistula  NEUROLOGY: No focal deficits   PSYCH: Awake and alert   SKIN: No rashes or lesions      LABS:                        9.1    6.21  )-----------( 69       ( 20 Nov 2023 05:30 )             26.8     Hgb Trend: 9.1<--, 10.2<--, 10.4<--, 8.9<--, 8.7<--  11-20    137  |  100  |  40<H>  ----------------------------<  80  3.9   |  27  |  2.88<H>    Ca    7.4<L>      20 Nov 2023 05:30  Phos  3.7     11-20  Mg     2.00     11-20    TPro  6.9  /  Alb  2.2<L>  /  TBili  2.0<H>  /  DBili  1.2<H>  /  AST  362<H>  /  ALT  25  /  AlkPhos  405<H>  11-19    Creatinine Trend: 2.88<--, 3.66<--, 2.94<--, 3.71<--, 2.81<--, 3.46<--  LIVER FUNCTIONS - ( 19 Nov 2023 05:07 )  Alb: 2.2 g/dL / Pro: 6.9 g/dL / ALK PHOS: 405 U/L / ALT: 25 U/L / AST: 362 U/L / GGT: x                 Urinalysis Basic - ( 20 Nov 2023 05:30 )    Color: x / Appearance: x / SG: x / pH: x  Gluc: 80 mg/dL / Ketone: x  / Bili: x / Urobili: x   Blood: x / Protein: x / Nitrite: x   Leuk Esterase: x / RBC: x / WBC x   Sq Epi: x / Non Sq Epi: x / Bacteria: x        RADIOLOGY & ADDITIONAL TESTS:    Imaging Personally Reviewed.    Consultant(s) Notes Reviewed.    Care Discussed with Consultants/Other Providers.

## 2023-11-20 NOTE — PROVIDER CONTACT NOTE (HYPOGLYCEMIA EVENT) - NS PROVIDER CONTACT BACKGROUND-HYPO
Age: 73y    Gender: Male    POCT Blood Glucose:  57 mg/dL (11-20-23 @ 07:53)  51 mg/dL (11-20-23 @ 07:31)  141 mg/dL (11-19-23 @ 21:09)  136 mg/dL (11-19-23 @ 20:04)  91 mg/dL (11-19-23 @ 16:37)  126 mg/dL (11-19-23 @ 11:52)  92 mg/dL (11-19-23 @ 10:55)      eMAR:  dextrose 50% Injectable   25 Gram(s) IV Push (11-20-23 @ 08:03)    insulin glargine Injectable (LANTUS)   6 Unit(s) SubCutaneous (11-19-23 @ 21:46)    insulin lispro Injectable (ADMELOG)   3 Unit(s) SubCutaneous (11-19-23 @ 16:43)   3 Unit(s) SubCutaneous (11-19-23 @ 11:58)     Age: 73y    Gender: Male    POCT Blood Glucose:  57 mg/dL (11-20-23 @ 07:53)  51 mg/dL (11-20-23 @ 07:31)  141 mg/dL (11-19-23 @ 21:09)  136 mg/dL (11-19-23 @ 20:04)  91 mg/dL (11-19-23 @ 16:37)  126 mg/dL (11-19-23 @ 11:52)  92 mg/dL (11-19-23 @ 10:55)    *There is a 22min gap between the FS at 7:31 and the FS at 7:53 due to patient refusing for repeat finger stick to confirm the original one. Patient states that he wants to drink apple juice before we recheck it.*    eMAR:  dextrose 50% Injectable   25 Gram(s) IV Push (11-20-23 @ 08:03)    insulin glargine Injectable (LANTUS)   6 Unit(s) SubCutaneous (11-19-23 @ 21:46)    insulin lispro Injectable (ADMELOG)   3 Unit(s) SubCutaneous (11-19-23 @ 16:43)   3 Unit(s) SubCutaneous (11-19-23 @ 11:58)

## 2023-11-21 LAB
ALBUMIN SERPL ELPH-MCNC: 1.9 G/DL — LOW (ref 3.3–5)
ALBUMIN SERPL ELPH-MCNC: 1.9 G/DL — LOW (ref 3.3–5)
ALP SERPL-CCNC: 309 U/L — HIGH (ref 40–120)
ALP SERPL-CCNC: 309 U/L — HIGH (ref 40–120)
ALT FLD-CCNC: 36 U/L — SIGNIFICANT CHANGE UP (ref 4–41)
ALT FLD-CCNC: 36 U/L — SIGNIFICANT CHANGE UP (ref 4–41)
ANION GAP SERPL CALC-SCNC: 10 MMOL/L — SIGNIFICANT CHANGE UP (ref 7–14)
ANION GAP SERPL CALC-SCNC: 10 MMOL/L — SIGNIFICANT CHANGE UP (ref 7–14)
AST SERPL-CCNC: 339 U/L — HIGH (ref 4–40)
AST SERPL-CCNC: 339 U/L — HIGH (ref 4–40)
BILIRUB SERPL-MCNC: 1.5 MG/DL — HIGH (ref 0.2–1.2)
BILIRUB SERPL-MCNC: 1.5 MG/DL — HIGH (ref 0.2–1.2)
BUN SERPL-MCNC: 48 MG/DL — HIGH (ref 7–23)
BUN SERPL-MCNC: 48 MG/DL — HIGH (ref 7–23)
CALCIUM SERPL-MCNC: 7.5 MG/DL — LOW (ref 8.4–10.5)
CALCIUM SERPL-MCNC: 7.5 MG/DL — LOW (ref 8.4–10.5)
CHLORIDE SERPL-SCNC: 100 MMOL/L — SIGNIFICANT CHANGE UP (ref 98–107)
CHLORIDE SERPL-SCNC: 100 MMOL/L — SIGNIFICANT CHANGE UP (ref 98–107)
CO2 SERPL-SCNC: 26 MMOL/L — SIGNIFICANT CHANGE UP (ref 22–31)
CO2 SERPL-SCNC: 26 MMOL/L — SIGNIFICANT CHANGE UP (ref 22–31)
CREAT SERPL-MCNC: 3.63 MG/DL — HIGH (ref 0.5–1.3)
CREAT SERPL-MCNC: 3.63 MG/DL — HIGH (ref 0.5–1.3)
EGFR: 17 ML/MIN/1.73M2 — LOW
EGFR: 17 ML/MIN/1.73M2 — LOW
GLUCOSE BLDC GLUCOMTR-MCNC: 104 MG/DL — HIGH (ref 70–99)
GLUCOSE BLDC GLUCOMTR-MCNC: 104 MG/DL — HIGH (ref 70–99)
GLUCOSE BLDC GLUCOMTR-MCNC: 151 MG/DL — HIGH (ref 70–99)
GLUCOSE BLDC GLUCOMTR-MCNC: 151 MG/DL — HIGH (ref 70–99)
GLUCOSE BLDC GLUCOMTR-MCNC: 227 MG/DL — HIGH (ref 70–99)
GLUCOSE BLDC GLUCOMTR-MCNC: 227 MG/DL — HIGH (ref 70–99)
GLUCOSE BLDC GLUCOMTR-MCNC: 73 MG/DL — SIGNIFICANT CHANGE UP (ref 70–99)
GLUCOSE BLDC GLUCOMTR-MCNC: 73 MG/DL — SIGNIFICANT CHANGE UP (ref 70–99)
GLUCOSE BLDC GLUCOMTR-MCNC: 90 MG/DL — SIGNIFICANT CHANGE UP (ref 70–99)
GLUCOSE BLDC GLUCOMTR-MCNC: 90 MG/DL — SIGNIFICANT CHANGE UP (ref 70–99)
GLUCOSE BLDC GLUCOMTR-MCNC: 91 MG/DL — SIGNIFICANT CHANGE UP (ref 70–99)
GLUCOSE BLDC GLUCOMTR-MCNC: 91 MG/DL — SIGNIFICANT CHANGE UP (ref 70–99)
GLUCOSE BLDC GLUCOMTR-MCNC: 92 MG/DL — SIGNIFICANT CHANGE UP (ref 70–99)
GLUCOSE BLDC GLUCOMTR-MCNC: 92 MG/DL — SIGNIFICANT CHANGE UP (ref 70–99)
GLUCOSE SERPL-MCNC: 77 MG/DL — SIGNIFICANT CHANGE UP (ref 70–99)
GLUCOSE SERPL-MCNC: 77 MG/DL — SIGNIFICANT CHANGE UP (ref 70–99)
HCT VFR BLD CALC: 26.3 % — LOW (ref 39–50)
HCT VFR BLD CALC: 26.3 % — LOW (ref 39–50)
HGB BLD-MCNC: 8.8 G/DL — LOW (ref 13–17)
HGB BLD-MCNC: 8.8 G/DL — LOW (ref 13–17)
MAGNESIUM SERPL-MCNC: 2 MG/DL — SIGNIFICANT CHANGE UP (ref 1.6–2.6)
MAGNESIUM SERPL-MCNC: 2 MG/DL — SIGNIFICANT CHANGE UP (ref 1.6–2.6)
MCHC RBC-ENTMCNC: 30.1 PG — SIGNIFICANT CHANGE UP (ref 27–34)
MCHC RBC-ENTMCNC: 30.1 PG — SIGNIFICANT CHANGE UP (ref 27–34)
MCHC RBC-ENTMCNC: 33.5 GM/DL — SIGNIFICANT CHANGE UP (ref 32–36)
MCHC RBC-ENTMCNC: 33.5 GM/DL — SIGNIFICANT CHANGE UP (ref 32–36)
MCV RBC AUTO: 90.1 FL — SIGNIFICANT CHANGE UP (ref 80–100)
MCV RBC AUTO: 90.1 FL — SIGNIFICANT CHANGE UP (ref 80–100)
NRBC # BLD: 0 /100 WBCS — SIGNIFICANT CHANGE UP (ref 0–0)
NRBC # BLD: 0 /100 WBCS — SIGNIFICANT CHANGE UP (ref 0–0)
NRBC # FLD: 0 K/UL — SIGNIFICANT CHANGE UP (ref 0–0)
NRBC # FLD: 0 K/UL — SIGNIFICANT CHANGE UP (ref 0–0)
PHOSPHATE SERPL-MCNC: 4.4 MG/DL — SIGNIFICANT CHANGE UP (ref 2.5–4.5)
PHOSPHATE SERPL-MCNC: 4.4 MG/DL — SIGNIFICANT CHANGE UP (ref 2.5–4.5)
PLATELET # BLD AUTO: 68 K/UL — LOW (ref 150–400)
PLATELET # BLD AUTO: 68 K/UL — LOW (ref 150–400)
POTASSIUM SERPL-MCNC: 4.3 MMOL/L — SIGNIFICANT CHANGE UP (ref 3.5–5.3)
POTASSIUM SERPL-MCNC: 4.3 MMOL/L — SIGNIFICANT CHANGE UP (ref 3.5–5.3)
POTASSIUM SERPL-SCNC: 4.3 MMOL/L — SIGNIFICANT CHANGE UP (ref 3.5–5.3)
POTASSIUM SERPL-SCNC: 4.3 MMOL/L — SIGNIFICANT CHANGE UP (ref 3.5–5.3)
PROT SERPL-MCNC: 5.8 G/DL — LOW (ref 6–8.3)
PROT SERPL-MCNC: 5.8 G/DL — LOW (ref 6–8.3)
RBC # BLD: 2.92 M/UL — LOW (ref 4.2–5.8)
RBC # BLD: 2.92 M/UL — LOW (ref 4.2–5.8)
RBC # FLD: 26.3 % — HIGH (ref 10.3–14.5)
RBC # FLD: 26.3 % — HIGH (ref 10.3–14.5)
SODIUM SERPL-SCNC: 136 MMOL/L — SIGNIFICANT CHANGE UP (ref 135–145)
SODIUM SERPL-SCNC: 136 MMOL/L — SIGNIFICANT CHANGE UP (ref 135–145)
T3 SERPL-MCNC: 72 NG/DL — LOW (ref 80–200)
T3 SERPL-MCNC: 72 NG/DL — LOW (ref 80–200)
WBC # BLD: 5.07 K/UL — SIGNIFICANT CHANGE UP (ref 3.8–10.5)
WBC # BLD: 5.07 K/UL — SIGNIFICANT CHANGE UP (ref 3.8–10.5)
WBC # FLD AUTO: 5.07 K/UL — SIGNIFICANT CHANGE UP (ref 3.8–10.5)
WBC # FLD AUTO: 5.07 K/UL — SIGNIFICANT CHANGE UP (ref 3.8–10.5)

## 2023-11-21 PROCEDURE — 99233 SBSQ HOSP IP/OBS HIGH 50: CPT

## 2023-11-21 PROCEDURE — 93010 ELECTROCARDIOGRAM REPORT: CPT

## 2023-11-21 RX ORDER — OXYCODONE HYDROCHLORIDE 5 MG/1
2.5 TABLET ORAL EVERY 6 HOURS
Refills: 0 | Status: DISCONTINUED | OUTPATIENT
Start: 2023-11-21 | End: 2023-11-22

## 2023-11-21 RX ORDER — DEXTROSE 50 % IN WATER 50 %
12.5 SYRINGE (ML) INTRAVENOUS ONCE
Refills: 0 | Status: COMPLETED | OUTPATIENT
Start: 2023-11-21 | End: 2023-11-21

## 2023-11-21 RX ADMIN — OXYCODONE HYDROCHLORIDE 2.5 MILLIGRAM(S): 5 TABLET ORAL at 11:24

## 2023-11-21 RX ADMIN — CHLORHEXIDINE GLUCONATE 1 APPLICATION(S): 213 SOLUTION TOPICAL at 11:34

## 2023-11-21 RX ADMIN — Medication 81 MILLIGRAM(S): at 11:33

## 2023-11-21 RX ADMIN — Medication 25 MICROGRAM(S): at 05:15

## 2023-11-21 RX ADMIN — Medication 12.5 GRAM(S): at 05:49

## 2023-11-21 RX ADMIN — Medication 1 MILLIGRAM(S): at 11:27

## 2023-11-21 RX ADMIN — Medication 3 UNIT(S): at 12:18

## 2023-11-21 RX ADMIN — OXYCODONE HYDROCHLORIDE 2.5 MILLIGRAM(S): 5 TABLET ORAL at 12:24

## 2023-11-21 RX ADMIN — CLOPIDOGREL BISULFATE 75 MILLIGRAM(S): 75 TABLET, FILM COATED ORAL at 11:33

## 2023-11-21 NOTE — PROGRESS NOTE ADULT - SUBJECTIVE AND OBJECTIVE BOX
Vida Carbajal        Patient is a 73y old  Male who presents with a chief complaint of resp failure (2023 14:33)      SUBJECTIVE / OVERNIGHT EVENTS: No acute overnight events. This morning pt w/ mid sternal chest pain, non radiating, not sig relieved w/ Tylenol.  Denies SOB, palpitations, diaphoresis. Otherwise w/o complaints. Initially refusing fistulogram but now amenable.       MEDICATIONS  (STANDING):  aspirin enteric coated 81 milliGRAM(s) Oral daily  carvedilol 6.25 milliGRAM(s) Oral every 12 hours  chlorhexidine 2% Cloths 1 Application(s) Topical daily  clopidogrel Tablet 75 milliGRAM(s) Oral daily  dextrose 5%. 1000 milliLiter(s) (100 mL/Hr) IV Continuous <Continuous>  dextrose 5%. 1000 milliLiter(s) (50 mL/Hr) IV Continuous <Continuous>  dextrose 50% Injectable 25 Gram(s) IV Push once  folic acid 1 milliGRAM(s) Oral daily  glucagon  Injectable 1 milliGRAM(s) IntraMuscular once  insulin glargine Injectable (LANTUS) 2 Unit(s) SubCutaneous at bedtime  insulin lispro (ADMELOG) corrective regimen sliding scale   SubCutaneous at bedtime  insulin lispro (ADMELOG) corrective regimen sliding scale   SubCutaneous three times a day before meals  insulin lispro Injectable (ADMELOG) 3 Unit(s) SubCutaneous three times a day before meals  levothyroxine 25 MICROGram(s) Oral daily  sacubitril 24 mG/valsartan 26 mG 1 Tablet(s) Oral two times a day    MEDICATIONS  (PRN):  dextrose Oral Gel 15 Gram(s) Oral once PRN Blood Glucose LESS THAN 70 milliGRAM(s)/deciliter  lactulose Syrup 20 Gram(s) Oral three times a day PRN 2-3 bm daily  oxyCODONE    IR 2.5 milliGRAM(s) Oral every 6 hours PRN Moderate Pain (4 - 6)  polyethylene glycol 3350 17 Gram(s) Oral two times a day PRN 2-3 bm daily  sodium chloride 0.9% Bolus. 100 milliLiter(s) IV Bolus every 5 minutes PRN SBP LESS THAN or EQUAL to 90 mmHg    Allergies    No Known Allergies    Intolerances        Vital Signs Last 24 Hrs  T(C): 35.5 (2023 10:05), Max: 36.4 (2023 21:45)  T(F): 95.9 (2023 10:05), Max: 97.6 (2023 01:45)  HR: 67 (2023 10:05) (65 - 98)  BP: 143/57 (2023 10:05) (103/55 - 143/57)  BP(mean): --  RR: 16 (2023 10:05) (16 - 18)  SpO2: 100% (2023 05:30) (98% - 100%)    Parameters below as of 2023 10:05  Patient On (Oxygen Delivery Method): room air      Daily     Daily Weight in k.6 (2023 10:05)  CAPILLARY BLOOD GLUCOSE      POCT Blood Glucose.: 104 mg/dL (2023 11:29)  POCT Blood Glucose.: 90 mg/dL (2023 09:26)  POCT Blood Glucose.: 91 mg/dL (2023 08:20)  POCT Blood Glucose.: 151 mg/dL (2023 06:06)  POCT Blood Glucose.: 73 mg/dL (2023 05:35)  POCT Blood Glucose.: 105 mg/dL (2023 22:38)  POCT Blood Glucose.: 97 mg/dL (2023 16:53)    I&O's Summary    2023 07:01  -  2023 14:48  --------------------------------------------------------  IN: 400 mL / OUT: 1000 mL / NET: -600 mL        PHYSICAL EXAM:  CONSTITUTIONAL: NAD  EYES: EOMI, conjunctiva and sclera clear  ENMT: Moist oral mucosa  NECK: Supple  RESPIRATORY: Breathing unlabored, CTAB  CARDIOVASCULAR: S1S2 no MRG  ABDOMEN: Nontender to palpation, normoactive bowel sounds, no rebound/guarding  MUSCULOSKELETAL: L arm swollen, + thrill in AV fistula  NEUROLOGY: No focal deficits   PSYCH: Awake and alert   SKIN: No rashes or lesions    LABS:                        8.8    5.07  )-----------( 68       ( 2023 05:25 )             26.3     Hgb Trend: 8.8<--, 9.1<--, 10.2<--, 10.4<--, 8.9<--  11    136  |  100  |  48<H>  ----------------------------<  77  4.3   |  26  |  3.63<H>    Ca    7.5<L>      2023 05:25  Phos  4.4       Mg     2.00         TPro  5.8<L>  /  Alb  1.9<L>  /  TBili  1.5<H>  /  DBili  x   /  AST  339<H>  /  ALT  36  /  AlkPhos  309<H>      Creatinine Trend: 3.63<--, 2.88<--, 3.66<--, 2.94<--, 3.71<--, 2.81<--  LIVER FUNCTIONS - ( 2023 05:25 )  Alb: 1.9 g/dL / Pro: 5.8 g/dL / ALK PHOS: 309 U/L / ALT: 36 U/L / AST: 339 U/L / GGT: x                 Urinalysis Basic - ( 2023 05:25 )    Color: x / Appearance: x / SG: x / pH: x  Gluc: 77 mg/dL / Ketone: x  / Bili: x / Urobili: x   Blood: x / Protein: x / Nitrite: x   Leuk Esterase: x / RBC: x / WBC x   Sq Epi: x / Non Sq Epi: x / Bacteria: x        RADIOLOGY & ADDITIONAL TESTS:    Imaging Personally Reviewed.    Consultant(s) Notes Reviewed.    Care Discussed with Consultants/Other Providers.

## 2023-11-21 NOTE — PROGRESS NOTE ADULT - ASSESSMENT
73y old man with PMH of ESRD s/p kidney transplant on HD, CAD s/p multiple stents (most recently, RCA stent on 10/2023), T2DM, and HTN, with recent hospitalization for bacterial PNA, who originally presented to the ED on 11/7 after being found unresponsive with agonal breathing by his daughter. CPR  administered by the daughter. Admitted for AMS, intubated for airway protection and AHRF likely 2/2 multifocal pneumonia, s/p MICU course, transferred to medicine for further mgt. Noted to have oozing from fistula, now pending fistulogram

## 2023-11-21 NOTE — PROGRESS NOTE ADULT - ASSESSMENT
73M with PMH of ESRD s/p kidney transplant on HD, CAD s/p multiple stents (most recently, RCA stent on 10/2023), T2DM, and HTN, with recent hospitalization for bacterial PNA, who originally presented to the ED on 11/7 after being found acutely unresponsive with agonal breathing by his daughter. Pt was intubated in ED for airway protection and due to AHRF and started on levophed; septic shock due to multifocal pneumonia was present on admission. Patient started on abx and is now back to baseline mental status, with occasional episodes of aphasia reported by daughter. Pt was extubated 11/11, now on room air. Pressors weaned off 11/14, transferred to medical floor 11/15. Vascular surgery consulted 2/2 oozing LUE AVF. Patient received dialysis through LUE fistula on 11/15 and was thereafter noted to have oozing therefrom. No historyof stenting in either arm or intervention on the AVF since creation per patient's family. Continues to have oozing after dialysis requiring suture overnight, no evidence of outflow stenosis on 11/17 duplex.    Recommendations:  - OR tomorrow for fistulogram   - Please make patient NPO after midnight.   - Please obtain 1AM labs (CBC, BMP, Coags, type and screen)   - Consent in chart  - Please ensure and document medical optimization for fistulogram  - Remainder of care per primary team      Vascular Surgery  [id number]

## 2023-11-21 NOTE — PROGRESS NOTE ADULT - SUBJECTIVE AND OBJECTIVE BOX
Surgery Progress Note    INTERVAL EVENTS:   No acute events overnight.    SUBJECTIVE: Patient seen and examined at bedside with surgical team, no bleeding at HD today    OBJECTIVE:    Vital Signs Last 24 Hrs  T(C): 35.5 (21 Nov 2023 10:05), Max: 36.4 (20 Nov 2023 14:00)  T(F): 95.9 (21 Nov 2023 10:05), Max: 97.6 (21 Nov 2023 01:45)  HR: 67 (21 Nov 2023 10:05) (65 - 98)  BP: 143/57 (21 Nov 2023 10:05) (103/55 - 143/57)  BP(mean): --  RR: 16 (21 Nov 2023 10:05) (16 - 18)  SpO2: 100% (21 Nov 2023 05:30) (98% - 100%)    Parameters below as of 21 Nov 2023 10:05  Patient On (Oxygen Delivery Method): room air    I&O's Detail    21 Nov 2023 07:01  -  21 Nov 2023 12:04  --------------------------------------------------------  IN:    Other (mL): 400 mL  Total IN: 400 mL    OUT:    Other (mL): 1000 mL  Total OUT: 1000 mL    Total NET: -600 mL      MEDICATIONS  (STANDING):  aspirin enteric coated 81 milliGRAM(s) Oral daily  carvedilol 6.25 milliGRAM(s) Oral every 12 hours  chlorhexidine 2% Cloths 1 Application(s) Topical daily  clopidogrel Tablet 75 milliGRAM(s) Oral daily  dextrose 5%. 1000 milliLiter(s) (100 mL/Hr) IV Continuous <Continuous>  dextrose 5%. 1000 milliLiter(s) (50 mL/Hr) IV Continuous <Continuous>  dextrose 50% Injectable 25 Gram(s) IV Push once  folic acid 1 milliGRAM(s) Oral daily  glucagon  Injectable 1 milliGRAM(s) IntraMuscular once  insulin glargine Injectable (LANTUS) 2 Unit(s) SubCutaneous at bedtime  insulin lispro (ADMELOG) corrective regimen sliding scale   SubCutaneous at bedtime  insulin lispro (ADMELOG) corrective regimen sliding scale   SubCutaneous three times a day before meals  insulin lispro Injectable (ADMELOG) 3 Unit(s) SubCutaneous three times a day before meals  levothyroxine 25 MICROGram(s) Oral daily  sacubitril 24 mG/valsartan 26 mG 1 Tablet(s) Oral two times a day    MEDICATIONS  (PRN):  dextrose Oral Gel 15 Gram(s) Oral once PRN Blood Glucose LESS THAN 70 milliGRAM(s)/deciliter  lactulose Syrup 20 Gram(s) Oral three times a day PRN 2-3 bm daily  oxyCODONE    IR 2.5 milliGRAM(s) Oral every 6 hours PRN Moderate Pain (4 - 6)  polyethylene glycol 3350 17 Gram(s) Oral two times a day PRN 2-3 bm daily  sodium chloride 0.9% Bolus. 100 milliLiter(s) IV Bolus every 5 minutes PRN SBP LESS THAN or EQUAL to 90 mmHg      PHYSICAL EXAM:  Constitutional:  NAD  Respiratory: Unlabored breathing  Abdomen: Soft, nondistended, NTTP. No rebound or guarding.  Extremities: WWP, PACHECO spontaneously. LUE with non bleeding fistula, + thrill. +     LABS:                        8.8    5.07  )-----------( 68       ( 21 Nov 2023 05:25 )             26.3     11-21    136  |  100  |  48<H>  ----------------------------<  77  4.3   |  26  |  3.63<H>    Ca    7.5<L>      21 Nov 2023 05:25  Phos  4.4     11-21  Mg     2.00     11-21    TPro  5.8<L>  /  Alb  1.9<L>  /  TBili  1.5<H>  /  DBili  x   /  AST  339<H>  /  ALT  36  /  AlkPhos  309<H>  11-21      LIVER FUNCTIONS - ( 21 Nov 2023 05:25 )  Alb: 1.9 g/dL / Pro: 5.8 g/dL / ALK PHOS: 309 U/L / ALT: 36 U/L / AST: 339 U/L / GGT: x           Urinalysis Basic - ( 21 Nov 2023 05:25 )    Color: x / Appearance: x / SG: x / pH: x  Gluc: 77 mg/dL / Ketone: x  / Bili: x / Urobili: x   Blood: x / Protein: x / Nitrite: x   Leuk Esterase: x / RBC: x / WBC x   Sq Epi: x / Non Sq Epi: x / Bacteria: x

## 2023-11-21 NOTE — PROVIDER CONTACT NOTE (OTHER) - SITUATION
Pt states that the dose of Tylenol that was given didn't do anything to pain. Pt requesting to speak with a provider and get something else for pain.

## 2023-11-22 DIAGNOSIS — R07.9 CHEST PAIN, UNSPECIFIED: ICD-10-CM

## 2023-11-22 LAB
ALBUMIN SERPL ELPH-MCNC: 2.1 G/DL — LOW (ref 3.3–5)
ALBUMIN SERPL ELPH-MCNC: 2.1 G/DL — LOW (ref 3.3–5)
ALP SERPL-CCNC: 347 U/L — HIGH (ref 40–120)
ALP SERPL-CCNC: 347 U/L — HIGH (ref 40–120)
ALT FLD-CCNC: 43 U/L — HIGH (ref 4–41)
ALT FLD-CCNC: 43 U/L — HIGH (ref 4–41)
ANION GAP SERPL CALC-SCNC: 10 MMOL/L — SIGNIFICANT CHANGE UP (ref 7–14)
ANION GAP SERPL CALC-SCNC: 10 MMOL/L — SIGNIFICANT CHANGE UP (ref 7–14)
ANION GAP SERPL CALC-SCNC: 6 MMOL/L — LOW (ref 7–14)
ANION GAP SERPL CALC-SCNC: 6 MMOL/L — LOW (ref 7–14)
APTT BLD: 36 SEC — HIGH (ref 24.5–35.6)
APTT BLD: 36 SEC — HIGH (ref 24.5–35.6)
AST SERPL-CCNC: 335 U/L — HIGH (ref 4–40)
AST SERPL-CCNC: 335 U/L — HIGH (ref 4–40)
BASE EXCESS BLDV CALC-SCNC: 4.4 MMOL/L — HIGH (ref -2–3)
BASE EXCESS BLDV CALC-SCNC: 4.4 MMOL/L — HIGH (ref -2–3)
BILIRUB SERPL-MCNC: 1.5 MG/DL — HIGH (ref 0.2–1.2)
BILIRUB SERPL-MCNC: 1.5 MG/DL — HIGH (ref 0.2–1.2)
BLD GP AB SCN SERPL QL: NEGATIVE — SIGNIFICANT CHANGE UP
BLD GP AB SCN SERPL QL: NEGATIVE — SIGNIFICANT CHANGE UP
BUN SERPL-MCNC: 35 MG/DL — HIGH (ref 7–23)
BUN SERPL-MCNC: 35 MG/DL — HIGH (ref 7–23)
BUN SERPL-MCNC: 38 MG/DL — HIGH (ref 7–23)
BUN SERPL-MCNC: 38 MG/DL — HIGH (ref 7–23)
CA-I SERPL-SCNC: 1.09 MMOL/L — LOW (ref 1.15–1.33)
CA-I SERPL-SCNC: 1.09 MMOL/L — LOW (ref 1.15–1.33)
CALCIUM SERPL-MCNC: 7.6 MG/DL — LOW (ref 8.4–10.5)
CHLORIDE BLDV-SCNC: 101 MMOL/L — SIGNIFICANT CHANGE UP (ref 96–108)
CHLORIDE BLDV-SCNC: 101 MMOL/L — SIGNIFICANT CHANGE UP (ref 96–108)
CHLORIDE SERPL-SCNC: 101 MMOL/L — SIGNIFICANT CHANGE UP (ref 98–107)
CK MB BLD-MCNC: 0.3 % — SIGNIFICANT CHANGE UP (ref 0–2.5)
CK MB BLD-MCNC: 0.3 % — SIGNIFICANT CHANGE UP (ref 0–2.5)
CK MB CFR SERPL CALC: 6.5 NG/ML — SIGNIFICANT CHANGE UP
CK MB CFR SERPL CALC: 6.5 NG/ML — SIGNIFICANT CHANGE UP
CK SERPL-CCNC: 2440 U/L — HIGH (ref 30–200)
CK SERPL-CCNC: 2440 U/L — HIGH (ref 30–200)
CO2 BLDV-SCNC: 30.6 MMOL/L — HIGH (ref 22–26)
CO2 BLDV-SCNC: 30.6 MMOL/L — HIGH (ref 22–26)
CO2 SERPL-SCNC: 27 MMOL/L — SIGNIFICANT CHANGE UP (ref 22–31)
CO2 SERPL-SCNC: 27 MMOL/L — SIGNIFICANT CHANGE UP (ref 22–31)
CO2 SERPL-SCNC: 30 MMOL/L — SIGNIFICANT CHANGE UP (ref 22–31)
CO2 SERPL-SCNC: 30 MMOL/L — SIGNIFICANT CHANGE UP (ref 22–31)
CREAT SERPL-MCNC: 3.06 MG/DL — HIGH (ref 0.5–1.3)
CREAT SERPL-MCNC: 3.06 MG/DL — HIGH (ref 0.5–1.3)
CREAT SERPL-MCNC: 3.16 MG/DL — HIGH (ref 0.5–1.3)
CREAT SERPL-MCNC: 3.16 MG/DL — HIGH (ref 0.5–1.3)
EGFR: 20 ML/MIN/1.73M2 — LOW
EGFR: 20 ML/MIN/1.73M2 — LOW
EGFR: 21 ML/MIN/1.73M2 — LOW
EGFR: 21 ML/MIN/1.73M2 — LOW
GAS PNL BLDV: 133 MMOL/L — LOW (ref 136–145)
GAS PNL BLDV: 133 MMOL/L — LOW (ref 136–145)
GAS PNL BLDV: SIGNIFICANT CHANGE UP
GLUCOSE BLDC GLUCOMTR-MCNC: 114 MG/DL — HIGH (ref 70–99)
GLUCOSE BLDC GLUCOMTR-MCNC: 114 MG/DL — HIGH (ref 70–99)
GLUCOSE BLDC GLUCOMTR-MCNC: 134 MG/DL — HIGH (ref 70–99)
GLUCOSE BLDC GLUCOMTR-MCNC: 134 MG/DL — HIGH (ref 70–99)
GLUCOSE BLDC GLUCOMTR-MCNC: 135 MG/DL — HIGH (ref 70–99)
GLUCOSE BLDC GLUCOMTR-MCNC: 135 MG/DL — HIGH (ref 70–99)
GLUCOSE BLDV-MCNC: 160 MG/DL — HIGH (ref 70–99)
GLUCOSE BLDV-MCNC: 160 MG/DL — HIGH (ref 70–99)
GLUCOSE SERPL-MCNC: 130 MG/DL — HIGH (ref 70–99)
GLUCOSE SERPL-MCNC: 130 MG/DL — HIGH (ref 70–99)
GLUCOSE SERPL-MCNC: 157 MG/DL — HIGH (ref 70–99)
GLUCOSE SERPL-MCNC: 157 MG/DL — HIGH (ref 70–99)
HCO3 BLDV-SCNC: 29 MMOL/L — SIGNIFICANT CHANGE UP (ref 22–29)
HCO3 BLDV-SCNC: 29 MMOL/L — SIGNIFICANT CHANGE UP (ref 22–29)
HCT VFR BLD CALC: 25.6 % — LOW (ref 39–50)
HCT VFR BLD CALC: 25.6 % — LOW (ref 39–50)
HCT VFR BLDA CALC: 27 % — LOW (ref 39–51)
HCT VFR BLDA CALC: 27 % — LOW (ref 39–51)
HGB BLD CALC-MCNC: 8.9 G/DL — LOW (ref 12.6–17.4)
HGB BLD CALC-MCNC: 8.9 G/DL — LOW (ref 12.6–17.4)
HGB BLD-MCNC: 8.7 G/DL — LOW (ref 13–17)
HGB BLD-MCNC: 8.7 G/DL — LOW (ref 13–17)
INR BLD: 1.15 RATIO — SIGNIFICANT CHANGE UP (ref 0.85–1.18)
INR BLD: 1.15 RATIO — SIGNIFICANT CHANGE UP (ref 0.85–1.18)
LACTATE BLDV-MCNC: 1.7 MMOL/L — SIGNIFICANT CHANGE UP (ref 0.5–2)
LACTATE BLDV-MCNC: 1.7 MMOL/L — SIGNIFICANT CHANGE UP (ref 0.5–2)
MAGNESIUM SERPL-MCNC: 2 MG/DL — SIGNIFICANT CHANGE UP (ref 1.6–2.6)
MAGNESIUM SERPL-MCNC: 2 MG/DL — SIGNIFICANT CHANGE UP (ref 1.6–2.6)
MCHC RBC-ENTMCNC: 30.7 PG — SIGNIFICANT CHANGE UP (ref 27–34)
MCHC RBC-ENTMCNC: 30.7 PG — SIGNIFICANT CHANGE UP (ref 27–34)
MCHC RBC-ENTMCNC: 34 GM/DL — SIGNIFICANT CHANGE UP (ref 32–36)
MCHC RBC-ENTMCNC: 34 GM/DL — SIGNIFICANT CHANGE UP (ref 32–36)
MCV RBC AUTO: 90.5 FL — SIGNIFICANT CHANGE UP (ref 80–100)
MCV RBC AUTO: 90.5 FL — SIGNIFICANT CHANGE UP (ref 80–100)
NRBC # BLD: 0 /100 WBCS — SIGNIFICANT CHANGE UP (ref 0–0)
NRBC # BLD: 0 /100 WBCS — SIGNIFICANT CHANGE UP (ref 0–0)
NRBC # FLD: 0 K/UL — SIGNIFICANT CHANGE UP (ref 0–0)
NRBC # FLD: 0 K/UL — SIGNIFICANT CHANGE UP (ref 0–0)
PCO2 BLDV: 44 MMHG — SIGNIFICANT CHANGE UP (ref 42–55)
PCO2 BLDV: 44 MMHG — SIGNIFICANT CHANGE UP (ref 42–55)
PH BLDV: 7.43 — SIGNIFICANT CHANGE UP (ref 7.32–7.43)
PH BLDV: 7.43 — SIGNIFICANT CHANGE UP (ref 7.32–7.43)
PHOSPHATE SERPL-MCNC: 3.7 MG/DL — SIGNIFICANT CHANGE UP (ref 2.5–4.5)
PHOSPHATE SERPL-MCNC: 3.7 MG/DL — SIGNIFICANT CHANGE UP (ref 2.5–4.5)
PLATELET # BLD AUTO: 74 K/UL — LOW (ref 150–400)
PLATELET # BLD AUTO: 74 K/UL — LOW (ref 150–400)
PO2 BLDV: 55 MMHG — HIGH (ref 25–45)
PO2 BLDV: 55 MMHG — HIGH (ref 25–45)
POTASSIUM BLDV-SCNC: 3.9 MMOL/L — SIGNIFICANT CHANGE UP (ref 3.5–5.1)
POTASSIUM BLDV-SCNC: 3.9 MMOL/L — SIGNIFICANT CHANGE UP (ref 3.5–5.1)
POTASSIUM SERPL-MCNC: 3.9 MMOL/L — SIGNIFICANT CHANGE UP (ref 3.5–5.3)
POTASSIUM SERPL-MCNC: 3.9 MMOL/L — SIGNIFICANT CHANGE UP (ref 3.5–5.3)
POTASSIUM SERPL-MCNC: 4 MMOL/L — SIGNIFICANT CHANGE UP (ref 3.5–5.3)
POTASSIUM SERPL-MCNC: 4 MMOL/L — SIGNIFICANT CHANGE UP (ref 3.5–5.3)
POTASSIUM SERPL-SCNC: 3.9 MMOL/L — SIGNIFICANT CHANGE UP (ref 3.5–5.3)
POTASSIUM SERPL-SCNC: 3.9 MMOL/L — SIGNIFICANT CHANGE UP (ref 3.5–5.3)
POTASSIUM SERPL-SCNC: 4 MMOL/L — SIGNIFICANT CHANGE UP (ref 3.5–5.3)
POTASSIUM SERPL-SCNC: 4 MMOL/L — SIGNIFICANT CHANGE UP (ref 3.5–5.3)
PROT SERPL-MCNC: 6.1 G/DL — SIGNIFICANT CHANGE UP (ref 6–8.3)
PROT SERPL-MCNC: 6.1 G/DL — SIGNIFICANT CHANGE UP (ref 6–8.3)
PROTHROM AB SERPL-ACNC: 12.9 SEC — SIGNIFICANT CHANGE UP (ref 9.5–13)
PROTHROM AB SERPL-ACNC: 12.9 SEC — SIGNIFICANT CHANGE UP (ref 9.5–13)
RBC # BLD: 2.83 M/UL — LOW (ref 4.2–5.8)
RBC # BLD: 2.83 M/UL — LOW (ref 4.2–5.8)
RBC # FLD: 25.2 % — HIGH (ref 10.3–14.5)
RBC # FLD: 25.2 % — HIGH (ref 10.3–14.5)
RH IG SCN BLD-IMP: POSITIVE — SIGNIFICANT CHANGE UP
RH IG SCN BLD-IMP: POSITIVE — SIGNIFICANT CHANGE UP
SAO2 % BLDV: 84.7 % — SIGNIFICANT CHANGE UP (ref 67–88)
SAO2 % BLDV: 84.7 % — SIGNIFICANT CHANGE UP (ref 67–88)
SODIUM SERPL-SCNC: 137 MMOL/L — SIGNIFICANT CHANGE UP (ref 135–145)
SODIUM SERPL-SCNC: 137 MMOL/L — SIGNIFICANT CHANGE UP (ref 135–145)
SODIUM SERPL-SCNC: 138 MMOL/L — SIGNIFICANT CHANGE UP (ref 135–145)
SODIUM SERPL-SCNC: 138 MMOL/L — SIGNIFICANT CHANGE UP (ref 135–145)
TROPONIN T, HIGH SENSITIVITY RESULT: 625 NG/L — CRITICAL HIGH
TROPONIN T, HIGH SENSITIVITY RESULT: 625 NG/L — CRITICAL HIGH
WBC # BLD: 5.15 K/UL — SIGNIFICANT CHANGE UP (ref 3.8–10.5)
WBC # BLD: 5.15 K/UL — SIGNIFICANT CHANGE UP (ref 3.8–10.5)
WBC # FLD AUTO: 5.15 K/UL — SIGNIFICANT CHANGE UP (ref 3.8–10.5)
WBC # FLD AUTO: 5.15 K/UL — SIGNIFICANT CHANGE UP (ref 3.8–10.5)

## 2023-11-22 PROCEDURE — 99233 SBSQ HOSP IP/OBS HIGH 50: CPT

## 2023-11-22 PROCEDURE — 99232 SBSQ HOSP IP/OBS MODERATE 35: CPT | Mod: FS

## 2023-11-22 PROCEDURE — 93010 ELECTROCARDIOGRAM REPORT: CPT

## 2023-11-22 RX ORDER — TRAMADOL HYDROCHLORIDE 50 MG/1
25 TABLET ORAL EVERY 12 HOURS
Refills: 0 | Status: DISCONTINUED | OUTPATIENT
Start: 2023-11-22 | End: 2023-11-23

## 2023-11-22 RX ORDER — CARVEDILOL PHOSPHATE 80 MG/1
3.12 CAPSULE, EXTENDED RELEASE ORAL EVERY 12 HOURS
Refills: 0 | Status: DISCONTINUED | OUTPATIENT
Start: 2023-11-22 | End: 2023-12-05

## 2023-11-22 RX ORDER — LIDOCAINE 4 G/100G
1 CREAM TOPICAL EVERY 24 HOURS
Refills: 0 | Status: DISCONTINUED | OUTPATIENT
Start: 2023-11-22 | End: 2023-12-05

## 2023-11-22 RX ORDER — LIDOCAINE 4 G/100G
1 CREAM TOPICAL ONCE
Refills: 0 | Status: COMPLETED | OUTPATIENT
Start: 2023-11-22 | End: 2023-11-22

## 2023-11-22 RX ORDER — GABAPENTIN 400 MG/1
100 CAPSULE ORAL
Refills: 0 | Status: DISCONTINUED | OUTPATIENT
Start: 2023-11-22 | End: 2023-12-05

## 2023-11-22 RX ORDER — SODIUM CHLORIDE 9 MG/ML
250 INJECTION INTRAMUSCULAR; INTRAVENOUS; SUBCUTANEOUS ONCE
Refills: 0 | Status: COMPLETED | OUTPATIENT
Start: 2023-11-22 | End: 2023-11-22

## 2023-11-22 RX ADMIN — LIDOCAINE 1 PATCH: 4 CREAM TOPICAL at 17:22

## 2023-11-22 RX ADMIN — Medication 3 UNIT(S): at 11:36

## 2023-11-22 RX ADMIN — SACUBITRIL AND VALSARTAN 1 TABLET(S): 24; 26 TABLET, FILM COATED ORAL at 11:32

## 2023-11-22 RX ADMIN — Medication 25 MICROGRAM(S): at 11:33

## 2023-11-22 RX ADMIN — GABAPENTIN 100 MILLIGRAM(S): 400 CAPSULE ORAL at 17:21

## 2023-11-22 RX ADMIN — LIDOCAINE 1 PATCH: 4 CREAM TOPICAL at 17:21

## 2023-11-22 RX ADMIN — Medication 1 MILLIGRAM(S): at 11:32

## 2023-11-22 RX ADMIN — CARVEDILOL PHOSPHATE 6.25 MILLIGRAM(S): 80 CAPSULE, EXTENDED RELEASE ORAL at 11:32

## 2023-11-22 RX ADMIN — CHLORHEXIDINE GLUCONATE 1 APPLICATION(S): 213 SOLUTION TOPICAL at 14:13

## 2023-11-22 RX ADMIN — SODIUM CHLORIDE 250 MILLILITER(S): 9 INJECTION INTRAMUSCULAR; INTRAVENOUS; SUBCUTANEOUS at 14:00

## 2023-11-22 RX ADMIN — CLOPIDOGREL BISULFATE 75 MILLIGRAM(S): 75 TABLET, FILM COATED ORAL at 11:33

## 2023-11-22 RX ADMIN — Medication 3 UNIT(S): at 17:18

## 2023-11-22 RX ADMIN — Medication 81 MILLIGRAM(S): at 11:33

## 2023-11-22 RX ADMIN — LIDOCAINE 1 PATCH: 4 CREAM TOPICAL at 14:22

## 2023-11-22 RX ADMIN — Medication 600 MILLIGRAM(S): at 17:20

## 2023-11-22 NOTE — PROVIDER CONTACT NOTE (OTHER) - SITUATION
RN reattempted collection of labs, AM VS and morning meds, pt refused. Said, "No, no, no... Get out of here!"

## 2023-11-22 NOTE — PROGRESS NOTE ADULT - SUBJECTIVE AND OBJECTIVE BOX
Patient refused am labs this am, patients family at bedside and attempted to have RN draw labs, patient continues to refuse, patient also refusing fistulogram today. Patients procedure cancelled, d/w primary team.

## 2023-11-22 NOTE — PROGRESS NOTE ADULT - PROBLEM SELECTOR PLAN 1
Pt. with ESRD on HD TIW (MWF, LUE AVF). Pt. also with history of kidney transplant (not on immunosuppression). Last HD as outpatient was done on 11/6/23 via L AVF. Pt. admitted to MICU with respiratory failure and systemic shock. Pt. initiated on CRRT on 11/8, discontinued on 11/10 as volume status and FiO2 requirements improved. Had some oozing from AVF after treatment - vascular following for evaluation of stenosis (will likely need fistulogram during current admission). Refused this AM but vascular following. Labs reviewed. Pt appears clinically stable and tolerating HD treatment yesterday.  Plan next HD treatment on 11/24/23. Dose medications as per ESRD/HD. Pt. with ESRD on HD TIW (MWF, LUE AVF). Pt. also with history of kidney transplant (not on immunosuppression). Last HD as outpatient was done on 11/6/23 via L AVF. Pt. admitted to MICU with respiratory failure and systemic shock. Pt. initiated on CRRT on 11/8, discontinued on 11/10 as volume status and FiO2 requirements improved. Had some oozing from AVF after treatment - vascular following currently functioning well. Labs reviewed 3k bath used today. Pt appears clinically stable and tolerating HD treatment today,.  Plan next HD treatment on 11/24/23. Dose medications as per ESRD/HD. 4 = No assist / stand by assistance

## 2023-11-22 NOTE — CHART NOTE - NSCHARTNOTEFT_GEN_A_CORE
[facility] ACP COVERAGE OVERNIGHT MEDICINE ACP COVERAGE  Late note entry    Notified by RN that pt c/o chest pain, was initially refusing assessments and EKG.  Upon arrival to bedside, pt now agreeable. Vitals stable. 12 lead EKG unchanged from previous.   Pt states that he does not have any chest pain, rather chest wall hurts moreso when he coughs. Denies any other complaints.     Pt appears comfortable, no acute distress. Cards: RRR; +reproducible chest wall tenderness Lungs: CTA B/L.    Vital Signs Last 24 Hrs  T(C): 36.7 (21 Nov 2023 21:50), Max: 36.7 (21 Nov 2023 21:50)  T(F): 98 (21 Nov 2023 21:50), Max: 98 (21 Nov 2023 21:50)  HR: 86 (21 Nov 2023 21:50) (65 - 86)  BP: 127/46 (21 Nov 2023 21:50) (103/55 - 143/57)  BP(mean): --  RR: 15 (21 Nov 2023 21:50) (15 - 18)  SpO2: 98% (21 Nov 2023 21:50) (98% - 100%)    Parameters below as of 21 Nov 2023 21:50  Patient On (Oxygen Delivery Method): room air    -No need for cardiac enzymes given reproducible pain  -pt planned for fistulogram in AM, NPO pMN, will f/u 01:00 labs    NAYELI Martínez PA-C  [id number]

## 2023-11-22 NOTE — PROGRESS NOTE ADULT - SUBJECTIVE AND OBJECTIVE BOX
[facility] DIVISION OF KIDNEY DISEASES AND HYPERTENSION -- FOLLOW UP NOTE  --------------------------------------------------------------------------------  Chief Complaint: ESRD    24 hour events/subjective:  Pt without any complaints this AM, had HD yesterday. He was refusing to go to vascular this AM however.        PAST HISTORY  --------------------------------------------------------------------------------  No significant changes to PMH, PSH, FHx, SHx, unless otherwise noted    ALLERGIES & MEDICATIONS  --------------------------------------------------------------------------------  Allergies    No Known Allergies    Intolerances      Standing Inpatient Medications  aspirin enteric coated 81 milliGRAM(s) Oral daily  carvedilol 3.125 milliGRAM(s) Oral every 12 hours  chlorhexidine 2% Cloths 1 Application(s) Topical daily  clopidogrel Tablet 75 milliGRAM(s) Oral daily  dextrose 5%. 1000 milliLiter(s) IV Continuous <Continuous>  dextrose 5%. 1000 milliLiter(s) IV Continuous <Continuous>  dextrose 50% Injectable 25 Gram(s) IV Push once  folic acid 1 milliGRAM(s) Oral daily  gabapentin 100 milliGRAM(s) Oral <User Schedule>  glucagon  Injectable 1 milliGRAM(s) IntraMuscular once  guaiFENesin  milliGRAM(s) Oral every 12 hours  insulin lispro (ADMELOG) corrective regimen sliding scale   SubCutaneous three times a day before meals  insulin lispro (ADMELOG) corrective regimen sliding scale   SubCutaneous at bedtime  insulin lispro Injectable (ADMELOG) 3 Unit(s) SubCutaneous three times a day before meals  levothyroxine 25 MICROGram(s) Oral daily  lidocaine   4% Patch 1 Patch Transdermal every 24 hours  sacubitril 24 mG/valsartan 26 mG 1 Tablet(s) Oral two times a day    PRN Inpatient Medications  benzonatate 100 milliGRAM(s) Oral three times a day PRN  dextrose Oral Gel 15 Gram(s) Oral once PRN  lactulose Syrup 20 Gram(s) Oral three times a day PRN  polyethylene glycol 3350 17 Gram(s) Oral two times a day PRN  sodium chloride 0.9% Bolus. 100 milliLiter(s) IV Bolus every 5 minutes PRN  traMADol 25 milliGRAM(s) Oral every 12 hours PRN      VITALS/PHYSICAL EXAM  --------------------------------------------------------------------------------  T(C): 36.6 (11-22-23 @ 10:23), Max: 36.7 (11-21-23 @ 21:50)  HR: 78 (11-22-23 @ 14:21) (75 - 86)  BP: 110/54 (11-22-23 @ 14:21) (78/38 - 127/46)  RR: 16 (11-22-23 @ 13:18) (15 - 16)  SpO2: 98% (11-22-23 @ 13:18) (98% - 98%)  Wt(kg): --        11-21-23 @ 07:01  -  11-22-23 @ 07:00  --------------------------------------------------------  IN: 400 mL / OUT: 1000 mL / NET: -600 mL      Physical Exam:  Gen: resting, NAD  HEENT: Anicteric  Pulm: Fair entry B/L, CTA B/L  CV: S1S2+  Abd: Soft, +BS    Ext: No LE edema B/L  Neuro: Awake and alert  Skin: Warm and dry  Dialysis access: LUE AVF +thrill + bruit    LABS/STUDIES  --------------------------------------------------------------------------------              8.7    5.15  >-----------<  74       [11-22-23 @ 09:45]              25.6     138  |  101  |  38  ----------------------------<  157      [11-22-23 @ 14:00]  4.0   |  27  |  3.16        Ca     7.6     [11-22-23 @ 14:00]      Mg     2.00     [11-22-23 @ 09:45]      Phos  3.7     [11-22-23 @ 09:45]    TPro  6.1  /  Alb  2.1  /  TBili  1.5  /  DBili  x   /  AST  335  /  ALT  43  /  AlkPhos  347  [11-22-23 @ 09:45]    PT/INR: PT 12.9 , INR 1.15       [11-22-23 @ 09:45]  PTT: 36.0       [11-22-23 @ 09:45]    CK 2440      [11-22-23 @ 14:00]    Creatinine Trend:  SCr 3.16 [11-22 @ 14:00]  SCr 3.06 [11-22 @ 09:45]  SCr 3.63 [11-21 @ 05:25]  SCr 2.88 [11-20 @ 05:30]  SCr 3.66 [11-19 @ 05:07]    Urinalysis - [11-22-23 @ 14:00]      Color  / Appearance  / SG  / pH       Gluc 157 / Ketone   / Bili  / Urobili        Blood  / Protein  / Leuk Est  / Nitrite       RBC  / WBC  / Hyaline  / Gran  / Sq Epi  / Non Sq Epi  / Bacteria       TSH 12.60      [11-20-23 @ 05:30]    HBsAb Reactive      [11-13-23 @ 14:30]  HBsAg Nonreact      [11-13-23 @ 14:30]  HBcAb Reactive      [11-13-23 @ 14:30]  HCV 0.16, Nonreact      [11-13-23 @ 14:30]

## 2023-11-22 NOTE — CHART NOTE - NSCHARTNOTEFT_GEN_A_CORE
- LUE stitch removed at bedside.   - Non bleeding AVF  - Call back if questions.          C Team surgery  [id number]

## 2023-11-22 NOTE — PROGRESS NOTE ADULT - SUBJECTIVE AND OBJECTIVE BOX
Vida Carbajal        Patient is a 73y old  Male who presents with a chief complaint of resp failure (19 Nov 2023 14:33)      SUBJECTIVE / OVERNIGHT EVENTS: No acute overnight events. This morning pt doing well. w/ chest discomfort x 2 days. Reproducible on exam. Off Tylenol given transaminitis. Daughters hesitant about pt receiving other medications i.e. opioids 2/2 concern for confusion. Pt also refused labs/ fistulogram this morning. Discussed w / patient and family. States he was "maybe confused" when he refused study. Also states he doesn't feel like anything is wrong w/ fistula. However now amenable to study. Will re-address w/ vascular. Pt/family in agreement.         MEDICATIONS  (STANDING):  aspirin enteric coated 81 milliGRAM(s) Oral daily  carvedilol 3.125 milliGRAM(s) Oral every 12 hours  chlorhexidine 2% Cloths 1 Application(s) Topical daily  clopidogrel Tablet 75 milliGRAM(s) Oral daily  dextrose 5%. 1000 milliLiter(s) (100 mL/Hr) IV Continuous <Continuous>  dextrose 5%. 1000 milliLiter(s) (50 mL/Hr) IV Continuous <Continuous>  dextrose 50% Injectable 25 Gram(s) IV Push once  folic acid 1 milliGRAM(s) Oral daily  gabapentin 100 milliGRAM(s) Oral <User Schedule>  glucagon  Injectable 1 milliGRAM(s) IntraMuscular once  insulin lispro (ADMELOG) corrective regimen sliding scale   SubCutaneous three times a day before meals  insulin lispro (ADMELOG) corrective regimen sliding scale   SubCutaneous at bedtime  insulin lispro Injectable (ADMELOG) 3 Unit(s) SubCutaneous three times a day before meals  levothyroxine 25 MICROGram(s) Oral daily  sacubitril 24 mG/valsartan 26 mG 1 Tablet(s) Oral two times a day    MEDICATIONS  (PRN):  benzonatate 100 milliGRAM(s) Oral three times a day PRN Cough  dextrose Oral Gel 15 Gram(s) Oral once PRN Blood Glucose LESS THAN 70 milliGRAM(s)/deciliter  lactulose Syrup 20 Gram(s) Oral three times a day PRN 2-3 bm daily  polyethylene glycol 3350 17 Gram(s) Oral two times a day PRN 2-3 bm daily  sodium chloride 0.9% Bolus. 100 milliLiter(s) IV Bolus every 5 minutes PRN SBP LESS THAN or EQUAL to 90 mmHg    Allergies    No Known Allergies    Intolerances        Vital Signs Last 24 Hrs  T(C): 36.6 (22 Nov 2023 10:23), Max: 36.7 (21 Nov 2023 21:50)  T(F): 97.9 (22 Nov 2023 10:23), Max: 98 (21 Nov 2023 21:50)  HR: 78 (22 Nov 2023 14:21) (75 - 86)  BP: 110/54 (22 Nov 2023 14:21) (78/38 - 127/46)  BP(mean): --  RR: 16 (22 Nov 2023 13:18) (15 - 16)  SpO2: 98% (22 Nov 2023 13:18) (98% - 98%)    Parameters below as of 22 Nov 2023 10:23  Patient On (Oxygen Delivery Method): room air      Daily     Daily   CAPILLARY BLOOD GLUCOSE      POCT Blood Glucose.: 114 mg/dL (22 Nov 2023 11:26)  POCT Blood Glucose.: 134 mg/dL (22 Nov 2023 07:44)  POCT Blood Glucose.: 227 mg/dL (21 Nov 2023 21:48)    I&O's Summary    21 Nov 2023 07:01  -  22 Nov 2023 07:00  --------------------------------------------------------  IN: 400 mL / OUT: 1000 mL / NET: -600 mL        PHYSICAL EXAM:  CONSTITUTIONAL: NAD  EYES: EOMI, conjunctiva and sclera clear  ENMT: Moist oral mucosa  NECK: Supple  RESPIRATORY: Breathing unlabored, CTAB  CARDIOVASCULAR: S1S2 no MRG  ABDOMEN: Nontender to palpation, normoactive bowel sounds, no rebound/guarding  MUSCULOSKELETAL: L arm swollen, + thrill in AV fistula  NEUROLOGY: No focal deficits   PSYCH: AAO x 3 but intermittently confused    SKIN: No rashes or lesions    LABS:                        8.7    5.15  )-----------( 74       ( 22 Nov 2023 09:45 )             25.6     Hgb Trend: 8.7<--, 8.8<--, 9.1<--, 10.2<--, 10.4<--  11-22    138  |  101  |  38<H>  ----------------------------<  157<H>  4.0   |  27  |  3.16<H>    Ca    7.6<L>      22 Nov 2023 14:00  Phos  3.7     11-22  Mg     2.00     11-22    TPro  6.1  /  Alb  2.1<L>  /  TBili  1.5<H>  /  DBili  x   /  AST  335<H>  /  ALT  43<H>  /  AlkPhos  347<H>  11-22    Creatinine Trend: 3.16<--, 3.06<--, 3.63<--, 2.88<--, 3.66<--, 2.94<--  LIVER FUNCTIONS - ( 22 Nov 2023 09:45 )  Alb: 2.1 g/dL / Pro: 6.1 g/dL / ALK PHOS: 347 U/L / ALT: 43 U/L / AST: 335 U/L / GGT: x           PT/INR - ( 22 Nov 2023 09:45 )   PT: 12.9 sec;   INR: 1.15 ratio         PTT - ( 22 Nov 2023 09:45 )  PTT:36.0 sec  CARDIAC MARKERS ( 22 Nov 2023 14:00 )  x     / x     / x     / x     / 6.5 ng/mL      Urinalysis Basic - ( 22 Nov 2023 14:00 )    Color: x / Appearance: x / SG: x / pH: x  Gluc: 157 mg/dL / Ketone: x  / Bili: x / Urobili: x   Blood: x / Protein: x / Nitrite: x   Leuk Esterase: x / RBC: x / WBC x   Sq Epi: x / Non Sq Epi: x / Bacteria: x        RADIOLOGY & ADDITIONAL TESTS:    Imaging Personally Reviewed.    Consultant(s) Notes Reviewed.    Care Discussed with Consultants/Other Providers.

## 2023-11-22 NOTE — PROGRESS NOTE ADULT - TIME BILLING
reviewing laboratory data, consultants' recommendations, documentation in [place], performing medically appropriate examinations/evaluations, discussion with patient/family/RN/ACP/Residents and interdisciplinary staff (such as , social workers, etc), counseling patient/family/care giver, ordering medically appropriate medication, tests, or procedures
reviewing laboratory data, consultants' recommendations, documentation in [place], performing medically appropriate examinations/evaluations, discussion with patient/family/RN/ACP/Residents and interdisciplinary staff (such as , social workers, etc), counseling patient/family/care giver, ordering medically appropriate medication, tests, or procedures
Time spent includes direct patient care  (interview and examination of patient), discussion with other providers, support staff and/or patient's family members, review of medical records, ordering diagnostic tests and analyzing results, and documentation.
reviewing laboratory data, consultants' recommendations, documentation in [place], performing medically appropriate examinations/evaluations, discussion with patient/family/RN/ACP/Residents and interdisciplinary staff (such as , social workers, etc), counseling patient/family/care giver, ordering medically appropriate medication, tests, or procedures

## 2023-11-22 NOTE — PRE PROCEDURE NOTE - PRE PROCEDURE EVALUATION
Surgery Preop Note    Procedure: LUE fistulogram  Surgeon: José Miguel                          8Rick8    5.07  )-----------( 68       ( 21 Nov 2023 05:25 )             26.3     11-21    136  |  100  |  48<H>  ----------------------------<  77  4.3   |  26  |  3.63<H>    Ca    7.5<L>      21 Nov 2023 05:25  Phos  4.4     11-21  Mg     2.00     11-21    TPro  5.8<L>  /  Alb  1.9<L>  /  TBili  1.5<H>  /  DBili  x   /  AST  339<H>  /  ALT  36  /  AlkPhos  309<H>  11-21        Urinalysis Basic - ( 21 Nov 2023 05:25 )    Color: x / Appearance: x / SG: x / pH: x  Gluc: 77 mg/dL / Ketone: x  / Bili: x / Urobili: x   Blood: x / Protein: x / Nitrite: x   Leuk Esterase: x / RBC: x / WBC x   Sq Epi: x / Non Sq Epi: x / Bacteria: x        Assessment: 73M with PMH of ESRD s/p kidney transplant on HD, CAD s/p multiple stents (most recently, RCA stent on 10/2023), T2DM, and HTN, with recent hospitalization for bacterial PNA, who originally presented to the ED on 11/7 after being found acutely unresponsive with agonal breathing by his daughter. Vascular surgery consulted 2/2 oozing LUE AVF. Patient received dialysis through LUE fistula on 11/15 and was thereafter noted to have oozing therefrom. No history of stenting in either arm or intervention on the AVF since creation per patient's family. Continues to have oozing after dialysis requiring suture overnight, no evidence of outflow stenosis on 11/17 duplex. Scheduled for LUE fistulogram on 11/22.       Preop Checklist:    [x]NPO after midnight  [x]IVF  [x]AM labs (CBC, BMP, coags)  [x]Type and Screen x2  []Medicine clearance  [x]Diabetes orders  [x]Anticoagulation  [x]Consent

## 2023-11-22 NOTE — PROVIDER CONTACT NOTE (OTHER) - SITUATION
Pt refused early collection of labs and VSq4. Pt said, "No, I will not allow it... I don't want anything." Pt refused early collection of labs , a FS and VSq4. Pt said, "No, I will not allow it... I don't want anything."

## 2023-11-23 LAB
ALBUMIN SERPL ELPH-MCNC: 2 G/DL — LOW (ref 3.3–5)
ALBUMIN SERPL ELPH-MCNC: 2 G/DL — LOW (ref 3.3–5)
ALP SERPL-CCNC: 321 U/L — HIGH (ref 40–120)
ALP SERPL-CCNC: 321 U/L — HIGH (ref 40–120)
ALT FLD-CCNC: 44 U/L — HIGH (ref 4–41)
ALT FLD-CCNC: 44 U/L — HIGH (ref 4–41)
ANION GAP SERPL CALC-SCNC: 11 MMOL/L — SIGNIFICANT CHANGE UP (ref 7–14)
ANION GAP SERPL CALC-SCNC: 11 MMOL/L — SIGNIFICANT CHANGE UP (ref 7–14)
AST SERPL-CCNC: 308 U/L — HIGH (ref 4–40)
AST SERPL-CCNC: 308 U/L — HIGH (ref 4–40)
BILIRUB DIRECT SERPL-MCNC: 1.1 MG/DL — HIGH (ref 0–0.3)
BILIRUB DIRECT SERPL-MCNC: 1.1 MG/DL — HIGH (ref 0–0.3)
BILIRUB INDIRECT FLD-MCNC: 0.3 MG/DL — SIGNIFICANT CHANGE UP (ref 0–1)
BILIRUB INDIRECT FLD-MCNC: 0.3 MG/DL — SIGNIFICANT CHANGE UP (ref 0–1)
BILIRUB SERPL-MCNC: 1.4 MG/DL — HIGH (ref 0.2–1.2)
BILIRUB SERPL-MCNC: 1.4 MG/DL — HIGH (ref 0.2–1.2)
BUN SERPL-MCNC: 42 MG/DL — HIGH (ref 7–23)
BUN SERPL-MCNC: 42 MG/DL — HIGH (ref 7–23)
CALCIUM SERPL-MCNC: 7.9 MG/DL — LOW (ref 8.4–10.5)
CALCIUM SERPL-MCNC: 7.9 MG/DL — LOW (ref 8.4–10.5)
CHLORIDE SERPL-SCNC: 101 MMOL/L — SIGNIFICANT CHANGE UP (ref 98–107)
CHLORIDE SERPL-SCNC: 101 MMOL/L — SIGNIFICANT CHANGE UP (ref 98–107)
CO2 SERPL-SCNC: 26 MMOL/L — SIGNIFICANT CHANGE UP (ref 22–31)
CO2 SERPL-SCNC: 26 MMOL/L — SIGNIFICANT CHANGE UP (ref 22–31)
CREAT SERPL-MCNC: 3.91 MG/DL — HIGH (ref 0.5–1.3)
CREAT SERPL-MCNC: 3.91 MG/DL — HIGH (ref 0.5–1.3)
EGFR: 16 ML/MIN/1.73M2 — LOW
EGFR: 16 ML/MIN/1.73M2 — LOW
GLUCOSE BLDC GLUCOMTR-MCNC: 79 MG/DL — SIGNIFICANT CHANGE UP (ref 70–99)
GLUCOSE BLDC GLUCOMTR-MCNC: 79 MG/DL — SIGNIFICANT CHANGE UP (ref 70–99)
GLUCOSE BLDC GLUCOMTR-MCNC: 83 MG/DL — SIGNIFICANT CHANGE UP (ref 70–99)
GLUCOSE BLDC GLUCOMTR-MCNC: 83 MG/DL — SIGNIFICANT CHANGE UP (ref 70–99)
GLUCOSE BLDC GLUCOMTR-MCNC: 92 MG/DL — SIGNIFICANT CHANGE UP (ref 70–99)
GLUCOSE BLDC GLUCOMTR-MCNC: 92 MG/DL — SIGNIFICANT CHANGE UP (ref 70–99)
GLUCOSE SERPL-MCNC: 89 MG/DL — SIGNIFICANT CHANGE UP (ref 70–99)
GLUCOSE SERPL-MCNC: 89 MG/DL — SIGNIFICANT CHANGE UP (ref 70–99)
HCT VFR BLD CALC: 23.8 % — LOW (ref 39–50)
HCT VFR BLD CALC: 23.8 % — LOW (ref 39–50)
HGB BLD-MCNC: 8.1 G/DL — LOW (ref 13–17)
HGB BLD-MCNC: 8.1 G/DL — LOW (ref 13–17)
MAGNESIUM SERPL-MCNC: 2.1 MG/DL — SIGNIFICANT CHANGE UP (ref 1.6–2.6)
MAGNESIUM SERPL-MCNC: 2.1 MG/DL — SIGNIFICANT CHANGE UP (ref 1.6–2.6)
MCHC RBC-ENTMCNC: 30.9 PG — SIGNIFICANT CHANGE UP (ref 27–34)
MCHC RBC-ENTMCNC: 30.9 PG — SIGNIFICANT CHANGE UP (ref 27–34)
MCHC RBC-ENTMCNC: 34 GM/DL — SIGNIFICANT CHANGE UP (ref 32–36)
MCHC RBC-ENTMCNC: 34 GM/DL — SIGNIFICANT CHANGE UP (ref 32–36)
MCV RBC AUTO: 90.8 FL — SIGNIFICANT CHANGE UP (ref 80–100)
MCV RBC AUTO: 90.8 FL — SIGNIFICANT CHANGE UP (ref 80–100)
NRBC # BLD: 0 /100 WBCS — SIGNIFICANT CHANGE UP (ref 0–0)
NRBC # BLD: 0 /100 WBCS — SIGNIFICANT CHANGE UP (ref 0–0)
NRBC # FLD: 0 K/UL — SIGNIFICANT CHANGE UP (ref 0–0)
NRBC # FLD: 0 K/UL — SIGNIFICANT CHANGE UP (ref 0–0)
PHOSPHATE SERPL-MCNC: 4.2 MG/DL — SIGNIFICANT CHANGE UP (ref 2.5–4.5)
PHOSPHATE SERPL-MCNC: 4.2 MG/DL — SIGNIFICANT CHANGE UP (ref 2.5–4.5)
PLATELET # BLD AUTO: 64 K/UL — LOW (ref 150–400)
PLATELET # BLD AUTO: 64 K/UL — LOW (ref 150–400)
POTASSIUM SERPL-MCNC: 3.9 MMOL/L — SIGNIFICANT CHANGE UP (ref 3.5–5.3)
POTASSIUM SERPL-MCNC: 3.9 MMOL/L — SIGNIFICANT CHANGE UP (ref 3.5–5.3)
POTASSIUM SERPL-SCNC: 3.9 MMOL/L — SIGNIFICANT CHANGE UP (ref 3.5–5.3)
POTASSIUM SERPL-SCNC: 3.9 MMOL/L — SIGNIFICANT CHANGE UP (ref 3.5–5.3)
PROT SERPL-MCNC: 6.2 G/DL — SIGNIFICANT CHANGE UP (ref 6–8.3)
PROT SERPL-MCNC: 6.2 G/DL — SIGNIFICANT CHANGE UP (ref 6–8.3)
RBC # BLD: 2.62 M/UL — LOW (ref 4.2–5.8)
RBC # BLD: 2.62 M/UL — LOW (ref 4.2–5.8)
RBC # FLD: 24.9 % — HIGH (ref 10.3–14.5)
RBC # FLD: 24.9 % — HIGH (ref 10.3–14.5)
SODIUM SERPL-SCNC: 138 MMOL/L — SIGNIFICANT CHANGE UP (ref 135–145)
SODIUM SERPL-SCNC: 138 MMOL/L — SIGNIFICANT CHANGE UP (ref 135–145)
WBC # BLD: 4.41 K/UL — SIGNIFICANT CHANGE UP (ref 3.8–10.5)
WBC # BLD: 4.41 K/UL — SIGNIFICANT CHANGE UP (ref 3.8–10.5)
WBC # FLD AUTO: 4.41 K/UL — SIGNIFICANT CHANGE UP (ref 3.8–10.5)
WBC # FLD AUTO: 4.41 K/UL — SIGNIFICANT CHANGE UP (ref 3.8–10.5)

## 2023-11-23 PROCEDURE — 99232 SBSQ HOSP IP/OBS MODERATE 35: CPT

## 2023-11-23 RX ORDER — TRAMADOL HYDROCHLORIDE 50 MG/1
25 TABLET ORAL EVERY 12 HOURS
Refills: 0 | Status: DISCONTINUED | OUTPATIENT
Start: 2023-11-23 | End: 2023-11-30

## 2023-11-23 RX ADMIN — CHLORHEXIDINE GLUCONATE 1 APPLICATION(S): 213 SOLUTION TOPICAL at 13:06

## 2023-11-23 RX ADMIN — LIDOCAINE 1 PATCH: 4 CREAM TOPICAL at 09:40

## 2023-11-23 RX ADMIN — SACUBITRIL AND VALSARTAN 1 TABLET(S): 24; 26 TABLET, FILM COATED ORAL at 13:00

## 2023-11-23 RX ADMIN — CLOPIDOGREL BISULFATE 75 MILLIGRAM(S): 75 TABLET, FILM COATED ORAL at 13:00

## 2023-11-23 RX ADMIN — Medication 1 MILLIGRAM(S): at 13:00

## 2023-11-23 RX ADMIN — Medication 600 MILLIGRAM(S): at 19:46

## 2023-11-23 RX ADMIN — LIDOCAINE 1 PATCH: 4 CREAM TOPICAL at 20:00

## 2023-11-23 RX ADMIN — Medication 3 UNIT(S): at 12:59

## 2023-11-23 RX ADMIN — CARVEDILOL PHOSPHATE 3.12 MILLIGRAM(S): 80 CAPSULE, EXTENDED RELEASE ORAL at 19:46

## 2023-11-23 RX ADMIN — Medication 3 UNIT(S): at 07:52

## 2023-11-23 RX ADMIN — CARVEDILOL PHOSPHATE 3.12 MILLIGRAM(S): 80 CAPSULE, EXTENDED RELEASE ORAL at 13:00

## 2023-11-23 RX ADMIN — SACUBITRIL AND VALSARTAN 1 TABLET(S): 24; 26 TABLET, FILM COATED ORAL at 19:46

## 2023-11-23 RX ADMIN — Medication 600 MILLIGRAM(S): at 07:01

## 2023-11-23 RX ADMIN — Medication 81 MILLIGRAM(S): at 12:59

## 2023-11-23 RX ADMIN — CARVEDILOL PHOSPHATE 3.12 MILLIGRAM(S): 80 CAPSULE, EXTENDED RELEASE ORAL at 06:52

## 2023-11-23 RX ADMIN — GABAPENTIN 100 MILLIGRAM(S): 400 CAPSULE ORAL at 19:46

## 2023-11-23 RX ADMIN — LIDOCAINE 1 PATCH: 4 CREAM TOPICAL at 06:56

## 2023-11-23 RX ADMIN — SACUBITRIL AND VALSARTAN 1 TABLET(S): 24; 26 TABLET, FILM COATED ORAL at 06:52

## 2023-11-23 RX ADMIN — LIDOCAINE 1 PATCH: 4 CREAM TOPICAL at 21:22

## 2023-11-23 NOTE — PROVIDER CONTACT NOTE (OTHER) - ACTION/TREATMENT ORDERED:
Senia Conn notified. Reattempt in hour. ACP Senia Hummel notified. Reattempt in hour.  2:15 am pt still refusing VS and medication. Pt states to leave him alone. Provider made aware.

## 2023-11-23 NOTE — PROGRESS NOTE ADULT - SUBJECTIVE AND OBJECTIVE BOX
Vida Carbajal        Patient is a 73y old  Male who presents with a chief complaint of resp failure (19 Nov 2023 14:33)      SUBJECTIVE / OVERNIGHT EVENTS: No acute overnight events. This morning pt doing well. States chest pain is now resolved, improved w/ lidocaine patch. No complaints.     MEDICATIONS  (STANDING):  aspirin enteric coated 81 milliGRAM(s) Oral daily  carvedilol 3.125 milliGRAM(s) Oral every 12 hours  chlorhexidine 2% Cloths 1 Application(s) Topical daily  clopidogrel Tablet 75 milliGRAM(s) Oral daily  dextrose 5%. 1000 milliLiter(s) (50 mL/Hr) IV Continuous <Continuous>  dextrose 5%. 1000 milliLiter(s) (100 mL/Hr) IV Continuous <Continuous>  dextrose 50% Injectable 25 Gram(s) IV Push once  folic acid 1 milliGRAM(s) Oral daily  gabapentin 100 milliGRAM(s) Oral <User Schedule>  glucagon  Injectable 1 milliGRAM(s) IntraMuscular once  guaiFENesin  milliGRAM(s) Oral every 12 hours  insulin lispro (ADMELOG) corrective regimen sliding scale   SubCutaneous three times a day before meals  insulin lispro (ADMELOG) corrective regimen sliding scale   SubCutaneous at bedtime  insulin lispro Injectable (ADMELOG) 3 Unit(s) SubCutaneous three times a day before meals  levothyroxine 25 MICROGram(s) Oral daily  lidocaine   4% Patch 1 Patch Transdermal every 24 hours  sacubitril 24 mG/valsartan 26 mG 1 Tablet(s) Oral two times a day    MEDICATIONS  (PRN):  benzonatate 100 milliGRAM(s) Oral three times a day PRN Cough  dextrose Oral Gel 15 Gram(s) Oral once PRN Blood Glucose LESS THAN 70 milliGRAM(s)/deciliter  lactulose Syrup 20 Gram(s) Oral three times a day PRN 2-3 bm daily  polyethylene glycol 3350 17 Gram(s) Oral two times a day PRN 2-3 bm daily  sodium chloride 0.9% Bolus. 100 milliLiter(s) IV Bolus every 5 minutes PRN SBP LESS THAN or EQUAL to 90 mmHg  traMADol 25 milliGRAM(s) Oral every 12 hours PRN Moderate Pain (4 - 6)-Severe pain (7-10)    Allergies    No Known Allergies    Intolerances        Vital Signs Last 24 Hrs  T(C): 36.8 (23 Nov 2023 10:18), Max: 36.8 (23 Nov 2023 06:45)  T(F): 98.2 (23 Nov 2023 10:18), Max: 98.3 (23 Nov 2023 06:45)  HR: 76 (23 Nov 2023 10:18) (76 - 82)  BP: 117/44 (23 Nov 2023 10:18) (117/44 - 133/61)  BP(mean): --  RR: 16 (23 Nov 2023 10:18) (16 - 17)  SpO2: 99% (23 Nov 2023 10:18) (95% - 99%)    Parameters below as of 23 Nov 2023 10:18  Patient On (Oxygen Delivery Method): room air      Daily     Daily   CAPILLARY BLOOD GLUCOSE      POCT Blood Glucose.: 79 mg/dL (23 Nov 2023 11:15)  POCT Blood Glucose.: 92 mg/dL (23 Nov 2023 07:25)  POCT Blood Glucose.: 135 mg/dL (22 Nov 2023 22:01)    I&O's Summary      PHYSICAL EXAM:  CONSTITUTIONAL: NAD  EYES: EOMI, conjunctiva and sclera clear  ENMT: Moist oral mucosa  NECK: Supple  RESPIRATORY: Breathing unlabored, CTAB  CARDIOVASCULAR: S1S2 no MRG  ABDOMEN: Nontender to palpation, normoactive bowel sounds, no rebound/guarding  MUSCULOSKELETAL: L arm swollen, + thrill in AV fistula  NEUROLOGY: No focal deficits   PSYCH: AAO x 3 but intermittently confused    SKIN: No rashes or lesions      LABS:                        8.1    4.41  )-----------( 64       ( 23 Nov 2023 07:10 )             23.8     Hgb Trend: 8.1<--, 8.7<--, 8.8<--, 9.1<--, 10.2<--  11-23    138  |  101  |  42<H>  ----------------------------<  89  3.9   |  26  |  3.91<H>    Ca    7.9<L>      23 Nov 2023 07:10  Phos  4.2     11-23  Mg     2.10     11-23    TPro  6.2  /  Alb  2.0<L>  /  TBili  1.4<H>  /  DBili  1.1<H>  /  AST  308<H>  /  ALT  44<H>  /  AlkPhos  321<H>  11-23    Creatinine Trend: 3.91<--, 3.16<--, 3.06<--, 3.63<--, 2.88<--, 3.66<--  LIVER FUNCTIONS - ( 23 Nov 2023 07:10 )  Alb: 2.0 g/dL / Pro: 6.2 g/dL / ALK PHOS: 321 U/L / ALT: 44 U/L / AST: 308 U/L / GGT: x           PT/INR - ( 22 Nov 2023 09:45 )   PT: 12.9 sec;   INR: 1.15 ratio         PTT - ( 22 Nov 2023 09:45 )  PTT:36.0 sec  CARDIAC MARKERS ( 22 Nov 2023 14:00 )  x     / x     / 2440 U/L / x     / 6.5 ng/mL      Urinalysis Basic - ( 23 Nov 2023 07:10 )    Color: x / Appearance: x / SG: x / pH: x  Gluc: 89 mg/dL / Ketone: x  / Bili: x / Urobili: x   Blood: x / Protein: x / Nitrite: x   Leuk Esterase: x / RBC: x / WBC x   Sq Epi: x / Non Sq Epi: x / Bacteria: x        RADIOLOGY & ADDITIONAL TESTS:    Imaging Personally Reviewed.    Consultant(s) Notes Reviewed.    Care Discussed with Consultants/Other Providers.

## 2023-11-23 NOTE — PROVIDER CONTACT NOTE (OTHER) - SITUATION
3rd attempt, Pt still refusing vitals, Blood work and scheduled meds. Primary RN, PCA and Secondary RN tried as well.

## 2023-11-24 LAB
ALBUMIN SERPL ELPH-MCNC: 1.9 G/DL — LOW (ref 3.3–5)
ALBUMIN SERPL ELPH-MCNC: 1.9 G/DL — LOW (ref 3.3–5)
ALP SERPL-CCNC: 364 U/L — HIGH (ref 40–120)
ALP SERPL-CCNC: 364 U/L — HIGH (ref 40–120)
ALT FLD-CCNC: 48 U/L — HIGH (ref 4–41)
ALT FLD-CCNC: 48 U/L — HIGH (ref 4–41)
ANION GAP SERPL CALC-SCNC: 9 MMOL/L — SIGNIFICANT CHANGE UP (ref 7–14)
ANION GAP SERPL CALC-SCNC: 9 MMOL/L — SIGNIFICANT CHANGE UP (ref 7–14)
AST SERPL-CCNC: 301 U/L — HIGH (ref 4–40)
AST SERPL-CCNC: 301 U/L — HIGH (ref 4–40)
BILIRUB DIRECT SERPL-MCNC: 1.1 MG/DL — HIGH (ref 0–0.3)
BILIRUB DIRECT SERPL-MCNC: 1.1 MG/DL — HIGH (ref 0–0.3)
BILIRUB INDIRECT FLD-MCNC: 0.4 MG/DL — SIGNIFICANT CHANGE UP (ref 0–1)
BILIRUB INDIRECT FLD-MCNC: 0.4 MG/DL — SIGNIFICANT CHANGE UP (ref 0–1)
BILIRUB SERPL-MCNC: 1.5 MG/DL — HIGH (ref 0.2–1.2)
BILIRUB SERPL-MCNC: 1.5 MG/DL — HIGH (ref 0.2–1.2)
BUN SERPL-MCNC: 49 MG/DL — HIGH (ref 7–23)
BUN SERPL-MCNC: 49 MG/DL — HIGH (ref 7–23)
CALCIUM SERPL-MCNC: 7.9 MG/DL — LOW (ref 8.4–10.5)
CALCIUM SERPL-MCNC: 7.9 MG/DL — LOW (ref 8.4–10.5)
CHLORIDE SERPL-SCNC: 101 MMOL/L — SIGNIFICANT CHANGE UP (ref 98–107)
CHLORIDE SERPL-SCNC: 101 MMOL/L — SIGNIFICANT CHANGE UP (ref 98–107)
CO2 SERPL-SCNC: 26 MMOL/L — SIGNIFICANT CHANGE UP (ref 22–31)
CO2 SERPL-SCNC: 26 MMOL/L — SIGNIFICANT CHANGE UP (ref 22–31)
CREAT SERPL-MCNC: 4.49 MG/DL — HIGH (ref 0.5–1.3)
CREAT SERPL-MCNC: 4.49 MG/DL — HIGH (ref 0.5–1.3)
EGFR: 13 ML/MIN/1.73M2 — LOW
EGFR: 13 ML/MIN/1.73M2 — LOW
GLUCOSE BLDC GLUCOMTR-MCNC: 115 MG/DL — HIGH (ref 70–99)
GLUCOSE BLDC GLUCOMTR-MCNC: 115 MG/DL — HIGH (ref 70–99)
GLUCOSE BLDC GLUCOMTR-MCNC: 149 MG/DL — HIGH (ref 70–99)
GLUCOSE BLDC GLUCOMTR-MCNC: 149 MG/DL — HIGH (ref 70–99)
GLUCOSE BLDC GLUCOMTR-MCNC: 168 MG/DL — HIGH (ref 70–99)
GLUCOSE BLDC GLUCOMTR-MCNC: 168 MG/DL — HIGH (ref 70–99)
GLUCOSE BLDC GLUCOMTR-MCNC: 197 MG/DL — HIGH (ref 70–99)
GLUCOSE BLDC GLUCOMTR-MCNC: 197 MG/DL — HIGH (ref 70–99)
GLUCOSE BLDC GLUCOMTR-MCNC: 58 MG/DL — LOW (ref 70–99)
GLUCOSE BLDC GLUCOMTR-MCNC: 58 MG/DL — LOW (ref 70–99)
GLUCOSE BLDC GLUCOMTR-MCNC: 81 MG/DL — SIGNIFICANT CHANGE UP (ref 70–99)
GLUCOSE BLDC GLUCOMTR-MCNC: 81 MG/DL — SIGNIFICANT CHANGE UP (ref 70–99)
GLUCOSE BLDC GLUCOMTR-MCNC: 90 MG/DL — SIGNIFICANT CHANGE UP (ref 70–99)
GLUCOSE BLDC GLUCOMTR-MCNC: 90 MG/DL — SIGNIFICANT CHANGE UP (ref 70–99)
GLUCOSE SERPL-MCNC: 145 MG/DL — HIGH (ref 70–99)
GLUCOSE SERPL-MCNC: 145 MG/DL — HIGH (ref 70–99)
HBV SURFACE AB SER-ACNC: 320.9 MIU/ML — SIGNIFICANT CHANGE UP
HBV SURFACE AB SER-ACNC: 320.9 MIU/ML — SIGNIFICANT CHANGE UP
HCT VFR BLD CALC: 22.7 % — LOW (ref 39–50)
HCT VFR BLD CALC: 22.7 % — LOW (ref 39–50)
HGB BLD-MCNC: 7.8 G/DL — LOW (ref 13–17)
HGB BLD-MCNC: 7.8 G/DL — LOW (ref 13–17)
MAGNESIUM SERPL-MCNC: 2.1 MG/DL — SIGNIFICANT CHANGE UP (ref 1.6–2.6)
MAGNESIUM SERPL-MCNC: 2.1 MG/DL — SIGNIFICANT CHANGE UP (ref 1.6–2.6)
MCHC RBC-ENTMCNC: 31 PG — SIGNIFICANT CHANGE UP (ref 27–34)
MCHC RBC-ENTMCNC: 31 PG — SIGNIFICANT CHANGE UP (ref 27–34)
MCHC RBC-ENTMCNC: 34.4 GM/DL — SIGNIFICANT CHANGE UP (ref 32–36)
MCHC RBC-ENTMCNC: 34.4 GM/DL — SIGNIFICANT CHANGE UP (ref 32–36)
MCV RBC AUTO: 90.1 FL — SIGNIFICANT CHANGE UP (ref 80–100)
MCV RBC AUTO: 90.1 FL — SIGNIFICANT CHANGE UP (ref 80–100)
NRBC # BLD: 0 /100 WBCS — SIGNIFICANT CHANGE UP (ref 0–0)
NRBC # BLD: 0 /100 WBCS — SIGNIFICANT CHANGE UP (ref 0–0)
NRBC # FLD: 0 K/UL — SIGNIFICANT CHANGE UP (ref 0–0)
NRBC # FLD: 0 K/UL — SIGNIFICANT CHANGE UP (ref 0–0)
PHOSPHATE SERPL-MCNC: 4.6 MG/DL — HIGH (ref 2.5–4.5)
PHOSPHATE SERPL-MCNC: 4.6 MG/DL — HIGH (ref 2.5–4.5)
PLATELET # BLD AUTO: 61 K/UL — LOW (ref 150–400)
PLATELET # BLD AUTO: 61 K/UL — LOW (ref 150–400)
POTASSIUM SERPL-MCNC: 4.7 MMOL/L — SIGNIFICANT CHANGE UP (ref 3.5–5.3)
POTASSIUM SERPL-MCNC: 4.7 MMOL/L — SIGNIFICANT CHANGE UP (ref 3.5–5.3)
POTASSIUM SERPL-SCNC: 4.7 MMOL/L — SIGNIFICANT CHANGE UP (ref 3.5–5.3)
POTASSIUM SERPL-SCNC: 4.7 MMOL/L — SIGNIFICANT CHANGE UP (ref 3.5–5.3)
PROT SERPL-MCNC: 6.2 G/DL — SIGNIFICANT CHANGE UP (ref 6–8.3)
PROT SERPL-MCNC: 6.2 G/DL — SIGNIFICANT CHANGE UP (ref 6–8.3)
RBC # BLD: 2.52 M/UL — LOW (ref 4.2–5.8)
RBC # BLD: 2.52 M/UL — LOW (ref 4.2–5.8)
RBC # FLD: 23.7 % — HIGH (ref 10.3–14.5)
RBC # FLD: 23.7 % — HIGH (ref 10.3–14.5)
SODIUM SERPL-SCNC: 136 MMOL/L — SIGNIFICANT CHANGE UP (ref 135–145)
SODIUM SERPL-SCNC: 136 MMOL/L — SIGNIFICANT CHANGE UP (ref 135–145)
WBC # BLD: 3.93 K/UL — SIGNIFICANT CHANGE UP (ref 3.8–10.5)
WBC # BLD: 3.93 K/UL — SIGNIFICANT CHANGE UP (ref 3.8–10.5)
WBC # FLD AUTO: 3.93 K/UL — SIGNIFICANT CHANGE UP (ref 3.8–10.5)
WBC # FLD AUTO: 3.93 K/UL — SIGNIFICANT CHANGE UP (ref 3.8–10.5)

## 2023-11-24 PROCEDURE — 99232 SBSQ HOSP IP/OBS MODERATE 35: CPT

## 2023-11-24 PROCEDURE — 90935 HEMODIALYSIS ONE EVALUATION: CPT

## 2023-11-24 RX ORDER — DEXTROSE 50 % IN WATER 50 %
25 SYRINGE (ML) INTRAVENOUS ONCE
Refills: 0 | Status: COMPLETED | OUTPATIENT
Start: 2023-11-24 | End: 2023-11-24

## 2023-11-24 RX ORDER — ERYTHROPOIETIN 10000 [IU]/ML
4000 INJECTION, SOLUTION INTRAVENOUS; SUBCUTANEOUS
Refills: 0 | Status: DISCONTINUED | OUTPATIENT
Start: 2023-11-24 | End: 2023-12-04

## 2023-11-24 RX ADMIN — LIDOCAINE 1 PATCH: 4 CREAM TOPICAL at 19:00

## 2023-11-24 RX ADMIN — CLOPIDOGREL BISULFATE 75 MILLIGRAM(S): 75 TABLET, FILM COATED ORAL at 12:29

## 2023-11-24 RX ADMIN — Medication 81 MILLIGRAM(S): at 12:29

## 2023-11-24 RX ADMIN — Medication 25 MICROGRAM(S): at 05:27

## 2023-11-24 RX ADMIN — LIDOCAINE 1 PATCH: 4 CREAM TOPICAL at 18:49

## 2023-11-24 RX ADMIN — CARVEDILOL PHOSPHATE 3.12 MILLIGRAM(S): 80 CAPSULE, EXTENDED RELEASE ORAL at 18:50

## 2023-11-24 RX ADMIN — CHLORHEXIDINE GLUCONATE 1 APPLICATION(S): 213 SOLUTION TOPICAL at 15:24

## 2023-11-24 RX ADMIN — SACUBITRIL AND VALSARTAN 1 TABLET(S): 24; 26 TABLET, FILM COATED ORAL at 18:50

## 2023-11-24 RX ADMIN — Medication 600 MILLIGRAM(S): at 05:27

## 2023-11-24 RX ADMIN — Medication 1 MILLIGRAM(S): at 12:28

## 2023-11-24 RX ADMIN — Medication 1: at 18:48

## 2023-11-24 RX ADMIN — Medication 25 GRAM(S): at 05:45

## 2023-11-24 NOTE — PROGRESS NOTE ADULT - SUBJECTIVE AND OBJECTIVE BOX
Vida Carbajal        Patient is a 73y old  Male who presents with a chief complaint of resp failure (2023 14:33)      SUBJECTIVE / OVERNIGHT EVENTS: No acute overnight events. This morning pt doing well. Denies fevers, chills, nausea, vomiting, chest pain, SOB.    MEDICATIONS  (STANDING):  aspirin enteric coated 81 milliGRAM(s) Oral daily  carvedilol 3.125 milliGRAM(s) Oral every 12 hours  chlorhexidine 2% Cloths 1 Application(s) Topical daily  clopidogrel Tablet 75 milliGRAM(s) Oral daily  dextrose 5%. 1000 milliLiter(s) (100 mL/Hr) IV Continuous <Continuous>  dextrose 5%. 1000 milliLiter(s) (50 mL/Hr) IV Continuous <Continuous>  dextrose 50% Injectable 25 Gram(s) IV Push once  epoetin dean (EPOGEN) Injectable 4000 Unit(s) IV Push <User Schedule>  folic acid 1 milliGRAM(s) Oral daily  gabapentin 100 milliGRAM(s) Oral <User Schedule>  glucagon  Injectable 1 milliGRAM(s) IntraMuscular once  insulin lispro (ADMELOG) corrective regimen sliding scale   SubCutaneous three times a day before meals  insulin lispro (ADMELOG) corrective regimen sliding scale   SubCutaneous at bedtime  levothyroxine 25 MICROGram(s) Oral daily  lidocaine   4% Patch 1 Patch Transdermal every 24 hours  sacubitril 24 mG/valsartan 26 mG 1 Tablet(s) Oral two times a day    MEDICATIONS  (PRN):  benzonatate 100 milliGRAM(s) Oral three times a day PRN Cough  dextrose Oral Gel 15 Gram(s) Oral once PRN Blood Glucose LESS THAN 70 milliGRAM(s)/deciliter  lactulose Syrup 20 Gram(s) Oral three times a day PRN 2-3 bm daily  polyethylene glycol 3350 17 Gram(s) Oral two times a day PRN 2-3 bm daily  sodium chloride 0.9% Bolus. 100 milliLiter(s) IV Bolus every 5 minutes PRN SBP LESS THAN or EQUAL to 90 mmHg  traMADol 25 milliGRAM(s) Oral every 12 hours PRN Moderate Pain (4 - 6)-Severe pain (7-10)    Allergies    No Known Allergies    Intolerances        Vital Signs Last 24 Hrs  T(C): 36.3 (2023 14:17), Max: 37.1 (2023 17:28)  T(F): 97.3 (2023 14:17), Max: 98.7 (2023 17:28)  HR: 74 (2023 14:17) (74 - 80)  BP: 138/73 (2023 14:17) (114/61 - 146/71)  BP(mean): --  RR: 18 (2023 14:17) (17 - 18)  SpO2: 100% (2023 14:17) (97% - 100%)    Parameters below as of 2023 14:17  Patient On (Oxygen Delivery Method): room air      Daily     Daily Weight in k (2023 10:00)  CAPILLARY BLOOD GLUCOSE  149 (2023 06:10)  62 (2023 05:37)  58 (2023 05:32)  83 (2023 22:58)      POCT Blood Glucose.: 115 mg/dL (2023 11:22)  POCT Blood Glucose.: 90 mg/dL (2023 09:57)  POCT Blood Glucose.: 81 mg/dL (2023 09:29)  POCT Blood Glucose.: 149 mg/dL (2023 06:10)  POCT Blood Glucose.: 58 mg/dL (2023 05:32)  POCT Blood Glucose.: 83 mg/dL (2023 22:58)    I&O's Summary    2023 07:01  -  2023 17:16  --------------------------------------------------------  IN: 400 mL / OUT: 1400 mL / NET: -1000 mL        PHYSICAL EXAM:  CONSTITUTIONAL: NAD  EYES: EOMI, conjunctiva and sclera clear  ENMT: Moist oral mucosa  NECK: Supple  RESPIRATORY: Breathing unlabored, CTAB  CARDIOVASCULAR: S1S2 no MRG  ABDOMEN: Nontender to palpation, normoactive bowel sounds, no rebound/guarding  MUSCULOSKELETAL: L arm swollen, + thrill in AV fistula  NEUROLOGY: No focal deficits   PSYCH: AAO x 3 but intermittently confused    SKIN: No rashes or lesions      LABS:                        7.8    3.93  )-----------( 61       ( 2023 06:35 )             22.7     Hgb Trend: 7.8<--, 8.1<--, 8.7<--, 8.8<--, 9.1<--  11-24    136  |  101  |  49<H>  ----------------------------<  145<H>  4.7   |  26  |  4.49<H>    Ca    7.9<L>      2023 06:35  Phos  4.6     11-24  Mg     2.10     11-24    TPro  6.2  /  Alb  1.9<L>  /  TBili  1.5<H>  /  DBili  1.1<H>  /  AST  301<H>  /  ALT  48<H>  /  AlkPhos  364<H>  11-24    Creatinine Trend: 4.49<--, 3.91<--, 3.16<--, 3.06<--, 3.63<--, 2.88<--  LIVER FUNCTIONS - ( 2023 06:35 )  Alb: 1.9 g/dL / Pro: 6.2 g/dL / ALK PHOS: 364 U/L / ALT: 48 U/L / AST: 301 U/L / GGT: x                 Urinalysis Basic - ( 2023 06:35 )    Color: x / Appearance: x / SG: x / pH: x  Gluc: 145 mg/dL / Ketone: x  / Bili: x / Urobili: x   Blood: x / Protein: x / Nitrite: x   Leuk Esterase: x / RBC: x / WBC x   Sq Epi: x / Non Sq Epi: x / Bacteria: x        RADIOLOGY & ADDITIONAL TESTS:    Imaging Personally Reviewed.    Consultant(s) Notes Reviewed.    Care Discussed with Consultants/Other Providers.

## 2023-11-24 NOTE — PROGRESS NOTE ADULT - SUBJECTIVE AND OBJECTIVE BOX
[facility] Division of Kidney Diseases & Hypertension  FOLLOW UP NOTE  --------------------------------------------------------------------------------  Chief Complaint: Patient seen and evaluated this morning during bedside ESRD    24 hour events/subjective: ESRD on Hd seen and evaluated at bedside this morning during HD treatment.  Tolerating procedure well.  Denies chest pain or shortness of breath.      PAST HISTORY  --------------------------------------------------------------------------------  No significant changes to PMH, PSH, FHx, SHx, unless otherwise noted    ALLERGIES & MEDICATIONS  --------------------------------------------------------------------------------  Allergies    No Known Allergies    Intolerances      Standing Inpatient Medications  aspirin enteric coated 81 milliGRAM(s) Oral daily  carvedilol 3.125 milliGRAM(s) Oral every 12 hours  chlorhexidine 2% Cloths 1 Application(s) Topical daily  clopidogrel Tablet 75 milliGRAM(s) Oral daily  dextrose 5%. 1000 milliLiter(s) IV Continuous <Continuous>  dextrose 5%. 1000 milliLiter(s) IV Continuous <Continuous>  dextrose 50% Injectable 25 Gram(s) IV Push once  folic acid 1 milliGRAM(s) Oral daily  gabapentin 100 milliGRAM(s) Oral <User Schedule>  glucagon  Injectable 1 milliGRAM(s) IntraMuscular once  insulin lispro (ADMELOG) corrective regimen sliding scale   SubCutaneous three times a day before meals  insulin lispro (ADMELOG) corrective regimen sliding scale   SubCutaneous at bedtime  levothyroxine 25 MICROGram(s) Oral daily  lidocaine   4% Patch 1 Patch Transdermal every 24 hours  sacubitril 24 mG/valsartan 26 mG 1 Tablet(s) Oral two times a day    PRN Inpatient Medications  benzonatate 100 milliGRAM(s) Oral three times a day PRN  dextrose Oral Gel 15 Gram(s) Oral once PRN  lactulose Syrup 20 Gram(s) Oral three times a day PRN  polyethylene glycol 3350 17 Gram(s) Oral two times a day PRN  sodium chloride 0.9% Bolus. 100 milliLiter(s) IV Bolus every 5 minutes PRN  traMADol 25 milliGRAM(s) Oral every 12 hours PRN      REVIEW OF SYSTEMS  --------------------------------------------------------------------------------  Gen: no fever  Respiratory: no sob  CV: no cp  GI: no ab pain  : no complaints  MSK: no pain    VITALS/PHYSICAL EXAM  --------------------------------------------------------------------------------  T(C): 36.7 (11-24-23 @ 10:00), Max: 37.1 (11-23-23 @ 17:28)  HR: 74 (11-24-23 @ 10:00) (74 - 80)  BP: 127/59 (11-24-23 @ 10:00) (114/61 - 146/71)  ABP: --  ABP(mean): --  RR: 17 (11-24-23 @ 10:00) (17 - 18)  SpO2: 97% (11-24-23 @ 05:37) (97% - 100%)  CVP(mm Hg): --        11-24-23 @ 07:01  -  11-24-23 @ 10:26  --------------------------------------------------------  IN: 400 mL / OUT: 1400 mL / NET: -1000 mL    Physical Exam:  Gen: resting, NAD  HEENT: Anicteric  Pulm: Fair entry B/L, CTA B/L  CV: S1S2+  Abd: Soft, +BS    Ext: No LE edema B/L  Neuro: Awake and alert  Skin: Warm and dry  Dialysis access: LUE AVF +thrill + bruit    LABS/STUDIES  --------------------------------------------------------------------------------              7.8    3.93  >-----------<  61       [11-24-23 @ 06:35]              22.7     136  |  101  |  49  ----------------------------<  145      [11-24-23 @ 06:35]  4.7   |  26  |  4.49        Ca     7.9     [11-24-23 @ 06:35]      Mg     2.10     [11-24-23 @ 06:35]      Phos  4.6     [11-24-23 @ 06:35]    TPro  6.2  /  Alb  1.9  /  TBili  1.5  /  DBili  1.1  /  AST  301  /  ALT  48  /  AlkPhos  364  [11-24-23 @ 06:35]        CK 2440      [11-22-23 @ 14:00]    Creatinine Trend:  SCr 4.49 [11-24 @ 06:35]  SCr 3.91 [11-23 @ 07:10]  SCr 3.16 [11-22 @ 14:00]  SCr 3.06 [11-22 @ 09:45]  SCr 3.63 [11-21 @ 05:25]    Urinalysis - [11-24-23 @ 06:35]      Color  / Appearance  / SG  / pH       Gluc 145 / Ketone   / Bili  / Urobili        Blood  / Protein  / Leuk Est  / Nitrite       RBC  / WBC  / Hyaline  / Gran  / Sq Epi  / Non Sq Epi  / Bacteria       TSH 12.60      [11-20-23 @ 05:30]

## 2023-11-24 NOTE — PROGRESS NOTE ADULT - PROBLEM SELECTOR PLAN 1
Pt. with ESRD on HD TIW (MWF, LUE AVF). Pt. also with history of kidney transplant (not on immunosuppression). Last HD as outpatient was done on 11/6/23 via L AVF. Pt. admitted to MICU with respiratory failure and systemic shock. Pt. initiated on CRRT on 11/8, discontinued on 11/10 as volume status and FiO2 requirements improved. Had some oozing from AVF after treatment - vascular following for evaluation of stenosis (will likely need fistulogram during current admission). Refused this but vascular following. Labs reviewed. Pt appears clinically stable and tolerating HD treatment today.  Plan next HD treatment on 11/27/23. Dose medications as per ESRD/HD.

## 2023-11-24 NOTE — PROVIDER CONTACT NOTE (HYPOGLYCEMIA EVENT) - NS PROVIDER CONTACT BACKGROUND-HYPO
Age: 73y    Gender: Male    POCT Blood Glucose:149 (11-24-23 @ 06:10)  62 (11-24-23 @ 05:37)  58 (11-24-23 @ 05:32)  83 (11-23-23 @ 22:58)    149 mg/dL (11-24-23 @ 06:10)  58 mg/dL (11-24-23 @ 05:32)  83 mg/dL (11-23-23 @ 22:58)  79 mg/dL (11-23-23 @ 11:15)  92 mg/dL (11-23-23 @ 07:25)      eMAR:  dextrose 50% Injectable   25 Gram(s) IV Push (11-24-23 @ 05:45)    insulin lispro Injectable (ADMELOG)   3 Unit(s) SubCutaneous (11-23-23 @ 12:59)   3 Unit(s) SubCutaneous (11-23-23 @ 07:52)    levothyroxine   25 MICROGram(s) Oral (11-24-23 @ 05:27)

## 2023-11-25 LAB
GLUCOSE BLDC GLUCOMTR-MCNC: 105 MG/DL — HIGH (ref 70–99)
GLUCOSE BLDC GLUCOMTR-MCNC: 105 MG/DL — HIGH (ref 70–99)
GLUCOSE BLDC GLUCOMTR-MCNC: 107 MG/DL — HIGH (ref 70–99)
GLUCOSE BLDC GLUCOMTR-MCNC: 107 MG/DL — HIGH (ref 70–99)
GLUCOSE BLDC GLUCOMTR-MCNC: 185 MG/DL — HIGH (ref 70–99)
GLUCOSE BLDC GLUCOMTR-MCNC: 185 MG/DL — HIGH (ref 70–99)
GLUCOSE BLDC GLUCOMTR-MCNC: 59 MG/DL — LOW (ref 70–99)
GLUCOSE BLDC GLUCOMTR-MCNC: 59 MG/DL — LOW (ref 70–99)
GLUCOSE BLDC GLUCOMTR-MCNC: 86 MG/DL — SIGNIFICANT CHANGE UP (ref 70–99)
GLUCOSE BLDC GLUCOMTR-MCNC: 86 MG/DL — SIGNIFICANT CHANGE UP (ref 70–99)

## 2023-11-25 PROCEDURE — 99232 SBSQ HOSP IP/OBS MODERATE 35: CPT

## 2023-11-25 RX ORDER — DEXTROSE 50 % IN WATER 50 %
15 SYRINGE (ML) INTRAVENOUS ONCE
Refills: 0 | Status: COMPLETED | OUTPATIENT
Start: 2023-11-25 | End: 2023-11-25

## 2023-11-25 RX ADMIN — Medication 81 MILLIGRAM(S): at 12:57

## 2023-11-25 RX ADMIN — SACUBITRIL AND VALSARTAN 1 TABLET(S): 24; 26 TABLET, FILM COATED ORAL at 07:45

## 2023-11-25 RX ADMIN — Medication 15 GRAM(S): at 08:21

## 2023-11-25 RX ADMIN — CLOPIDOGREL BISULFATE 75 MILLIGRAM(S): 75 TABLET, FILM COATED ORAL at 12:57

## 2023-11-25 RX ADMIN — CARVEDILOL PHOSPHATE 3.12 MILLIGRAM(S): 80 CAPSULE, EXTENDED RELEASE ORAL at 07:46

## 2023-11-25 RX ADMIN — CHLORHEXIDINE GLUCONATE 1 APPLICATION(S): 213 SOLUTION TOPICAL at 13:19

## 2023-11-25 RX ADMIN — Medication 25 MICROGRAM(S): at 07:46

## 2023-11-25 RX ADMIN — LIDOCAINE 1 PATCH: 4 CREAM TOPICAL at 18:30

## 2023-11-25 RX ADMIN — LIDOCAINE 1 PATCH: 4 CREAM TOPICAL at 06:00

## 2023-11-25 RX ADMIN — LIDOCAINE 1 PATCH: 4 CREAM TOPICAL at 17:56

## 2023-11-25 RX ADMIN — CARVEDILOL PHOSPHATE 3.12 MILLIGRAM(S): 80 CAPSULE, EXTENDED RELEASE ORAL at 17:56

## 2023-11-25 RX ADMIN — SACUBITRIL AND VALSARTAN 1 TABLET(S): 24; 26 TABLET, FILM COATED ORAL at 17:56

## 2023-11-25 RX ADMIN — Medication 1 MILLIGRAM(S): at 12:56

## 2023-11-25 NOTE — PROGRESS NOTE ADULT - ASSESSMENT
73y old man with PMH of ESRD s/p kidney transplant on HD, CAD s/p multiple stents (most recently, RCA stent on 10/2023), T2DM, and HTN, with recent hospitalization for bacterial PNA, who originally presented to the ED on 11/7 after being found unresponsive with agonal breathing by his daughter. CPR  administered by the daughter. Admitted for AMS, intubated for airway protection and AHRF likely 2/2 multifocal pneumonia, s/p MICU course, transferred to medicine for further mgt. Noted to have oozing from fistula, now resolved

## 2023-11-25 NOTE — PROVIDER CONTACT NOTE (HYPOGLYCEMIA EVENT) - NS PROVIDER CONTACT BACKGROUND-HYPO
Age: 73y    Gender: Male    POCT Blood Glucose:68 (11-25-23 @ 07:57)  59 (11-25-23 @ 07:51)    59 mg/dL (11-25-23 @ 07:51)  168 mg/dL (11-24-23 @ 21:16)  197 mg/dL (11-24-23 @ 16:53)  115 mg/dL (11-24-23 @ 11:22)  90 mg/dL (11-24-23 @ 09:57)  81 mg/dL (11-24-23 @ 09:29)      eMAR:  insulin lispro (ADMELOG) corrective regimen sliding scale   1 Unit(s) SubCutaneous (11-24-23 @ 18:48)    levothyroxine   25 MICROGram(s) Oral (11-25-23 @ 07:46)

## 2023-11-25 NOTE — PROGRESS NOTE ADULT - SUBJECTIVE AND OBJECTIVE BOX
Vida Carbajal        Patient is a 73y old  Male who presents with a chief complaint of resp failure (19 Nov 2023 14:33)      SUBJECTIVE / OVERNIGHT EVENTS: No acute overnight events. This morning pt doing well. No complaints.     MEDICATIONS  (STANDING):  aspirin enteric coated 81 milliGRAM(s) Oral daily  carvedilol 3.125 milliGRAM(s) Oral every 12 hours  chlorhexidine 2% Cloths 1 Application(s) Topical daily  clopidogrel Tablet 75 milliGRAM(s) Oral daily  dextrose 5%. 1000 milliLiter(s) (100 mL/Hr) IV Continuous <Continuous>  dextrose 5%. 1000 milliLiter(s) (50 mL/Hr) IV Continuous <Continuous>  dextrose 50% Injectable 25 Gram(s) IV Push once  epoetin dean (EPOGEN) Injectable 4000 Unit(s) IV Push <User Schedule>  folic acid 1 milliGRAM(s) Oral daily  gabapentin 100 milliGRAM(s) Oral <User Schedule>  glucagon  Injectable 1 milliGRAM(s) IntraMuscular once  levothyroxine 25 MICROGram(s) Oral daily  lidocaine   4% Patch 1 Patch Transdermal every 24 hours  sacubitril 24 mG/valsartan 26 mG 1 Tablet(s) Oral two times a day    MEDICATIONS  (PRN):  benzonatate 100 milliGRAM(s) Oral three times a day PRN Cough  dextrose Oral Gel 15 Gram(s) Oral once PRN Blood Glucose LESS THAN 70 milliGRAM(s)/deciliter  lactulose Syrup 20 Gram(s) Oral three times a day PRN 2-3 bm daily  polyethylene glycol 3350 17 Gram(s) Oral two times a day PRN 2-3 bm daily  sodium chloride 0.9% Bolus. 100 milliLiter(s) IV Bolus every 5 minutes PRN SBP LESS THAN or EQUAL to 90 mmHg  traMADol 25 milliGRAM(s) Oral every 12 hours PRN Moderate Pain (4 - 6)-Severe pain (7-10)    Allergies    No Known Allergies    Intolerances        Vital Signs Last 24 Hrs  T(C): 36.4 (25 Nov 2023 09:22), Max: 37.1 (25 Nov 2023 06:00)  T(F): 97.6 (25 Nov 2023 09:22), Max: 98.8 (25 Nov 2023 06:00)  HR: 74 (25 Nov 2023 09:22) (74 - 88)  BP: 120/49 (25 Nov 2023 09:22) (120/49 - 134/61)  BP(mean): --  RR: 19 (25 Nov 2023 09:22) (18 - 19)  SpO2: 99% (25 Nov 2023 09:22) (99% - 100%)    Parameters below as of 25 Nov 2023 09:22  Patient On (Oxygen Delivery Method): room air      Daily     Daily   CAPILLARY BLOOD GLUCOSE  86 (25 Nov 2023 08:39)  68 (25 Nov 2023 07:57)  59 (25 Nov 2023 07:51)      POCT Blood Glucose.: 185 mg/dL (25 Nov 2023 16:59)  POCT Blood Glucose.: 107 mg/dL (25 Nov 2023 11:01)  POCT Blood Glucose.: 105 mg/dL (25 Nov 2023 09:17)  POCT Blood Glucose.: 86 mg/dL (25 Nov 2023 08:37)  POCT Blood Glucose.: 59 mg/dL (25 Nov 2023 07:51)  POCT Blood Glucose.: 168 mg/dL (24 Nov 2023 21:16)    I&O's Summary    24 Nov 2023 07:01  -  25 Nov 2023 07:00  --------------------------------------------------------  IN: 400 mL / OUT: 1400 mL / NET: -1000 mL        PHYSICAL EXAM:  CONSTITUTIONAL: NAD  EYES: EOMI, conjunctiva and sclera clear  ENMT: Moist oral mucosa  NECK: Supple  RESPIRATORY: Breathing unlabored, CTAB  CARDIOVASCULAR: S1S2 no MRG  ABDOMEN: Nontender to palpation, normoactive bowel sounds, no rebound/guarding  MUSCULOSKELETAL: L arm swollen, + thrill in AV fistula  NEUROLOGY: No focal deficits   PSYCH: AAO x 3   SKIN: No rashes or lesions    LABS:                        7.8    3.93  )-----------( 61       ( 24 Nov 2023 06:35 )             22.7     Hgb Trend: 7.8<--, 8.1<--, 8.7<--, 8.8<--, 9.1<--  11-24    136  |  101  |  49<H>  ----------------------------<  145<H>  4.7   |  26  |  4.49<H>    Ca    7.9<L>      24 Nov 2023 06:35  Phos  4.6     11-24  Mg     2.10     11-24    TPro  6.2  /  Alb  1.9<L>  /  TBili  1.5<H>  /  DBili  1.1<H>  /  AST  301<H>  /  ALT  48<H>  /  AlkPhos  364<H>  11-24    Creatinine Trend: 4.49<--, 3.91<--, 3.16<--, 3.06<--, 3.63<--, 2.88<--  LIVER FUNCTIONS - ( 24 Nov 2023 06:35 )  Alb: 1.9 g/dL / Pro: 6.2 g/dL / ALK PHOS: 364 U/L / ALT: 48 U/L / AST: 301 U/L / GGT: x                 Urinalysis Basic - ( 24 Nov 2023 06:35 )    Color: x / Appearance: x / SG: x / pH: x  Gluc: 145 mg/dL / Ketone: x  / Bili: x / Urobili: x   Blood: x / Protein: x / Nitrite: x   Leuk Esterase: x / RBC: x / WBC x   Sq Epi: x / Non Sq Epi: x / Bacteria: x        RADIOLOGY & ADDITIONAL TESTS:    Imaging Personally Reviewed.    Consultant(s) Notes Reviewed.    Care Discussed with Consultants/Other Providers.

## 2023-11-26 LAB
ANION GAP SERPL CALC-SCNC: 10 MMOL/L — SIGNIFICANT CHANGE UP (ref 7–14)
ANION GAP SERPL CALC-SCNC: 10 MMOL/L — SIGNIFICANT CHANGE UP (ref 7–14)
BUN SERPL-MCNC: 41 MG/DL — HIGH (ref 7–23)
BUN SERPL-MCNC: 41 MG/DL — HIGH (ref 7–23)
CALCIUM SERPL-MCNC: 8 MG/DL — LOW (ref 8.4–10.5)
CALCIUM SERPL-MCNC: 8 MG/DL — LOW (ref 8.4–10.5)
CHLORIDE SERPL-SCNC: 99 MMOL/L — SIGNIFICANT CHANGE UP (ref 98–107)
CHLORIDE SERPL-SCNC: 99 MMOL/L — SIGNIFICANT CHANGE UP (ref 98–107)
CO2 SERPL-SCNC: 28 MMOL/L — SIGNIFICANT CHANGE UP (ref 22–31)
CO2 SERPL-SCNC: 28 MMOL/L — SIGNIFICANT CHANGE UP (ref 22–31)
CREAT SERPL-MCNC: 3.91 MG/DL — HIGH (ref 0.5–1.3)
CREAT SERPL-MCNC: 3.91 MG/DL — HIGH (ref 0.5–1.3)
EGFR: 16 ML/MIN/1.73M2 — LOW
EGFR: 16 ML/MIN/1.73M2 — LOW
GLUCOSE BLDC GLUCOMTR-MCNC: 105 MG/DL — HIGH (ref 70–99)
GLUCOSE BLDC GLUCOMTR-MCNC: 105 MG/DL — HIGH (ref 70–99)
GLUCOSE BLDC GLUCOMTR-MCNC: 139 MG/DL — HIGH (ref 70–99)
GLUCOSE BLDC GLUCOMTR-MCNC: 139 MG/DL — HIGH (ref 70–99)
GLUCOSE BLDC GLUCOMTR-MCNC: 154 MG/DL — HIGH (ref 70–99)
GLUCOSE BLDC GLUCOMTR-MCNC: 154 MG/DL — HIGH (ref 70–99)
GLUCOSE BLDC GLUCOMTR-MCNC: 193 MG/DL — HIGH (ref 70–99)
GLUCOSE BLDC GLUCOMTR-MCNC: 193 MG/DL — HIGH (ref 70–99)
GLUCOSE SERPL-MCNC: 71 MG/DL — SIGNIFICANT CHANGE UP (ref 70–99)
GLUCOSE SERPL-MCNC: 71 MG/DL — SIGNIFICANT CHANGE UP (ref 70–99)
HCT VFR BLD CALC: 22.6 % — LOW (ref 39–50)
HCT VFR BLD CALC: 22.6 % — LOW (ref 39–50)
HGB BLD-MCNC: 7.7 G/DL — LOW (ref 13–17)
HGB BLD-MCNC: 7.7 G/DL — LOW (ref 13–17)
MAGNESIUM SERPL-MCNC: 2.1 MG/DL — SIGNIFICANT CHANGE UP (ref 1.6–2.6)
MAGNESIUM SERPL-MCNC: 2.1 MG/DL — SIGNIFICANT CHANGE UP (ref 1.6–2.6)
MCHC RBC-ENTMCNC: 31 PG — SIGNIFICANT CHANGE UP (ref 27–34)
MCHC RBC-ENTMCNC: 31 PG — SIGNIFICANT CHANGE UP (ref 27–34)
MCHC RBC-ENTMCNC: 34.1 GM/DL — SIGNIFICANT CHANGE UP (ref 32–36)
MCHC RBC-ENTMCNC: 34.1 GM/DL — SIGNIFICANT CHANGE UP (ref 32–36)
MCV RBC AUTO: 91.1 FL — SIGNIFICANT CHANGE UP (ref 80–100)
MCV RBC AUTO: 91.1 FL — SIGNIFICANT CHANGE UP (ref 80–100)
NRBC # BLD: 0 /100 WBCS — SIGNIFICANT CHANGE UP (ref 0–0)
NRBC # BLD: 0 /100 WBCS — SIGNIFICANT CHANGE UP (ref 0–0)
NRBC # FLD: 0 K/UL — SIGNIFICANT CHANGE UP (ref 0–0)
NRBC # FLD: 0 K/UL — SIGNIFICANT CHANGE UP (ref 0–0)
PHOSPHATE SERPL-MCNC: 4.2 MG/DL — SIGNIFICANT CHANGE UP (ref 2.5–4.5)
PHOSPHATE SERPL-MCNC: 4.2 MG/DL — SIGNIFICANT CHANGE UP (ref 2.5–4.5)
PLATELET # BLD AUTO: 56 K/UL — LOW (ref 150–400)
PLATELET # BLD AUTO: 56 K/UL — LOW (ref 150–400)
POTASSIUM SERPL-MCNC: 5 MMOL/L — SIGNIFICANT CHANGE UP (ref 3.5–5.3)
POTASSIUM SERPL-MCNC: 5 MMOL/L — SIGNIFICANT CHANGE UP (ref 3.5–5.3)
POTASSIUM SERPL-SCNC: 5 MMOL/L — SIGNIFICANT CHANGE UP (ref 3.5–5.3)
POTASSIUM SERPL-SCNC: 5 MMOL/L — SIGNIFICANT CHANGE UP (ref 3.5–5.3)
RBC # BLD: 2.48 M/UL — LOW (ref 4.2–5.8)
RBC # BLD: 2.48 M/UL — LOW (ref 4.2–5.8)
RBC # FLD: 23 % — HIGH (ref 10.3–14.5)
RBC # FLD: 23 % — HIGH (ref 10.3–14.5)
SODIUM SERPL-SCNC: 137 MMOL/L — SIGNIFICANT CHANGE UP (ref 135–145)
SODIUM SERPL-SCNC: 137 MMOL/L — SIGNIFICANT CHANGE UP (ref 135–145)
WBC # BLD: 3.47 K/UL — LOW (ref 3.8–10.5)
WBC # BLD: 3.47 K/UL — LOW (ref 3.8–10.5)
WBC # FLD AUTO: 3.47 K/UL — LOW (ref 3.8–10.5)
WBC # FLD AUTO: 3.47 K/UL — LOW (ref 3.8–10.5)

## 2023-11-26 PROCEDURE — 99232 SBSQ HOSP IP/OBS MODERATE 35: CPT

## 2023-11-26 RX ADMIN — Medication 81 MILLIGRAM(S): at 13:13

## 2023-11-26 RX ADMIN — LIDOCAINE 1 PATCH: 4 CREAM TOPICAL at 18:51

## 2023-11-26 RX ADMIN — CARVEDILOL PHOSPHATE 3.12 MILLIGRAM(S): 80 CAPSULE, EXTENDED RELEASE ORAL at 06:26

## 2023-11-26 RX ADMIN — Medication 25 MICROGRAM(S): at 06:26

## 2023-11-26 RX ADMIN — CARVEDILOL PHOSPHATE 3.12 MILLIGRAM(S): 80 CAPSULE, EXTENDED RELEASE ORAL at 17:11

## 2023-11-26 RX ADMIN — CLOPIDOGREL BISULFATE 75 MILLIGRAM(S): 75 TABLET, FILM COATED ORAL at 13:14

## 2023-11-26 RX ADMIN — LIDOCAINE 1 PATCH: 4 CREAM TOPICAL at 17:12

## 2023-11-26 RX ADMIN — SACUBITRIL AND VALSARTAN 1 TABLET(S): 24; 26 TABLET, FILM COATED ORAL at 17:11

## 2023-11-26 RX ADMIN — LIDOCAINE 1 PATCH: 4 CREAM TOPICAL at 07:00

## 2023-11-26 RX ADMIN — CHLORHEXIDINE GLUCONATE 1 APPLICATION(S): 213 SOLUTION TOPICAL at 13:16

## 2023-11-26 RX ADMIN — SACUBITRIL AND VALSARTAN 1 TABLET(S): 24; 26 TABLET, FILM COATED ORAL at 06:26

## 2023-11-26 RX ADMIN — Medication 1 MILLIGRAM(S): at 13:14

## 2023-11-26 NOTE — PROGRESS NOTE ADULT - PROBLEM SELECTOR PLAN 3
Unclear etiology, presumed due to sepsis  Improving  continue to monitor Unclear etiology. Presume to be 2/2 infection   Unclear baseline  continue to monitor  Transfuse as indicated  Heme eval if downtrending

## 2023-11-26 NOTE — PROGRESS NOTE ADULT - SUBJECTIVE AND OBJECTIVE BOX
Vida Carbajal        Patient is a 73y old  Male who presents with a chief complaint of resp failure (19 Nov 2023 14:33)      SUBJECTIVE / OVERNIGHT EVENTS: No acute overnight events. This morning pt doing well. No complaints    MEDICATIONS  (STANDING):  aspirin enteric coated 81 milliGRAM(s) Oral daily  carvedilol 3.125 milliGRAM(s) Oral every 12 hours  chlorhexidine 2% Cloths 1 Application(s) Topical daily  clopidogrel Tablet 75 milliGRAM(s) Oral daily  dextrose 5%. 1000 milliLiter(s) (50 mL/Hr) IV Continuous <Continuous>  dextrose 5%. 1000 milliLiter(s) (100 mL/Hr) IV Continuous <Continuous>  dextrose 50% Injectable 25 Gram(s) IV Push once  epoetin dean (EPOGEN) Injectable 4000 Unit(s) IV Push <User Schedule>  folic acid 1 milliGRAM(s) Oral daily  gabapentin 100 milliGRAM(s) Oral <User Schedule>  glucagon  Injectable 1 milliGRAM(s) IntraMuscular once  levothyroxine 25 MICROGram(s) Oral daily  lidocaine   4% Patch 1 Patch Transdermal every 24 hours  sacubitril 24 mG/valsartan 26 mG 1 Tablet(s) Oral two times a day    MEDICATIONS  (PRN):  benzonatate 100 milliGRAM(s) Oral three times a day PRN Cough  dextrose Oral Gel 15 Gram(s) Oral once PRN Blood Glucose LESS THAN 70 milliGRAM(s)/deciliter  lactulose Syrup 20 Gram(s) Oral three times a day PRN 2-3 bm daily  polyethylene glycol 3350 17 Gram(s) Oral two times a day PRN 2-3 bm daily  sodium chloride 0.9% Bolus. 100 milliLiter(s) IV Bolus every 5 minutes PRN SBP LESS THAN or EQUAL to 90 mmHg  traMADol 25 milliGRAM(s) Oral every 12 hours PRN Moderate Pain (4 - 6)-Severe pain (7-10)    Allergies    No Known Allergies    Intolerances        Vital Signs Last 24 Hrs  T(C): 36.9 (26 Nov 2023 15:17), Max: 36.9 (26 Nov 2023 15:17)  T(F): 98.4 (26 Nov 2023 15:17), Max: 98.4 (26 Nov 2023 15:17)  HR: 82 (26 Nov 2023 15:17) (75 - 82)  BP: 140/60 (26 Nov 2023 15:17) (120/58 - 149/75)  BP(mean): --  RR: 18 (26 Nov 2023 15:17) (18 - 20)  SpO2: 98% (26 Nov 2023 15:17) (96% - 99%)    Parameters below as of 26 Nov 2023 15:17  Patient On (Oxygen Delivery Method): room air      Daily     Daily   CAPILLARY BLOOD GLUCOSE      POCT Blood Glucose.: 154 mg/dL (26 Nov 2023 16:49)  POCT Blood Glucose.: 105 mg/dL (26 Nov 2023 11:11)  POCT Blood Glucose.: 139 mg/dL (26 Nov 2023 00:38)    I&O's Summary      PHYSICAL EXAM:  CONSTITUTIONAL: NAD  EYES: EOMI, conjunctiva and sclera clear  ENMT: Moist oral mucosa  NECK: Supple  RESPIRATORY: Breathing unlabored, CTAB  CARDIOVASCULAR: S1S2 no MRG  ABDOMEN: Nontender to palpation, normoactive bowel sounds, no rebound/guarding  MUSCULOSKELETAL: L arm swollen, + thrill in AV fistula  NEUROLOGY: No focal deficits   PSYCH: AAO x 3   SKIN: No rashes or lesions      LABS:                        7.7    3.47  )-----------( 56       ( 26 Nov 2023 06:42 )             22.6     Hgb Trend: 7.7<--, 7.8<--, 8.1<--, 8.7<--, 8.8<--  11-26    137  |  99  |  41<H>  ----------------------------<  71  5.0   |  28  |  3.91<H>    Ca    8.0<L>      26 Nov 2023 06:42  Phos  4.2     11-26  Mg     2.10     11-26      Creatinine Trend: 3.91<--, 4.49<--, 3.91<--, 3.16<--, 3.06<--, 3.63<--          Urinalysis Basic - ( 26 Nov 2023 06:42 )    Color: x / Appearance: x / SG: x / pH: x  Gluc: 71 mg/dL / Ketone: x  / Bili: x / Urobili: x   Blood: x / Protein: x / Nitrite: x   Leuk Esterase: x / RBC: x / WBC x   Sq Epi: x / Non Sq Epi: x / Bacteria: x        RADIOLOGY & ADDITIONAL TESTS:    Imaging Personally Reviewed.    Consultant(s) Notes Reviewed.    Care Discussed with Consultants/Other Providers.

## 2023-11-27 LAB
ANION GAP SERPL CALC-SCNC: 10 MMOL/L — SIGNIFICANT CHANGE UP (ref 7–14)
ANION GAP SERPL CALC-SCNC: 10 MMOL/L — SIGNIFICANT CHANGE UP (ref 7–14)
BUN SERPL-MCNC: 50 MG/DL — HIGH (ref 7–23)
BUN SERPL-MCNC: 50 MG/DL — HIGH (ref 7–23)
CALCIUM SERPL-MCNC: 7.9 MG/DL — LOW (ref 8.4–10.5)
CALCIUM SERPL-MCNC: 7.9 MG/DL — LOW (ref 8.4–10.5)
CHLORIDE SERPL-SCNC: 98 MMOL/L — SIGNIFICANT CHANGE UP (ref 98–107)
CHLORIDE SERPL-SCNC: 98 MMOL/L — SIGNIFICANT CHANGE UP (ref 98–107)
CO2 SERPL-SCNC: 25 MMOL/L — SIGNIFICANT CHANGE UP (ref 22–31)
CO2 SERPL-SCNC: 25 MMOL/L — SIGNIFICANT CHANGE UP (ref 22–31)
CREAT SERPL-MCNC: 4.74 MG/DL — HIGH (ref 0.5–1.3)
CREAT SERPL-MCNC: 4.74 MG/DL — HIGH (ref 0.5–1.3)
EGFR: 12 ML/MIN/1.73M2 — LOW
EGFR: 12 ML/MIN/1.73M2 — LOW
GLUCOSE BLDC GLUCOMTR-MCNC: 119 MG/DL — HIGH (ref 70–99)
GLUCOSE BLDC GLUCOMTR-MCNC: 119 MG/DL — HIGH (ref 70–99)
GLUCOSE BLDC GLUCOMTR-MCNC: 143 MG/DL — HIGH (ref 70–99)
GLUCOSE BLDC GLUCOMTR-MCNC: 143 MG/DL — HIGH (ref 70–99)
GLUCOSE BLDC GLUCOMTR-MCNC: 181 MG/DL — HIGH (ref 70–99)
GLUCOSE BLDC GLUCOMTR-MCNC: 181 MG/DL — HIGH (ref 70–99)
GLUCOSE BLDC GLUCOMTR-MCNC: 187 MG/DL — HIGH (ref 70–99)
GLUCOSE BLDC GLUCOMTR-MCNC: 187 MG/DL — HIGH (ref 70–99)
GLUCOSE BLDC GLUCOMTR-MCNC: 72 MG/DL — SIGNIFICANT CHANGE UP (ref 70–99)
GLUCOSE BLDC GLUCOMTR-MCNC: 72 MG/DL — SIGNIFICANT CHANGE UP (ref 70–99)
GLUCOSE BLDC GLUCOMTR-MCNC: 80 MG/DL — SIGNIFICANT CHANGE UP (ref 70–99)
GLUCOSE BLDC GLUCOMTR-MCNC: 80 MG/DL — SIGNIFICANT CHANGE UP (ref 70–99)
GLUCOSE BLDC GLUCOMTR-MCNC: 90 MG/DL — SIGNIFICANT CHANGE UP (ref 70–99)
GLUCOSE BLDC GLUCOMTR-MCNC: 90 MG/DL — SIGNIFICANT CHANGE UP (ref 70–99)
GLUCOSE SERPL-MCNC: 74 MG/DL — SIGNIFICANT CHANGE UP (ref 70–99)
GLUCOSE SERPL-MCNC: 74 MG/DL — SIGNIFICANT CHANGE UP (ref 70–99)
HCT VFR BLD CALC: 21.9 % — LOW (ref 39–50)
HCT VFR BLD CALC: 21.9 % — LOW (ref 39–50)
HGB BLD-MCNC: 7.5 G/DL — LOW (ref 13–17)
HGB BLD-MCNC: 7.5 G/DL — LOW (ref 13–17)
MAGNESIUM SERPL-MCNC: 2.2 MG/DL — SIGNIFICANT CHANGE UP (ref 1.6–2.6)
MAGNESIUM SERPL-MCNC: 2.2 MG/DL — SIGNIFICANT CHANGE UP (ref 1.6–2.6)
MCHC RBC-ENTMCNC: 31.4 PG — SIGNIFICANT CHANGE UP (ref 27–34)
MCHC RBC-ENTMCNC: 31.4 PG — SIGNIFICANT CHANGE UP (ref 27–34)
MCHC RBC-ENTMCNC: 34.2 GM/DL — SIGNIFICANT CHANGE UP (ref 32–36)
MCHC RBC-ENTMCNC: 34.2 GM/DL — SIGNIFICANT CHANGE UP (ref 32–36)
MCV RBC AUTO: 91.6 FL — SIGNIFICANT CHANGE UP (ref 80–100)
MCV RBC AUTO: 91.6 FL — SIGNIFICANT CHANGE UP (ref 80–100)
NRBC # BLD: 0 /100 WBCS — SIGNIFICANT CHANGE UP (ref 0–0)
NRBC # BLD: 0 /100 WBCS — SIGNIFICANT CHANGE UP (ref 0–0)
NRBC # FLD: 0 K/UL — SIGNIFICANT CHANGE UP (ref 0–0)
NRBC # FLD: 0 K/UL — SIGNIFICANT CHANGE UP (ref 0–0)
PHOSPHATE SERPL-MCNC: 4.6 MG/DL — HIGH (ref 2.5–4.5)
PHOSPHATE SERPL-MCNC: 4.6 MG/DL — HIGH (ref 2.5–4.5)
PLATELET # BLD AUTO: 49 K/UL — LOW (ref 150–400)
PLATELET # BLD AUTO: 49 K/UL — LOW (ref 150–400)
POTASSIUM SERPL-MCNC: 5 MMOL/L — SIGNIFICANT CHANGE UP (ref 3.5–5.3)
POTASSIUM SERPL-MCNC: 5 MMOL/L — SIGNIFICANT CHANGE UP (ref 3.5–5.3)
POTASSIUM SERPL-SCNC: 5 MMOL/L — SIGNIFICANT CHANGE UP (ref 3.5–5.3)
POTASSIUM SERPL-SCNC: 5 MMOL/L — SIGNIFICANT CHANGE UP (ref 3.5–5.3)
RBC # BLD: 2.39 M/UL — LOW (ref 4.2–5.8)
RBC # BLD: 2.39 M/UL — LOW (ref 4.2–5.8)
RBC # FLD: 21.9 % — HIGH (ref 10.3–14.5)
RBC # FLD: 21.9 % — HIGH (ref 10.3–14.5)
SODIUM SERPL-SCNC: 133 MMOL/L — LOW (ref 135–145)
SODIUM SERPL-SCNC: 133 MMOL/L — LOW (ref 135–145)
WBC # BLD: 3.59 K/UL — LOW (ref 3.8–10.5)
WBC # BLD: 3.59 K/UL — LOW (ref 3.8–10.5)
WBC # FLD AUTO: 3.59 K/UL — LOW (ref 3.8–10.5)
WBC # FLD AUTO: 3.59 K/UL — LOW (ref 3.8–10.5)

## 2023-11-27 PROCEDURE — 90935 HEMODIALYSIS ONE EVALUATION: CPT

## 2023-11-27 PROCEDURE — 99233 SBSQ HOSP IP/OBS HIGH 50: CPT

## 2023-11-27 RX ADMIN — LIDOCAINE 1 PATCH: 4 CREAM TOPICAL at 18:22

## 2023-11-27 RX ADMIN — LIDOCAINE 1 PATCH: 4 CREAM TOPICAL at 17:12

## 2023-11-27 RX ADMIN — CARVEDILOL PHOSPHATE 3.12 MILLIGRAM(S): 80 CAPSULE, EXTENDED RELEASE ORAL at 17:13

## 2023-11-27 RX ADMIN — Medication 25 MICROGRAM(S): at 05:36

## 2023-11-27 RX ADMIN — Medication 81 MILLIGRAM(S): at 11:27

## 2023-11-27 RX ADMIN — CLOPIDOGREL BISULFATE 75 MILLIGRAM(S): 75 TABLET, FILM COATED ORAL at 11:28

## 2023-11-27 RX ADMIN — SACUBITRIL AND VALSARTAN 1 TABLET(S): 24; 26 TABLET, FILM COATED ORAL at 17:12

## 2023-11-27 RX ADMIN — LIDOCAINE 1 PATCH: 4 CREAM TOPICAL at 07:00

## 2023-11-27 RX ADMIN — GABAPENTIN 100 MILLIGRAM(S): 400 CAPSULE ORAL at 17:14

## 2023-11-27 RX ADMIN — ERYTHROPOIETIN 4000 UNIT(S): 10000 INJECTION, SOLUTION INTRAVENOUS; SUBCUTANEOUS at 08:29

## 2023-11-27 RX ADMIN — Medication 1 MILLIGRAM(S): at 11:29

## 2023-11-27 RX ADMIN — CHLORHEXIDINE GLUCONATE 1 APPLICATION(S): 213 SOLUTION TOPICAL at 11:29

## 2023-11-27 NOTE — PROGRESS NOTE ADULT - PROBLEM SELECTOR PLAN 3
Unclear etiology. Presume to be 2/2 infection   Unclear baseline  continue to monitor  Transfuse as indicated  Heme eval if downtrending

## 2023-11-27 NOTE — PROGRESS NOTE ADULT - PROBLEM SELECTOR PLAN 1
Pt. with ESRD on HD TIW (MWF, LUE AVF). Pt. also with history of kidney transplant (not on immunosuppression). Last HD as outpatient was done on 11/6/23 via L AVF. Pt. admitted to MICU with respiratory failure and systemic shock. Pt. initiated on CRRT on 11/8, discontinued on 11/10 as volume status and FiO2 requirements improved. Had some oozing from AVF after treatment - vascular following for evaluation of stenosis (will likely need fistulogram during current admission). Refused this but vascular following. Labs reviewed. Pt appears clinically stable and tolerating HD treatment today.  Plan next HD treatment on 11/29/23. Dose medications as per ESRD/HD.

## 2023-11-27 NOTE — PROGRESS NOTE ADULT - SUBJECTIVE AND OBJECTIVE BOX
**********************************************  [facility] Division of [facility] Medicine  Ayla Finley MD  Available via MS Teams  Pager: [phone]  **********************************************     Patient is a 73y old  Male who presents with a chief complaint of resp failure (19 Nov 2023 14:33)    SUBJECTIVE / OVERNIGHT EVENTS: No acute events overnight. Patient examined at bedside this AM, with no subjective complaints. Denies CP, palpitations, SOB, n/v/d, abdominal pain.     OBJECTIVE:  Vital Signs Last 24 Hrs  T(C): 37.3 (27 Nov 2023 17:12), Max: 37.3 (27 Nov 2023 17:12)  T(F): 99.2 (27 Nov 2023 17:12), Max: 99.2 (27 Nov 2023 17:12)  HR: 84 (27 Nov 2023 17:12) (78 - 84)  BP: 128/56 (27 Nov 2023 17:12) (117/58 - 150/60)  BP(mean): --  RR: 19 (27 Nov 2023 17:12) (17 - 19)  SpO2: 95% (27 Nov 2023 17:12) (94% - 98%)    Parameters below as of 27 Nov 2023 17:12  Patient On (Oxygen Delivery Method): room air        I&O's Summary    27 Nov 2023 07:01  -  27 Nov 2023 17:36  --------------------------------------------------------  IN: 400 mL / OUT: 1900 mL / NET: -1500 mL      Physical Exam:     General: No acute distress, well-appearing    Eyes: PERRL, EOMI     ENT: MMM, no oropharyngeal lesions or erythema appreciated     Pulm: No increased WOB. CTAB. No wheezing.     CV: RRR. S1&S2+. No M/R/G appreciated.     Abdomen: +BS. Soft, NTND. No organomegaly.     MSK: Nml ROM    Extremities: No peripheral edema or cyanosis.     Neuro: A&Ox3, no focal deficits     Skin: Warm and dry. No visible rash.     Labs:  CAPILLARY BLOOD GLUCOSE      POCT Blood Glucose.: 187 mg/dL (27 Nov 2023 16:45)  POCT Blood Glucose.: 143 mg/dL (27 Nov 2023 11:12)  POCT Blood Glucose.: 119 mg/dL (27 Nov 2023 10:16)  POCT Blood Glucose.: 80 mg/dL (27 Nov 2023 09:00)  POCT Blood Glucose.: 72 mg/dL (27 Nov 2023 08:34)  POCT Blood Glucose.: 90 mg/dL (27 Nov 2023 05:23)  POCT Blood Glucose.: 193 mg/dL (26 Nov 2023 22:17)                          7.5    3.59  )-----------( 49       ( 27 Nov 2023 06:20 )             21.9     11-27    133<L>  |  98  |  50<H>  ----------------------------<  74  5.0   |  25  |  4.74<H>    Ca    7.9<L>      27 Nov 2023 06:20  Phos  4.6     11-27  Mg     2.20     11-27              Urinalysis Basic - ( 27 Nov 2023 06:20 )    Color: x / Appearance: x / SG: x / pH: x  Gluc: 74 mg/dL / Ketone: x  / Bili: x / Urobili: x   Blood: x / Protein: x / Nitrite: x   Leuk Esterase: x / RBC: x / WBC x   Sq Epi: x / Non Sq Epi: x / Bacteria: x      Imaging Personally Reviewed:      MEDICATIONS  (STANDING):  aspirin enteric coated 81 milliGRAM(s) Oral daily  carvedilol 3.125 milliGRAM(s) Oral every 12 hours  chlorhexidine 2% Cloths 1 Application(s) Topical daily  clopidogrel Tablet 75 milliGRAM(s) Oral daily  dextrose 5%. 1000 milliLiter(s) (50 mL/Hr) IV Continuous <Continuous>  dextrose 5%. 1000 milliLiter(s) (100 mL/Hr) IV Continuous <Continuous>  dextrose 50% Injectable 25 Gram(s) IV Push once  epoetin dean (EPOGEN) Injectable 4000 Unit(s) IV Push <User Schedule>  folic acid 1 milliGRAM(s) Oral daily  gabapentin 100 milliGRAM(s) Oral <User Schedule>  glucagon  Injectable 1 milliGRAM(s) IntraMuscular once  levothyroxine 25 MICROGram(s) Oral daily  lidocaine   4% Patch 1 Patch Transdermal every 24 hours  sacubitril 24 mG/valsartan 26 mG 1 Tablet(s) Oral two times a day    MEDICATIONS  (PRN):  benzonatate 100 milliGRAM(s) Oral three times a day PRN Cough  dextrose Oral Gel 15 Gram(s) Oral once PRN Blood Glucose LESS THAN 70 milliGRAM(s)/deciliter  lactulose Syrup 20 Gram(s) Oral three times a day PRN 2-3 bm daily  polyethylene glycol 3350 17 Gram(s) Oral two times a day PRN 2-3 bm daily  sodium chloride 0.9% Bolus. 100 milliLiter(s) IV Bolus every 5 minutes PRN SBP LESS THAN or EQUAL to 90 mmHg  traMADol 25 milliGRAM(s) Oral every 12 hours PRN Moderate Pain (4 - 6)-Severe pain (7-10)

## 2023-11-27 NOTE — PROGRESS NOTE ADULT - SUBJECTIVE AND OBJECTIVE BOX
[facility] DIVISION OF KIDNEY DISEASES AND HYPERTENSION -- FOLLOW UP NOTE  --------------------------------------------------------------------------------  Chief Complaint: ESRD    24 hour events/subjective:  Pt on HD this AM- BP stable, No complaints      PAST HISTORY  --------------------------------------------------------------------------------  No significant changes to PMH, PSH, FHx, SHx, unless otherwise noted    ALLERGIES & MEDICATIONS  --------------------------------------------------------------------------------  Allergies    No Known Allergies    Intolerances      Standing Inpatient Medications  aspirin enteric coated 81 milliGRAM(s) Oral daily  carvedilol 3.125 milliGRAM(s) Oral every 12 hours  chlorhexidine 2% Cloths 1 Application(s) Topical daily  clopidogrel Tablet 75 milliGRAM(s) Oral daily  dextrose 5%. 1000 milliLiter(s) IV Continuous <Continuous>  dextrose 5%. 1000 milliLiter(s) IV Continuous <Continuous>  dextrose 50% Injectable 25 Gram(s) IV Push once  epoetin dean (EPOGEN) Injectable 4000 Unit(s) IV Push <User Schedule>  folic acid 1 milliGRAM(s) Oral daily  gabapentin 100 milliGRAM(s) Oral <User Schedule>  glucagon  Injectable 1 milliGRAM(s) IntraMuscular once  levothyroxine 25 MICROGram(s) Oral daily  lidocaine   4% Patch 1 Patch Transdermal every 24 hours  sacubitril 24 mG/valsartan 26 mG 1 Tablet(s) Oral two times a day    PRN Inpatient Medications  benzonatate 100 milliGRAM(s) Oral three times a day PRN  dextrose Oral Gel 15 Gram(s) Oral once PRN  lactulose Syrup 20 Gram(s) Oral three times a day PRN  polyethylene glycol 3350 17 Gram(s) Oral two times a day PRN  sodium chloride 0.9% Bolus. 100 milliLiter(s) IV Bolus every 5 minutes PRN  traMADol 25 milliGRAM(s) Oral every 12 hours PRN      VITALS/PHYSICAL EXAM  --------------------------------------------------------------------------------  T(C): 37.1 (11-27-23 @ 10:30), Max: 37.1 (11-27-23 @ 10:30)  HR: 82 (11-27-23 @ 10:30) (78 - 84)  BP: 135/66 (11-27-23 @ 10:30) (117/58 - 150/60)  RR: 18 (11-27-23 @ 10:30) (17 - 18)  SpO2: 95% (11-27-23 @ 10:30) (94% - 98%)  Wt(kg): --        11-27-23 @ 07:01  -  11-27-23 @ 12:33  --------------------------------------------------------  IN: 400 mL / OUT: 1900 mL / NET: -1500 mL      Physical Exam:  Gen: resting, NAD  HEENT: Anicteric  Pulm: Fair entry B/L, CTA B/L  CV: S1S2+  Abd: Soft, +BS    Ext: No LE edema B/L  Neuro: Awake and alert  Skin: Warm and dry  Dialysis access: LUE AVF +attached to dialysis tubing    LABS/STUDIES  --------------------------------------------------------------------------------              7.5    3.59  >-----------<  49       [11-27-23 @ 06:20]              21.9     133  |  98  |  50  ----------------------------<  74      [11-27-23 @ 06:20]  5.0   |  25  |  4.74        Ca     7.9     [11-27-23 @ 06:20]      Mg     2.20     [11-27-23 @ 06:20]      Phos  4.6     [11-27-23 @ 06:20]            Creatinine Trend:  SCr 4.74 [11-27 @ 06:20]  SCr 3.91 [11-26 @ 06:42]  SCr 4.49 [11-24 @ 06:35]  SCr 3.91 [11-23 @ 07:10]  SCr 3.16 [11-22 @ 14:00]    Urinalysis - [11-27-23 @ 06:20]      Color  / Appearance  / SG  / pH       Gluc 74 / Ketone   / Bili  / Urobili        Blood  / Protein  / Leuk Est  / Nitrite       RBC  / WBC  / Hyaline  / Gran  / Sq Epi  / Non Sq Epi  / Bacteria       TSH 12.60      [11-20-23 @ 05:30]    HBsAb 320.9      [11-24-23 @ 07:00]  HBsAb Reactive      [11-13-23 @ 14:30]  HBsAg Nonreact      [11-13-23 @ 14:30]  HBcAb Reactive      [11-13-23 @ 14:30]  HCV 0.16, Nonreact      [11-13-23 @ 14:30]

## 2023-11-28 ENCOUNTER — TRANSCRIPTION ENCOUNTER (OUTPATIENT)
Age: 73
End: 2023-11-28

## 2023-11-28 LAB
ALBUMIN SERPL ELPH-MCNC: 2.1 G/DL — LOW (ref 3.3–5)
ALBUMIN SERPL ELPH-MCNC: 2.1 G/DL — LOW (ref 3.3–5)
ANION GAP SERPL CALC-SCNC: 7 MMOL/L — SIGNIFICANT CHANGE UP (ref 7–14)
ANION GAP SERPL CALC-SCNC: 7 MMOL/L — SIGNIFICANT CHANGE UP (ref 7–14)
BUN SERPL-MCNC: 30 MG/DL — HIGH (ref 7–23)
BUN SERPL-MCNC: 30 MG/DL — HIGH (ref 7–23)
CALCIUM SERPL-MCNC: 7.6 MG/DL — LOW (ref 8.4–10.5)
CALCIUM SERPL-MCNC: 7.6 MG/DL — LOW (ref 8.4–10.5)
CHLORIDE SERPL-SCNC: 101 MMOL/L — SIGNIFICANT CHANGE UP (ref 98–107)
CHLORIDE SERPL-SCNC: 101 MMOL/L — SIGNIFICANT CHANGE UP (ref 98–107)
CO2 SERPL-SCNC: 30 MMOL/L — SIGNIFICANT CHANGE UP (ref 22–31)
CO2 SERPL-SCNC: 30 MMOL/L — SIGNIFICANT CHANGE UP (ref 22–31)
CREAT SERPL-MCNC: 3.46 MG/DL — HIGH (ref 0.5–1.3)
CREAT SERPL-MCNC: 3.46 MG/DL — HIGH (ref 0.5–1.3)
EGFR: 18 ML/MIN/1.73M2 — LOW
EGFR: 18 ML/MIN/1.73M2 — LOW
GLUCOSE BLDC GLUCOMTR-MCNC: 128 MG/DL — HIGH (ref 70–99)
GLUCOSE BLDC GLUCOMTR-MCNC: 128 MG/DL — HIGH (ref 70–99)
GLUCOSE BLDC GLUCOMTR-MCNC: 132 MG/DL — HIGH (ref 70–99)
GLUCOSE BLDC GLUCOMTR-MCNC: 132 MG/DL — HIGH (ref 70–99)
GLUCOSE BLDC GLUCOMTR-MCNC: 144 MG/DL — HIGH (ref 70–99)
GLUCOSE BLDC GLUCOMTR-MCNC: 144 MG/DL — HIGH (ref 70–99)
GLUCOSE BLDC GLUCOMTR-MCNC: 149 MG/DL — HIGH (ref 70–99)
GLUCOSE BLDC GLUCOMTR-MCNC: 149 MG/DL — HIGH (ref 70–99)
GLUCOSE BLDC GLUCOMTR-MCNC: 194 MG/DL — HIGH (ref 70–99)
GLUCOSE BLDC GLUCOMTR-MCNC: 194 MG/DL — HIGH (ref 70–99)
GLUCOSE BLDC GLUCOMTR-MCNC: 196 MG/DL — HIGH (ref 70–99)
GLUCOSE BLDC GLUCOMTR-MCNC: 196 MG/DL — HIGH (ref 70–99)
GLUCOSE BLDC GLUCOMTR-MCNC: 55 MG/DL — LOW (ref 70–99)
GLUCOSE BLDC GLUCOMTR-MCNC: 55 MG/DL — LOW (ref 70–99)
GLUCOSE BLDC GLUCOMTR-MCNC: 57 MG/DL — LOW (ref 70–99)
GLUCOSE BLDC GLUCOMTR-MCNC: 58 MG/DL — LOW (ref 70–99)
GLUCOSE BLDC GLUCOMTR-MCNC: 58 MG/DL — LOW (ref 70–99)
GLUCOSE BLDC GLUCOMTR-MCNC: 62 MG/DL — LOW (ref 70–99)
GLUCOSE BLDC GLUCOMTR-MCNC: 62 MG/DL — LOW (ref 70–99)
GLUCOSE BLDC GLUCOMTR-MCNC: 68 MG/DL — LOW (ref 70–99)
GLUCOSE BLDC GLUCOMTR-MCNC: 68 MG/DL — LOW (ref 70–99)
GLUCOSE BLDC GLUCOMTR-MCNC: 71 MG/DL — SIGNIFICANT CHANGE UP (ref 70–99)
GLUCOSE BLDC GLUCOMTR-MCNC: 71 MG/DL — SIGNIFICANT CHANGE UP (ref 70–99)
GLUCOSE SERPL-MCNC: 63 MG/DL — LOW (ref 70–99)
GLUCOSE SERPL-MCNC: 63 MG/DL — LOW (ref 70–99)
HCT VFR BLD CALC: 21.8 % — LOW (ref 39–50)
HCT VFR BLD CALC: 21.8 % — LOW (ref 39–50)
HGB BLD-MCNC: 7.3 G/DL — LOW (ref 13–17)
HGB BLD-MCNC: 7.3 G/DL — LOW (ref 13–17)
MAGNESIUM SERPL-MCNC: 2 MG/DL — SIGNIFICANT CHANGE UP (ref 1.6–2.6)
MAGNESIUM SERPL-MCNC: 2 MG/DL — SIGNIFICANT CHANGE UP (ref 1.6–2.6)
MCHC RBC-ENTMCNC: 30.7 PG — SIGNIFICANT CHANGE UP (ref 27–34)
MCHC RBC-ENTMCNC: 30.7 PG — SIGNIFICANT CHANGE UP (ref 27–34)
MCHC RBC-ENTMCNC: 33.5 GM/DL — SIGNIFICANT CHANGE UP (ref 32–36)
MCHC RBC-ENTMCNC: 33.5 GM/DL — SIGNIFICANT CHANGE UP (ref 32–36)
MCV RBC AUTO: 91.6 FL — SIGNIFICANT CHANGE UP (ref 80–100)
MCV RBC AUTO: 91.6 FL — SIGNIFICANT CHANGE UP (ref 80–100)
NRBC # BLD: 0 /100 WBCS — SIGNIFICANT CHANGE UP (ref 0–0)
NRBC # BLD: 0 /100 WBCS — SIGNIFICANT CHANGE UP (ref 0–0)
NRBC # FLD: 0 K/UL — SIGNIFICANT CHANGE UP (ref 0–0)
NRBC # FLD: 0 K/UL — SIGNIFICANT CHANGE UP (ref 0–0)
PHOSPHATE SERPL-MCNC: 3.8 MG/DL — SIGNIFICANT CHANGE UP (ref 2.5–4.5)
PHOSPHATE SERPL-MCNC: 3.8 MG/DL — SIGNIFICANT CHANGE UP (ref 2.5–4.5)
PLATELET # BLD AUTO: 51 K/UL — LOW (ref 150–400)
PLATELET # BLD AUTO: 51 K/UL — LOW (ref 150–400)
POTASSIUM SERPL-MCNC: 3.9 MMOL/L — SIGNIFICANT CHANGE UP (ref 3.5–5.3)
POTASSIUM SERPL-MCNC: 3.9 MMOL/L — SIGNIFICANT CHANGE UP (ref 3.5–5.3)
POTASSIUM SERPL-SCNC: 3.9 MMOL/L — SIGNIFICANT CHANGE UP (ref 3.5–5.3)
POTASSIUM SERPL-SCNC: 3.9 MMOL/L — SIGNIFICANT CHANGE UP (ref 3.5–5.3)
RBC # BLD: 2.38 M/UL — LOW (ref 4.2–5.8)
RBC # BLD: 2.38 M/UL — LOW (ref 4.2–5.8)
RBC # FLD: 22.5 % — HIGH (ref 10.3–14.5)
RBC # FLD: 22.5 % — HIGH (ref 10.3–14.5)
SODIUM SERPL-SCNC: 138 MMOL/L — SIGNIFICANT CHANGE UP (ref 135–145)
SODIUM SERPL-SCNC: 138 MMOL/L — SIGNIFICANT CHANGE UP (ref 135–145)
WBC # BLD: 3.49 K/UL — LOW (ref 3.8–10.5)
WBC # BLD: 3.49 K/UL — LOW (ref 3.8–10.5)
WBC # FLD AUTO: 3.49 K/UL — LOW (ref 3.8–10.5)
WBC # FLD AUTO: 3.49 K/UL — LOW (ref 3.8–10.5)

## 2023-11-28 PROCEDURE — 99232 SBSQ HOSP IP/OBS MODERATE 35: CPT

## 2023-11-28 PROCEDURE — 99232 SBSQ HOSP IP/OBS MODERATE 35: CPT | Mod: FS

## 2023-11-28 RX ORDER — CALCIUM GLUCONATE 100 MG/ML
1 VIAL (ML) INTRAVENOUS ONCE
Refills: 0 | Status: COMPLETED | OUTPATIENT
Start: 2023-11-28 | End: 2023-11-28

## 2023-11-28 RX ORDER — DEXTROSE 50 % IN WATER 50 %
25 SYRINGE (ML) INTRAVENOUS ONCE
Refills: 0 | Status: COMPLETED | OUTPATIENT
Start: 2023-11-28 | End: 2023-11-28

## 2023-11-28 RX ADMIN — CARVEDILOL PHOSPHATE 3.12 MILLIGRAM(S): 80 CAPSULE, EXTENDED RELEASE ORAL at 05:56

## 2023-11-28 RX ADMIN — Medication 25 MICROGRAM(S): at 05:56

## 2023-11-28 RX ADMIN — LIDOCAINE 1 PATCH: 4 CREAM TOPICAL at 22:13

## 2023-11-28 RX ADMIN — LIDOCAINE 1 PATCH: 4 CREAM TOPICAL at 06:37

## 2023-11-28 RX ADMIN — Medication 1 MILLIGRAM(S): at 11:18

## 2023-11-28 RX ADMIN — CLOPIDOGREL BISULFATE 75 MILLIGRAM(S): 75 TABLET, FILM COATED ORAL at 11:18

## 2023-11-28 RX ADMIN — SACUBITRIL AND VALSARTAN 1 TABLET(S): 24; 26 TABLET, FILM COATED ORAL at 11:18

## 2023-11-28 RX ADMIN — SACUBITRIL AND VALSARTAN 1 TABLET(S): 24; 26 TABLET, FILM COATED ORAL at 22:13

## 2023-11-28 RX ADMIN — Medication 25 GRAM(S): at 08:09

## 2023-11-28 RX ADMIN — CARVEDILOL PHOSPHATE 3.12 MILLIGRAM(S): 80 CAPSULE, EXTENDED RELEASE ORAL at 22:13

## 2023-11-28 RX ADMIN — Medication 100 GRAM(S): at 09:44

## 2023-11-28 RX ADMIN — Medication 81 MILLIGRAM(S): at 11:17

## 2023-11-28 RX ADMIN — CHLORHEXIDINE GLUCONATE 1 APPLICATION(S): 213 SOLUTION TOPICAL at 11:21

## 2023-11-28 RX ADMIN — Medication 100 MILLIGRAM(S): at 22:13

## 2023-11-28 NOTE — PROVIDER CONTACT NOTE (OTHER) - DATE AND TIME:
17-Nov-2023 15:52
28-Nov-2023 14:35
17-Nov-2023 11:55
21-Nov-2023 22:10
22-Nov-2023 06:17
23-Nov-2023 06:44
20-Nov-2023 20:40
22-Nov-2023 13:19
25-Nov-2023 06:27
21-Nov-2023 17:40
21-Nov-2023 21:40
22-Nov-2023 01:50
23-Nov-2023 00:55

## 2023-11-28 NOTE — PROGRESS NOTE ADULT - PROBLEM SELECTOR PLAN 4
Septic shock due to multifocal pneumonia present on admission  Pt was intubated for airway protection, now extubated  Diagnostic paracentesis negative for SBP  Appreciate cardiology recs--low suspicion for cardiogenic component to shock  s/p IV meropenem  Stress dose steroids tapered off  Tapered droxydopa 200 tid-->100 Tid on 11/15,  d/c 11/16  Monitor BP  Sepsis now resolved  Appreciate PM&R eval, ELICIA recommended  -NOW RESOLVED

## 2023-11-28 NOTE — PROVIDER CONTACT NOTE (HYPOGLYCEMIA EVENT) - NS PROVIDER CONTACT NOTE-PROVIDER NOTIFIED-HYPO
Howard Aviles, ACP
[place], [id number]
rupert camejo (text paged)
ACP 
[facility]

## 2023-11-28 NOTE — PROGRESS NOTE ADULT - SUBJECTIVE AND OBJECTIVE BOX
[facility] DIVISION OF KIDNEY DISEASES AND HYPERTENSION -- FOLLOW UP NOTE  --------------------------------------------------------------------------------  Chief Complaint: ESRD    24 hour events/subjective:  Pt seen and examined with wife at bedside today. No complaints.       PAST HISTORY  --------------------------------------------------------------------------------  No significant changes to PMH, PSH, FHx, SHx, unless otherwise noted    ALLERGIES & MEDICATIONS  --------------------------------------------------------------------------------  Allergies    No Known Allergies    Intolerances      Standing Inpatient Medications  aspirin enteric coated 81 milliGRAM(s) Oral daily  carvedilol 3.125 milliGRAM(s) Oral every 12 hours  chlorhexidine 2% Cloths 1 Application(s) Topical daily  clopidogrel Tablet 75 milliGRAM(s) Oral daily  dextrose 5%. 1000 milliLiter(s) IV Continuous <Continuous>  dextrose 5%. 1000 milliLiter(s) IV Continuous <Continuous>  dextrose 50% Injectable 25 Gram(s) IV Push once  epoetin dean (EPOGEN) Injectable 4000 Unit(s) IV Push <User Schedule>  folic acid 1 milliGRAM(s) Oral daily  gabapentin 100 milliGRAM(s) Oral <User Schedule>  glucagon  Injectable 1 milliGRAM(s) IntraMuscular once  levothyroxine 25 MICROGram(s) Oral daily  lidocaine   4% Patch 1 Patch Transdermal every 24 hours  sacubitril 24 mG/valsartan 26 mG 1 Tablet(s) Oral two times a day    PRN Inpatient Medications  benzonatate 100 milliGRAM(s) Oral three times a day PRN  dextrose Oral Gel 15 Gram(s) Oral once PRN  lactulose Syrup 20 Gram(s) Oral three times a day PRN  polyethylene glycol 3350 17 Gram(s) Oral two times a day PRN  sodium chloride 0.9% Bolus. 100 milliLiter(s) IV Bolus every 5 minutes PRN  traMADol 25 milliGRAM(s) Oral every 12 hours PRN    VITALS/PHYSICAL EXAM  --------------------------------------------------------------------------------  T(C): 37.1 (11-28-23 @ 05:29), Max: 37.3 (11-27-23 @ 17:12)  HR: 74 (11-28-23 @ 05:29) (74 - 84)  BP: 113/55 (11-28-23 @ 05:29) (113/55 - 128/56)  RR: 18 (11-28-23 @ 05:29) (16 - 19)  SpO2: 99% (11-28-23 @ 05:29) (95% - 99%)  Wt(kg): --        11-27-23 @ 07:01  -  11-28-23 @ 07:00  --------------------------------------------------------  IN: 400 mL / OUT: 1900 mL / NET: -1500 mL      Physical Exam:  Gen: resting, NAD  HEENT: Anicteric  Pulm: Fair entry B/L, CTA B/L  CV: S1S2+  Abd: Soft, +BS    Ext: No LE edema B/L  Neuro: Awake and alert  Skin: Warm and dry  Dialysis access: LUE AVF + thrill + bruit    LABS/STUDIES  --------------------------------------------------------------------------------              7.3    3.49  >-----------<  51       [11-28-23 @ 06:00]              21.8     138  |  101  |  30  ----------------------------<  63      [11-28-23 @ 06:00]  3.9   |  30  |  3.46        Ca     7.6     [11-28-23 @ 06:00]      Mg     2.00     [11-28-23 @ 06:00]      Phos  3.8     [11-28-23 @ 06:00]    TPro  x   /  Alb  2.1  /  TBili  x   /  DBili  x   /  AST  x   /  ALT  x   /  AlkPhos  x   [11-28-23 @ 06:00]          Creatinine Trend:  SCr 3.46 [11-28 @ 06:00]  SCr 4.74 [11-27 @ 06:20]  SCr 3.91 [11-26 @ 06:42]  SCr 4.49 [11-24 @ 06:35]  SCr 3.91 [11-23 @ 07:10]    Urinalysis - [11-28-23 @ 06:00]      Color  / Appearance  / SG  / pH       Gluc 63 / Ketone   / Bili  / Urobili        Blood  / Protein  / Leuk Est  / Nitrite       RBC  / WBC  / Hyaline  / Gran  / Sq Epi  / Non Sq Epi  / Bacteria       TSH 12.60      [11-20-23 @ 05:30]    HBsAb 320.9      [11-24-23 @ 07:00]  HBsAb Reactive      [11-13-23 @ 14:30]  HBsAg Nonreact      [11-13-23 @ 14:30]  HBcAb Reactive      [11-13-23 @ 14:30]  HCV 0.16, Nonreact      [11-13-23 @ 14:30]

## 2023-11-28 NOTE — PROVIDER CONTACT NOTE (OTHER) - RECOMMENDATIONS
EKG and adjust BP meds. Will reassess in one hour. ACP Howard at bedside.
Notify provider.
ACP Misti notified.
Educated patient on importance of labs pt still refused
continue to monitor pt
Education provided on importance of Blood work, VS monitoring and Bp medication.
Hold premeal insulin, patient has poor appetite
Pending vascular. monitoring site for bleeding.
assess patient
Education provided on importance of VS monitoring and Bp medication.
Education provided on importance of VS monitoring and lab collection. RN reached out to daughter, attempt to facilitate compliance unsuccessful. To reattempt at a later time. Maintain safety.
continue to monitor pt. To reattempt at a later time.
ACP Misti notified.

## 2023-11-28 NOTE — PROVIDER CONTACT NOTE (OTHER) - NAME OF MD/NP/PA/DO NOTIFIED:
Misti Martínez, ACP
Howard Aviles ACP
Misti Martínez, ACP
Spoke to Nora REDDING and vascular [id number]
Misti Martínez, ACP
[place] [id number]
Senia Conn
ACP made aware
Misti Martínez, ACP
Senia Conn
Howard Aviles NP
[place] Mota
Misti Martínez, ACP

## 2023-11-28 NOTE — PROGRESS NOTE ADULT - PROBLEM SELECTOR PLAN 1
Pt. with ESRD on HD TIW (MWF, LUE AVF). Pt. also with history of kidney transplant (not on immunosuppression). Last HD as outpatient was done on 11/6/23 via L AVF. Pt. admitted to MICU with respiratory failure and systemic shock. Pt. initiated on CRRT on 11/8, discontinued on 11/10 as volume status and FiO2 requirements improved. Had some oozing from AVF after treatment - vascular following for evaluation of stenosis (will likely need fistulogram during current admission). Refused this but vascular following, planned for OR tomorrow 11/29- will do HD this evening so that he is stable for surgery tomorrow. Labs reviewed. Pt appears clinically stable. Dose medications as per ESRD/HD.

## 2023-11-28 NOTE — PROVIDER CONTACT NOTE (HYPOGLYCEMIA EVENT) - NS PROVIDER CONTACT NOTE-TREATMENT-HYPO
4 oz Fruit Juice (Specify quantity, date/time)
Dextrose 50% 25 Grams IV Push
4 oz Fruit Juice (Specify quantity, date/time)
2 apple juice/Dextrose 50% 25 Grams IV Push
4 oz Fruit Juice (Specify quantity, date/time)/Dextrose 50% 25 Grams IV Push

## 2023-11-28 NOTE — PROGRESS NOTE ADULT - PROBLEM SELECTOR PLAN 3
Presumed due to shock liver  improving  continue to monitor  -Hepatic function panel ordered for AM, can consider restarting statin if resolved

## 2023-11-28 NOTE — PROVIDER CONTACT NOTE (OTHER) - ASSESSMENT
A&O4, Pt is not in acute distress.
Patient noted to be having bleeding from left AV fistula site, no tenderness, no erythema, no edema. Patient denies numbness or tingling, no acute distress noted. Patient denies any pain or discomfort from site.
Pt is not in acute distress. Verbally responsive but confused, agitated, Stated to RN " I said No! leave me alone" and pulls back the blanket over his head.
A&O4, Pt is not in acute distress.
Pt is not in acute distress. Verbally responsive but confused, agitated when woken up . Laying in bed with his blanket over his face. 3rd attempt, Pt still refusing vitals, Blood work and scheduled meds. Primary RN, PCA and Secondary RN tried as well.
Pt is not in acute distress. Verbally responsive but confused, agitated.
Pt is stable eating dinner
Pt in bed resting well. Asymptomatic will continue to monitor.
Pt not in acute distress.
patient hypotensive, NAD noted, oxygen 98% on RA, afebrile. Patient complains of chest discomfort with coughing. Denies SOB, palpitations or new onset of chest pain. Patient states has been feeling the same discomfort since yesterday.
Pt is not in acute distress. Verbally responsive but confused.
Pt just returned from [employer],did not have breakfast,  he had some cereal with milk, 4 OZ Apple juice given  Will hold pre meal insulin
came back from dialysis with dsg c/d/i  oozing expanded at least4 cm in diameter

## 2023-11-28 NOTE — CHART NOTE - NSCHARTNOTEFT_GEN_A_CORE
Late Note  Notified by RN patient having bleeding from AV fistula site. Saw patient at bedside. Patient denies any recent trauma, pain at AVF, chest pain, difficulty breathing, cooling of extremities, or paresthesias. Soaked band-aid was removed and minimal yet continuous bleeding occurring at AVF site.     T(C): 36.4 (11-28-23 @ 17:10), Max: 37.2 (11-27-23 @ 19:30)  HR: 80 (11-28-23 @ 17:10) (74 - 80)  BP: 147/68 (11-28-23 @ 17:10) (113/55 - 147/68)  RR: 18 (11-28-23 @ 17:10) (16 - 18)  SpO2: 98% (11-28-23 @ 17:10) (98% - 99%)    CONSTITUTIONAL: Well groomed, no apparent distress  RESP: No respiratory distress, no use of accessory muscles; CTA b/l, no WRR  CV: RRR, +S1S2, no MRG; no JVD; no peripheral edema  GI: Soft, NT, ND, no rebound, no guarding; no palpable masses; no hepatosplenomegaly; no hernia palpated    Band-aid was replaced with guaze and Tegaderm. Vascular Surgery team was notified. Saw patient with vascular surgical team. Vascular surgery with NTD and patient was okay to go forward with HD in preparation of fistulogram planned for 11/29/23. Late Note  Notified by RN patient having bleeding from AV fistula site. Saw patient at bedside. Patient denies any recent trauma, pain at AVF, chest pain, difficulty breathing, cooling of extremities, or paresthesias. Soaked band-aid was removed and minimal yet continuous bleeding occurring at AVF site.     T(C): 36.4 (11-28-23 @ 17:10), Max: 37.2 (11-27-23 @ 19:30)  HR: 80 (11-28-23 @ 17:10) (74 - 80)  BP: 147/68 (11-28-23 @ 17:10) (113/55 - 147/68)  RR: 18 (11-28-23 @ 17:10) (16 - 18)  SpO2: 98% (11-28-23 @ 17:10) (98% - 99%)    CONSTITUTIONAL: Well groomed, no apparent distress  RESP: No respiratory distress, no use of accessory muscles; CTA b/l, no WRR  CV: RRR, +S1S2, no MRG; no JVD; no peripheral edema  GI: Soft, NT, ND, no rebound, no guarding; no palpable masses; no hepatosplenomegaly; no hernia palpated    Band-aid was replaced with guaze and Tegaderm. Vascular Surgery team was notified. Saw patient with vascular surgical team. Vascular surgery with NTD and patient was okay to go forward with HD this evening in preparation of fistulogram planned for 11/29/23. Nephrology notified and HD to be performed this evening.

## 2023-11-28 NOTE — PROVIDER CONTACT NOTE (HYPOGLYCEMIA EVENT) - NS PROVIDER CONTACT CONTRIBUTING FACTORS OF EPISODE
Chronic kidney disease
Previous finger stick less than 100 mg/dL
Poor oral intake within the last 24 hours/Chronic kidney disease
Poor oral intake within the last 24 hours/Previous finger stick less than 100 mg/dL
Poor oral intake within the last 24 hours/Previous finger stick less than 100 mg/dL

## 2023-11-28 NOTE — PROVIDER CONTACT NOTE (HYPOGLYCEMIA EVENT) - NS PROVIDER CONTACT BACKGROUND-HYPO
Age: 73y    Gender: Male    POCT Blood Glucose:  196 mg/dL (11-28-23 @ 08:04)  194 mg/dL (11-28-23 @ 08:02)  57 mg/dL (11-28-23 @ 07:57)  57 mg/dL (11-28-23 @ 07:53)  181 mg/dL (11-27-23 @ 21:52)  187 mg/dL (11-27-23 @ 16:45)  143 mg/dL (11-27-23 @ 11:12)  119 mg/dL (11-27-23 @ 10:16)      eMAR:  dextrose 50% Injectable   25 Gram(s) IV Push (11-28-23 @ 08:09)    levothyroxine   25 MICROGram(s) Oral (11-28-23 @ 05:56)

## 2023-11-28 NOTE — PROVIDER CONTACT NOTE (OTHER) - BACKGROUND
Pt is here for AMS, today pt is A&OX4.
Pt AXO4 asymptomatic on bedrest / dialysis patient.
Pt is here for AMS, today pt is A&OX3.
Dialysis patient  came in for AMS
Pt is here for AMS, today pt is A&OX3.
Pt is here for AMS, today pt is A&OX4.
altered mental status, PMH ESRD, HTN, DM
Pt is here for AMS, today pt is A&OX4.
Pt is here for AMS, today pt is A&OX4.
Dialysis patient  came in for AMS
patient admitted for
AMS
Pt is here for AMS, today pt is A&OX4.

## 2023-11-28 NOTE — PROVIDER CONTACT NOTE (HYPOGLYCEMIA EVENT) - NS PROVIDER CONTACT RECOMMEND-HYPO
4oz of apple juice provided, patient was slightly confused, dextrose 50% administered IVP and patient advised to finish breakfast. Patient should be encouraged to eat dinner and have snack at bedtime.

## 2023-11-28 NOTE — PROGRESS NOTE ADULT - PROBLEM SELECTOR PLAN 1
HD per nephrology  anemia due to ESRD, aranesp at outpt HD per renal    Appreciate vasc surg eval of oozing fistula, for fistulogram  Initially Planned for fistulogram 11/22, pt refused. Re-addressed w/ patient and family. Now in agreement.   Plan for fistulogram on 11/29  Avoid nephrotoxins /renally dose meds

## 2023-11-28 NOTE — PROGRESS NOTE ADULT - SUBJECTIVE AND OBJECTIVE BOX
**********************************************  [facility] Division of [facility] Medicine  Ayla Finley MD  Available via MS Teams  Pager: [phone]  **********************************************     Patient is a 73y old  Male who presents with a chief complaint of resp failure (19 Nov 2023 14:33)    SUBJECTIVE / OVERNIGHT EVENTS: No acute events overnight. Patient examined at bedside this AM, with no subjective complaints. Denies CP, palpitations, SOB, n/v/d, abdominal pain.     OBJECTIVE:  Vital Signs Last 24 Hrs  T(C): 37.1 (28 Nov 2023 05:29), Max: 37.3 (27 Nov 2023 17:12)  T(F): 98.7 (28 Nov 2023 05:29), Max: 99.2 (27 Nov 2023 17:12)  HR: 74 (28 Nov 2023 05:29) (74 - 84)  BP: 113/55 (28 Nov 2023 05:29) (113/55 - 128/56)  BP(mean): --  RR: 18 (28 Nov 2023 05:29) (16 - 19)  SpO2: 99% (28 Nov 2023 05:29) (95% - 99%)    Parameters below as of 28 Nov 2023 05:29  Patient On (Oxygen Delivery Method): room air        I&O's Summary    27 Nov 2023 07:01  -  28 Nov 2023 07:00  --------------------------------------------------------  IN: 400 mL / OUT: 1900 mL / NET: -1500 mL      Physical Exam:     General: No acute distress, well-appearing    Eyes: PERRL, EOMI     ENT: MMM, no oropharyngeal lesions or erythema appreciated     Pulm: No increased WOB. CTAB. No wheezing.     CV: RRR. S1&S2+. No M/R/G appreciated.     Abdomen: +BS. Soft, NTND. No organomegaly.     MSK: Nml ROM    Extremities: LUE AVF    Neuro: A&Ox3, no focal deficits     Skin: Warm and dry. No visible rash.     Labs:  CAPILLARY BLOOD GLUCOSE      POCT Blood Glucose.: 128 mg/dL (28 Nov 2023 11:14)  POCT Blood Glucose.: 196 mg/dL (28 Nov 2023 08:04)  POCT Blood Glucose.: 194 mg/dL (28 Nov 2023 08:02)  POCT Blood Glucose.: 57 mg/dL (28 Nov 2023 07:57)  POCT Blood Glucose.: 57 mg/dL (28 Nov 2023 07:53)  POCT Blood Glucose.: 58 mg/dL (28 Nov 2023 07:43)  POCT Blood Glucose.: 55 mg/dL (28 Nov 2023 07:41)  POCT Blood Glucose.: 181 mg/dL (27 Nov 2023 21:52)  POCT Blood Glucose.: 187 mg/dL (27 Nov 2023 16:45)                          7.3    3.49  )-----------( 51       ( 28 Nov 2023 06:00 )             21.8     11-28    138  |  101  |  30<H>  ----------------------------<  63<L>  3.9   |  30  |  3.46<H>    Ca    7.6<L>      28 Nov 2023 06:00  Phos  3.8     11-28  Mg     2.00     11-28    TPro  x   /  Alb  2.1<L>  /  TBili  x   /  DBili  x   /  AST  x   /  ALT  x   /  AlkPhos  x   11-28            Urinalysis Basic - ( 28 Nov 2023 06:00 )    Color: x / Appearance: x / SG: x / pH: x  Gluc: 63 mg/dL / Ketone: x  / Bili: x / Urobili: x   Blood: x / Protein: x / Nitrite: x   Leuk Esterase: x / RBC: x / WBC x   Sq Epi: x / Non Sq Epi: x / Bacteria: x      Imaging Personally Reviewed:      MEDICATIONS  (STANDING):  aspirin enteric coated 81 milliGRAM(s) Oral daily  carvedilol 3.125 milliGRAM(s) Oral every 12 hours  chlorhexidine 2% Cloths 1 Application(s) Topical daily  clopidogrel Tablet 75 milliGRAM(s) Oral daily  dextrose 5%. 1000 milliLiter(s) (50 mL/Hr) IV Continuous <Continuous>  dextrose 5%. 1000 milliLiter(s) (100 mL/Hr) IV Continuous <Continuous>  dextrose 50% Injectable 25 Gram(s) IV Push once  epoetin dean (EPOGEN) Injectable 4000 Unit(s) IV Push <User Schedule>  folic acid 1 milliGRAM(s) Oral daily  gabapentin 100 milliGRAM(s) Oral <User Schedule>  glucagon  Injectable 1 milliGRAM(s) IntraMuscular once  levothyroxine 25 MICROGram(s) Oral daily  lidocaine   4% Patch 1 Patch Transdermal every 24 hours  sacubitril 24 mG/valsartan 26 mG 1 Tablet(s) Oral two times a day    MEDICATIONS  (PRN):  benzonatate 100 milliGRAM(s) Oral three times a day PRN Cough  dextrose Oral Gel 15 Gram(s) Oral once PRN Blood Glucose LESS THAN 70 milliGRAM(s)/deciliter  lactulose Syrup 20 Gram(s) Oral three times a day PRN 2-3 bm daily  polyethylene glycol 3350 17 Gram(s) Oral two times a day PRN 2-3 bm daily  sodium chloride 0.9% Bolus. 100 milliLiter(s) IV Bolus every 5 minutes PRN SBP LESS THAN or EQUAL to 90 mmHg  traMADol 25 milliGRAM(s) Oral every 12 hours PRN Moderate Pain (4 - 6)-Severe pain (7-10)

## 2023-11-29 LAB
ALBUMIN SERPL ELPH-MCNC: 2.2 G/DL — LOW (ref 3.3–5)
ALBUMIN SERPL ELPH-MCNC: 2.2 G/DL — LOW (ref 3.3–5)
ALP SERPL-CCNC: 381 U/L — HIGH (ref 40–120)
ALP SERPL-CCNC: 381 U/L — HIGH (ref 40–120)
ALT FLD-CCNC: 35 U/L — SIGNIFICANT CHANGE UP (ref 4–41)
ALT FLD-CCNC: 35 U/L — SIGNIFICANT CHANGE UP (ref 4–41)
ANION GAP SERPL CALC-SCNC: 6 MMOL/L — LOW (ref 7–14)
ANION GAP SERPL CALC-SCNC: 6 MMOL/L — LOW (ref 7–14)
APTT BLD: 37 SEC — HIGH (ref 24.5–35.6)
APTT BLD: 37 SEC — HIGH (ref 24.5–35.6)
AST SERPL-CCNC: 128 U/L — HIGH (ref 4–40)
AST SERPL-CCNC: 128 U/L — HIGH (ref 4–40)
BILIRUB DIRECT SERPL-MCNC: 0.8 MG/DL — HIGH (ref 0–0.3)
BILIRUB DIRECT SERPL-MCNC: 0.8 MG/DL — HIGH (ref 0–0.3)
BILIRUB INDIRECT FLD-MCNC: 0.4 MG/DL — SIGNIFICANT CHANGE UP (ref 0–1)
BILIRUB INDIRECT FLD-MCNC: 0.4 MG/DL — SIGNIFICANT CHANGE UP (ref 0–1)
BILIRUB SERPL-MCNC: 1.2 MG/DL — SIGNIFICANT CHANGE UP (ref 0.2–1.2)
BILIRUB SERPL-MCNC: 1.2 MG/DL — SIGNIFICANT CHANGE UP (ref 0.2–1.2)
BLD GP AB SCN SERPL QL: NEGATIVE — SIGNIFICANT CHANGE UP
BLD GP AB SCN SERPL QL: NEGATIVE — SIGNIFICANT CHANGE UP
BUN SERPL-MCNC: 16 MG/DL — SIGNIFICANT CHANGE UP (ref 7–23)
BUN SERPL-MCNC: 16 MG/DL — SIGNIFICANT CHANGE UP (ref 7–23)
CALCIUM SERPL-MCNC: 7.7 MG/DL — LOW (ref 8.4–10.5)
CALCIUM SERPL-MCNC: 7.7 MG/DL — LOW (ref 8.4–10.5)
CHLORIDE SERPL-SCNC: 99 MMOL/L — SIGNIFICANT CHANGE UP (ref 98–107)
CHLORIDE SERPL-SCNC: 99 MMOL/L — SIGNIFICANT CHANGE UP (ref 98–107)
CLOSURE TME COLL+EPINEP BLD: 55 K/UL — LOW (ref 150–400)
CLOSURE TME COLL+EPINEP BLD: 55 K/UL — LOW (ref 150–400)
CO2 SERPL-SCNC: 30 MMOL/L — SIGNIFICANT CHANGE UP (ref 22–31)
CO2 SERPL-SCNC: 30 MMOL/L — SIGNIFICANT CHANGE UP (ref 22–31)
CREAT SERPL-MCNC: 2.35 MG/DL — HIGH (ref 0.5–1.3)
CREAT SERPL-MCNC: 2.35 MG/DL — HIGH (ref 0.5–1.3)
EGFR: 29 ML/MIN/1.73M2 — LOW
EGFR: 29 ML/MIN/1.73M2 — LOW
GLUCOSE BLDC GLUCOMTR-MCNC: 117 MG/DL — HIGH (ref 70–99)
GLUCOSE BLDC GLUCOMTR-MCNC: 117 MG/DL — HIGH (ref 70–99)
GLUCOSE BLDC GLUCOMTR-MCNC: 124 MG/DL — HIGH (ref 70–99)
GLUCOSE BLDC GLUCOMTR-MCNC: 124 MG/DL — HIGH (ref 70–99)
GLUCOSE BLDC GLUCOMTR-MCNC: 90 MG/DL — SIGNIFICANT CHANGE UP (ref 70–99)
GLUCOSE BLDC GLUCOMTR-MCNC: 90 MG/DL — SIGNIFICANT CHANGE UP (ref 70–99)
GLUCOSE BLDC GLUCOMTR-MCNC: 97 MG/DL — SIGNIFICANT CHANGE UP (ref 70–99)
GLUCOSE BLDC GLUCOMTR-MCNC: 97 MG/DL — SIGNIFICANT CHANGE UP (ref 70–99)
GLUCOSE SERPL-MCNC: 156 MG/DL — HIGH (ref 70–99)
GLUCOSE SERPL-MCNC: 156 MG/DL — HIGH (ref 70–99)
HCT VFR BLD CALC: 23.4 % — LOW (ref 39–50)
HCT VFR BLD CALC: 23.4 % — LOW (ref 39–50)
HGB BLD-MCNC: 7.8 G/DL — LOW (ref 13–17)
HGB BLD-MCNC: 7.8 G/DL — LOW (ref 13–17)
INR BLD: 1.19 RATIO — HIGH (ref 0.85–1.18)
INR BLD: 1.19 RATIO — HIGH (ref 0.85–1.18)
MAGNESIUM SERPL-MCNC: 2 MG/DL — SIGNIFICANT CHANGE UP (ref 1.6–2.6)
MAGNESIUM SERPL-MCNC: 2 MG/DL — SIGNIFICANT CHANGE UP (ref 1.6–2.6)
MCHC RBC-ENTMCNC: 30.7 PG — SIGNIFICANT CHANGE UP (ref 27–34)
MCHC RBC-ENTMCNC: 30.7 PG — SIGNIFICANT CHANGE UP (ref 27–34)
MCHC RBC-ENTMCNC: 33.3 GM/DL — SIGNIFICANT CHANGE UP (ref 32–36)
MCHC RBC-ENTMCNC: 33.3 GM/DL — SIGNIFICANT CHANGE UP (ref 32–36)
MCV RBC AUTO: 92.1 FL — SIGNIFICANT CHANGE UP (ref 80–100)
MCV RBC AUTO: 92.1 FL — SIGNIFICANT CHANGE UP (ref 80–100)
NRBC # BLD: 0 /100 WBCS — SIGNIFICANT CHANGE UP (ref 0–0)
NRBC # BLD: 0 /100 WBCS — SIGNIFICANT CHANGE UP (ref 0–0)
NRBC # FLD: 0 K/UL — SIGNIFICANT CHANGE UP (ref 0–0)
NRBC # FLD: 0 K/UL — SIGNIFICANT CHANGE UP (ref 0–0)
PHOSPHATE SERPL-MCNC: 2.7 MG/DL — SIGNIFICANT CHANGE UP (ref 2.5–4.5)
PHOSPHATE SERPL-MCNC: 2.7 MG/DL — SIGNIFICANT CHANGE UP (ref 2.5–4.5)
PLATELET # BLD AUTO: 60 K/UL — LOW (ref 150–400)
PLATELET # BLD AUTO: 60 K/UL — LOW (ref 150–400)
POTASSIUM SERPL-MCNC: 4.3 MMOL/L — SIGNIFICANT CHANGE UP (ref 3.5–5.3)
POTASSIUM SERPL-MCNC: 4.3 MMOL/L — SIGNIFICANT CHANGE UP (ref 3.5–5.3)
POTASSIUM SERPL-SCNC: 4.3 MMOL/L — SIGNIFICANT CHANGE UP (ref 3.5–5.3)
POTASSIUM SERPL-SCNC: 4.3 MMOL/L — SIGNIFICANT CHANGE UP (ref 3.5–5.3)
PROT SERPL-MCNC: 6.9 G/DL — SIGNIFICANT CHANGE UP (ref 6–8.3)
PROT SERPL-MCNC: 6.9 G/DL — SIGNIFICANT CHANGE UP (ref 6–8.3)
PROTHROM AB SERPL-ACNC: 13.3 SEC — HIGH (ref 9.5–13)
PROTHROM AB SERPL-ACNC: 13.3 SEC — HIGH (ref 9.5–13)
RBC # BLD: 2.54 M/UL — LOW (ref 4.2–5.8)
RBC # BLD: 2.54 M/UL — LOW (ref 4.2–5.8)
RBC # FLD: 22.1 % — HIGH (ref 10.3–14.5)
RBC # FLD: 22.1 % — HIGH (ref 10.3–14.5)
RH IG SCN BLD-IMP: POSITIVE — SIGNIFICANT CHANGE UP
RH IG SCN BLD-IMP: POSITIVE — SIGNIFICANT CHANGE UP
SODIUM SERPL-SCNC: 135 MMOL/L — SIGNIFICANT CHANGE UP (ref 135–145)
SODIUM SERPL-SCNC: 135 MMOL/L — SIGNIFICANT CHANGE UP (ref 135–145)
WBC # BLD: 3.59 K/UL — LOW (ref 3.8–10.5)
WBC # BLD: 3.59 K/UL — LOW (ref 3.8–10.5)
WBC # FLD AUTO: 3.59 K/UL — LOW (ref 3.8–10.5)
WBC # FLD AUTO: 3.59 K/UL — LOW (ref 3.8–10.5)

## 2023-11-29 PROCEDURE — 99232 SBSQ HOSP IP/OBS MODERATE 35: CPT | Mod: FS

## 2023-11-29 PROCEDURE — 36901 INTRO CATH DIALYSIS CIRCUIT: CPT

## 2023-11-29 PROCEDURE — 99232 SBSQ HOSP IP/OBS MODERATE 35: CPT

## 2023-11-29 RX ADMIN — GABAPENTIN 100 MILLIGRAM(S): 400 CAPSULE ORAL at 19:29

## 2023-11-29 RX ADMIN — SACUBITRIL AND VALSARTAN 1 TABLET(S): 24; 26 TABLET, FILM COATED ORAL at 05:21

## 2023-11-29 RX ADMIN — Medication 25 MICROGRAM(S): at 05:21

## 2023-11-29 RX ADMIN — CHLORHEXIDINE GLUCONATE 1 APPLICATION(S): 213 SOLUTION TOPICAL at 13:42

## 2023-11-29 RX ADMIN — CLOPIDOGREL BISULFATE 75 MILLIGRAM(S): 75 TABLET, FILM COATED ORAL at 11:01

## 2023-11-29 RX ADMIN — SACUBITRIL AND VALSARTAN 1 TABLET(S): 24; 26 TABLET, FILM COATED ORAL at 17:14

## 2023-11-29 RX ADMIN — Medication 1 MILLIGRAM(S): at 13:42

## 2023-11-29 RX ADMIN — LIDOCAINE 1 PATCH: 4 CREAM TOPICAL at 17:14

## 2023-11-29 RX ADMIN — CARVEDILOL PHOSPHATE 3.12 MILLIGRAM(S): 80 CAPSULE, EXTENDED RELEASE ORAL at 22:10

## 2023-11-29 RX ADMIN — Medication 81 MILLIGRAM(S): at 11:01

## 2023-11-29 RX ADMIN — CARVEDILOL PHOSPHATE 3.12 MILLIGRAM(S): 80 CAPSULE, EXTENDED RELEASE ORAL at 10:59

## 2023-11-29 NOTE — PROGRESS NOTE ADULT - SUBJECTIVE AND OBJECTIVE BOX
[facility] DIVISION OF KIDNEY DISEASES AND HYPERTENSION -- FOLLOW UP NOTE  --------------------------------------------------------------------------------  Chief Complaint: ESRD    24 hour events/subjective:  Pt seen after fistulogram, no complaints.       PAST HISTORY  --------------------------------------------------------------------------------  No significant changes to PMH, PSH, FHx, SHx, unless otherwise noted    ALLERGIES & MEDICATIONS  --------------------------------------------------------------------------------  Allergies    No Known Allergies    Intolerances      Standing Inpatient Medications  aspirin enteric coated 81 milliGRAM(s) Oral daily  carvedilol 3.125 milliGRAM(s) Oral every 12 hours  chlorhexidine 2% Cloths 1 Application(s) Topical daily  clopidogrel Tablet 75 milliGRAM(s) Oral daily  dextrose 5%. 1000 milliLiter(s) IV Continuous <Continuous>  dextrose 5%. 1000 milliLiter(s) IV Continuous <Continuous>  dextrose 50% Injectable 25 Gram(s) IV Push once  epoetin dean (EPOGEN) Injectable 4000 Unit(s) IV Push <User Schedule>  folic acid 1 milliGRAM(s) Oral daily  gabapentin 100 milliGRAM(s) Oral <User Schedule>  glucagon  Injectable 1 milliGRAM(s) IntraMuscular once  levothyroxine 25 MICROGram(s) Oral daily  lidocaine   4% Patch 1 Patch Transdermal every 24 hours  sacubitril 24 mG/valsartan 26 mG 1 Tablet(s) Oral two times a day    PRN Inpatient Medications  benzonatate 100 milliGRAM(s) Oral three times a day PRN  dextrose Oral Gel 15 Gram(s) Oral once PRN  lactulose Syrup 20 Gram(s) Oral three times a day PRN  polyethylene glycol 3350 17 Gram(s) Oral two times a day PRN  sodium chloride 0.9% Bolus. 100 milliLiter(s) IV Bolus every 5 minutes PRN  traMADol 25 milliGRAM(s) Oral every 12 hours PRN        VITALS/PHYSICAL EXAM  --------------------------------------------------------------------------------  T(C): 36.4 (11-29-23 @ 18:21), Max: 36.7 (11-28-23 @ 22:13)  HR: 70 (11-29-23 @ 18:21) (70 - 88)  BP: 134/61 (11-29-23 @ 18:21) (115/61 - 156/76)  RR: 18 (11-29-23 @ 18:21) (12 - 19)  SpO2: 100% (11-29-23 @ 18:21) (95% - 100%)  Wt(kg): --        11-28-23 @ 07:01  -  11-29-23 @ 07:00  --------------------------------------------------------  IN: 400 mL / OUT: 1400 mL / NET: -1000 mL      Physical Exam:  Gen: resting, NAD  HEENT: Anicteric  Pulm: Fair entry B/L, CTA B/L  CV: S1S2+  Abd: Soft, +BS    Ext: No LE edema B/L  Neuro: Awake and alert  Skin: Warm and dry  Dialysis access: YOANDY GALLEGOS +     LABS/STUDIES  --------------------------------------------------------------------------------              7.8    3.59  >-----------<  60       [11-29-23 @ 04:00]              23.4     135  |  99  |  16  ----------------------------<  156      [11-29-23 @ 04:00]  4.3   |  30  |  2.35        Ca     7.7     [11-29-23 @ 04:00]      Mg     2.00     [11-29-23 @ 04:00]      Phos  2.7     [11-29-23 @ 04:00]    TPro  6.9  /  Alb  2.2  /  TBili  1.2  /  DBili  0.8  /  AST  128  /  ALT  35  /  AlkPhos  381  [11-29-23 @ 04:00]    PT/INR: PT 13.3 , INR 1.19       [11-29-23 @ 04:00]  PTT: 37.0       [11-29-23 @ 04:00]      Creatinine Trend:  SCr 2.35 [11-29 @ 04:00]  SCr 3.46 [11-28 @ 06:00]  SCr 4.74 [11-27 @ 06:20]  SCr 3.91 [11-26 @ 06:42]  SCr 4.49 [11-24 @ 06:35]    Urinalysis - [11-29-23 @ 04:00]      Color  / Appearance  / SG  / pH       Gluc 156 / Ketone   / Bili  / Urobili        Blood  / Protein  / Leuk Est  / Nitrite       RBC  / WBC  / Hyaline  / Gran  / Sq Epi  / Non Sq Epi  / Bacteria       TSH 12.60      [11-20-23 @ 05:30]    HBsAb 320.9      [11-24-23 @ 07:00]  HBsAb Reactive      [11-13-23 @ 14:30]  HBsAg Nonreact      [11-13-23 @ 14:30]  HBcAb Reactive      [11-13-23 @ 14:30]  HCV 0.16, Nonreact      [11-13-23 @ 14:30]

## 2023-11-29 NOTE — BRIEF OPERATIVE NOTE - NSICDXBRIEFPREOP_GEN_ALL_CORE_FT
PRE-OP DIAGNOSIS:  Other complication of arteriovenous dialysis fistula 29-Nov-2023 08:43:58 Bleeding fistula Norberto Coker

## 2023-11-29 NOTE — PROGRESS NOTE ADULT - PROBLEM SELECTOR PLAN 1
Pt. with ESRD on HD TIW (MWF, LUE AVF). Pt. also with history of kidney transplant (not on immunosuppression). Last HD as outpatient was done on 11/6/23 via L AVF. Pt. admitted to MICU with respiratory failure and systemic shock. Pt. initiated on CRRT on 11/8, discontinued on 11/10 as volume status and FiO2 requirements improved. Had some oozing from AVF after treatment - vascular completed fistulogram 11/29 no signs of bleed seen. Labs reviewed. Pt appears clinically stable. Plan for HD 11/30. Dose medications as per ESRD/HD.

## 2023-11-29 NOTE — PROGRESS NOTE ADULT - PROBLEM SELECTOR PLAN 1
HD per nephrology  anemia due to ESRD, aranesp at outpt HD per renal    Appreciate vasc surg eval of oozing fistula, s/p fistulogram 11/29.   Avoid nephrotoxins /renally dose meds

## 2023-11-29 NOTE — PROGRESS NOTE ADULT - SUBJECTIVE AND OBJECTIVE BOX
**********************************************  [facility] Division of [facility] Medicine  Ayla Finley MD  Available via MS Teams  Pager: [phone]  **********************************************     Patient is a 73y old  Male who presents with a chief complaint of resp failure (19 Nov 2023 14:33)    SUBJECTIVE / OVERNIGHT EVENTS: No acute events overnight. Patient examined at bedside this AM, with no subjective complaints. Denies CP, palpitations, SOB, n/v/d, abdominal pain.     OBJECTIVE:  Vital Signs Last 24 Hrs  T(C): 36.2 (29 Nov 2023 11:00), Max: 36.8 (28 Nov 2023 16:58)  T(F): 97.2 (29 Nov 2023 11:00), Max: 98.2 (28 Nov 2023 16:58)  HR: 72 (29 Nov 2023 11:00) (70 - 88)  BP: 126/66 (29 Nov 2023 11:00) (115/61 - 156/76)  BP(mean): 79 (29 Nov 2023 11:00) (69 - 86)  RR: 15 (29 Nov 2023 11:00) (12 - 19)  SpO2: 98% (29 Nov 2023 11:00) (95% - 100%)    Parameters below as of 29 Nov 2023 11:00  Patient On (Oxygen Delivery Method): room air        I&O's Summary    28 Nov 2023 07:01  -  29 Nov 2023 07:00  --------------------------------------------------------  IN: 400 mL / OUT: 1400 mL / NET: -1000 mL      Physical Exam:     General: No acute distress, well-appearing    Eyes: PERRL, EOMI     ENT: MMM, no oropharyngeal lesions or erythema appreciated     Pulm: No increased WOB. CTAB. No wheezing.     CV: RRR. S1&S2+. No M/R/G appreciated.     Abdomen: +BS. Soft, NTND. No organomegaly.     MSK: Nml ROM    Extremities: No peripheral edema or cyanosis.     Neuro: A&Ox3, no focal deficits     Skin: Warm and dry. No visible rash.       Labs:  CAPILLARY BLOOD GLUCOSE      POCT Blood Glucose.: 124 mg/dL (29 Nov 2023 16:29)  POCT Blood Glucose.: 90 mg/dL (29 Nov 2023 11:50)  POCT Blood Glucose.: 97 mg/dL (29 Nov 2023 08:43)  POCT Blood Glucose.: 117 mg/dL (29 Nov 2023 05:49)  POCT Blood Glucose.: 132 mg/dL (28 Nov 2023 21:25)  POCT Blood Glucose.: 144 mg/dL (28 Nov 2023 19:55)  POCT Blood Glucose.: 149 mg/dL (28 Nov 2023 16:41)                          7.8    3.59  )-----------( 60       ( 29 Nov 2023 04:00 )             23.4     11-29    135  |  99  |  16  ----------------------------<  156<H>  4.3   |  30  |  2.35<H>    Ca    7.7<L>      29 Nov 2023 04:00  Phos  2.7     11-29  Mg     2.00     11-29    TPro  6.9  /  Alb  2.2<L>  /  TBili  1.2  /  DBili  0.8<H>  /  AST  128<H>  /  ALT  35  /  AlkPhos  381<H>  11-29    PT/INR - ( 29 Nov 2023 04:00 )   PT: 13.3 sec;   INR: 1.19 ratio         PTT - ( 29 Nov 2023 04:00 )  PTT:37.0 sec        Urinalysis Basic - ( 29 Nov 2023 04:00 )    Color: x / Appearance: x / SG: x / pH: x  Gluc: 156 mg/dL / Ketone: x  / Bili: x / Urobili: x   Blood: x / Protein: x / Nitrite: x   Leuk Esterase: x / RBC: x / WBC x   Sq Epi: x / Non Sq Epi: x / Bacteria: x      Imaging Personally Reviewed:      MEDICATIONS  (STANDING):  aspirin enteric coated 81 milliGRAM(s) Oral daily  carvedilol 3.125 milliGRAM(s) Oral every 12 hours  chlorhexidine 2% Cloths 1 Application(s) Topical daily  clopidogrel Tablet 75 milliGRAM(s) Oral daily  dextrose 5%. 1000 milliLiter(s) (100 mL/Hr) IV Continuous <Continuous>  dextrose 5%. 1000 milliLiter(s) (50 mL/Hr) IV Continuous <Continuous>  dextrose 50% Injectable 25 Gram(s) IV Push once  epoetin dean (EPOGEN) Injectable 4000 Unit(s) IV Push <User Schedule>  folic acid 1 milliGRAM(s) Oral daily  gabapentin 100 milliGRAM(s) Oral <User Schedule>  glucagon  Injectable 1 milliGRAM(s) IntraMuscular once  levothyroxine 25 MICROGram(s) Oral daily  lidocaine   4% Patch 1 Patch Transdermal every 24 hours  sacubitril 24 mG/valsartan 26 mG 1 Tablet(s) Oral two times a day    MEDICATIONS  (PRN):  benzonatate 100 milliGRAM(s) Oral three times a day PRN Cough  dextrose Oral Gel 15 Gram(s) Oral once PRN Blood Glucose LESS THAN 70 milliGRAM(s)/deciliter  lactulose Syrup 20 Gram(s) Oral three times a day PRN 2-3 bm daily  polyethylene glycol 3350 17 Gram(s) Oral two times a day PRN 2-3 bm daily  sodium chloride 0.9% Bolus. 100 milliLiter(s) IV Bolus every 5 minutes PRN SBP LESS THAN or EQUAL to 90 mmHg  traMADol 25 milliGRAM(s) Oral every 12 hours PRN Moderate Pain (4 - 6)-Severe pain (7-10)

## 2023-11-29 NOTE — BRIEF OPERATIVE NOTE - NSICDXBRIEFPOSTOP_GEN_ALL_CORE_FT
POST-OP DIAGNOSIS:  Other complication of arteriovenous dialysis fistula 29-Nov-2023 08:44:12 Bleeding fistula Norberto Coker

## 2023-11-29 NOTE — PROGRESS NOTE ADULT - ASSESSMENT
73M with PMH of ESRD s/p kidney transplant on HD, CAD s/p multiple stents (most recently, RCA stent on 10/2023), T2DM, and HTN, with recent hospitalization for bacterial PNA, who originally presented to the ED on 11/7 after being found unresponsive with agonal breathing by his daughter. CPR  administered by the daughter. Admitted for AMS, intubated for airway protection and AHRF likely 2/2 multifocal pneumonia, s/p MICU course, transferred to medicine for further mgt. Noted to have oozing from fistula, now resolved. Pending fistulogram per Vascular on 11/29.

## 2023-11-29 NOTE — CHART NOTE - NSCHARTNOTEFT_GEN_A_CORE
VASCULAR SURGERY POST OP CHECK    STATUS POST PROCEDURE: LUE diagnostic fistulogram    SUBJECTIVE: Pt seen and examined without complaints. Denies pain of the LUE. Still reporting some swelling of the proximal arm. Denies CP/SOB/N/V.     OBJECTIVE:  Vital Signs Last 24 Hrs  T(C): 36.2 (29 Nov 2023 11:00), Max: 36.8 (28 Nov 2023 16:58)  T(F): 97.2 (29 Nov 2023 11:00), Max: 98.2 (28 Nov 2023 16:58)  HR: 72 (29 Nov 2023 11:00) (70 - 88)  BP: 126/66 (29 Nov 2023 11:00) (115/61 - 156/76)  BP(mean): 79 (29 Nov 2023 11:00) (69 - 86)  RR: 15 (29 Nov 2023 11:00) (12 - 19)  SpO2: 98% (29 Nov 2023 11:00) (95% - 100%)    Parameters below as of 29 Nov 2023 11:00  Patient On (Oxygen Delivery Method): room air      I&O's Summary    28 Nov 2023 07:01  -  29 Nov 2023 07:00  --------------------------------------------------------  IN: 400 mL / OUT: 1400 mL / NET: -1000 mL      PHYSICAL EXAM:  Gen: NAD, A&Ox3  Pulm: No respiratory distress, no subcostal retractions  Extremities: LUE access site c/d/i, no active bleeding, soft, without swelling, palpable L radial pulse, fistula with palpable thrill    ASSESSMENT/PLAN:  73-year-old male history of ESRD on HD, left Monday Wednesday Friday, last HD yesterday, CAD status post stents last stent was 1 month ago, BPH, diabetes, hypertension now s/p LUE diagnostic fistulogram. Patient tolerated the procedure well and is recovering appropriately.    Plan:  - Stitch removed, access site clean  - Venous outflow patent without stenosis  - Continue HD via fistula  - Rest of care per primary team    C Team Surgery  [id number]

## 2023-11-30 LAB
ALBUMIN SERPL ELPH-MCNC: 2.1 G/DL — LOW (ref 3.3–5)
ALBUMIN SERPL ELPH-MCNC: 2.1 G/DL — LOW (ref 3.3–5)
ALP SERPL-CCNC: 363 U/L — HIGH (ref 40–120)
ALP SERPL-CCNC: 363 U/L — HIGH (ref 40–120)
ALT FLD-CCNC: 28 U/L — SIGNIFICANT CHANGE UP (ref 4–41)
ALT FLD-CCNC: 28 U/L — SIGNIFICANT CHANGE UP (ref 4–41)
ANION GAP SERPL CALC-SCNC: 6 MMOL/L — LOW (ref 7–14)
ANION GAP SERPL CALC-SCNC: 6 MMOL/L — LOW (ref 7–14)
AST SERPL-CCNC: 93 U/L — HIGH (ref 4–40)
AST SERPL-CCNC: 93 U/L — HIGH (ref 4–40)
BILIRUB SERPL-MCNC: 1.2 MG/DL — SIGNIFICANT CHANGE UP (ref 0.2–1.2)
BILIRUB SERPL-MCNC: 1.2 MG/DL — SIGNIFICANT CHANGE UP (ref 0.2–1.2)
BUN SERPL-MCNC: 10 MG/DL — SIGNIFICANT CHANGE UP (ref 7–23)
BUN SERPL-MCNC: 10 MG/DL — SIGNIFICANT CHANGE UP (ref 7–23)
CALCIUM SERPL-MCNC: 7.7 MG/DL — LOW (ref 8.4–10.5)
CALCIUM SERPL-MCNC: 7.7 MG/DL — LOW (ref 8.4–10.5)
CHLORIDE SERPL-SCNC: 98 MMOL/L — SIGNIFICANT CHANGE UP (ref 98–107)
CHLORIDE SERPL-SCNC: 98 MMOL/L — SIGNIFICANT CHANGE UP (ref 98–107)
CO2 SERPL-SCNC: 30 MMOL/L — SIGNIFICANT CHANGE UP (ref 22–31)
CO2 SERPL-SCNC: 30 MMOL/L — SIGNIFICANT CHANGE UP (ref 22–31)
CREAT SERPL-MCNC: 1.92 MG/DL — HIGH (ref 0.5–1.3)
CREAT SERPL-MCNC: 1.92 MG/DL — HIGH (ref 0.5–1.3)
EGFR: 37 ML/MIN/1.73M2 — LOW
EGFR: 37 ML/MIN/1.73M2 — LOW
GLUCOSE BLDC GLUCOMTR-MCNC: 102 MG/DL — HIGH (ref 70–99)
GLUCOSE BLDC GLUCOMTR-MCNC: 102 MG/DL — HIGH (ref 70–99)
GLUCOSE BLDC GLUCOMTR-MCNC: 135 MG/DL — HIGH (ref 70–99)
GLUCOSE BLDC GLUCOMTR-MCNC: 135 MG/DL — HIGH (ref 70–99)
GLUCOSE BLDC GLUCOMTR-MCNC: 154 MG/DL — HIGH (ref 70–99)
GLUCOSE BLDC GLUCOMTR-MCNC: 154 MG/DL — HIGH (ref 70–99)
GLUCOSE BLDC GLUCOMTR-MCNC: 74 MG/DL — SIGNIFICANT CHANGE UP (ref 70–99)
GLUCOSE BLDC GLUCOMTR-MCNC: 74 MG/DL — SIGNIFICANT CHANGE UP (ref 70–99)
GLUCOSE BLDC GLUCOMTR-MCNC: 98 MG/DL — SIGNIFICANT CHANGE UP (ref 70–99)
GLUCOSE BLDC GLUCOMTR-MCNC: 98 MG/DL — SIGNIFICANT CHANGE UP (ref 70–99)
GLUCOSE SERPL-MCNC: 140 MG/DL — HIGH (ref 70–99)
GLUCOSE SERPL-MCNC: 140 MG/DL — HIGH (ref 70–99)
HCT VFR BLD CALC: 25 % — LOW (ref 39–50)
HCT VFR BLD CALC: 25 % — LOW (ref 39–50)
HGB BLD-MCNC: 8.4 G/DL — LOW (ref 13–17)
HGB BLD-MCNC: 8.4 G/DL — LOW (ref 13–17)
MAGNESIUM SERPL-MCNC: 1.9 MG/DL — SIGNIFICANT CHANGE UP (ref 1.6–2.6)
MAGNESIUM SERPL-MCNC: 1.9 MG/DL — SIGNIFICANT CHANGE UP (ref 1.6–2.6)
MCHC RBC-ENTMCNC: 30.2 PG — SIGNIFICANT CHANGE UP (ref 27–34)
MCHC RBC-ENTMCNC: 30.2 PG — SIGNIFICANT CHANGE UP (ref 27–34)
MCHC RBC-ENTMCNC: 33.6 GM/DL — SIGNIFICANT CHANGE UP (ref 32–36)
MCHC RBC-ENTMCNC: 33.6 GM/DL — SIGNIFICANT CHANGE UP (ref 32–36)
MCV RBC AUTO: 89.9 FL — SIGNIFICANT CHANGE UP (ref 80–100)
MCV RBC AUTO: 89.9 FL — SIGNIFICANT CHANGE UP (ref 80–100)
NRBC # BLD: 0 /100 WBCS — SIGNIFICANT CHANGE UP (ref 0–0)
NRBC # BLD: 0 /100 WBCS — SIGNIFICANT CHANGE UP (ref 0–0)
NRBC # FLD: 0 K/UL — SIGNIFICANT CHANGE UP (ref 0–0)
NRBC # FLD: 0 K/UL — SIGNIFICANT CHANGE UP (ref 0–0)
PHOSPHATE SERPL-MCNC: 2.3 MG/DL — LOW (ref 2.5–4.5)
PHOSPHATE SERPL-MCNC: 2.3 MG/DL — LOW (ref 2.5–4.5)
PLATELET # BLD AUTO: 59 K/UL — LOW (ref 150–400)
PLATELET # BLD AUTO: 59 K/UL — LOW (ref 150–400)
POTASSIUM SERPL-MCNC: 3.5 MMOL/L — SIGNIFICANT CHANGE UP (ref 3.5–5.3)
POTASSIUM SERPL-MCNC: 3.5 MMOL/L — SIGNIFICANT CHANGE UP (ref 3.5–5.3)
POTASSIUM SERPL-SCNC: 3.5 MMOL/L — SIGNIFICANT CHANGE UP (ref 3.5–5.3)
POTASSIUM SERPL-SCNC: 3.5 MMOL/L — SIGNIFICANT CHANGE UP (ref 3.5–5.3)
PROT SERPL-MCNC: 6.6 G/DL — SIGNIFICANT CHANGE UP (ref 6–8.3)
PROT SERPL-MCNC: 6.6 G/DL — SIGNIFICANT CHANGE UP (ref 6–8.3)
RBC # BLD: 2.78 M/UL — LOW (ref 4.2–5.8)
RBC # BLD: 2.78 M/UL — LOW (ref 4.2–5.8)
RBC # FLD: 21.8 % — HIGH (ref 10.3–14.5)
RBC # FLD: 21.8 % — HIGH (ref 10.3–14.5)
SODIUM SERPL-SCNC: 134 MMOL/L — LOW (ref 135–145)
SODIUM SERPL-SCNC: 134 MMOL/L — LOW (ref 135–145)
WBC # BLD: 3.24 K/UL — LOW (ref 3.8–10.5)
WBC # BLD: 3.24 K/UL — LOW (ref 3.8–10.5)
WBC # FLD AUTO: 3.24 K/UL — LOW (ref 3.8–10.5)
WBC # FLD AUTO: 3.24 K/UL — LOW (ref 3.8–10.5)

## 2023-11-30 PROCEDURE — 99232 SBSQ HOSP IP/OBS MODERATE 35: CPT

## 2023-11-30 PROCEDURE — 99233 SBSQ HOSP IP/OBS HIGH 50: CPT | Mod: FS

## 2023-11-30 RX ORDER — TRAMADOL HYDROCHLORIDE 50 MG/1
25 TABLET ORAL EVERY 12 HOURS
Refills: 0 | Status: DISCONTINUED | OUTPATIENT
Start: 2023-11-30 | End: 2023-12-05

## 2023-11-30 RX ORDER — SODIUM,POTASSIUM PHOSPHATES 278-250MG
1 POWDER IN PACKET (EA) ORAL ONCE
Refills: 0 | Status: COMPLETED | OUTPATIENT
Start: 2023-11-30 | End: 2023-11-30

## 2023-11-30 RX ADMIN — CLOPIDOGREL BISULFATE 75 MILLIGRAM(S): 75 TABLET, FILM COATED ORAL at 11:41

## 2023-11-30 RX ADMIN — Medication 25 MICROGRAM(S): at 05:50

## 2023-11-30 RX ADMIN — GABAPENTIN 100 MILLIGRAM(S): 400 CAPSULE ORAL at 21:10

## 2023-11-30 RX ADMIN — SACUBITRIL AND VALSARTAN 1 TABLET(S): 24; 26 TABLET, FILM COATED ORAL at 05:50

## 2023-11-30 RX ADMIN — Medication 81 MILLIGRAM(S): at 11:41

## 2023-11-30 RX ADMIN — LIDOCAINE 1 PATCH: 4 CREAM TOPICAL at 18:05

## 2023-11-30 RX ADMIN — Medication 1 MILLIGRAM(S): at 11:40

## 2023-11-30 RX ADMIN — Medication 1 PACKET(S): at 18:04

## 2023-11-30 RX ADMIN — SACUBITRIL AND VALSARTAN 1 TABLET(S): 24; 26 TABLET, FILM COATED ORAL at 18:05

## 2023-11-30 RX ADMIN — CARVEDILOL PHOSPHATE 3.12 MILLIGRAM(S): 80 CAPSULE, EXTENDED RELEASE ORAL at 18:05

## 2023-11-30 RX ADMIN — CHLORHEXIDINE GLUCONATE 1 APPLICATION(S): 213 SOLUTION TOPICAL at 11:41

## 2023-11-30 RX ADMIN — LIDOCAINE 1 PATCH: 4 CREAM TOPICAL at 06:38

## 2023-11-30 RX ADMIN — CARVEDILOL PHOSPHATE 3.12 MILLIGRAM(S): 80 CAPSULE, EXTENDED RELEASE ORAL at 11:40

## 2023-11-30 RX ADMIN — LIDOCAINE 1 PATCH: 4 CREAM TOPICAL at 19:08

## 2023-11-30 RX ADMIN — ERYTHROPOIETIN 4000 UNIT(S): 10000 INJECTION, SOLUTION INTRAVENOUS; SUBCUTANEOUS at 08:43

## 2023-11-30 NOTE — PROGRESS NOTE ADULT - SUBJECTIVE AND OBJECTIVE BOX
[facility] DIVISION OF KIDNEY DISEASES AND HYPERTENSION -- FOLLOW UP NOTE  --------------------------------------------------------------------------------  Chief Complaint: ESRD    24 hour events/subjective:  Pt seen and examined. No complaints. Feeling well, HD done via AVF this am without any bleeding. No issues cannulating.       PAST HISTORY  --------------------------------------------------------------------------------  No significant changes to PMH, PSH, FHx, SHx, unless otherwise noted    ALLERGIES & MEDICATIONS  --------------------------------------------------------------------------------  Allergies    No Known Allergies    Intolerances      Standing Inpatient Medications  aspirin enteric coated 81 milliGRAM(s) Oral daily  carvedilol 3.125 milliGRAM(s) Oral every 12 hours  chlorhexidine 2% Cloths 1 Application(s) Topical daily  clopidogrel Tablet 75 milliGRAM(s) Oral daily  dextrose 5%. 1000 milliLiter(s) IV Continuous <Continuous>  dextrose 5%. 1000 milliLiter(s) IV Continuous <Continuous>  dextrose 50% Injectable 25 Gram(s) IV Push once  epoetin dean (EPOGEN) Injectable 4000 Unit(s) IV Push <User Schedule>  folic acid 1 milliGRAM(s) Oral daily  gabapentin 100 milliGRAM(s) Oral <User Schedule>  glucagon  Injectable 1 milliGRAM(s) IntraMuscular once  levothyroxine 25 MICROGram(s) Oral daily  lidocaine   4% Patch 1 Patch Transdermal every 24 hours  sacubitril 24 mG/valsartan 26 mG 1 Tablet(s) Oral two times a day    PRN Inpatient Medications  benzonatate 100 milliGRAM(s) Oral three times a day PRN  dextrose Oral Gel 15 Gram(s) Oral once PRN  lactulose Syrup 20 Gram(s) Oral three times a day PRN  polyethylene glycol 3350 17 Gram(s) Oral two times a day PRN  sodium chloride 0.9% Bolus. 100 milliLiter(s) IV Bolus every 5 minutes PRN  traMADol 25 milliGRAM(s) Oral every 12 hours PRN        VITALS/PHYSICAL EXAM  --------------------------------------------------------------------------------  T(C): 36.6 (11-30-23 @ 17:16), Max: 36.6 (11-30-23 @ 06:30)  HR: 83 (11-30-23 @ 17:16) (71 - 83)  BP: 165/79 (11-30-23 @ 17:16) (132/57 - 165/79)  RR: 18 (11-30-23 @ 17:16) (16 - 18)  SpO2: 100% (11-30-23 @ 17:16) (98% - 100%)  Wt(kg): --        11-30-23 @ 07:01  -  11-30-23 @ 20:59  --------------------------------------------------------  IN: 640 mL / OUT: 1400 mL / NET: -760 mL      Physical Exam:  Gen: resting, NAD  HEENT: Anicteric  Pulm: Fair entry B/L, CTA B/L  CV: S1S2+  Abd: Soft, +BS    Ext: No LE edema B/L  Neuro: Awake and alert  Skin: Warm and dry  Dialysis access: YOANDY GALLEGOS +       LABS/STUDIES  --------------------------------------------------------------------------------              8.4    3.24  >-----------<  59       [11-30-23 @ 15:34]              25.0     134  |  98  |  10  ----------------------------<  140      [11-30-23 @ 15:34]  3.5   |  30  |  1.92        Ca     7.7     [11-30-23 @ 15:34]      Mg     1.90     [11-30-23 @ 15:34]      Phos  2.3     [11-30-23 @ 15:34]    TPro  6.6  /  Alb  2.1  /  TBili  1.2  /  DBili  x   /  AST  93  /  ALT  28  /  AlkPhos  363  [11-30-23 @ 15:34]    PT/INR: PT 13.3 , INR 1.19       [11-29-23 @ 04:00]  PTT: 37.0       [11-29-23 @ 04:00]      Creatinine Trend:  SCr 1.92 [11-30 @ 15:34]  SCr 2.35 [11-29 @ 04:00]  SCr 3.46 [11-28 @ 06:00]  SCr 4.74 [11-27 @ 06:20]  SCr 3.91 [11-26 @ 06:42]    Urinalysis - [11-30-23 @ 15:34]      Color  / Appearance  / SG  / pH       Gluc 140 / Ketone   / Bili  / Urobili        Blood  / Protein  / Leuk Est  / Nitrite       RBC  / WBC  / Hyaline  / Gran  / Sq Epi  / Non Sq Epi  / Bacteria       TSH 12.60      [11-20-23 @ 05:30]    HBsAb 320.9      [11-24-23 @ 07:00]  HBsAb Reactive      [11-13-23 @ 14:30]  HBsAg Nonreact      [11-13-23 @ 14:30]  HBcAb Reactive      [11-13-23 @ 14:30]  HCV 0.16, Nonreact      [11-13-23 @ 14:30]

## 2023-11-30 NOTE — PROGRESS NOTE ADULT - ASSESSMENT
73M with PMH of ESRD s/p kidney transplant on HD, CAD s/p multiple stents (most recently, RCA stent on 10/2023), T2DM, and HTN, with recent hospitalization for bacterial PNA, who originally presented to the ED on 11/7 after being found acutely unresponsive with agonal breathing by his daughter. Pt was intubated in ED for airway protection and due to AHRF and started on levophed; septic shock due to multifocal pneumonia was present on admission. Patient started on abx and is now back to baseline mental status, with occasional episodes of aphasia reported by daughter. Pt was extubated 11/11, now on room air. Pressors weaned off 11/14, transferred to medical floor 11/15. Vascular surgery consulted 2/2 oozing LUE AVF. Patient received dialysis through LUE fistula on 11/15 and was thereafter noted to have oozing therefrom. No history of stenting in either arm or intervention on the AVF since creation per patient's family. Continues to have oozing after dialysis requiring suture overnight, no evidence of outflow stenosis on 11/17 duplex. Patient underwent LUE fistulogram on 11/29 which demonstrated good flow without outflow or central stenosis.    Plan:  - Patient s/p fistulogram  - Good flow, no interventions performed  - Fistula accessed at HD today without issue, good flow was achieved and no bleeding noted after cannulation  - Will sign off at this time. Please re-consult as needed    C Team Surgery  [id number]   73M with PMH of ESRD s/p kidney transplant on HD, CAD s/p multiple stents (most recently, RCA stent on 10/2023), T2DM, and HTN, with recent hospitalization for bacterial PNA, who originally presented to the ED on 11/7 after being found acutely unresponsive with agonal breathing by his daughter. Pt was intubated in ED for airway protection and due to AHRF and started on levophed; septic shock due to multifocal pneumonia was present on admission. Patient started on abx and is now back to baseline mental status, with occasional episodes of aphasia reported by daughter. Pt was extubated 11/11, now on room air. Pressors weaned off 11/14, transferred to medical floor 11/15. Vascular surgery consulted 2/2 oozing LUE AVF. Patient received dialysis through LUE fistula on 11/15 and was thereafter noted to have oozing therefrom. No history of stenting in either arm or intervention on the AVF since creation per patient's family. Continues to have oozing after dialysis requiring suture overnight, no evidence of outflow stenosis on 11/17 duplex. Patient underwent LUE fistulogram on 11/29 which demonstrated good flow without outflow or central stenosis.    Plan:  - Patient s/p fistulogram  - Good flow, no interventions performed  - Fistula accessed at HD today without issue, good flow was achieved and no bleeding noted after cannulation  - Will sign off at this time. Please re-consult as needed    C Team Surgery  [id number]   73M with PMH of ESRD s/p kidney transplant on HD, CAD s/p multiple stents (most recently, RCA stent on 10/2023), T2DM, and HTN, with recent hospitalization for bacterial PNA, who originally presented to the ED on 11/7 after being found acutely unresponsive with agonal breathing by his daughter. Pt was intubated in ED for airway protection and due to AHRF and started on levophed; septic shock due to multifocal pneumonia was present on admission. Patient started on abx and is now back to baseline mental status, with occasional episodes of aphasia reported by daughter. Pt was extubated 11/11, now on room air. Pressors weaned off 11/14, transferred to medical floor 11/15. Vascular surgery consulted 2/2 oozing LUE AVF. Patient received dialysis through LUE fistula on 11/15 and was thereafter noted to have oozing therefrom. No history of stenting in either arm or intervention on the AVF since creation per patient's family. Continues to have oozing after dialysis requiring suture overnight, no evidence of outflow stenosis on 11/17 duplex. Patient underwent LUE fistulogram on 11/29 which demonstrated good flow without outflow or central stenosis.    Plan:  - Patient s/p fistulogram  - Good flow, no interventions performed  - Fistula accessed at HD today without issue, good flow was achieved and no bleeding noted after cannulation  - Will sign off at this time. Please re-consult as needed    C Team Surgery  [id number]

## 2023-11-30 NOTE — PROGRESS NOTE ADULT - PROBLEM SELECTOR PLAN 1
HD per nephrology  anemia due to ESRD, aranesp at outpt HD per renal    Appreciate vasc surg eval of oozing fistula, s/p fistulogram 11/29. tolerating HD well through fistula, no oozing noted today.   Avoid nephrotoxins /renally dose meds

## 2023-11-30 NOTE — CHART NOTE - NSCHARTNOTEFT_GEN_A_CORE
Source: Patient [x ]    Family [ ]     other [x ] Chart review    Current Diet : Diet, Renal Restrictions:   For patients receiving Renal Replacement - No Protein Restr, No Conc K, No Conc Phos, Low Sodium  DASH/TLC {Sodium & Cholesterol Restricted} (DASH) (11-30-23 @ 12:51) [Active]    PO intake: % [x ]   Height (cm): 180.3 (07 Nov 2023 14:45)  Weight (kg): 61.7kg (11/30), 57.6kg (11/21), 68.6kg (11/14).   BMI (kg/m2): 18.9 (11/30)    Nutrition Note:  73M with PMH of ESRD s/p kidney transplant on HD, CAD s/p multiple stents (most recently, RCA stent on 10/2023), T2DM, and HTN, with recent hospitalization for bacterial PNA, who originally presented to the ED on 11/7 after being found unresponsive with agonal breathing by his daughter. CPR  administered by the daughter. Admitted for AMS, intubated for airway protection and AHRF likely 2/2 multifocal pneumonia, s/p MICU course, transferred to medicine for further mgt. Noted to have oozing from fistula, now resolved. Pending fistulogram per Vascular on 11/29, per chart.     Patient is seen for nutrition consult- assessment, education and nutrition follow-up. As per tray observation during visit, patient consumed 100% of lunch. Patient reports good appetite. Patient was seen by SLP 11/30, recommended regular and thin liquid diet consistency, aligns with current diet order. Patient denies any GI distress (N/V/D/C) during visit. Last bowel movement 11/28, per RN flow sheet. Bowel regimen is in placed. Noted patient has weight loss of -6.9kg/-10%x 2.5 weeks. Noted patient is on HD, fluid shift could cause weight change, continue to monitor weight trend. Per RN flow sheet, patient has 1+ edema to left arm, surgical incisions, no pressure injury noted at this time. Patient is on folic acid for micronutrient support. Noted patient has episodes of hypoglycemia, HgA1c- 5.8% (11/14), consistent carb diet restriction discontinued on 11/28. Recommend continue to monitor POCT, to determine needs of consistent carb diet restriction. Adequate po intake encouraged, well balanced meals with different food groups, Plate Method, CKD nutrition therapy, potassium content of food, phos content of food diet education provided during visit. Patient is receptive to information provided.     __________________ Pertinent Medications__________________   MEDICATIONS  (STANDING):  aspirin enteric coated 81 milliGRAM(s) Oral daily  carvedilol 3.125 milliGRAM(s) Oral every 12 hours  chlorhexidine 2% Cloths 1 Application(s) Topical daily  clopidogrel Tablet 75 milliGRAM(s) Oral daily  dextrose 5%. 1000 milliLiter(s) (100 mL/Hr) IV Continuous <Continuous>  dextrose 5%. 1000 milliLiter(s) (50 mL/Hr) IV Continuous <Continuous>  dextrose 50% Injectable 25 Gram(s) IV Push once  epoetin dean (EPOGEN) Injectable 4000 Unit(s) IV Push <User Schedule>  folic acid 1 milliGRAM(s) Oral daily  gabapentin 100 milliGRAM(s) Oral <User Schedule>  glucagon  Injectable 1 milliGRAM(s) IntraMuscular once  levothyroxine 25 MICROGram(s) Oral daily  lidocaine   4% Patch 1 Patch Transdermal every 24 hours  sacubitril 24 mG/valsartan 26 mG 1 Tablet(s) Oral two times a day    MEDICATIONS  (PRN):  benzonatate 100 milliGRAM(s) Oral three times a day PRN Cough  dextrose Oral Gel 15 Gram(s) Oral once PRN Blood Glucose LESS THAN 70 milliGRAM(s)/deciliter  lactulose Syrup 20 Gram(s) Oral three times a day PRN 2-3 bm daily  polyethylene glycol 3350 17 Gram(s) Oral two times a day PRN 2-3 bm daily  sodium chloride 0.9% Bolus. 100 milliLiter(s) IV Bolus every 5 minutes PRN SBP LESS THAN or EQUAL to 90 mmHg  traMADol 25 milliGRAM(s) Oral every 12 hours PRN Moderate Pain (4 - 6)-Severe pain (7-10)      __________________ Pertinent Labs__________________   11-29 Na135 mmol/L Glu 156 mg/dL<H> K+ 4.3 mmol/L Cr  2.35 mg/dL<H> BUN 16 mg/dL 11-29 Phos 2.7 mg/dL 11-29 Alb 2.2 g/dL<L>  11-30 @ 11:09 POCT 102 mg/dL  11-30 @ 08:55 POCT 74 mg/dL  11-30 @ 06:05 POCT 98 mg/dL  11-29 @ 16:29 POCT 124 mg/dL      Estimated Needs:   [x ] Recalculated: Based on dry weight: 61.7kg/136lbs  Estimated energy needs: 1851-2159kcal (based on 30-35kcal/kg)  Estimated protein needs: 80-98gms (based on 1.3-1.6gms/kg)    Previous Nutrition Diagnosis:   [x ] Severe Malnutrition   Nutrition Diagnosis is [ x] ongoing   New Nutrition Diagnosis: [x] not applicable    Interventions:   Recommend  [x ] Recommend d/c DASH/TLC restriction. Change diet to renal. Continue to monitor POCT, to determine needs of consistent carb diet restriction.  [x ] Add Nepro 8oz 1x/day (420kcal, 19gm protein) for nutrient support.   [x ] Encourage PO intake and honor food preferences as able.     Monitoring and Evaluation:   [x ] PO intake [x ] Tolerance to diet prescription [x ] weights [x ] follow up per protocol  [x] other: bowel movement, skin integrity, labs.

## 2023-11-30 NOTE — PROGRESS NOTE ADULT - SUBJECTIVE AND OBJECTIVE BOX
**********************************************  [facility] Division of [facility] Medicine  Ayla Finley MD  Available via MS Teams  Pager: [phone]  **********************************************     Patient is a 73y old  Male who presents with a chief complaint of resp failure (19 Nov 2023 14:33)    SUBJECTIVE / OVERNIGHT EVENTS: No acute events overnight. Patient examined at bedside this AM, with no subjective complaints. Denies CP, palpitations, SOB, n/v/d, abdominal pain.     OBJECTIVE:  Vital Signs Last 24 Hrs  T(C): 36.3 (30 Nov 2023 11:10), Max: 36.6 (30 Nov 2023 06:30)  T(F): 97.3 (30 Nov 2023 11:10), Max: 97.8 (30 Nov 2023 06:30)  HR: 75 (30 Nov 2023 11:10) (70 - 75)  BP: 138/72 (30 Nov 2023 11:10) (132/57 - 150/75)  BP(mean): --  RR: 16 (30 Nov 2023 11:10) (16 - 18)  SpO2: 98% (30 Nov 2023 11:10) (98% - 100%)    Parameters below as of 30 Nov 2023 11:10  Patient On (Oxygen Delivery Method): room air        I&O's Summary    30 Nov 2023 07:01  -  30 Nov 2023 12:51  --------------------------------------------------------  IN: 400 mL / OUT: 1400 mL / NET: -1000 mL      Physical Exam:     General: No acute distress, well-appearing    Eyes: PERRL, EOMI     ENT: MMM, no oropharyngeal lesions or erythema appreciated     Pulm: No increased WOB. CTAB. No wheezing.     CV: RRR. S1&S2+. No M/R/G appreciated.     Abdomen: +BS. Soft, NTND. No organomegaly.     MSK: Nml ROM    Extremities: No peripheral edema or cyanosis.     Neuro: A&Ox3, no focal deficits     Skin: Warm and dry. No visible rash.     Labs:  CAPILLARY BLOOD GLUCOSE      POCT Blood Glucose.: 102 mg/dL (30 Nov 2023 11:09)  POCT Blood Glucose.: 74 mg/dL (30 Nov 2023 08:55)  POCT Blood Glucose.: 98 mg/dL (30 Nov 2023 06:05)  POCT Blood Glucose.: 124 mg/dL (29 Nov 2023 16:29)                          7.8    3.59  )-----------( 60       ( 29 Nov 2023 04:00 )             23.4     11-29    135  |  99  |  16  ----------------------------<  156<H>  4.3   |  30  |  2.35<H>    Ca    7.7<L>      29 Nov 2023 04:00  Phos  2.7     11-29  Mg     2.00     11-29    TPro  6.9  /  Alb  2.2<L>  /  TBili  1.2  /  DBili  0.8<H>  /  AST  128<H>  /  ALT  35  /  AlkPhos  381<H>  11-29    PT/INR - ( 29 Nov 2023 04:00 )   PT: 13.3 sec;   INR: 1.19 ratio         PTT - ( 29 Nov 2023 04:00 )  PTT:37.0 sec        Urinalysis Basic - ( 29 Nov 2023 04:00 )    Color: x / Appearance: x / SG: x / pH: x  Gluc: 156 mg/dL / Ketone: x  / Bili: x / Urobili: x   Blood: x / Protein: x / Nitrite: x   Leuk Esterase: x / RBC: x / WBC x   Sq Epi: x / Non Sq Epi: x / Bacteria: x      Imaging Personally Reviewed:      MEDICATIONS  (STANDING):  aspirin enteric coated 81 milliGRAM(s) Oral daily  carvedilol 3.125 milliGRAM(s) Oral every 12 hours  chlorhexidine 2% Cloths 1 Application(s) Topical daily  clopidogrel Tablet 75 milliGRAM(s) Oral daily  dextrose 5%. 1000 milliLiter(s) (50 mL/Hr) IV Continuous <Continuous>  dextrose 5%. 1000 milliLiter(s) (100 mL/Hr) IV Continuous <Continuous>  dextrose 50% Injectable 25 Gram(s) IV Push once  epoetin dean (EPOGEN) Injectable 4000 Unit(s) IV Push <User Schedule>  folic acid 1 milliGRAM(s) Oral daily  gabapentin 100 milliGRAM(s) Oral <User Schedule>  glucagon  Injectable 1 milliGRAM(s) IntraMuscular once  levothyroxine 25 MICROGram(s) Oral daily  lidocaine   4% Patch 1 Patch Transdermal every 24 hours  sacubitril 24 mG/valsartan 26 mG 1 Tablet(s) Oral two times a day    MEDICATIONS  (PRN):  benzonatate 100 milliGRAM(s) Oral three times a day PRN Cough  dextrose Oral Gel 15 Gram(s) Oral once PRN Blood Glucose LESS THAN 70 milliGRAM(s)/deciliter  lactulose Syrup 20 Gram(s) Oral three times a day PRN 2-3 bm daily  polyethylene glycol 3350 17 Gram(s) Oral two times a day PRN 2-3 bm daily  sodium chloride 0.9% Bolus. 100 milliLiter(s) IV Bolus every 5 minutes PRN SBP LESS THAN or EQUAL to 90 mmHg  traMADol 25 milliGRAM(s) Oral every 12 hours PRN Moderate Pain (4 - 6)-Severe pain (7-10)

## 2023-11-30 NOTE — SWALLOW BEDSIDE ASSESSMENT ADULT - SWALLOW EVAL: DIAGNOSIS
1.  Functional oral phase for puree, soft and bite sized solids, mildly-thick and thin liquids marked by adequate acceptance and containment, prolonged yet functional mastication of solids, adequate oral transit and adequate oral clearance. 2. Mild oral dysphagia for regular solids marked by prolonged and inadequate mastication, adequate oral transit and adequate oral clearance. 3. Functional pharyngeal phase for aforementioned consistencies marked by hyolaryngeal excursion present upon palpation and no overt s/s of penetration/aspiration evidenced.
1. Functional oral phase for puree, regular solids, mildly-thick and thin liquids marked by adequate acceptance and containment, adequate mastication of solids, adequate oral transit and adequate oral clearance. 2. Functional pharyngeal phase for aforementioned consistencies marked by hyolaryngeal excursion present upon palpation and no overt s/s of penetration/aspiration evidenced.

## 2023-11-30 NOTE — SWALLOW BEDSIDE ASSESSMENT ADULT - ADDITIONAL RECOMMENDATIONS
2. This service to follow-up as schedule permits for diet tolerance. 3. Medical team further advised to reconsult this department with any change in medical status and/or observed change in tolerance of recommended PO diet.
3. This service to follow-up as schedule permits for diet advancement. 4. Medical team further advised to reconsult this department with any change in medical status and/or observed change in tolerance of recommended PO diet.

## 2023-11-30 NOTE — PROGRESS NOTE ADULT - SUBJECTIVE AND OBJECTIVE BOX
VASCULAR SURGERY DAILY PROGRESS NOTE    SUBJECTIVE: No acute events overnight. Patient seen and evaluated.     OBJECTIVE:  Vital Signs Last 24 Hrs  T(C): 36.3 (2023 11:10), Max: 36.6 (2023 06:30)  T(F): 97.3 (2023 11:10), Max: 97.8 (2023 06:30)  HR: 75 (2023 11:10) (70 - 75)  BP: 138/72 (2023 11:10) (132/57 - 150/75)  BP(mean): --  RR: 16 (2023 11:10) (16 - 18)  SpO2: 98% (2023 11:10) (98% - 100%)    Parameters below as of 2023 11:10  Patient On (Oxygen Delivery Method): room air      I&O's Detail    2023 07:01  -  2023 14:10  --------------------------------------------------------  IN:    Other (mL): 400 mL  Total IN: 400 mL    OUT:    Other (mL): 1400 mL  Total OUT: 1400 mL    Total NET: -1000 mL        Daily     Daily Weight in k.7 (2023 09:46)  MEDICATIONS  (STANDING):  aspirin enteric coated 81 milliGRAM(s) Oral daily  carvedilol 3.125 milliGRAM(s) Oral every 12 hours  chlorhexidine 2% Cloths 1 Application(s) Topical daily  clopidogrel Tablet 75 milliGRAM(s) Oral daily  dextrose 5%. 1000 milliLiter(s) (100 mL/Hr) IV Continuous <Continuous>  dextrose 5%. 1000 milliLiter(s) (50 mL/Hr) IV Continuous <Continuous>  dextrose 50% Injectable 25 Gram(s) IV Push once  epoetin dean (EPOGEN) Injectable 4000 Unit(s) IV Push <User Schedule>  folic acid 1 milliGRAM(s) Oral daily  gabapentin 100 milliGRAM(s) Oral <User Schedule>  glucagon  Injectable 1 milliGRAM(s) IntraMuscular once  levothyroxine 25 MICROGram(s) Oral daily  lidocaine   4% Patch 1 Patch Transdermal every 24 hours  sacubitril 24 mG/valsartan 26 mG 1 Tablet(s) Oral two times a day    MEDICATIONS  (PRN):  benzonatate 100 milliGRAM(s) Oral three times a day PRN Cough  dextrose Oral Gel 15 Gram(s) Oral once PRN Blood Glucose LESS THAN 70 milliGRAM(s)/deciliter  lactulose Syrup 20 Gram(s) Oral three times a day PRN 2-3 bm daily  polyethylene glycol 3350 17 Gram(s) Oral two times a day PRN 2-3 bm daily  sodium chloride 0.9% Bolus. 100 milliLiter(s) IV Bolus every 5 minutes PRN SBP LESS THAN or EQUAL to 90 mmHg  traMADol 25 milliGRAM(s) Oral every 12 hours PRN Moderate Pain (4 - 6)-Severe pain (7-10)      LABS:                        7.8    3.59  )-----------( 60       ( 2023 04:00 )             23.4         135  |  99  |  16  ----------------------------<  156<H>  4.3   |  30  |  2.35<H>    Ca    7.7<L>      2023 04:00  Phos  2.7       Mg     2.00         TPro  6.9  /  Alb  2.2<L>  /  TBili  1.2  /  DBili  0.8<H>  /  AST  128<H>  /  ALT  35  /  AlkPhos  381<H>      PT/INR - ( 2023 04:00 )   PT: 13.3 sec;   INR: 1.19 ratio         PTT - ( 2023 04:00 )  PTT:37.0 sec  Urinalysis Basic - ( 2023 04:00 )    Color: x / Appearance: x / SG: x / pH: x  Gluc: 156 mg/dL / Ketone: x  / Bili: x / Urobili: x   Blood: x / Protein: x / Nitrite: x   Leuk Esterase: x / RBC: x / WBC x   Sq Epi: x / Non Sq Epi: x / Bacteria: x    PHYSICAL EXAM:  Constitutional:  NAD  Respiratory: Unlabored breathing  Extremities:  LUE with non bleeding fistula, palpable thrill, L radial palpable

## 2023-11-30 NOTE — CHART NOTE - NSCHARTNOTESELECT_GEN_ALL_CORE
CP/Event Note
Event Note
POCUS/Event Note
Endocrinology/Event Note
Event Note
Follow-up/Nutrition Services
MAR Acceptance/Event Note
MICU Transfer Note/Event Note
Nutrition Consult- Assessment, Education. Follow-up/Nutrition Services
Nutrition Services
POCUS/Event Note
Post Op Check
Vascular Surgery

## 2023-11-30 NOTE — PROGRESS NOTE ADULT - REASON FOR ADMISSION
Altered Mental Status
resp failure
respiratory failure
Altered Mental Status
resp failure
Bleeding LUE fistula
sepsis
resp failure

## 2023-11-30 NOTE — SWALLOW BEDSIDE ASSESSMENT ADULT - COMMENTS
Clinical swallow evaluation ordered and attempted. Per discussion with RN, patient is off the unit at dialysis. This service to follow-up as schedule permits.
As per MICU note dated 11/14/23 "Patient is a 73y old  Male who presents with a chief complaint of 74 yo M w Hx of CAD, DM, HTN, s/p renal transplant, currently ESRD/HD TIW with recent hospitalization for bacterial PNA. Presenting after being found unresponsive at home with agonal breathing, AMS & hypoglycemia.  Pt intubated for airway protection and acute hypoxic respiratory failure. "    CXR 11/8/23 "FINDINGS:  No pneumomediastinum or pneumothorax post bronchoscopy. The endotracheal   and enteric tube in addition to the right IJ hemodialysis catheter again seen.  Right basilar inhomogeneous opacification again seen unchanged may well   represent pneumonia possibly secondary to aspiration."    Of note, no new chest imaging since extubation.     Patient visited at bedside for clinical swallow evaluation. Patient presents as awake and alert, able to follow 1-step directives and make basic wants/needs known. Patient denies difficulty swallowing. Per RN, no observed difficulty with current diet.
As per Hospitalist note dated 11/29/23 "73M with PMH of ESRD s/p kidney transplant on HD, CAD s/p multiple stents (most recently, RCA stent on 10/2023), T2DM, and HTN, with recent hospitalization for bacterial PNA, who originally presented to the ED on 11/7 after being found unresponsive with agonal breathing by his daughter. CPR  administered by the daughter. Admitted for AMS, intubated for airway protection and AHRF likely 2/2 multifocal pneumonia, s/p MICU course, transferred to medicine for further mgt. Noted to have oozing from fistula, now resolved. Pending fistulogram per Vascular on 11/29. "    CXR 11/8/23 "FINDINGS: No pneumomediastinum or pneumothorax post bronchoscopy. The endotracheal and enteric tube in addition to the right IJ hemodialysis catheter again seen. Right basilar inhomogeneous opacification again seen unchanged may well represent pneumonia possibly secondary to aspiration.  No recent chest imaging.     Patient is known to this service, seen for a clinical swallow evaluation on 11/15/23 with recommendation of Soft and Bite Sized Solids with Thin Liquids and consideration for Cinesophagram (see note for details).     Patient visited at bedside for repeat clinical swallow evaluation following attempt this AM. Patient presents as awake and alert, able to follow 1-step directives and make basic wants/needs known. Sister present at bedside. Patient denies difficulty swallowing.

## 2023-11-30 NOTE — SWALLOW BEDSIDE ASSESSMENT ADULT - ASR SWALLOW RECOMMEND DIAG
2. Consider Cinesophagram at MD's discretion if concerned that results of chest imaging may be dysphagia related
Objective testing is NOT indicated given functional clinical assessment and no recent chest imaging

## 2023-11-30 NOTE — PROGRESS NOTE ADULT - PROBLEM SELECTOR PLAN 1
Pt. with ESRD on HD TIW (MWF, LUE AVF). Pt. also with history of kidney transplant (not on immunosuppression). Last HD as outpatient was done on 11/6/23 via L AVF. Pt. admitted to MICU with respiratory failure and systemic shock. Pt. initiated on CRRT on 11/8, discontinued on 11/10 as volume status and FiO2 requirements improved. Had some oozing from AVF after treatment - vascular completed fistulogram 11/29 no signs of bleed seen. Labs reviewed. Pt appears clinically stable on HD today. Plan for HD 12/2. Dose medications as per ESRD/HD.

## 2023-11-30 NOTE — PROGRESS NOTE ADULT - SUBJECTIVE AND OBJECTIVE BOX
ANESTHESIA POSTOP CHECK    73y Male POSTOP DAY 1     No COMPLAINTS as per RN - patient is in HD    NO APPARENT ANESTHESIA COMPLICATIONS

## 2023-11-30 NOTE — SWALLOW BEDSIDE ASSESSMENT ADULT - SWALLOW EVAL: CURRENT DIET
Soft and Bite Sized Solids with Thin Liquids per MD order
Soft and Bite Sized Solids with Thin Liquids

## 2023-12-01 DIAGNOSIS — T82.9XXA UNSPECIFIED COMPLICATION OF CARDIAC AND VASCULAR PROSTHETIC DEVICE, IMPLANT AND GRAFT, INITIAL ENCOUNTER: ICD-10-CM

## 2023-12-01 DIAGNOSIS — D64.9 ANEMIA, UNSPECIFIED: ICD-10-CM

## 2023-12-01 LAB
ALBUMIN SERPL ELPH-MCNC: 2.3 G/DL — LOW (ref 3.3–5)
ALBUMIN SERPL ELPH-MCNC: 2.3 G/DL — LOW (ref 3.3–5)
ALP SERPL-CCNC: 347 U/L — HIGH (ref 40–120)
ALP SERPL-CCNC: 347 U/L — HIGH (ref 40–120)
ALT FLD-CCNC: 25 U/L — SIGNIFICANT CHANGE UP (ref 4–41)
ALT FLD-CCNC: 25 U/L — SIGNIFICANT CHANGE UP (ref 4–41)
ANION GAP SERPL CALC-SCNC: 7 MMOL/L — SIGNIFICANT CHANGE UP (ref 7–14)
ANION GAP SERPL CALC-SCNC: 7 MMOL/L — SIGNIFICANT CHANGE UP (ref 7–14)
AST SERPL-CCNC: 83 U/L — HIGH (ref 4–40)
AST SERPL-CCNC: 83 U/L — HIGH (ref 4–40)
BASOPHILS # BLD AUTO: 0 K/UL — SIGNIFICANT CHANGE UP (ref 0–0.2)
BASOPHILS # BLD AUTO: 0 K/UL — SIGNIFICANT CHANGE UP (ref 0–0.2)
BASOPHILS NFR BLD AUTO: 0 % — SIGNIFICANT CHANGE UP (ref 0–2)
BASOPHILS NFR BLD AUTO: 0 % — SIGNIFICANT CHANGE UP (ref 0–2)
BILIRUB DIRECT SERPL-MCNC: 0.8 MG/DL — HIGH (ref 0–0.3)
BILIRUB DIRECT SERPL-MCNC: 0.8 MG/DL — HIGH (ref 0–0.3)
BILIRUB INDIRECT FLD-MCNC: 0.3 MG/DL — SIGNIFICANT CHANGE UP (ref 0–1)
BILIRUB INDIRECT FLD-MCNC: 0.3 MG/DL — SIGNIFICANT CHANGE UP (ref 0–1)
BILIRUB SERPL-MCNC: 1.1 MG/DL — SIGNIFICANT CHANGE UP (ref 0.2–1.2)
BILIRUB SERPL-MCNC: 1.1 MG/DL — SIGNIFICANT CHANGE UP (ref 0.2–1.2)
BUN SERPL-MCNC: 15 MG/DL — SIGNIFICANT CHANGE UP (ref 7–23)
BUN SERPL-MCNC: 15 MG/DL — SIGNIFICANT CHANGE UP (ref 7–23)
CALCIUM SERPL-MCNC: 7.6 MG/DL — LOW (ref 8.4–10.5)
CALCIUM SERPL-MCNC: 7.6 MG/DL — LOW (ref 8.4–10.5)
CHLORIDE SERPL-SCNC: 98 MMOL/L — SIGNIFICANT CHANGE UP (ref 98–107)
CHLORIDE SERPL-SCNC: 98 MMOL/L — SIGNIFICANT CHANGE UP (ref 98–107)
CO2 SERPL-SCNC: 29 MMOL/L — SIGNIFICANT CHANGE UP (ref 22–31)
CO2 SERPL-SCNC: 29 MMOL/L — SIGNIFICANT CHANGE UP (ref 22–31)
CREAT SERPL-MCNC: 2.59 MG/DL — HIGH (ref 0.5–1.3)
CREAT SERPL-MCNC: 2.59 MG/DL — HIGH (ref 0.5–1.3)
DACRYOCYTES BLD QL SMEAR: SLIGHT — SIGNIFICANT CHANGE UP
DACRYOCYTES BLD QL SMEAR: SLIGHT — SIGNIFICANT CHANGE UP
EGFR: 26 ML/MIN/1.73M2 — LOW
EGFR: 26 ML/MIN/1.73M2 — LOW
EOSINOPHIL # BLD AUTO: 0.09 K/UL — SIGNIFICANT CHANGE UP (ref 0–0.5)
EOSINOPHIL # BLD AUTO: 0.09 K/UL — SIGNIFICANT CHANGE UP (ref 0–0.5)
EOSINOPHIL NFR BLD AUTO: 2.7 % — SIGNIFICANT CHANGE UP (ref 0–6)
EOSINOPHIL NFR BLD AUTO: 2.7 % — SIGNIFICANT CHANGE UP (ref 0–6)
FERRITIN SERPL-MCNC: 2663 NG/ML — HIGH (ref 30–400)
FERRITIN SERPL-MCNC: 2663 NG/ML — HIGH (ref 30–400)
GIANT PLATELETS BLD QL SMEAR: PRESENT — SIGNIFICANT CHANGE UP
GIANT PLATELETS BLD QL SMEAR: PRESENT — SIGNIFICANT CHANGE UP
GLUCOSE BLDC GLUCOMTR-MCNC: 116 MG/DL — HIGH (ref 70–99)
GLUCOSE BLDC GLUCOMTR-MCNC: 116 MG/DL — HIGH (ref 70–99)
GLUCOSE BLDC GLUCOMTR-MCNC: 138 MG/DL — HIGH (ref 70–99)
GLUCOSE BLDC GLUCOMTR-MCNC: 138 MG/DL — HIGH (ref 70–99)
GLUCOSE BLDC GLUCOMTR-MCNC: 143 MG/DL — HIGH (ref 70–99)
GLUCOSE BLDC GLUCOMTR-MCNC: 143 MG/DL — HIGH (ref 70–99)
GLUCOSE BLDC GLUCOMTR-MCNC: 90 MG/DL — SIGNIFICANT CHANGE UP (ref 70–99)
GLUCOSE BLDC GLUCOMTR-MCNC: 90 MG/DL — SIGNIFICANT CHANGE UP (ref 70–99)
GLUCOSE SERPL-MCNC: 84 MG/DL — SIGNIFICANT CHANGE UP (ref 70–99)
GLUCOSE SERPL-MCNC: 84 MG/DL — SIGNIFICANT CHANGE UP (ref 70–99)
HCT VFR BLD CALC: 22.2 % — LOW (ref 39–50)
HCT VFR BLD CALC: 22.2 % — LOW (ref 39–50)
HCT VFR BLD CALC: 26.7 % — LOW (ref 39–50)
HCT VFR BLD CALC: 26.7 % — LOW (ref 39–50)
HGB BLD-MCNC: 7.6 G/DL — LOW (ref 13–17)
HGB BLD-MCNC: 7.6 G/DL — LOW (ref 13–17)
HGB BLD-MCNC: 9.1 G/DL — LOW (ref 13–17)
HGB BLD-MCNC: 9.1 G/DL — LOW (ref 13–17)
IANC: 0.98 K/UL — LOW (ref 1.8–7.4)
IANC: 0.98 K/UL — LOW (ref 1.8–7.4)
IRON SATN MFR SERPL: 50 % — SIGNIFICANT CHANGE UP (ref 14–50)
IRON SATN MFR SERPL: 50 % — SIGNIFICANT CHANGE UP (ref 14–50)
IRON SATN MFR SERPL: 59 UG/DL — SIGNIFICANT CHANGE UP (ref 45–165)
IRON SATN MFR SERPL: 59 UG/DL — SIGNIFICANT CHANGE UP (ref 45–165)
LYMPHOCYTES # BLD AUTO: 1.3 K/UL — SIGNIFICANT CHANGE UP (ref 1–3.3)
LYMPHOCYTES # BLD AUTO: 1.3 K/UL — SIGNIFICANT CHANGE UP (ref 1–3.3)
LYMPHOCYTES # BLD AUTO: 39.3 % — SIGNIFICANT CHANGE UP (ref 13–44)
LYMPHOCYTES # BLD AUTO: 39.3 % — SIGNIFICANT CHANGE UP (ref 13–44)
MAGNESIUM SERPL-MCNC: 1.9 MG/DL — SIGNIFICANT CHANGE UP (ref 1.6–2.6)
MAGNESIUM SERPL-MCNC: 1.9 MG/DL — SIGNIFICANT CHANGE UP (ref 1.6–2.6)
MANUAL SMEAR VERIFICATION: SIGNIFICANT CHANGE UP
MANUAL SMEAR VERIFICATION: SIGNIFICANT CHANGE UP
MCHC RBC-ENTMCNC: 30.5 PG — SIGNIFICANT CHANGE UP (ref 27–34)
MCHC RBC-ENTMCNC: 30.5 PG — SIGNIFICANT CHANGE UP (ref 27–34)
MCHC RBC-ENTMCNC: 31.2 PG — SIGNIFICANT CHANGE UP (ref 27–34)
MCHC RBC-ENTMCNC: 31.2 PG — SIGNIFICANT CHANGE UP (ref 27–34)
MCHC RBC-ENTMCNC: 34.1 GM/DL — SIGNIFICANT CHANGE UP (ref 32–36)
MCHC RBC-ENTMCNC: 34.1 GM/DL — SIGNIFICANT CHANGE UP (ref 32–36)
MCHC RBC-ENTMCNC: 34.2 GM/DL — SIGNIFICANT CHANGE UP (ref 32–36)
MCHC RBC-ENTMCNC: 34.2 GM/DL — SIGNIFICANT CHANGE UP (ref 32–36)
MCV RBC AUTO: 89.2 FL — SIGNIFICANT CHANGE UP (ref 80–100)
MCV RBC AUTO: 89.2 FL — SIGNIFICANT CHANGE UP (ref 80–100)
MCV RBC AUTO: 91.4 FL — SIGNIFICANT CHANGE UP (ref 80–100)
MCV RBC AUTO: 91.4 FL — SIGNIFICANT CHANGE UP (ref 80–100)
MONOCYTES # BLD AUTO: 0.36 K/UL — SIGNIFICANT CHANGE UP (ref 0–0.9)
MONOCYTES # BLD AUTO: 0.36 K/UL — SIGNIFICANT CHANGE UP (ref 0–0.9)
MONOCYTES NFR BLD AUTO: 10.7 % — SIGNIFICANT CHANGE UP (ref 2–14)
MONOCYTES NFR BLD AUTO: 10.7 % — SIGNIFICANT CHANGE UP (ref 2–14)
NEUTROPHILS # BLD AUTO: 1.45 K/UL — LOW (ref 1.8–7.4)
NEUTROPHILS # BLD AUTO: 1.45 K/UL — LOW (ref 1.8–7.4)
NEUTROPHILS NFR BLD AUTO: 43.7 % — SIGNIFICANT CHANGE UP (ref 43–77)
NEUTROPHILS NFR BLD AUTO: 43.7 % — SIGNIFICANT CHANGE UP (ref 43–77)
NRBC # BLD: 0 /100 WBCS — SIGNIFICANT CHANGE UP (ref 0–0)
NRBC # BLD: 0 /100 WBCS — SIGNIFICANT CHANGE UP (ref 0–0)
NRBC # FLD: 0 K/UL — SIGNIFICANT CHANGE UP (ref 0–0)
NRBC # FLD: 0 K/UL — SIGNIFICANT CHANGE UP (ref 0–0)
PHOSPHATE SERPL-MCNC: 2.8 MG/DL — SIGNIFICANT CHANGE UP (ref 2.5–4.5)
PHOSPHATE SERPL-MCNC: 2.8 MG/DL — SIGNIFICANT CHANGE UP (ref 2.5–4.5)
PLAT MORPH BLD: NORMAL — SIGNIFICANT CHANGE UP
PLAT MORPH BLD: NORMAL — SIGNIFICANT CHANGE UP
PLATELET # BLD AUTO: 52 K/UL — LOW (ref 150–400)
PLATELET # BLD AUTO: 52 K/UL — LOW (ref 150–400)
PLATELET # BLD AUTO: 53 K/UL — LOW (ref 150–400)
PLATELET # BLD AUTO: 53 K/UL — LOW (ref 150–400)
PLATELET COUNT - ESTIMATE: ABNORMAL
PLATELET COUNT - ESTIMATE: ABNORMAL
POIKILOCYTOSIS BLD QL AUTO: SIGNIFICANT CHANGE UP
POIKILOCYTOSIS BLD QL AUTO: SIGNIFICANT CHANGE UP
POTASSIUM SERPL-MCNC: 3.7 MMOL/L — SIGNIFICANT CHANGE UP (ref 3.5–5.3)
POTASSIUM SERPL-MCNC: 3.7 MMOL/L — SIGNIFICANT CHANGE UP (ref 3.5–5.3)
POTASSIUM SERPL-SCNC: 3.7 MMOL/L — SIGNIFICANT CHANGE UP (ref 3.5–5.3)
POTASSIUM SERPL-SCNC: 3.7 MMOL/L — SIGNIFICANT CHANGE UP (ref 3.5–5.3)
PROT SERPL-MCNC: 6.5 G/DL — SIGNIFICANT CHANGE UP (ref 6–8.3)
PROT SERPL-MCNC: 6.5 G/DL — SIGNIFICANT CHANGE UP (ref 6–8.3)
RBC # BLD: 2.49 M/UL — LOW (ref 4.2–5.8)
RBC # BLD: 2.49 M/UL — LOW (ref 4.2–5.8)
RBC # BLD: 2.92 M/UL — LOW (ref 4.2–5.8)
RBC # BLD: 2.92 M/UL — LOW (ref 4.2–5.8)
RBC # FLD: 20.6 % — HIGH (ref 10.3–14.5)
RBC # FLD: 20.6 % — HIGH (ref 10.3–14.5)
RBC # FLD: 21.4 % — HIGH (ref 10.3–14.5)
RBC # FLD: 21.4 % — HIGH (ref 10.3–14.5)
RBC BLD AUTO: ABNORMAL
RBC BLD AUTO: ABNORMAL
SCHISTOCYTES BLD QL AUTO: SLIGHT — SIGNIFICANT CHANGE UP
SCHISTOCYTES BLD QL AUTO: SLIGHT — SIGNIFICANT CHANGE UP
SMUDGE CELLS # BLD: PRESENT — SIGNIFICANT CHANGE UP
SMUDGE CELLS # BLD: PRESENT — SIGNIFICANT CHANGE UP
SODIUM SERPL-SCNC: 134 MMOL/L — LOW (ref 135–145)
SODIUM SERPL-SCNC: 134 MMOL/L — LOW (ref 135–145)
TARGETS BLD QL SMEAR: SIGNIFICANT CHANGE UP
TARGETS BLD QL SMEAR: SIGNIFICANT CHANGE UP
TIBC SERPL-MCNC: 118 UG/DL — LOW (ref 220–430)
TIBC SERPL-MCNC: 118 UG/DL — LOW (ref 220–430)
UIBC SERPL-MCNC: 59 UG/DL — LOW (ref 110–370)
UIBC SERPL-MCNC: 59 UG/DL — LOW (ref 110–370)
VARIANT LYMPHS # BLD: 3.6 % — SIGNIFICANT CHANGE UP (ref 0–6)
VARIANT LYMPHS # BLD: 3.6 % — SIGNIFICANT CHANGE UP (ref 0–6)
WBC # BLD: 3.32 K/UL — LOW (ref 3.8–10.5)
WBC # BLD: 3.32 K/UL — LOW (ref 3.8–10.5)
WBC # BLD: 4.36 K/UL — SIGNIFICANT CHANGE UP (ref 3.8–10.5)
WBC # BLD: 4.36 K/UL — SIGNIFICANT CHANGE UP (ref 3.8–10.5)
WBC # FLD AUTO: 3.32 K/UL — LOW (ref 3.8–10.5)
WBC # FLD AUTO: 3.32 K/UL — LOW (ref 3.8–10.5)
WBC # FLD AUTO: 4.36 K/UL — SIGNIFICANT CHANGE UP (ref 3.8–10.5)
WBC # FLD AUTO: 4.36 K/UL — SIGNIFICANT CHANGE UP (ref 3.8–10.5)

## 2023-12-01 PROCEDURE — 99233 SBSQ HOSP IP/OBS HIGH 50: CPT | Mod: FS

## 2023-12-01 PROCEDURE — 99232 SBSQ HOSP IP/OBS MODERATE 35: CPT

## 2023-12-01 RX ADMIN — CLOPIDOGREL BISULFATE 75 MILLIGRAM(S): 75 TABLET, FILM COATED ORAL at 12:06

## 2023-12-01 RX ADMIN — CARVEDILOL PHOSPHATE 3.12 MILLIGRAM(S): 80 CAPSULE, EXTENDED RELEASE ORAL at 17:58

## 2023-12-01 RX ADMIN — LIDOCAINE 1 PATCH: 4 CREAM TOPICAL at 17:53

## 2023-12-01 RX ADMIN — SACUBITRIL AND VALSARTAN 1 TABLET(S): 24; 26 TABLET, FILM COATED ORAL at 17:58

## 2023-12-01 RX ADMIN — CARVEDILOL PHOSPHATE 3.12 MILLIGRAM(S): 80 CAPSULE, EXTENDED RELEASE ORAL at 05:35

## 2023-12-01 RX ADMIN — Medication 25 MICROGRAM(S): at 05:36

## 2023-12-01 RX ADMIN — CHLORHEXIDINE GLUCONATE 1 APPLICATION(S): 213 SOLUTION TOPICAL at 12:06

## 2023-12-01 RX ADMIN — Medication 1 MILLIGRAM(S): at 12:06

## 2023-12-01 RX ADMIN — ERYTHROPOIETIN 4000 UNIT(S): 10000 INJECTION, SOLUTION INTRAVENOUS; SUBCUTANEOUS at 22:53

## 2023-12-01 RX ADMIN — LIDOCAINE 1 PATCH: 4 CREAM TOPICAL at 05:55

## 2023-12-01 RX ADMIN — LIDOCAINE 1 PATCH: 4 CREAM TOPICAL at 18:04

## 2023-12-01 RX ADMIN — SACUBITRIL AND VALSARTAN 1 TABLET(S): 24; 26 TABLET, FILM COATED ORAL at 05:35

## 2023-12-01 RX ADMIN — Medication 81 MILLIGRAM(S): at 12:05

## 2023-12-01 NOTE — PROGRESS NOTE ADULT - PROBLEM SELECTOR PLAN 4
Unclear etiology. Presume to be 2/2 infection. No s/s of bleeding.   Transfuse as indicated  continue to monitor  -Starting to stabilize

## 2023-12-01 NOTE — PROGRESS NOTE ADULT - PROBLEM SELECTOR PLAN 1
Pt. with ESRD on HD TIW (MWF, LUE AVF). Pt. also with history of kidney transplant (not on immunosuppression). Last HD as outpatient was done on 11/6/23 via L AVF. Pt. admitted to MICU with respiratory failure and systemic shock. Pt. initiated on CRRT on 11/8, discontinued on 11/10 as volume status and FiO2 requirements improved. Had some oozing from AVF after treatment - vascular completed fistulogram 11/29 no signs of bleed seen. Labs reviewed. Pt appears clinically stable on HD yesterday. Plan for HD 12/2. Dose medications as per ESRD/HD.

## 2023-12-01 NOTE — PROGRESS NOTE ADULT - SUBJECTIVE AND OBJECTIVE BOX
[facility] DIVISION OF KIDNEY DISEASES AND HYPERTENSION -- FOLLOW UP NOTE  --------------------------------------------------------------------------------  Chief Complaint: ESRD    24 hour events/subjective:  Pt seen and examined with daughter at bedside. He has no complaints. No shortness of breath.       PAST HISTORY  --------------------------------------------------------------------------------  No significant changes to PMH, PSH, FHx, SHx, unless otherwise noted    ALLERGIES & MEDICATIONS  --------------------------------------------------------------------------------  Allergies    No Known Allergies    Intolerances      Standing Inpatient Medications  aspirin enteric coated 81 milliGRAM(s) Oral daily  carvedilol 3.125 milliGRAM(s) Oral every 12 hours  chlorhexidine 2% Cloths 1 Application(s) Topical daily  clopidogrel Tablet 75 milliGRAM(s) Oral daily  dextrose 5%. 1000 milliLiter(s) IV Continuous <Continuous>  dextrose 5%. 1000 milliLiter(s) IV Continuous <Continuous>  dextrose 50% Injectable 25 Gram(s) IV Push once  epoetin dean (EPOGEN) Injectable 4000 Unit(s) IV Push <User Schedule>  folic acid 1 milliGRAM(s) Oral daily  gabapentin 100 milliGRAM(s) Oral <User Schedule>  glucagon  Injectable 1 milliGRAM(s) IntraMuscular once  levothyroxine 25 MICROGram(s) Oral daily  lidocaine   4% Patch 1 Patch Transdermal every 24 hours  sacubitril 24 mG/valsartan 26 mG 1 Tablet(s) Oral two times a day    PRN Inpatient Medications  benzonatate 100 milliGRAM(s) Oral three times a day PRN  dextrose Oral Gel 15 Gram(s) Oral once PRN  lactulose Syrup 20 Gram(s) Oral three times a day PRN  polyethylene glycol 3350 17 Gram(s) Oral two times a day PRN  sodium chloride 0.9% Bolus. 100 milliLiter(s) IV Bolus every 5 minutes PRN  traMADol 25 milliGRAM(s) Oral every 12 hours PRN      VITALS/PHYSICAL EXAM  --------------------------------------------------------------------------------  T(C): 36.9 (12-01-23 @ 05:30), Max: 37 (12-01-23 @ 02:12)  HR: 83 (12-01-23 @ 05:30) (82 - 86)  BP: 128/59 (12-01-23 @ 05:30) (128/59 - 165/79)  RR: 18 (12-01-23 @ 05:30) (18 - 18)  SpO2: 97% (12-01-23 @ 05:30) (97% - 100%)  Wt(kg): --        11-30-23 @ 07:01  -  12-01-23 @ 07:00  --------------------------------------------------------  IN: 640 mL / OUT: 1400 mL / NET: -760 mL      Physical Exam:  Gen: resting, NAD  HEENT: Anicteric  Pulm: Fair entry B/L, CTA B/L  CV: S1S2+  Abd: Soft, +BS    Ext: No LE edema B/L  Neuro: Awake and alert  Skin: Warm and dry  Dialysis access: LUE AVF +thrill + bruit       LABS/STUDIES  --------------------------------------------------------------------------------              7.6    3.32  >-----------<  52       [12-01-23 @ 05:44]              22.2     134  |  98  |  15  ----------------------------<  84      [12-01-23 @ 05:44]  3.7   |  29  |  2.59        Ca     7.6     [12-01-23 @ 05:44]      Mg     1.90     [12-01-23 @ 05:44]      Phos  2.8     [12-01-23 @ 05:44]    TPro  6.5  /  Alb  2.3  /  TBili  1.1  /  DBili  0.8  /  AST  83  /  ALT  25  /  AlkPhos  347  [12-01-23 @ 05:44]          Creatinine Trend:  SCr 2.59 [12-01 @ 05:44]  SCr 1.92 [11-30 @ 15:34]  SCr 2.35 [11-29 @ 04:00]  SCr 3.46 [11-28 @ 06:00]  SCr 4.74 [11-27 @ 06:20]    Urinalysis - [12-01-23 @ 05:44]      Color  / Appearance  / SG  / pH       Gluc 84 / Ketone   / Bili  / Urobili        Blood  / Protein  / Leuk Est  / Nitrite       RBC  / WBC  / Hyaline  / Gran  / Sq Epi  / Non Sq Epi  / Bacteria       TSH 12.60      [11-20-23 @ 05:30]    HBsAb 320.9      [11-24-23 @ 07:00]  HBsAb Reactive      [11-13-23 @ 14:30]  HBsAg Nonreact      [11-13-23 @ 14:30]  HBcAb Reactive      [11-13-23 @ 14:30]  HCV 0.16, Nonreact      [11-13-23 @ 14:30]

## 2023-12-01 NOTE — PROGRESS NOTE ADULT - SUBJECTIVE AND OBJECTIVE BOX
**********************************************  [facility]  Ayla Finley MD  Available via MS Teams  Pager: [phone]  **********************************************     Patient is a 73y old  Male who presents with a chief complaint of Bleeding LUE fistula (30 Nov 2023 14:10)    SUBJECTIVE / OVERNIGHT EVENTS: No acute events overnight. Patient examined at bedside this AM, with no subjective complaints. Denies CP, palpitations, SOB, n/v/d, abdominal pain.     OBJECTIVE:  Vital Signs Last 24 Hrs  T(C): 37.2 (01 Dec 2023 09:35), Max: 37.2 (01 Dec 2023 09:35)  T(F): 98.9 (01 Dec 2023 09:35), Max: 98.9 (01 Dec 2023 09:35)  HR: 73 (01 Dec 2023 09:35) (73 - 86)  BP: 101/51 (01 Dec 2023 09:35) (101/51 - 165/79)  BP(mean): --  RR: 18 (01 Dec 2023 09:35) (18 - 18)  SpO2: 98% (01 Dec 2023 09:35) (97% - 100%)    Parameters below as of 01 Dec 2023 09:35  Patient On (Oxygen Delivery Method): room air        I&O's Summary    30 Nov 2023 07:01  -  01 Dec 2023 07:00  --------------------------------------------------------  IN: 640 mL / OUT: 1400 mL / NET: -760 mL    Physical Exam:     General: No acute distress, well-appearing    Eyes: PERRL, EOMI     ENT: MMM, no oropharyngeal lesions or erythema appreciated     Pulm: No increased WOB. CTAB. No wheezing.     CV: RRR. S1&S2+. No M/R/G appreciated.     Abdomen: +BS. Soft, NTND. No organomegaly.     MSK: Nml ROM    Extremities: No peripheral edema or cyanosis.     Neuro: A&Ox3, no focal deficits     Skin: Warm and dry. No visible rash.       Labs:  CAPILLARY BLOOD GLUCOSE      POCT Blood Glucose.: 138 mg/dL (01 Dec 2023 11:18)  POCT Blood Glucose.: 90 mg/dL (01 Dec 2023 07:29)  POCT Blood Glucose.: 135 mg/dL (30 Nov 2023 23:27)  POCT Blood Glucose.: 154 mg/dL (30 Nov 2023 16:30)                          7.6    3.32  )-----------( 52       ( 01 Dec 2023 05:44 )             22.2     12-01    134<L>  |  98  |  15  ----------------------------<  84  3.7   |  29  |  2.59<H>    Ca    7.6<L>      01 Dec 2023 05:44  Phos  2.8     12-01  Mg     1.90     12-01    TPro  6.5  /  Alb  2.3<L>  /  TBili  1.1  /  DBili  0.8<H>  /  AST  83<H>  /  ALT  25  /  AlkPhos  347<H>  12-01            Urinalysis Basic - ( 01 Dec 2023 05:44 )    Color: x / Appearance: x / SG: x / pH: x  Gluc: 84 mg/dL / Ketone: x  / Bili: x / Urobili: x   Blood: x / Protein: x / Nitrite: x   Leuk Esterase: x / RBC: x / WBC x   Sq Epi: x / Non Sq Epi: x / Bacteria: x      Imaging Personally Reviewed:      MEDICATIONS  (STANDING):  aspirin enteric coated 81 milliGRAM(s) Oral daily  carvedilol 3.125 milliGRAM(s) Oral every 12 hours  chlorhexidine 2% Cloths 1 Application(s) Topical daily  clopidogrel Tablet 75 milliGRAM(s) Oral daily  dextrose 5%. 1000 milliLiter(s) (50 mL/Hr) IV Continuous <Continuous>  dextrose 5%. 1000 milliLiter(s) (100 mL/Hr) IV Continuous <Continuous>  dextrose 50% Injectable 25 Gram(s) IV Push once  epoetin dean (EPOGEN) Injectable 4000 Unit(s) IV Push <User Schedule>  folic acid 1 milliGRAM(s) Oral daily  gabapentin 100 milliGRAM(s) Oral <User Schedule>  glucagon  Injectable 1 milliGRAM(s) IntraMuscular once  levothyroxine 25 MICROGram(s) Oral daily  lidocaine   4% Patch 1 Patch Transdermal every 24 hours  sacubitril 24 mG/valsartan 26 mG 1 Tablet(s) Oral two times a day    MEDICATIONS  (PRN):  benzonatate 100 milliGRAM(s) Oral three times a day PRN Cough  dextrose Oral Gel 15 Gram(s) Oral once PRN Blood Glucose LESS THAN 70 milliGRAM(s)/deciliter  lactulose Syrup 20 Gram(s) Oral three times a day PRN 2-3 bm daily  polyethylene glycol 3350 17 Gram(s) Oral two times a day PRN 2-3 bm daily  sodium chloride 0.9% Bolus. 100 milliLiter(s) IV Bolus every 5 minutes PRN SBP LESS THAN or EQUAL to 90 mmHg  traMADol 25 milliGRAM(s) Oral every 12 hours PRN Moderate Pain (4 - 6)-Severe pain (7-10)

## 2023-12-01 NOTE — PROGRESS NOTE ADULT - PROBLEM SELECTOR PLAN 1
Pt with anemia. likely AOCD, getting EPO per Nephro. No e/o bleeding.   -Goal hgb >8 due to CAD and recent stents   -Will transfuse 1U PRBC today   -Iron studies added on to AM labs   -CTM

## 2023-12-01 NOTE — PROGRESS NOTE ADULT - PROBLEM SELECTOR PLAN 11
Normal FT4  Concerning for subclinical hypothyroidism   Discussed w/. endo, c/w levothyroxine 25mcg QD  Repeat TFTs in 4-6 weeks -w/ PCP or Endo ([facility] at [facility]: [id number] [place] [address]; Ph # [phone])

## 2023-12-02 LAB
ANION GAP SERPL CALC-SCNC: 10 MMOL/L — SIGNIFICANT CHANGE UP (ref 7–14)
ANION GAP SERPL CALC-SCNC: 10 MMOL/L — SIGNIFICANT CHANGE UP (ref 7–14)
BASOPHILS # BLD AUTO: 0.01 K/UL — SIGNIFICANT CHANGE UP (ref 0–0.2)
BASOPHILS # BLD AUTO: 0.01 K/UL — SIGNIFICANT CHANGE UP (ref 0–0.2)
BASOPHILS NFR BLD AUTO: 0.2 % — SIGNIFICANT CHANGE UP (ref 0–2)
BASOPHILS NFR BLD AUTO: 0.2 % — SIGNIFICANT CHANGE UP (ref 0–2)
BUN SERPL-MCNC: 31 MG/DL — HIGH (ref 7–23)
BUN SERPL-MCNC: 31 MG/DL — HIGH (ref 7–23)
CALCIUM SERPL-MCNC: 7.7 MG/DL — LOW (ref 8.4–10.5)
CALCIUM SERPL-MCNC: 7.7 MG/DL — LOW (ref 8.4–10.5)
CHLORIDE SERPL-SCNC: 98 MMOL/L — SIGNIFICANT CHANGE UP (ref 98–107)
CHLORIDE SERPL-SCNC: 98 MMOL/L — SIGNIFICANT CHANGE UP (ref 98–107)
CO2 SERPL-SCNC: 26 MMOL/L — SIGNIFICANT CHANGE UP (ref 22–31)
CO2 SERPL-SCNC: 26 MMOL/L — SIGNIFICANT CHANGE UP (ref 22–31)
CREAT SERPL-MCNC: 3.56 MG/DL — HIGH (ref 0.5–1.3)
CREAT SERPL-MCNC: 3.56 MG/DL — HIGH (ref 0.5–1.3)
EGFR: 18 ML/MIN/1.73M2 — LOW
EGFR: 18 ML/MIN/1.73M2 — LOW
EOSINOPHIL # BLD AUTO: 0.16 K/UL — SIGNIFICANT CHANGE UP (ref 0–0.5)
EOSINOPHIL # BLD AUTO: 0.16 K/UL — SIGNIFICANT CHANGE UP (ref 0–0.5)
EOSINOPHIL NFR BLD AUTO: 3.6 % — SIGNIFICANT CHANGE UP (ref 0–6)
EOSINOPHIL NFR BLD AUTO: 3.6 % — SIGNIFICANT CHANGE UP (ref 0–6)
GLUCOSE BLDC GLUCOMTR-MCNC: 116 MG/DL — HIGH (ref 70–99)
GLUCOSE BLDC GLUCOMTR-MCNC: 116 MG/DL — HIGH (ref 70–99)
GLUCOSE BLDC GLUCOMTR-MCNC: 125 MG/DL — HIGH (ref 70–99)
GLUCOSE BLDC GLUCOMTR-MCNC: 125 MG/DL — HIGH (ref 70–99)
GLUCOSE BLDC GLUCOMTR-MCNC: 132 MG/DL — HIGH (ref 70–99)
GLUCOSE BLDC GLUCOMTR-MCNC: 132 MG/DL — HIGH (ref 70–99)
GLUCOSE BLDC GLUCOMTR-MCNC: 74 MG/DL — SIGNIFICANT CHANGE UP (ref 70–99)
GLUCOSE BLDC GLUCOMTR-MCNC: 74 MG/DL — SIGNIFICANT CHANGE UP (ref 70–99)
GLUCOSE BLDC GLUCOMTR-MCNC: 83 MG/DL — SIGNIFICANT CHANGE UP (ref 70–99)
GLUCOSE BLDC GLUCOMTR-MCNC: 83 MG/DL — SIGNIFICANT CHANGE UP (ref 70–99)
GLUCOSE SERPL-MCNC: 107 MG/DL — HIGH (ref 70–99)
GLUCOSE SERPL-MCNC: 107 MG/DL — HIGH (ref 70–99)
HCT VFR BLD CALC: 23.4 % — LOW (ref 39–50)
HCT VFR BLD CALC: 23.4 % — LOW (ref 39–50)
HGB BLD-MCNC: 8.5 G/DL — LOW (ref 13–17)
HGB BLD-MCNC: 8.5 G/DL — LOW (ref 13–17)
IANC: 1.58 K/UL — LOW (ref 1.8–7.4)
IANC: 1.58 K/UL — LOW (ref 1.8–7.4)
IMM GRANULOCYTES NFR BLD AUTO: 0.2 % — SIGNIFICANT CHANGE UP (ref 0–0.9)
IMM GRANULOCYTES NFR BLD AUTO: 0.2 % — SIGNIFICANT CHANGE UP (ref 0–0.9)
LYMPHOCYTES # BLD AUTO: 2.28 K/UL — SIGNIFICANT CHANGE UP (ref 1–3.3)
LYMPHOCYTES # BLD AUTO: 2.28 K/UL — SIGNIFICANT CHANGE UP (ref 1–3.3)
LYMPHOCYTES # BLD AUTO: 50.7 % — HIGH (ref 13–44)
LYMPHOCYTES # BLD AUTO: 50.7 % — HIGH (ref 13–44)
MAGNESIUM SERPL-MCNC: 2 MG/DL — SIGNIFICANT CHANGE UP (ref 1.6–2.6)
MAGNESIUM SERPL-MCNC: 2 MG/DL — SIGNIFICANT CHANGE UP (ref 1.6–2.6)
MCHC RBC-ENTMCNC: 32.1 PG — SIGNIFICANT CHANGE UP (ref 27–34)
MCHC RBC-ENTMCNC: 32.1 PG — SIGNIFICANT CHANGE UP (ref 27–34)
MCHC RBC-ENTMCNC: 36.3 GM/DL — HIGH (ref 32–36)
MCHC RBC-ENTMCNC: 36.3 GM/DL — HIGH (ref 32–36)
MCV RBC AUTO: 88.3 FL — SIGNIFICANT CHANGE UP (ref 80–100)
MCV RBC AUTO: 88.3 FL — SIGNIFICANT CHANGE UP (ref 80–100)
MONOCYTES # BLD AUTO: 0.46 K/UL — SIGNIFICANT CHANGE UP (ref 0–0.9)
MONOCYTES # BLD AUTO: 0.46 K/UL — SIGNIFICANT CHANGE UP (ref 0–0.9)
MONOCYTES NFR BLD AUTO: 10.2 % — SIGNIFICANT CHANGE UP (ref 2–14)
MONOCYTES NFR BLD AUTO: 10.2 % — SIGNIFICANT CHANGE UP (ref 2–14)
NEUTROPHILS # BLD AUTO: 1.58 K/UL — LOW (ref 1.8–7.4)
NEUTROPHILS # BLD AUTO: 1.58 K/UL — LOW (ref 1.8–7.4)
NEUTROPHILS NFR BLD AUTO: 35.1 % — LOW (ref 43–77)
NEUTROPHILS NFR BLD AUTO: 35.1 % — LOW (ref 43–77)
NRBC # BLD: 0 /100 WBCS — SIGNIFICANT CHANGE UP (ref 0–0)
NRBC # BLD: 0 /100 WBCS — SIGNIFICANT CHANGE UP (ref 0–0)
NRBC # FLD: 0 K/UL — SIGNIFICANT CHANGE UP (ref 0–0)
NRBC # FLD: 0 K/UL — SIGNIFICANT CHANGE UP (ref 0–0)
PHOSPHATE SERPL-MCNC: 3.5 MG/DL — SIGNIFICANT CHANGE UP (ref 2.5–4.5)
PHOSPHATE SERPL-MCNC: 3.5 MG/DL — SIGNIFICANT CHANGE UP (ref 2.5–4.5)
PLATELET # BLD AUTO: 51 K/UL — LOW (ref 150–400)
PLATELET # BLD AUTO: 51 K/UL — LOW (ref 150–400)
POTASSIUM SERPL-MCNC: 4 MMOL/L — SIGNIFICANT CHANGE UP (ref 3.5–5.3)
POTASSIUM SERPL-MCNC: 4 MMOL/L — SIGNIFICANT CHANGE UP (ref 3.5–5.3)
POTASSIUM SERPL-SCNC: 4 MMOL/L — SIGNIFICANT CHANGE UP (ref 3.5–5.3)
POTASSIUM SERPL-SCNC: 4 MMOL/L — SIGNIFICANT CHANGE UP (ref 3.5–5.3)
RBC # BLD: 2.65 M/UL — LOW (ref 4.2–5.8)
RBC # BLD: 2.65 M/UL — LOW (ref 4.2–5.8)
RBC # FLD: 20.2 % — HIGH (ref 10.3–14.5)
RBC # FLD: 20.2 % — HIGH (ref 10.3–14.5)
SODIUM SERPL-SCNC: 134 MMOL/L — LOW (ref 135–145)
SODIUM SERPL-SCNC: 134 MMOL/L — LOW (ref 135–145)
WBC # BLD: 4.5 K/UL — SIGNIFICANT CHANGE UP (ref 3.8–10.5)
WBC # BLD: 4.5 K/UL — SIGNIFICANT CHANGE UP (ref 3.8–10.5)
WBC # FLD AUTO: 4.5 K/UL — SIGNIFICANT CHANGE UP (ref 3.8–10.5)
WBC # FLD AUTO: 4.5 K/UL — SIGNIFICANT CHANGE UP (ref 3.8–10.5)

## 2023-12-02 PROCEDURE — 90935 HEMODIALYSIS ONE EVALUATION: CPT | Mod: GC

## 2023-12-02 PROCEDURE — 99232 SBSQ HOSP IP/OBS MODERATE 35: CPT

## 2023-12-02 RX ADMIN — LIDOCAINE 1 PATCH: 4 CREAM TOPICAL at 18:43

## 2023-12-02 RX ADMIN — CLOPIDOGREL BISULFATE 75 MILLIGRAM(S): 75 TABLET, FILM COATED ORAL at 12:14

## 2023-12-02 RX ADMIN — LIDOCAINE 1 PATCH: 4 CREAM TOPICAL at 18:54

## 2023-12-02 RX ADMIN — CARVEDILOL PHOSPHATE 3.12 MILLIGRAM(S): 80 CAPSULE, EXTENDED RELEASE ORAL at 18:43

## 2023-12-02 RX ADMIN — CARVEDILOL PHOSPHATE 3.12 MILLIGRAM(S): 80 CAPSULE, EXTENDED RELEASE ORAL at 12:15

## 2023-12-02 RX ADMIN — LIDOCAINE 1 PATCH: 4 CREAM TOPICAL at 05:34

## 2023-12-02 RX ADMIN — Medication 25 MICROGRAM(S): at 05:35

## 2023-12-02 RX ADMIN — Medication 81 MILLIGRAM(S): at 12:14

## 2023-12-02 RX ADMIN — SACUBITRIL AND VALSARTAN 1 TABLET(S): 24; 26 TABLET, FILM COATED ORAL at 12:14

## 2023-12-02 RX ADMIN — SACUBITRIL AND VALSARTAN 1 TABLET(S): 24; 26 TABLET, FILM COATED ORAL at 18:43

## 2023-12-02 RX ADMIN — Medication 100 MILLIGRAM(S): at 03:38

## 2023-12-02 RX ADMIN — CHLORHEXIDINE GLUCONATE 1 APPLICATION(S): 213 SOLUTION TOPICAL at 12:15

## 2023-12-02 RX ADMIN — Medication 1 MILLIGRAM(S): at 12:14

## 2023-12-02 NOTE — PROGRESS NOTE ADULT - SUBJECTIVE AND OBJECTIVE BOX
[facility] DIVISION OF KIDNEY DISEASES AND HYPERTENSION   FOLLOW UP NOTE  --------------------------------------------------------------------------------  Chief Complaint: ESRD on HD TIW (usually MWF but currently TTS inpatient).     24 hour events/subjective: Pt seen and examined during HD rounds. He appears to be tolerating HD. He feels "alright". No shortness of breath.     PAST HISTORY  --------------------------------------------------------------------------------  No significant changes to PMH, PSH, FHx, SHx, unless otherwise noted    ALLERGIES & MEDICATIONS  --------------------------------------------------------------------------------  Allergies  No Known Allergies    Intolerances    Standing Inpatient Medications  aspirin enteric coated 81 milliGRAM(s) Oral daily  carvedilol 3.125 milliGRAM(s) Oral every 12 hours  chlorhexidine 2% Cloths 1 Application(s) Topical daily  clopidogrel Tablet 75 milliGRAM(s) Oral daily  dextrose 5%. 1000 milliLiter(s) IV Continuous <Continuous>  dextrose 5%. 1000 milliLiter(s) IV Continuous <Continuous>  dextrose 50% Injectable 25 Gram(s) IV Push once  epoetin dean (EPOGEN) Injectable 4000 Unit(s) IV Push <User Schedule>  folic acid 1 milliGRAM(s) Oral daily  gabapentin 100 milliGRAM(s) Oral <User Schedule>  glucagon  Injectable 1 milliGRAM(s) IntraMuscular once  levothyroxine 25 MICROGram(s) Oral daily  lidocaine   4% Patch 1 Patch Transdermal every 24 hours  sacubitril 24 mG/valsartan 26 mG 1 Tablet(s) Oral two times a day    PRN Inpatient Medications  benzonatate 100 milliGRAM(s) Oral three times a day PRN  dextrose Oral Gel 15 Gram(s) Oral once PRN  lactulose Syrup 20 Gram(s) Oral three times a day PRN  polyethylene glycol 3350 17 Gram(s) Oral two times a day PRN  sodium chloride 0.9% Bolus. 100 milliLiter(s) IV Bolus every 5 minutes PRN  traMADol 25 milliGRAM(s) Oral every 12 hours PRN    REVIEW OF SYSTEMS  --------------------------------------------------------------------------------  All other systems were reviewed and are negative, except as noted.    VITALS/PHYSICAL EXAM  --------------------------------------------------------------------------------  T(C): 36.7 (12-02-23 @ 12:20), Max: 36.8 (12-01-23 @ 16:55)  HR: 77 (12-02-23 @ 12:20) (70 - 86)  BP: 131/57 (12-02-23 @ 12:20) (131/57 - 161/75)  RR: 16 (12-02-23 @ 12:20) (16 - 18)  SpO2: 97% (12-02-23 @ 12:20) (97% - 98%)  Wt(kg): --    12-01-23 @ 07:01  -  12-02-23 @ 07:00  --------------------------------------------------------  IN: 670 mL / OUT: 0 mL / NET: 670 mL    12-02-23 @ 07:01  -  12-02-23 @ 15:22  --------------------------------------------------------  IN: 500 mL / OUT: 1500 mL / NET: -1000 mL    Physical Exam:  Gen: undergoing HD in NAD  HEENT: Anicteric  Pulm: Fair entry B/L, CTA B/L  CV: S1S2+  Abd: Soft, +BS    Ext: No LE edema B/L  Neuro: Awake and alert  Skin: Warm and dry  Dialysis access: LUE AVF connected to dialysis circuit     LABS/STUDIES  --------------------------------------------------------------------------------              8.5    4.50  >-----------<  51       [12-02-23 @ 07:41]              23.4     134  |  98  |  31  ----------------------------<  107      [12-02-23 @ 07:41]  4.0   |  26  |  3.56        Ca     7.7     [12-02-23 @ 07:41]      Mg     2.00     [12-02-23 @ 07:41]      Phos  3.5     [12-02-23 @ 07:41]    TPro  6.5  /  Alb  2.3  /  TBili  1.1  /  DBili  0.8  /  AST  83  /  ALT  25  /  AlkPhos  347  [12-01-23 @ 05:44]    Creatinine Trend:  SCr 3.56 [12-02 @ 07:41]  SCr 2.59 [12-01 @ 05:44]  SCr 1.92 [11-30 @ 15:34]  SCr 2.35 [11-29 @ 04:00]  SCr 3.46 [11-28 @ 06:00]    HBsAb 320.9      [11-24-23 @ 07:00]  HBsAb Reactive      [11-13-23 @ 14:30]  HBsAg Nonreact      [11-13-23 @ 14:30]  HBcAb Reactive      [11-13-23 @ 14:30]  HCV 0.16, Nonreact      [11-13-23 @ 14:30]

## 2023-12-02 NOTE — PROGRESS NOTE ADULT - ASSESSMENT
73M with PMH of ESRD s/p kidney transplant on HD, CAD s/p multiple stents (most recently, RCA stent on 10/2023), T2DM, and HTN, with recent hospitalization for bacterial PNA, who originally presented to the ED on 11/7 after being found unresponsive with agonal breathing by his daughter. CPR  administered by the daughter. Admitted for AMS, intubated for airway protection and AHRF likely 2/2 multifocal pneumonia, s/p MICU course, transferred to medicine for further mgt. Noted to have oozing from fistula, now resolved. Pending fistulogram per Vascular on 11/29.  73M with PMH of ESRD s/p kidney transplant on HD, CAD s/p multiple stents (most recently, RCA stent on 10/2023), T2DM, and HTN, with recent hospitalization for bacterial PNA, who originally presented to the ED on 11/7 after being found unresponsive with agonal breathing by his daughter. CPR  administered by the daughter. Admitted for AMS, intubated for airway protection and AHRF likely 2/2 multifocal pneumonia, s/p MICU course, transferred to medicine for further mgt. Noted to have oozing from fistula, now resolved. S/p fistulogram per Vascular on 11/29.

## 2023-12-02 NOTE — PROGRESS NOTE ADULT - ATTENDING SUPERVISION STATEMENT
Resident
Fellow
Resident
Resident
Fellow
Resident
Fellow
Resident
Fellow
Resident

## 2023-12-02 NOTE — PROGRESS NOTE ADULT - PROBLEM SELECTOR PLAN 1
Pt with anemia. likely AOCD, getting EPO per Nephro. No e/o bleeding.   -Goal hgb >8 due to CAD and recent stents   -Will transfuse 1U PRBC today   -Iron studies added on to AM labs   -CTM Pt with anemia. likely AOCD, getting EPO per Nephro. No e/o bleeding.   -Goal hgb >8 due to CAD and recent stents   -S/p 1U PRBC on 12/1 with good response   -CTM

## 2023-12-02 NOTE — PROGRESS NOTE ADULT - SUBJECTIVE AND OBJECTIVE BOX
**********************************************  [facility] Medicine  Ayla Finley MD  Available via MS Teams  Pager: [phone]  **********************************************     Patient is a 73y old  Male who presents with a chief complaint of Bleeding LUE fistula (30 Nov 2023 14:10)    SUBJECTIVE / OVERNIGHT EVENTS: No acute events overnight. Patient examined at bedside this AM, with no subjective complaints. Denies CP, palpitations, SOB, n/v/d, abdominal pain.     OBJECTIVE:  Vital Signs Last 24 Hrs  T(C): 36.7 (02 Dec 2023 10:37), Max: 36.8 (01 Dec 2023 16:55)  T(F): 98.1 (02 Dec 2023 10:37), Max: 98.2 (01 Dec 2023 16:55)  HR: 70 (02 Dec 2023 10:37) (70 - 86)  BP: 156/73 (02 Dec 2023 10:37) (125/66 - 161/75)  BP(mean): --  RR: 16 (02 Dec 2023 10:37) (16 - 18)  SpO2: 98% (02 Dec 2023 04:00) (96% - 98%)    Parameters below as of 02 Dec 2023 10:37  Patient On (Oxygen Delivery Method): room air        I&O's Summary    01 Dec 2023 07:01  -  02 Dec 2023 07:00  --------------------------------------------------------  IN: 670 mL / OUT: 0 mL / NET: 670 mL    02 Dec 2023 07:01  -  02 Dec 2023 12:01  --------------------------------------------------------  IN: 500 mL / OUT: 1500 mL / NET: -1000 mL      Physical Examination:      Labs:  CAPILLARY BLOOD GLUCOSE      POCT Blood Glucose.: 74 mg/dL (02 Dec 2023 11:16)  POCT Blood Glucose.: 83 mg/dL (02 Dec 2023 09:06)  POCT Blood Glucose.: 125 mg/dL (02 Dec 2023 05:30)  POCT Blood Glucose.: 143 mg/dL (01 Dec 2023 21:10)  POCT Blood Glucose.: 116 mg/dL (01 Dec 2023 16:18)                          8.5    4.50  )-----------( 51       ( 02 Dec 2023 07:41 )             23.4     12-02    134<L>  |  98  |  31<H>  ----------------------------<  107<H>  4.0   |  26  |  3.56<H>    Ca    7.7<L>      02 Dec 2023 07:41  Phos  3.5     12-02  Mg     2.00     12-02    TPro  6.5  /  Alb  2.3<L>  /  TBili  1.1  /  DBili  0.8<H>  /  AST  83<H>  /  ALT  25  /  AlkPhos  347<H>  12-01            Urinalysis Basic - ( 02 Dec 2023 07:41 )    Color: x / Appearance: x / SG: x / pH: x  Gluc: 107 mg/dL / Ketone: x  / Bili: x / Urobili: x   Blood: x / Protein: x / Nitrite: x   Leuk Esterase: x / RBC: x / WBC x   Sq Epi: x / Non Sq Epi: x / Bacteria: x      Imaging Personally Reviewed:      MEDICATIONS  (STANDING):  aspirin enteric coated 81 milliGRAM(s) Oral daily  carvedilol 3.125 milliGRAM(s) Oral every 12 hours  chlorhexidine 2% Cloths 1 Application(s) Topical daily  clopidogrel Tablet 75 milliGRAM(s) Oral daily  dextrose 5%. 1000 milliLiter(s) (50 mL/Hr) IV Continuous <Continuous>  dextrose 5%. 1000 milliLiter(s) (100 mL/Hr) IV Continuous <Continuous>  dextrose 50% Injectable 25 Gram(s) IV Push once  epoetin dean (EPOGEN) Injectable 4000 Unit(s) IV Push <User Schedule>  folic acid 1 milliGRAM(s) Oral daily  gabapentin 100 milliGRAM(s) Oral <User Schedule>  glucagon  Injectable 1 milliGRAM(s) IntraMuscular once  levothyroxine 25 MICROGram(s) Oral daily  lidocaine   4% Patch 1 Patch Transdermal every 24 hours  sacubitril 24 mG/valsartan 26 mG 1 Tablet(s) Oral two times a day    MEDICATIONS  (PRN):  benzonatate 100 milliGRAM(s) Oral three times a day PRN Cough  dextrose Oral Gel 15 Gram(s) Oral once PRN Blood Glucose LESS THAN 70 milliGRAM(s)/deciliter  lactulose Syrup 20 Gram(s) Oral three times a day PRN 2-3 bm daily  polyethylene glycol 3350 17 Gram(s) Oral two times a day PRN 2-3 bm daily  sodium chloride 0.9% Bolus. 100 milliLiter(s) IV Bolus every 5 minutes PRN SBP LESS THAN or EQUAL to 90 mmHg  traMADol 25 milliGRAM(s) Oral every 12 hours PRN Moderate Pain (4 - 6)-Severe pain (7-10)   **********************************************  [facility]  Ayla Finley MD  Available via MS Teams  Pager: [phone]  **********************************************     Patient is a 73y old  Male who presents with a chief complaint of Bleeding LUE fistula (30 Nov 2023 14:10)    SUBJECTIVE / OVERNIGHT EVENTS: No acute events overnight. Patient examined at bedside this AM, with no subjective complaints. Denies CP, palpitations, SOB, n/v/d, abdominal pain.     OBJECTIVE:  Vital Signs Last 24 Hrs  T(C): 36.7 (02 Dec 2023 10:37), Max: 36.8 (01 Dec 2023 16:55)  T(F): 98.1 (02 Dec 2023 10:37), Max: 98.2 (01 Dec 2023 16:55)  HR: 70 (02 Dec 2023 10:37) (70 - 86)  BP: 156/73 (02 Dec 2023 10:37) (125/66 - 161/75)  BP(mean): --  RR: 16 (02 Dec 2023 10:37) (16 - 18)  SpO2: 98% (02 Dec 2023 04:00) (96% - 98%)    Parameters below as of 02 Dec 2023 10:37  Patient On (Oxygen Delivery Method): room air        I&O's Summary    01 Dec 2023 07:01  -  02 Dec 2023 07:00  --------------------------------------------------------  IN: 670 mL / OUT: 0 mL / NET: 670 mL    02 Dec 2023 07:01  -  02 Dec 2023 12:01  --------------------------------------------------------  IN: 500 mL / OUT: 1500 mL / NET: -1000 mL      Physical Examination:      Labs:  CAPILLARY BLOOD GLUCOSE      POCT Blood Glucose.: 74 mg/dL (02 Dec 2023 11:16)  POCT Blood Glucose.: 83 mg/dL (02 Dec 2023 09:06)  POCT Blood Glucose.: 125 mg/dL (02 Dec 2023 05:30)  POCT Blood Glucose.: 143 mg/dL (01 Dec 2023 21:10)  POCT Blood Glucose.: 116 mg/dL (01 Dec 2023 16:18)                          8.5    4.50  )-----------( 51       ( 02 Dec 2023 07:41 )             23.4     12-02    134<L>  |  98  |  31<H>  ----------------------------<  107<H>  4.0   |  26  |  3.56<H>    Ca    7.7<L>      02 Dec 2023 07:41  Phos  3.5     12-02  Mg     2.00     12-02    TPro  6.5  /  Alb  2.3<L>  /  TBili  1.1  /  DBili  0.8<H>  /  AST  83<H>  /  ALT  25  /  AlkPhos  347<H>  12-01            Urinalysis Basic - ( 02 Dec 2023 07:41 )    Color: x / Appearance: x / SG: x / pH: x  Gluc: 107 mg/dL / Ketone: x  / Bili: x / Urobili: x   Blood: x / Protein: x / Nitrite: x   Leuk Esterase: x / RBC: x / WBC x   Sq Epi: x / Non Sq Epi: x / Bacteria: x      Imaging Personally Reviewed:      MEDICATIONS  (STANDING):  aspirin enteric coated 81 milliGRAM(s) Oral daily  carvedilol 3.125 milliGRAM(s) Oral every 12 hours  chlorhexidine 2% Cloths 1 Application(s) Topical daily  clopidogrel Tablet 75 milliGRAM(s) Oral daily  dextrose 5%. 1000 milliLiter(s) (50 mL/Hr) IV Continuous <Continuous>  dextrose 5%. 1000 milliLiter(s) (100 mL/Hr) IV Continuous <Continuous>  dextrose 50% Injectable 25 Gram(s) IV Push once  epoetin dean (EPOGEN) Injectable 4000 Unit(s) IV Push <User Schedule>  folic acid 1 milliGRAM(s) Oral daily  gabapentin 100 milliGRAM(s) Oral <User Schedule>  glucagon  Injectable 1 milliGRAM(s) IntraMuscular once  levothyroxine 25 MICROGram(s) Oral daily  lidocaine   4% Patch 1 Patch Transdermal every 24 hours  sacubitril 24 mG/valsartan 26 mG 1 Tablet(s) Oral two times a day    MEDICATIONS  (PRN):  benzonatate 100 milliGRAM(s) Oral three times a day PRN Cough  dextrose Oral Gel 15 Gram(s) Oral once PRN Blood Glucose LESS THAN 70 milliGRAM(s)/deciliter  lactulose Syrup 20 Gram(s) Oral three times a day PRN 2-3 bm daily  polyethylene glycol 3350 17 Gram(s) Oral two times a day PRN 2-3 bm daily  sodium chloride 0.9% Bolus. 100 milliLiter(s) IV Bolus every 5 minutes PRN SBP LESS THAN or EQUAL to 90 mmHg  traMADol 25 milliGRAM(s) Oral every 12 hours PRN Moderate Pain (4 - 6)-Severe pain (7-10)   **********************************************  [facility] Medicine  Ayla Finley MD  Available via MS Teams  Pager: [phone]  **********************************************     Patient is a 73y old  Male who presents with a chief complaint of Bleeding LUE fistula (30 Nov 2023 14:10)    SUBJECTIVE / OVERNIGHT EVENTS: No acute events overnight. Patient examined at bedside this AM, with no subjective complaints. Denies CP, palpitations, SOB, n/v/d, abdominal pain.     OBJECTIVE:  Vital Signs Last 24 Hrs  T(C): 36.7 (02 Dec 2023 10:37), Max: 36.8 (01 Dec 2023 16:55)  T(F): 98.1 (02 Dec 2023 10:37), Max: 98.2 (01 Dec 2023 16:55)  HR: 70 (02 Dec 2023 10:37) (70 - 86)  BP: 156/73 (02 Dec 2023 10:37) (125/66 - 161/75)  BP(mean): --  RR: 16 (02 Dec 2023 10:37) (16 - 18)  SpO2: 98% (02 Dec 2023 04:00) (96% - 98%)    Parameters below as of 02 Dec 2023 10:37  Patient On (Oxygen Delivery Method): room air        I&O's Summary    01 Dec 2023 07:01  -  02 Dec 2023 07:00  --------------------------------------------------------  IN: 670 mL / OUT: 0 mL / NET: 670 mL    02 Dec 2023 07:01  -  02 Dec 2023 12:01  --------------------------------------------------------  IN: 500 mL / OUT: 1500 mL / NET: -1000 mL      Physical Examination:      Labs:  CAPILLARY BLOOD GLUCOSE      POCT Blood Glucose.: 74 mg/dL (02 Dec 2023 11:16)  POCT Blood Glucose.: 83 mg/dL (02 Dec 2023 09:06)  POCT Blood Glucose.: 125 mg/dL (02 Dec 2023 05:30)  POCT Blood Glucose.: 143 mg/dL (01 Dec 2023 21:10)  POCT Blood Glucose.: 116 mg/dL (01 Dec 2023 16:18)                          8.5    4.50  )-----------( 51       ( 02 Dec 2023 07:41 )             23.4     12-02    134<L>  |  98  |  31<H>  ----------------------------<  107<H>  4.0   |  26  |  3.56<H>    Ca    7.7<L>      02 Dec 2023 07:41  Phos  3.5     12-02  Mg     2.00     12-02    TPro  6.5  /  Alb  2.3<L>  /  TBili  1.1  /  DBili  0.8<H>  /  AST  83<H>  /  ALT  25  /  AlkPhos  347<H>  12-01            Urinalysis Basic - ( 02 Dec 2023 07:41 )    Color: x / Appearance: x / SG: x / pH: x  Gluc: 107 mg/dL / Ketone: x  / Bili: x / Urobili: x   Blood: x / Protein: x / Nitrite: x   Leuk Esterase: x / RBC: x / WBC x   Sq Epi: x / Non Sq Epi: x / Bacteria: x      Imaging Personally Reviewed:      MEDICATIONS  (STANDING):  aspirin enteric coated 81 milliGRAM(s) Oral daily  carvedilol 3.125 milliGRAM(s) Oral every 12 hours  chlorhexidine 2% Cloths 1 Application(s) Topical daily  clopidogrel Tablet 75 milliGRAM(s) Oral daily  dextrose 5%. 1000 milliLiter(s) (50 mL/Hr) IV Continuous <Continuous>  dextrose 5%. 1000 milliLiter(s) (100 mL/Hr) IV Continuous <Continuous>  dextrose 50% Injectable 25 Gram(s) IV Push once  epoetin dean (EPOGEN) Injectable 4000 Unit(s) IV Push <User Schedule>  folic acid 1 milliGRAM(s) Oral daily  gabapentin 100 milliGRAM(s) Oral <User Schedule>  glucagon  Injectable 1 milliGRAM(s) IntraMuscular once  levothyroxine 25 MICROGram(s) Oral daily  lidocaine   4% Patch 1 Patch Transdermal every 24 hours  sacubitril 24 mG/valsartan 26 mG 1 Tablet(s) Oral two times a day    MEDICATIONS  (PRN):  benzonatate 100 milliGRAM(s) Oral three times a day PRN Cough  dextrose Oral Gel 15 Gram(s) Oral once PRN Blood Glucose LESS THAN 70 milliGRAM(s)/deciliter  lactulose Syrup 20 Gram(s) Oral three times a day PRN 2-3 bm daily  polyethylene glycol 3350 17 Gram(s) Oral two times a day PRN 2-3 bm daily  sodium chloride 0.9% Bolus. 100 milliLiter(s) IV Bolus every 5 minutes PRN SBP LESS THAN or EQUAL to 90 mmHg  traMADol 25 milliGRAM(s) Oral every 12 hours PRN Moderate Pain (4 - 6)-Severe pain (7-10)   **********************************************  [facility] Medicine  Ayla Finley MD  Available via MS Teams  Pager: [phone]  **********************************************     Patient is a 73y old  Male who presents with a chief complaint of Bleeding LUE fistula (30 Nov 2023 14:10)    SUBJECTIVE / OVERNIGHT EVENTS: No acute events overnight. Patient examined at bedside this AM, with no subjective complaints. Denies CP, palpitations, SOB, n/v/d, abdominal pain.     OBJECTIVE:  Vital Signs Last 24 Hrs  T(C): 36.7 (02 Dec 2023 10:37), Max: 36.8 (01 Dec 2023 16:55)  T(F): 98.1 (02 Dec 2023 10:37), Max: 98.2 (01 Dec 2023 16:55)  HR: 70 (02 Dec 2023 10:37) (70 - 86)  BP: 156/73 (02 Dec 2023 10:37) (125/66 - 161/75)  BP(mean): --  RR: 16 (02 Dec 2023 10:37) (16 - 18)  SpO2: 98% (02 Dec 2023 04:00) (96% - 98%)    Parameters below as of 02 Dec 2023 10:37  Patient On (Oxygen Delivery Method): room air        I&O's Summary    01 Dec 2023 07:01  -  02 Dec 2023 07:00  --------------------------------------------------------  IN: 670 mL / OUT: 0 mL / NET: 670 mL    02 Dec 2023 07:01  -  02 Dec 2023 12:01  --------------------------------------------------------  IN: 500 mL / OUT: 1500 mL / NET: -1000 mL      Physical Exam:     General: No acute distress, well-appearing    Eyes: PERRL, EOMI     ENT: MMM, no oropharyngeal lesions or erythema appreciated     Pulm: No increased WOB. CTAB. No wheezing.     CV: RRR. S1&S2+. No M/R/G appreciated.     Abdomen: +BS. Soft, NTND. No organomegaly.     MSK: Nml ROM    Extremities: No peripheral edema or cyanosis.     Neuro: A&Ox3, no focal deficits     Skin: Warm and dry. No visible rash.       Labs:  CAPILLARY BLOOD GLUCOSE      POCT Blood Glucose.: 74 mg/dL (02 Dec 2023 11:16)  POCT Blood Glucose.: 83 mg/dL (02 Dec 2023 09:06)  POCT Blood Glucose.: 125 mg/dL (02 Dec 2023 05:30)  POCT Blood Glucose.: 143 mg/dL (01 Dec 2023 21:10)  POCT Blood Glucose.: 116 mg/dL (01 Dec 2023 16:18)                          8.5    4.50  )-----------( 51       ( 02 Dec 2023 07:41 )             23.4     12-02    134<L>  |  98  |  31<H>  ----------------------------<  107<H>  4.0   |  26  |  3.56<H>    Ca    7.7<L>      02 Dec 2023 07:41  Phos  3.5     12-02  Mg     2.00     12-02    TPro  6.5  /  Alb  2.3<L>  /  TBili  1.1  /  DBili  0.8<H>  /  AST  83<H>  /  ALT  25  /  AlkPhos  347<H>  12-01            Urinalysis Basic - ( 02 Dec 2023 07:41 )    Color: x / Appearance: x / SG: x / pH: x  Gluc: 107 mg/dL / Ketone: x  / Bili: x / Urobili: x   Blood: x / Protein: x / Nitrite: x   Leuk Esterase: x / RBC: x / WBC x   Sq Epi: x / Non Sq Epi: x / Bacteria: x      Imaging Personally Reviewed:      MEDICATIONS  (STANDING):  aspirin enteric coated 81 milliGRAM(s) Oral daily  carvedilol 3.125 milliGRAM(s) Oral every 12 hours  chlorhexidine 2% Cloths 1 Application(s) Topical daily  clopidogrel Tablet 75 milliGRAM(s) Oral daily  dextrose 5%. 1000 milliLiter(s) (50 mL/Hr) IV Continuous <Continuous>  dextrose 5%. 1000 milliLiter(s) (100 mL/Hr) IV Continuous <Continuous>  dextrose 50% Injectable 25 Gram(s) IV Push once  epoetin dean (EPOGEN) Injectable 4000 Unit(s) IV Push <User Schedule>  folic acid 1 milliGRAM(s) Oral daily  gabapentin 100 milliGRAM(s) Oral <User Schedule>  glucagon  Injectable 1 milliGRAM(s) IntraMuscular once  levothyroxine 25 MICROGram(s) Oral daily  lidocaine   4% Patch 1 Patch Transdermal every 24 hours  sacubitril 24 mG/valsartan 26 mG 1 Tablet(s) Oral two times a day    MEDICATIONS  (PRN):  benzonatate 100 milliGRAM(s) Oral three times a day PRN Cough  dextrose Oral Gel 15 Gram(s) Oral once PRN Blood Glucose LESS THAN 70 milliGRAM(s)/deciliter  lactulose Syrup 20 Gram(s) Oral three times a day PRN 2-3 bm daily  polyethylene glycol 3350 17 Gram(s) Oral two times a day PRN 2-3 bm daily  sodium chloride 0.9% Bolus. 100 milliLiter(s) IV Bolus every 5 minutes PRN SBP LESS THAN or EQUAL to 90 mmHg  traMADol 25 milliGRAM(s) Oral every 12 hours PRN Moderate Pain (4 - 6)-Severe pain (7-10)   **********************************************  [facility] Medicine  Ayla Finley MD  Available via MS Teams  Pager: [phone]  **********************************************     Patient is a 73y old  Male who presents with a chief complaint of Bleeding LUE fistula (30 Nov 2023 14:10)    SUBJECTIVE / OVERNIGHT EVENTS: No acute events overnight. Patient examined at bedside this AM, with no subjective complaints. Denies CP, palpitations, SOB, n/v/d, abdominal pain.     OBJECTIVE:  Vital Signs Last 24 Hrs  T(C): 36.7 (02 Dec 2023 10:37), Max: 36.8 (01 Dec 2023 16:55)  T(F): 98.1 (02 Dec 2023 10:37), Max: 98.2 (01 Dec 2023 16:55)  HR: 70 (02 Dec 2023 10:37) (70 - 86)  BP: 156/73 (02 Dec 2023 10:37) (125/66 - 161/75)  BP(mean): --  RR: 16 (02 Dec 2023 10:37) (16 - 18)  SpO2: 98% (02 Dec 2023 04:00) (96% - 98%)    Parameters below as of 02 Dec 2023 10:37  Patient On (Oxygen Delivery Method): room air        I&O's Summary    01 Dec 2023 07:01  -  02 Dec 2023 07:00  --------------------------------------------------------  IN: 670 mL / OUT: 0 mL / NET: 670 mL    02 Dec 2023 07:01  -  02 Dec 2023 12:01  --------------------------------------------------------  IN: 500 mL / OUT: 1500 mL / NET: -1000 mL      Physical Exam:     General: No acute distress, well-appearing    Eyes: PERRL, EOMI     ENT: MMM, no oropharyngeal lesions or erythema appreciated     Pulm: No increased WOB. CTAB. No wheezing.     CV: RRR. S1&S2+. No M/R/G appreciated.     Abdomen: +BS. Soft, NTND. No organomegaly.     MSK: Nml ROM    Extremities: No peripheral edema or cyanosis.     Neuro: A&Ox3, no focal deficits     Skin: Warm and dry. No visible rash.       Labs:  CAPILLARY BLOOD GLUCOSE      POCT Blood Glucose.: 74 mg/dL (02 Dec 2023 11:16)  POCT Blood Glucose.: 83 mg/dL (02 Dec 2023 09:06)  POCT Blood Glucose.: 125 mg/dL (02 Dec 2023 05:30)  POCT Blood Glucose.: 143 mg/dL (01 Dec 2023 21:10)  POCT Blood Glucose.: 116 mg/dL (01 Dec 2023 16:18)                          8.5    4.50  )-----------( 51       ( 02 Dec 2023 07:41 )             23.4     12-02    134<L>  |  98  |  31<H>  ----------------------------<  107<H>  4.0   |  26  |  3.56<H>    Ca    7.7<L>      02 Dec 2023 07:41  Phos  3.5     12-02  Mg     2.00     12-02    TPro  6.5  /  Alb  2.3<L>  /  TBili  1.1  /  DBili  0.8<H>  /  AST  83<H>  /  ALT  25  /  AlkPhos  347<H>  12-01            Urinalysis Basic - ( 02 Dec 2023 07:41 )    Color: x / Appearance: x / SG: x / pH: x  Gluc: 107 mg/dL / Ketone: x  / Bili: x / Urobili: x   Blood: x / Protein: x / Nitrite: x   Leuk Esterase: x / RBC: x / WBC x   Sq Epi: x / Non Sq Epi: x / Bacteria: x      Imaging Personally Reviewed:      MEDICATIONS  (STANDING):  aspirin enteric coated 81 milliGRAM(s) Oral daily  carvedilol 3.125 milliGRAM(s) Oral every 12 hours  chlorhexidine 2% Cloths 1 Application(s) Topical daily  clopidogrel Tablet 75 milliGRAM(s) Oral daily  dextrose 5%. 1000 milliLiter(s) (50 mL/Hr) IV Continuous <Continuous>  dextrose 5%. 1000 milliLiter(s) (100 mL/Hr) IV Continuous <Continuous>  dextrose 50% Injectable 25 Gram(s) IV Push once  epoetin dean (EPOGEN) Injectable 4000 Unit(s) IV Push <User Schedule>  folic acid 1 milliGRAM(s) Oral daily  gabapentin 100 milliGRAM(s) Oral <User Schedule>  glucagon  Injectable 1 milliGRAM(s) IntraMuscular once  levothyroxine 25 MICROGram(s) Oral daily  lidocaine   4% Patch 1 Patch Transdermal every 24 hours  sacubitril 24 mG/valsartan 26 mG 1 Tablet(s) Oral two times a day    MEDICATIONS  (PRN):  benzonatate 100 milliGRAM(s) Oral three times a day PRN Cough  dextrose Oral Gel 15 Gram(s) Oral once PRN Blood Glucose LESS THAN 70 milliGRAM(s)/deciliter  lactulose Syrup 20 Gram(s) Oral three times a day PRN 2-3 bm daily  polyethylene glycol 3350 17 Gram(s) Oral two times a day PRN 2-3 bm daily  sodium chloride 0.9% Bolus. 100 milliLiter(s) IV Bolus every 5 minutes PRN SBP LESS THAN or EQUAL to 90 mmHg  traMADol 25 milliGRAM(s) Oral every 12 hours PRN Moderate Pain (4 - 6)-Severe pain (7-10)   **********************************************  [facility] Medicine  Ayla Finley MD  Available via MS Teams  Pager: [phone]  **********************************************     Patient is a 73y old  Male who presents with a chief complaint of Bleeding LUE fistula (30 Nov 2023 14:10)    SUBJECTIVE / OVERNIGHT EVENTS: No acute events overnight. Patient examined at bedside this AM, with no subjective complaints. Denies CP, palpitations, SOB, n/v/d, abdominal pain.     OBJECTIVE:  Vital Signs Last 24 Hrs  T(C): 36.7 (02 Dec 2023 10:37), Max: 36.8 (01 Dec 2023 16:55)  T(F): 98.1 (02 Dec 2023 10:37), Max: 98.2 (01 Dec 2023 16:55)  HR: 70 (02 Dec 2023 10:37) (70 - 86)  BP: 156/73 (02 Dec 2023 10:37) (125/66 - 161/75)  BP(mean): --  RR: 16 (02 Dec 2023 10:37) (16 - 18)  SpO2: 98% (02 Dec 2023 04:00) (96% - 98%)    Parameters below as of 02 Dec 2023 10:37  Patient On (Oxygen Delivery Method): room air        I&O's Summary    01 Dec 2023 07:01  -  02 Dec 2023 07:00  --------------------------------------------------------  IN: 670 mL / OUT: 0 mL / NET: 670 mL    02 Dec 2023 07:01  -  02 Dec 2023 12:01  --------------------------------------------------------  IN: 500 mL / OUT: 1500 mL / NET: -1000 mL      Physical Exam:     General: No acute distress, well-appearing    Eyes: PERRL, EOMI     ENT: MMM, no oropharyngeal lesions or erythema appreciated     Pulm: No increased WOB. CTAB. No wheezing.     CV: RRR. S1&S2+. No M/R/G appreciated.     Abdomen: +BS. Soft, NTND. No organomegaly.     MSK: Nml ROM    Extremities: No peripheral edema or cyanosis.     Neuro: A&Ox3, no focal deficits     Skin: Warm and dry. No visible rash.       Labs:  CAPILLARY BLOOD GLUCOSE      POCT Blood Glucose.: 74 mg/dL (02 Dec 2023 11:16)  POCT Blood Glucose.: 83 mg/dL (02 Dec 2023 09:06)  POCT Blood Glucose.: 125 mg/dL (02 Dec 2023 05:30)  POCT Blood Glucose.: 143 mg/dL (01 Dec 2023 21:10)  POCT Blood Glucose.: 116 mg/dL (01 Dec 2023 16:18)                          8.5    4.50  )-----------( 51       ( 02 Dec 2023 07:41 )             23.4     12-02    134<L>  |  98  |  31<H>  ----------------------------<  107<H>  4.0   |  26  |  3.56<H>    Ca    7.7<L>      02 Dec 2023 07:41  Phos  3.5     12-02  Mg     2.00     12-02    TPro  6.5  /  Alb  2.3<L>  /  TBili  1.1  /  DBili  0.8<H>  /  AST  83<H>  /  ALT  25  /  AlkPhos  347<H>  12-01            Urinalysis Basic - ( 02 Dec 2023 07:41 )    Color: x / Appearance: x / SG: x / pH: x  Gluc: 107 mg/dL / Ketone: x  / Bili: x / Urobili: x   Blood: x / Protein: x / Nitrite: x   Leuk Esterase: x / RBC: x / WBC x   Sq Epi: x / Non Sq Epi: x / Bacteria: x      Imaging Personally Reviewed:      MEDICATIONS  (STANDING):  aspirin enteric coated 81 milliGRAM(s) Oral daily  carvedilol 3.125 milliGRAM(s) Oral every 12 hours  chlorhexidine 2% Cloths 1 Application(s) Topical daily  clopidogrel Tablet 75 milliGRAM(s) Oral daily  dextrose 5%. 1000 milliLiter(s) (50 mL/Hr) IV Continuous <Continuous>  dextrose 5%. 1000 milliLiter(s) (100 mL/Hr) IV Continuous <Continuous>  dextrose 50% Injectable 25 Gram(s) IV Push once  epoetin dean (EPOGEN) Injectable 4000 Unit(s) IV Push <User Schedule>  folic acid 1 milliGRAM(s) Oral daily  gabapentin 100 milliGRAM(s) Oral <User Schedule>  glucagon  Injectable 1 milliGRAM(s) IntraMuscular once  levothyroxine 25 MICROGram(s) Oral daily  lidocaine   4% Patch 1 Patch Transdermal every 24 hours  sacubitril 24 mG/valsartan 26 mG 1 Tablet(s) Oral two times a day    MEDICATIONS  (PRN):  benzonatate 100 milliGRAM(s) Oral three times a day PRN Cough  dextrose Oral Gel 15 Gram(s) Oral once PRN Blood Glucose LESS THAN 70 milliGRAM(s)/deciliter  lactulose Syrup 20 Gram(s) Oral three times a day PRN 2-3 bm daily  polyethylene glycol 3350 17 Gram(s) Oral two times a day PRN 2-3 bm daily  sodium chloride 0.9% Bolus. 100 milliLiter(s) IV Bolus every 5 minutes PRN SBP LESS THAN or EQUAL to 90 mmHg  traMADol 25 milliGRAM(s) Oral every 12 hours PRN Moderate Pain (4 - 6)-Severe pain (7-10)

## 2023-12-02 NOTE — PROGRESS NOTE ADULT - ATTENDING COMMENTS
Patient examined and case reviewed in detail on bedside rounds  Critically ill and unstable on vent/pressors/HD with MOFS and sepsis  Frequent bedside visits with therapy change today.   I have personally and independently provided 35+ minutes of critical care services; this excludes time spent on separate procedures or teaching  I have personally and independently provided 75+ minutes of critical care services; this excludes time spent on separate procedures or teaching
Agree with above. Seen and examined with team at the bedside. Critically ill requiring frequent bedside visits. ESRD on HD with cardiomyopathy. Had stent placed recently. Cardiology input appreciated. Continue supportive care. Empiric abx almost completed. Improved BP off vasopressors.
Patient examined and case reviewed in detail on bedside rounds  Critically ill and unstable on vent/pressors with cor pulmonale on echo and sepsis   Frequent bedside visits with therapy change today.   I have personally and independently provided 35+ minutes of critical care services; this excludes time spent on separate procedures or teaching  I have personally and independently provided 75+ minutes of critical care services; this excludes time spent on separate procedures or teaching
personally saw and examined patient  labs and vital reviewed  agree with above assessment and plan  bp improved, has remained off pressors  suspect hypotension 2/2 sepsis  tte reviewed, ?new biv dysfunction  will need GDMT for HFrEF if bp can tolerate  would start with losartan and if tolerates, will consider bb  will need outpt follow up  will cont to follow
pt seen and examined   labs and vitals reviewed  agree with above assessment and plan  pt feeling well, improving daily  does not appear grossly hypervolemic  no cp or sob  would like to initiate GDMT for HFrEF with coreg and ace/arb/entresto, however pending symptoms and BP, this may have to start/cont as outpt  he will need outpt cards f/u.   will cont to follow with o[facility]
Agree with above. Critically ill on vent requiring frequent bedside visits. Critically ill on vasopressors. For HD today. On abx. Awaiting MRI for some aphasia to rule out new stroke. Supportive care. Daughter at the bedside and updated.
ESRD on HD s/p failed transplant   pneumonia and septic shock on high pressor requirement( going down today)  new and worsening RV enlargement. POCUS reviewed   continue CRRT for controlled UF and clearance. tolerating UF 100cc/hr  check K post changing K bath   discussed with MICU
ESRD on HD s/p failed transplant . remains critically ill intubated and on pressors   pneumonia and septic shock on high pressor requirement( going down )  new and worsening RV enlargement. POCUS reviewed with MICU team   continue CRRT for controlled UF and clearance. tolerating UF 100cc/hr  K and Ca better controlled   dose for eGFR around 30
Patient examined and case reviewed in detail on bedside rounds  Critically ill and unstable on vent/pressors/HD with sepsis    Frequent bedside visits with therapy change today.   I have personally and independently provided 35+ minutes of critical care services; this excludes time spent on separate procedures or teaching
This is a 73-year-old male with significant past medical history of end-stage renal disease on dialysis who comes in for altered mental status found to have acute hypoxic respiratory failure likely secondary to pulmonary edema from volume overload.  Patient was excessively extubated on Saturday.  Currently on nasal cannula.  Failed bedside speech and swallow evaluation on Saturday we will reattempt speech and swallow evaluation today otherwise we will need to place an NG tube.  Patient continues to require some pressors and unable to use midodrine due to Procardia.  We will try droxidopa 100 mg 3 times daily.  Rest of the plan as per above.
pt seen and examined  labs and vitals reviewed  agree with above assessment and plan  pt without complaints  tte reviewed, BiV dys noted  unclear if this is the etiology for profound hypotension  suspect pt recovering from sepsis  pressor wean in progress, will cont to monitor with you  currently pt off pressors, BP stable  will follow with you
ESRD . ongoing HD on pressors  plan for UF 1.5  reassess HD needs in am. if able to do HD then can remove non- tunneled catheter
ESRD . s/P CRRT and then back on HD s/p UF 1.5 11/13.  no plans for HD today   reasess in am
This is a 73-year-old male with significant past medical history of end-stage renal disease on dialysis who comes in for altered mental status found to have acute hypoxic respiratory failure likely secondary to pulmonary edema from volume overload.  Patient is awake and alert and following commands.  FiO2 requirements have significantly decreased.  Patient is CPAP and well.  We will attempt to extubate today.  Hemodialysis will resume tomorrow.  CRRT was stopped yesterday.  Continue antibiotics for now.  Rest of plan as per above.
personally saw and examined patient  labs and vital reviewed  agree with above assessment and plan  pt without complaints, does not appear hypervolemic  known biv dys  suspect initial hypotension in setting of sepsis 2/2 pna, now resolved, bp improved  will need GDMT for HFrEF  agree with started coreg, likely furhter med titration to be done as outpt, as would ease pt into full therapy given recent tenuous bp.  will need outpt cards f/u on d/c
Pt. with ESRD on HD (a outpatient), received CRRT in MICU from 11/8/23 to 11/10/23. Labs from last night reviewed. Assessment and plan for ESRD/HD as outlined above. No plan for HD today. Will assess need for HD daily. Monitor BP and labs.
Pt. with ESRD on HD (a outpatient), received CRRT in MICU from 11/8/23 to 11/10/23. Pt. clinically improved. Labs reviewed. Assessment and plan for ESRD/HD as outlined above. Plan for maintenance HD tomorrow. Monitor BP and labs.
1. ESRD - patient seen while receiving dialysis.  Tolerating treatment at time of visit.    2. Anemia - on OC.  See above discussion
esrd on HD   pt was evaluated during HD session this am. BP and Blood flow during dialysis are acceptable
as above

## 2023-12-02 NOTE — PROGRESS NOTE ADULT - PROBLEM SELECTOR PLAN 11
Normal FT4  Concerning for subclinical hypothyroidism   Discussed w/. endo, c/w levothyroxine 25mcg QD  Repeat TFTs in 4-6 weeks -w/ PCP or Endo ([facility]ocrine Clinic at [facility] [place]: [address] [place] [address]; Ph # [phone]) Normal FT4  Concerning for subclinical hypothyroidism   Discussed w/. endo, c/w levothyroxine 25mcg QD  Repeat TFTs in 4-6 weeks -w/ PCP or Endo (Endocrine Clinic at [facility] [place]: [address] [place] [address]; Ph # [phone]) Normal FT4  Concerning for subclinical hypothyroidism   Discussed w/. endo, c/w levothyroxine 25mcg QD  Repeat TFTs in 4-6 weeks -w/ PCP or Endo (Endocrine Clinic at [facility] [place]: [address] [place] [address]; Ph # [phone])

## 2023-12-02 NOTE — PROGRESS NOTE ADULT - PROBLEM SELECTOR PLAN 1
Pt. with ESRD on HD TIW (MWF, LUE AVF). Pt. also with history of kidney transplant (not on immunosuppression). Last HD as outpatient was done on 11/6/23 via L AVF. Pt. admitted to MICU with respiratory failure and systemic shock. Pt. initiated on CRRT on 11/8, discontinued on 11/10 as volume status and FiO2 requirements improved. Had some oozing from AVF after treatment - vascular completed fistulogram 11/29 no signs of bleed seen. Labs reviewed. Pt appears clinically stable while receiving HD via LUE AVF today. Dose medications as per ESRD/HD.

## 2023-12-03 LAB
GLUCOSE BLDC GLUCOMTR-MCNC: 136 MG/DL — HIGH (ref 70–99)
GLUCOSE BLDC GLUCOMTR-MCNC: 136 MG/DL — HIGH (ref 70–99)
GLUCOSE BLDC GLUCOMTR-MCNC: 162 MG/DL — HIGH (ref 70–99)
GLUCOSE BLDC GLUCOMTR-MCNC: 162 MG/DL — HIGH (ref 70–99)
GLUCOSE BLDC GLUCOMTR-MCNC: 236 MG/DL — HIGH (ref 70–99)
GLUCOSE BLDC GLUCOMTR-MCNC: 236 MG/DL — HIGH (ref 70–99)
GLUCOSE BLDC GLUCOMTR-MCNC: 98 MG/DL — SIGNIFICANT CHANGE UP (ref 70–99)
GLUCOSE BLDC GLUCOMTR-MCNC: 98 MG/DL — SIGNIFICANT CHANGE UP (ref 70–99)

## 2023-12-03 PROCEDURE — 99232 SBSQ HOSP IP/OBS MODERATE 35: CPT

## 2023-12-03 RX ADMIN — Medication 100 MILLIGRAM(S): at 22:53

## 2023-12-03 RX ADMIN — CARVEDILOL PHOSPHATE 3.12 MILLIGRAM(S): 80 CAPSULE, EXTENDED RELEASE ORAL at 07:00

## 2023-12-03 RX ADMIN — Medication 25 MICROGRAM(S): at 07:00

## 2023-12-03 RX ADMIN — CLOPIDOGREL BISULFATE 75 MILLIGRAM(S): 75 TABLET, FILM COATED ORAL at 11:17

## 2023-12-03 RX ADMIN — Medication 100 MILLIGRAM(S): at 13:01

## 2023-12-03 RX ADMIN — Medication 600 MILLIGRAM(S): at 19:25

## 2023-12-03 RX ADMIN — CHLORHEXIDINE GLUCONATE 1 APPLICATION(S): 213 SOLUTION TOPICAL at 11:19

## 2023-12-03 RX ADMIN — Medication 81 MILLIGRAM(S): at 11:18

## 2023-12-03 RX ADMIN — Medication 1 MILLIGRAM(S): at 11:18

## 2023-12-03 RX ADMIN — SACUBITRIL AND VALSARTAN 1 TABLET(S): 24; 26 TABLET, FILM COATED ORAL at 07:00

## 2023-12-03 RX ADMIN — SACUBITRIL AND VALSARTAN 1 TABLET(S): 24; 26 TABLET, FILM COATED ORAL at 17:12

## 2023-12-03 RX ADMIN — LIDOCAINE 1 PATCH: 4 CREAM TOPICAL at 17:12

## 2023-12-03 RX ADMIN — LIDOCAINE 1 PATCH: 4 CREAM TOPICAL at 07:00

## 2023-12-03 RX ADMIN — CARVEDILOL PHOSPHATE 3.12 MILLIGRAM(S): 80 CAPSULE, EXTENDED RELEASE ORAL at 17:12

## 2023-12-03 NOTE — PROGRESS NOTE ADULT - PROBLEM SELECTOR PLAN 1
Pt with anemia. likely AOCD. No e/o bleeding.   -Goal hgb >8 due to CAD and recent stents   -S/p 1U PRBC on 12/1 with good response   -EPO per Nephro   -CTM

## 2023-12-03 NOTE — PROGRESS NOTE ADULT - PROBLEM SELECTOR PLAN 11
Normal FT4  Concerning for subclinical hypothyroidism   Discussed w/. endo, c/w levothyroxine 25mcg QD  Repeat TFTs in 4-6 weeks -w/ PCP or Endo (Endocrine Clinic at [facility] [place]: [address] [place] [address]; Ph # [phone]) Normal FT4  Concerning for subclinical hypothyroidism   Discussed w/. endo, c/w levothyroxine 25mcg QD  Repeat TFTs in 4-6 weeks -w/ PCP or Endo (Endocrine Clinic at [facility] [place]: [address] [place] [address]; Ph # [phone])

## 2023-12-03 NOTE — PROGRESS NOTE ADULT - SUBJECTIVE AND OBJECTIVE BOX
**********************************************  [facility] Division of [facility] Medicine  Ayla Finley MD  Available via MS Teams  Pager: [phone]  **********************************************     Patient is a 73y old  Male who presents with a chief complaint of Bleeding LUE fistula (30 Nov 2023 14:10)    SUBJECTIVE / OVERNIGHT EVENTS: No acute events overnight. Patient examined at bedside this AM, with no subjective complaints. Denies CP, palpitations, SOB, n/v/d, abdominal pain.     OBJECTIVE:  Vital Signs Last 24 Hrs  T(C): 36.9 (03 Dec 2023 09:30), Max: 37.2 (02 Dec 2023 17:05)  T(F): 98.4 (03 Dec 2023 09:30), Max: 98.9 (02 Dec 2023 17:05)  HR: 79 (03 Dec 2023 09:30) (79 - 82)  BP: 147/70 (03 Dec 2023 09:30) (142/60 - 148/78)  BP(mean): --  RR: 18 (03 Dec 2023 09:30) (18 - 18)  SpO2: 100% (03 Dec 2023 09:30) (92% - 100%)    Parameters below as of 03 Dec 2023 09:30  Patient On (Oxygen Delivery Method): room air        I&O's Summary    02 Dec 2023 07:01  -  03 Dec 2023 07:00  --------------------------------------------------------  IN: 860 mL / OUT: 1500 mL / NET: -640 mL      Physical Exam:     General: No acute distress, well-appearing    Eyes: PERRL, EOMI     ENT: MMM, no oropharyngeal lesions or erythema appreciated     Pulm: No increased WOB. CTAB. No wheezing.     CV: RRR. S1&S2+. No M/R/G appreciated.     Abdomen: +BS. Soft, NTND. No organomegaly.     MSK: Nml ROM    Extremities: No peripheral edema or cyanosis.     Neuro: A&Ox3, no focal deficits     Skin: Warm and dry. No visible rash.       Labs:  CAPILLARY BLOOD GLUCOSE      POCT Blood Glucose.: 98 mg/dL (03 Dec 2023 16:42)  POCT Blood Glucose.: 236 mg/dL (03 Dec 2023 11:25)  POCT Blood Glucose.: 162 mg/dL (03 Dec 2023 07:16)  POCT Blood Glucose.: 116 mg/dL (02 Dec 2023 21:20)                          8.5    4.50  )-----------( 51       ( 02 Dec 2023 07:41 )             23.4     12-02    134<L>  |  98  |  31<H>  ----------------------------<  107<H>  4.0   |  26  |  3.56<H>    Ca    7.7<L>      02 Dec 2023 07:41  Phos  3.5     12-02  Mg     2.00     12-02              Urinalysis Basic - ( 02 Dec 2023 07:41 )    Color: x / Appearance: x / SG: x / pH: x  Gluc: 107 mg/dL / Ketone: x  / Bili: x / Urobili: x   Blood: x / Protein: x / Nitrite: x   Leuk Esterase: x / RBC: x / WBC x   Sq Epi: x / Non Sq Epi: x / Bacteria: x      Imaging Personally Reviewed:      MEDICATIONS  (STANDING):  aspirin enteric coated 81 milliGRAM(s) Oral daily  carvedilol 3.125 milliGRAM(s) Oral every 12 hours  chlorhexidine 2% Cloths 1 Application(s) Topical daily  clopidogrel Tablet 75 milliGRAM(s) Oral daily  dextrose 5%. 1000 milliLiter(s) (50 mL/Hr) IV Continuous <Continuous>  dextrose 5%. 1000 milliLiter(s) (100 mL/Hr) IV Continuous <Continuous>  dextrose 50% Injectable 25 Gram(s) IV Push once  epoetin dean (EPOGEN) Injectable 4000 Unit(s) IV Push <User Schedule>  folic acid 1 milliGRAM(s) Oral daily  gabapentin 100 milliGRAM(s) Oral <User Schedule>  glucagon  Injectable 1 milliGRAM(s) IntraMuscular once  levothyroxine 25 MICROGram(s) Oral daily  lidocaine   4% Patch 1 Patch Transdermal every 24 hours  sacubitril 24 mG/valsartan 26 mG 1 Tablet(s) Oral two times a day    MEDICATIONS  (PRN):  benzonatate 100 milliGRAM(s) Oral three times a day PRN Cough  dextrose Oral Gel 15 Gram(s) Oral once PRN Blood Glucose LESS THAN 70 milliGRAM(s)/deciliter  lactulose Syrup 20 Gram(s) Oral three times a day PRN 2-3 bm daily  polyethylene glycol 3350 17 Gram(s) Oral two times a day PRN 2-3 bm daily  sodium chloride 0.9% Bolus. 100 milliLiter(s) IV Bolus every 5 minutes PRN SBP LESS THAN or EQUAL to 90 mmHg  traMADol 25 milliGRAM(s) Oral every 12 hours PRN Moderate Pain (4 - 6)-Severe pain (7-10)   **********************************************  [facility] Division of [facility] Medicine  Ayla Finley MD  Available via MS Teams  Pager: [phone]  **********************************************     Patient is a 73y old  Male who presents with a chief complaint of Bleeding LUE fistula (30 Nov 2023 14:10)    SUBJECTIVE / OVERNIGHT EVENTS: No acute events overnight. Patient examined at bedside this AM, with no subjective complaints. Denies CP, palpitations, SOB, n/v/d, abdominal pain.     OBJECTIVE:  Vital Signs Last 24 Hrs  T(C): 36.9 (03 Dec 2023 09:30), Max: 37.2 (02 Dec 2023 17:05)  T(F): 98.4 (03 Dec 2023 09:30), Max: 98.9 (02 Dec 2023 17:05)  HR: 79 (03 Dec 2023 09:30) (79 - 82)  BP: 147/70 (03 Dec 2023 09:30) (142/60 - 148/78)  BP(mean): --  RR: 18 (03 Dec 2023 09:30) (18 - 18)  SpO2: 100% (03 Dec 2023 09:30) (92% - 100%)    Parameters below as of 03 Dec 2023 09:30  Patient On (Oxygen Delivery Method): room air        I&O's Summary    02 Dec 2023 07:01  -  03 Dec 2023 07:00  --------------------------------------------------------  IN: 860 mL / OUT: 1500 mL / NET: -640 mL      Physical Exam:     General: No acute distress, well-appearing    Eyes: PERRL, EOMI     ENT: MMM, no oropharyngeal lesions or erythema appreciated     Pulm: No increased WOB. CTAB. No wheezing.     CV: RRR. S1&S2+. No M/R/G appreciated.     Abdomen: +BS. Soft, NTND. No organomegaly.     MSK: Nml ROM    Extremities: No peripheral edema or cyanosis.     Neuro: A&Ox3, no focal deficits     Skin: Warm and dry. No visible rash.       Labs:  CAPILLARY BLOOD GLUCOSE      POCT Blood Glucose.: 98 mg/dL (03 Dec 2023 16:42)  POCT Blood Glucose.: 236 mg/dL (03 Dec 2023 11:25)  POCT Blood Glucose.: 162 mg/dL (03 Dec 2023 07:16)  POCT Blood Glucose.: 116 mg/dL (02 Dec 2023 21:20)                          8.5    4.50  )-----------( 51       ( 02 Dec 2023 07:41 )             23.4     12-02    134<L>  |  98  |  31<H>  ----------------------------<  107<H>  4.0   |  26  |  3.56<H>    Ca    7.7<L>      02 Dec 2023 07:41  Phos  3.5     12-02  Mg     2.00     12-02              Urinalysis Basic - ( 02 Dec 2023 07:41 )    Color: x / Appearance: x / SG: x / pH: x  Gluc: 107 mg/dL / Ketone: x  / Bili: x / Urobili: x   Blood: x / Protein: x / Nitrite: x   Leuk Esterase: x / RBC: x / WBC x   Sq Epi: x / Non Sq Epi: x / Bacteria: x      Imaging Personally Reviewed:      MEDICATIONS  (STANDING):  aspirin enteric coated 81 milliGRAM(s) Oral daily  carvedilol 3.125 milliGRAM(s) Oral every 12 hours  chlorhexidine 2% Cloths 1 Application(s) Topical daily  clopidogrel Tablet 75 milliGRAM(s) Oral daily  dextrose 5%. 1000 milliLiter(s) (50 mL/Hr) IV Continuous <Continuous>  dextrose 5%. 1000 milliLiter(s) (100 mL/Hr) IV Continuous <Continuous>  dextrose 50% Injectable 25 Gram(s) IV Push once  epoetin dean (EPOGEN) Injectable 4000 Unit(s) IV Push <User Schedule>  folic acid 1 milliGRAM(s) Oral daily  gabapentin 100 milliGRAM(s) Oral <User Schedule>  glucagon  Injectable 1 milliGRAM(s) IntraMuscular once  levothyroxine 25 MICROGram(s) Oral daily  lidocaine   4% Patch 1 Patch Transdermal every 24 hours  sacubitril 24 mG/valsartan 26 mG 1 Tablet(s) Oral two times a day    MEDICATIONS  (PRN):  benzonatate 100 milliGRAM(s) Oral three times a day PRN Cough  dextrose Oral Gel 15 Gram(s) Oral once PRN Blood Glucose LESS THAN 70 milliGRAM(s)/deciliter  lactulose Syrup 20 Gram(s) Oral three times a day PRN 2-3 bm daily  polyethylene glycol 3350 17 Gram(s) Oral two times a day PRN 2-3 bm daily  sodium chloride 0.9% Bolus. 100 milliLiter(s) IV Bolus every 5 minutes PRN SBP LESS THAN or EQUAL to 90 mmHg  traMADol 25 milliGRAM(s) Oral every 12 hours PRN Moderate Pain (4 - 6)-Severe pain (7-10)

## 2023-12-03 NOTE — PROGRESS NOTE ADULT - ASSESSMENT
73M with PMH of ESRD s/p kidney transplant on HD, CAD s/p multiple stents (most recently, RCA stent on 10/2023), T2DM, and HTN, with recent hospitalization for bacterial PNA, who originally presented to the ED on 11/7 after being found unresponsive with agonal breathing by his daughter. CPR  administered by the daughter. Admitted for AMS, intubated for airway protection and AHRF likely 2/2 multifocal pneumonia, s/p MICU course, transferred to medicine for further mgt. Noted to have oozing from fistula, s/p fistulogram per Vascular on 11/29, now resolved.

## 2023-12-04 LAB
ANION GAP SERPL CALC-SCNC: 11 MMOL/L — SIGNIFICANT CHANGE UP (ref 7–14)
ANION GAP SERPL CALC-SCNC: 11 MMOL/L — SIGNIFICANT CHANGE UP (ref 7–14)
BUN SERPL-MCNC: 32 MG/DL — HIGH (ref 7–23)
BUN SERPL-MCNC: 32 MG/DL — HIGH (ref 7–23)
CALCIUM SERPL-MCNC: 8.1 MG/DL — LOW (ref 8.4–10.5)
CALCIUM SERPL-MCNC: 8.1 MG/DL — LOW (ref 8.4–10.5)
CHLORIDE SERPL-SCNC: 100 MMOL/L — SIGNIFICANT CHANGE UP (ref 98–107)
CHLORIDE SERPL-SCNC: 100 MMOL/L — SIGNIFICANT CHANGE UP (ref 98–107)
CO2 SERPL-SCNC: 26 MMOL/L — SIGNIFICANT CHANGE UP (ref 22–31)
CO2 SERPL-SCNC: 26 MMOL/L — SIGNIFICANT CHANGE UP (ref 22–31)
CREAT SERPL-MCNC: 3.58 MG/DL — HIGH (ref 0.5–1.3)
CREAT SERPL-MCNC: 3.58 MG/DL — HIGH (ref 0.5–1.3)
EGFR: 17 ML/MIN/1.73M2 — LOW
EGFR: 17 ML/MIN/1.73M2 — LOW
GLUCOSE BLDC GLUCOMTR-MCNC: 100 MG/DL — HIGH (ref 70–99)
GLUCOSE BLDC GLUCOMTR-MCNC: 100 MG/DL — HIGH (ref 70–99)
GLUCOSE BLDC GLUCOMTR-MCNC: 125 MG/DL — HIGH (ref 70–99)
GLUCOSE BLDC GLUCOMTR-MCNC: 125 MG/DL — HIGH (ref 70–99)
GLUCOSE BLDC GLUCOMTR-MCNC: 128 MG/DL — HIGH (ref 70–99)
GLUCOSE BLDC GLUCOMTR-MCNC: 128 MG/DL — HIGH (ref 70–99)
GLUCOSE BLDC GLUCOMTR-MCNC: 143 MG/DL — HIGH (ref 70–99)
GLUCOSE BLDC GLUCOMTR-MCNC: 143 MG/DL — HIGH (ref 70–99)
GLUCOSE SERPL-MCNC: 86 MG/DL — SIGNIFICANT CHANGE UP (ref 70–99)
GLUCOSE SERPL-MCNC: 86 MG/DL — SIGNIFICANT CHANGE UP (ref 70–99)
HCT VFR BLD CALC: 26.1 % — LOW (ref 39–50)
HCT VFR BLD CALC: 26.1 % — LOW (ref 39–50)
HGB BLD-MCNC: 8.9 G/DL — LOW (ref 13–17)
HGB BLD-MCNC: 8.9 G/DL — LOW (ref 13–17)
MAGNESIUM SERPL-MCNC: 2 MG/DL — SIGNIFICANT CHANGE UP (ref 1.6–2.6)
MAGNESIUM SERPL-MCNC: 2 MG/DL — SIGNIFICANT CHANGE UP (ref 1.6–2.6)
MCHC RBC-ENTMCNC: 31.1 PG — SIGNIFICANT CHANGE UP (ref 27–34)
MCHC RBC-ENTMCNC: 31.1 PG — SIGNIFICANT CHANGE UP (ref 27–34)
MCHC RBC-ENTMCNC: 34.1 GM/DL — SIGNIFICANT CHANGE UP (ref 32–36)
MCHC RBC-ENTMCNC: 34.1 GM/DL — SIGNIFICANT CHANGE UP (ref 32–36)
MCV RBC AUTO: 91.3 FL — SIGNIFICANT CHANGE UP (ref 80–100)
MCV RBC AUTO: 91.3 FL — SIGNIFICANT CHANGE UP (ref 80–100)
NRBC # BLD: 0 /100 WBCS — SIGNIFICANT CHANGE UP (ref 0–0)
NRBC # BLD: 0 /100 WBCS — SIGNIFICANT CHANGE UP (ref 0–0)
NRBC # FLD: 0 K/UL — SIGNIFICANT CHANGE UP (ref 0–0)
NRBC # FLD: 0 K/UL — SIGNIFICANT CHANGE UP (ref 0–0)
PHOSPHATE SERPL-MCNC: 3.5 MG/DL — SIGNIFICANT CHANGE UP (ref 2.5–4.5)
PHOSPHATE SERPL-MCNC: 3.5 MG/DL — SIGNIFICANT CHANGE UP (ref 2.5–4.5)
PLATELET # BLD AUTO: 56 K/UL — LOW (ref 150–400)
PLATELET # BLD AUTO: 56 K/UL — LOW (ref 150–400)
POTASSIUM SERPL-MCNC: 4.7 MMOL/L — SIGNIFICANT CHANGE UP (ref 3.5–5.3)
POTASSIUM SERPL-MCNC: 4.7 MMOL/L — SIGNIFICANT CHANGE UP (ref 3.5–5.3)
POTASSIUM SERPL-SCNC: 4.7 MMOL/L — SIGNIFICANT CHANGE UP (ref 3.5–5.3)
POTASSIUM SERPL-SCNC: 4.7 MMOL/L — SIGNIFICANT CHANGE UP (ref 3.5–5.3)
RBC # BLD: 2.86 M/UL — LOW (ref 4.2–5.8)
RBC # BLD: 2.86 M/UL — LOW (ref 4.2–5.8)
RBC # FLD: 19.8 % — HIGH (ref 10.3–14.5)
RBC # FLD: 19.8 % — HIGH (ref 10.3–14.5)
SODIUM SERPL-SCNC: 137 MMOL/L — SIGNIFICANT CHANGE UP (ref 135–145)
SODIUM SERPL-SCNC: 137 MMOL/L — SIGNIFICANT CHANGE UP (ref 135–145)
WBC # BLD: 4.55 K/UL — SIGNIFICANT CHANGE UP (ref 3.8–10.5)
WBC # BLD: 4.55 K/UL — SIGNIFICANT CHANGE UP (ref 3.8–10.5)
WBC # FLD AUTO: 4.55 K/UL — SIGNIFICANT CHANGE UP (ref 3.8–10.5)
WBC # FLD AUTO: 4.55 K/UL — SIGNIFICANT CHANGE UP (ref 3.8–10.5)

## 2023-12-04 PROCEDURE — 99232 SBSQ HOSP IP/OBS MODERATE 35: CPT

## 2023-12-04 PROCEDURE — 99232 SBSQ HOSP IP/OBS MODERATE 35: CPT | Mod: GC

## 2023-12-04 PROCEDURE — 74018 RADEX ABDOMEN 1 VIEW: CPT | Mod: 26

## 2023-12-04 RX ORDER — ERYTHROPOIETIN 10000 [IU]/ML
6000 INJECTION, SOLUTION INTRAVENOUS; SUBCUTANEOUS
Refills: 0 | Status: DISCONTINUED | OUTPATIENT
Start: 2023-12-04 | End: 2023-12-05

## 2023-12-04 RX ADMIN — LIDOCAINE 1 PATCH: 4 CREAM TOPICAL at 19:04

## 2023-12-04 RX ADMIN — Medication 25 MICROGRAM(S): at 06:13

## 2023-12-04 RX ADMIN — CARVEDILOL PHOSPHATE 3.12 MILLIGRAM(S): 80 CAPSULE, EXTENDED RELEASE ORAL at 06:13

## 2023-12-04 RX ADMIN — LIDOCAINE 1 PATCH: 4 CREAM TOPICAL at 05:00

## 2023-12-04 RX ADMIN — CLOPIDOGREL BISULFATE 75 MILLIGRAM(S): 75 TABLET, FILM COATED ORAL at 11:34

## 2023-12-04 RX ADMIN — Medication 81 MILLIGRAM(S): at 11:34

## 2023-12-04 RX ADMIN — CHLORHEXIDINE GLUCONATE 1 APPLICATION(S): 213 SOLUTION TOPICAL at 11:35

## 2023-12-04 RX ADMIN — Medication 1 MILLIGRAM(S): at 11:33

## 2023-12-04 RX ADMIN — SACUBITRIL AND VALSARTAN 1 TABLET(S): 24; 26 TABLET, FILM COATED ORAL at 17:37

## 2023-12-04 RX ADMIN — Medication 600 MILLIGRAM(S): at 06:17

## 2023-12-04 RX ADMIN — LIDOCAINE 1 PATCH: 4 CREAM TOPICAL at 17:38

## 2023-12-04 RX ADMIN — GABAPENTIN 100 MILLIGRAM(S): 400 CAPSULE ORAL at 19:06

## 2023-12-04 RX ADMIN — Medication 600 MILLIGRAM(S): at 17:37

## 2023-12-04 RX ADMIN — SACUBITRIL AND VALSARTAN 1 TABLET(S): 24; 26 TABLET, FILM COATED ORAL at 06:12

## 2023-12-04 RX ADMIN — CARVEDILOL PHOSPHATE 3.12 MILLIGRAM(S): 80 CAPSULE, EXTENDED RELEASE ORAL at 17:37

## 2023-12-04 NOTE — PROGRESS NOTE ADULT - SUBJECTIVE AND OBJECTIVE BOX
[facility] DIVISION OF KIDNEY DISEASES AND HYPERTENSION -- HEMODIALYSIS NOTE   Nehrology Office [phone]  available on [employer] teams--> Betito Chi   --------------------------------------------------------------------------------  Chief Complaint: ESRD/Ongoing hemodialysis requirement  no complaints this am.   24 hour events/subjective:      ALLERGIES & MEDICATIONS  --------------------------------------------------------------------------------  Allergies    No Known Allergies    Intolerances      Standing Inpatient Medications  aspirin enteric coated 81 milliGRAM(s) Oral daily  carvedilol 3.125 milliGRAM(s) Oral every 12 hours  chlorhexidine 2% Cloths 1 Application(s) Topical daily  clopidogrel Tablet 75 milliGRAM(s) Oral daily  dextrose 5%. 1000 milliLiter(s) IV Continuous <Continuous>  dextrose 5%. 1000 milliLiter(s) IV Continuous <Continuous>  dextrose 50% Injectable 25 Gram(s) IV Push once  epoetin dean (EPOGEN) Injectable 4000 Unit(s) IV Push <User Schedule>  folic acid 1 milliGRAM(s) Oral daily  gabapentin 100 milliGRAM(s) Oral <User Schedule>  glucagon  Injectable 1 milliGRAM(s) IntraMuscular once  guaiFENesin  milliGRAM(s) Oral every 12 hours  levothyroxine 25 MICROGram(s) Oral daily  lidocaine   4% Patch 1 Patch Transdermal every 24 hours  sacubitril 24 mG/valsartan 26 mG 1 Tablet(s) Oral two times a day    PRN Inpatient Medications  benzonatate 100 milliGRAM(s) Oral three times a day PRN  dextrose Oral Gel 15 Gram(s) Oral once PRN  lactulose Syrup 20 Gram(s) Oral three times a day PRN  polyethylene glycol 3350 17 Gram(s) Oral two times a day PRN  sodium chloride 0.9% Bolus. 100 milliLiter(s) IV Bolus every 5 minutes PRN  traMADol 25 milliGRAM(s) Oral every 12 hours PRN          VITALS/PHYSICAL EXAM  --------------------------------------------------------------------------------  T(C): 36.7 (12-04-23 @ 09:40), Max: 36.9 (12-03-23 @ 22:00)  HR: 77 (12-04-23 @ 09:40) (77 - 81)  BP: 130/59 (12-04-23 @ 09:40) (130/59 - 145/71)  RR: 18 (12-04-23 @ 09:40) (18 - 18)  SpO2: 96% (12-04-23 @ 09:40) (96% - 100%)  Wt(kg): --        Physical Exam:  	Gen NAD  	HEENT: no JVD  	Pulm: CTABL  	CV: S1S2,  	Abd: Soft,   	Ext:  - edema B/L LE   	Neuro: Awake and alert  	Skin: Warm and dry          Vascular:  AVF + bruit    LABS/STUDIES  --------------------------------------------------------------------------------              8.9    4.55  >-----------<  56       [12-04-23 @ 06:10]              26.1     137  |  100  |  32  ----------------------------<  86      [12-04-23 @ 06:10]  4.7   |  26  |  3.58        Ca     8.1     [12-04-23 @ 06:10]      Mg     2.00     [12-04-23 @ 06:10]      Phos  3.5     [12-04-23 @ 06:10]            Iron 59, TIBC 118, %sat 50      [12-01-23 @ 05:44]  Ferritin 2663      [12-01-23 @ 05:44]  TSH 12.60      [11-20-23 @ 05:30]    HBsAb 320.9      [11-24-23 @ 07:00]  HBsAb Reactive      [11-13-23 @ 14:30]  HBsAg Nonreact      [11-13-23 @ 14:30]  HBcAb Reactive      [11-13-23 @ 14:30]  HCV 0.16, Nonreact      [11-13-23 @ 14:30]   [facility] DIVISION OF KIDNEY DISEASES AND HYPERTENSION -- HEMODIALYSIS NOTE   Nehrology Office [phone]  available on [employer] teams--> Betito Chi   --------------------------------------------------------------------------------  Chief Complaint: ESRD/Ongoing hemodialysis requirement  no complaints this am.   24 hour events/subjective:      ALLERGIES & MEDICATIONS  --------------------------------------------------------------------------------  Allergies    No Known Allergies    Intolerances      Standing Inpatient Medications  aspirin enteric coated 81 milliGRAM(s) Oral daily  carvedilol 3.125 milliGRAM(s) Oral every 12 hours  chlorhexidine 2% Cloths 1 Application(s) Topical daily  clopidogrel Tablet 75 milliGRAM(s) Oral daily  dextrose 5%. 1000 milliLiter(s) IV Continuous <Continuous>  dextrose 5%. 1000 milliLiter(s) IV Continuous <Continuous>  dextrose 50% Injectable 25 Gram(s) IV Push once  epoetin dean (EPOGEN) Injectable 4000 Unit(s) IV Push <User Schedule>  folic acid 1 milliGRAM(s) Oral daily  gabapentin 100 milliGRAM(s) Oral <User Schedule>  glucagon  Injectable 1 milliGRAM(s) IntraMuscular once  guaiFENesin  milliGRAM(s) Oral every 12 hours  levothyroxine 25 MICROGram(s) Oral daily  lidocaine   4% Patch 1 Patch Transdermal every 24 hours  sacubitril 24 mG/valsartan 26 mG 1 Tablet(s) Oral two times a day    PRN Inpatient Medications  benzonatate 100 milliGRAM(s) Oral three times a day PRN  dextrose Oral Gel 15 Gram(s) Oral once PRN  lactulose Syrup 20 Gram(s) Oral three times a day PRN  polyethylene glycol 3350 17 Gram(s) Oral two times a day PRN  sodium chloride 0.9% Bolus. 100 milliLiter(s) IV Bolus every 5 minutes PRN  traMADol 25 milliGRAM(s) Oral every 12 hours PRN          VITALS/PHYSICAL EXAM  --------------------------------------------------------------------------------  T(C): 36.7 (12-04-23 @ 09:40), Max: 36.9 (12-03-23 @ 22:00)  HR: 77 (12-04-23 @ 09:40) (77 - 81)  BP: 130/59 (12-04-23 @ 09:40) (130/59 - 145/71)  RR: 18 (12-04-23 @ 09:40) (18 - 18)  SpO2: 96% (12-04-23 @ 09:40) (96% - 100%)  Wt(kg): --        Physical Exam:  	Gen NAD  	HEENT: no JVD  	Pulm: CTABL  	CV: S1S2,  	Abd: Soft,   	Ext:  - edema B/L LE   	Neuro: Awake and alert  	Skin: Warm and dry          Vascular:  AVF + bruit    LABS/STUDIES  --------------------------------------------------------------------------------              8.9    4.55  >-----------<  56       [12-04-23 @ 06:10]              26.1     137  |  100  |  32  ----------------------------<  86      [12-04-23 @ 06:10]  4.7   |  26  |  3.58        Ca     8.1     [12-04-23 @ 06:10]      Mg     2.00     [12-04-23 @ 06:10]      Phos  3.5     [12-04-23 @ 06:10]            Iron 59, TIBC 118, %sat 50      [12-01-23 @ 05:44]  Ferritin 2663      [12-01-23 @ 05:44]  TSH 12.60      [11-20-23 @ 05:30]    HBsAb 320.9      [11-24-23 @ 07:00]  HBsAb Reactive      [11-13-23 @ 14:30]  HBsAg Nonreact      [11-13-23 @ 14:30]  HBcAb Reactive      [11-13-23 @ 14:30]  HCV 0.16, Nonreact      [11-13-23 @ 14:30]

## 2023-12-04 NOTE — PROGRESS NOTE ADULT - PROBLEM SELECTOR PLAN 4
Unclear etiology. Presume to be 2/2 infection. No s/s of bleeding.   Transfuse as indicated  continue to monitor  -Starting to stabilize  should have hematology f/up

## 2023-12-04 NOTE — PROGRESS NOTE ADULT - PROBLEM SELECTOR PLAN 11
Normal FT4  Concerning for subclinical hypothyroidism   Discussed w/. endo, c/w levothyroxine 25mcg QD  Repeat TFTs in 4-6 weeks -w/ PCP or Endo (Endocrine Clinic at [facility] [place]: [address] [place] [address]; Ph # [phone]) Normal FT4  Concerning for subclinical hypothyroidism   Discussed w/. endo, c/w levothyroxine 25mcg QD  Repeat TFTs in 4-6 weeks -w/ PCP or Endo (Endocrine Clinic at [facility] [place]: [address] [place] [address]; Ph # [phone])

## 2023-12-04 NOTE — PROGRESS NOTE ADULT - SUBJECTIVE AND OBJECTIVE BOX
[facility] Division of Hospital Medicine  Neda Oh MD  Hospitalist   Pager [phone]  Avaliable via MS teams     SUBJECTIVE / OVERNIGHT EVENTS: Pt seen and examined. patient with no acute complainants. Pt's wife at bedside- updated.     ADDITIONAL REVIEW OF SYSTEMS:    MEDICATIONS  (STANDING):  aspirin enteric coated 81 milliGRAM(s) Oral daily  carvedilol 3.125 milliGRAM(s) Oral every 12 hours  chlorhexidine 2% Cloths 1 Application(s) Topical daily  clopidogrel Tablet 75 milliGRAM(s) Oral daily  dextrose 5%. 1000 milliLiter(s) (50 mL/Hr) IV Continuous <Continuous>  dextrose 5%. 1000 milliLiter(s) (100 mL/Hr) IV Continuous <Continuous>  dextrose 50% Injectable 25 Gram(s) IV Push once  epoetin dean (EPOGEN) Injectable 6000 Unit(s) IV Push <User Schedule>  folic acid 1 milliGRAM(s) Oral daily  gabapentin 100 milliGRAM(s) Oral <User Schedule>  glucagon  Injectable 1 milliGRAM(s) IntraMuscular once  guaiFENesin  milliGRAM(s) Oral every 12 hours  levothyroxine 25 MICROGram(s) Oral daily  lidocaine   4% Patch 1 Patch Transdermal every 24 hours  sacubitril 24 mG/valsartan 26 mG 1 Tablet(s) Oral two times a day    MEDICATIONS  (PRN):  benzonatate 100 milliGRAM(s) Oral three times a day PRN Cough  dextrose Oral Gel 15 Gram(s) Oral once PRN Blood Glucose LESS THAN 70 milliGRAM(s)/deciliter  lactulose Syrup 20 Gram(s) Oral three times a day PRN 2-3 bm daily  polyethylene glycol 3350 17 Gram(s) Oral two times a day PRN 2-3 bm daily  sodium chloride 0.9% Bolus. 100 milliLiter(s) IV Bolus every 5 minutes PRN SBP LESS THAN or EQUAL to 90 mmHg  traMADol 25 milliGRAM(s) Oral every 12 hours PRN Moderate Pain (4 - 6)-Severe pain (7-10)      I&O's Summary      PHYSICAL EXAM:  Vital Signs Last 24 Hrs  T(C): 36.7 (04 Dec 2023 09:40), Max: 36.9 (03 Dec 2023 22:00)  T(F): 98.1 (04 Dec 2023 09:40), Max: 98.4 (03 Dec 2023 22:00)  HR: 77 (04 Dec 2023 09:40) (77 - 81)  BP: 130/59 (04 Dec 2023 09:40) (130/59 - 145/71)  BP(mean): --  RR: 18 (04 Dec 2023 09:40) (18 - 18)  SpO2: 96% (04 Dec 2023 09:40) (96% - 100%)    Parameters below as of 04 Dec 2023 09:40  Patient On (Oxygen Delivery Method): room air      General: No acute distress  Eyes: PERRL, EOMI   ENT: MMM, no oropharyngeal lesions or erythema appreciated   Pulm: No increased WOB. CTAB. No wheezing.   CV: RRR. S1&S2+. No M/R/G appreciated.   Abdomen: distended abdomen, nontender    MSK: Nml ROM   Extremities: No peripheral edema or cyanosis.    Neuro: A&Ox3, no focal deficits    Skin: Warm and dry. No visible rash.     LABS:                        8.9    4.55  )-----------( 56       ( 04 Dec 2023 06:10 )             26.1     12-04    137  |  100  |  32<H>  ----------------------------<  86  4.7   |  26  |  3.58<H>    Ca    8.1<L>      04 Dec 2023 06:10  Phos  3.5     12-04  Mg     2.00     12-04            Urinalysis Basic - ( 04 Dec 2023 06:10 )    Color: x / Appearance: x / SG: x / pH: x  Gluc: 86 mg/dL / Ketone: x  / Bili: x / Urobili: x   Blood: x / Protein: x / Nitrite: x   Leuk Esterase: x / RBC: x / WBC x   Sq Epi: x / Non Sq Epi: x / Bacteria: x         [facility] of Hospital Medicine  Neda Oh MD  Hospitalist   Pager [phone]  Avaliable via MS teams     SUBJECTIVE / OVERNIGHT EVENTS: Pt seen and examined. patient with no acute complainants. Pt's wife at bedside- updated.     ADDITIONAL REVIEW OF SYSTEMS:    MEDICATIONS  (STANDING):  aspirin enteric coated 81 milliGRAM(s) Oral daily  carvedilol 3.125 milliGRAM(s) Oral every 12 hours  chlorhexidine 2% Cloths 1 Application(s) Topical daily  clopidogrel Tablet 75 milliGRAM(s) Oral daily  dextrose 5%. 1000 milliLiter(s) (50 mL/Hr) IV Continuous <Continuous>  dextrose 5%. 1000 milliLiter(s) (100 mL/Hr) IV Continuous <Continuous>  dextrose 50% Injectable 25 Gram(s) IV Push once  epoetin dean (EPOGEN) Injectable 6000 Unit(s) IV Push <User Schedule>  folic acid 1 milliGRAM(s) Oral daily  gabapentin 100 milliGRAM(s) Oral <User Schedule>  glucagon  Injectable 1 milliGRAM(s) IntraMuscular once  guaiFENesin  milliGRAM(s) Oral every 12 hours  levothyroxine 25 MICROGram(s) Oral daily  lidocaine   4% Patch 1 Patch Transdermal every 24 hours  sacubitril 24 mG/valsartan 26 mG 1 Tablet(s) Oral two times a day    MEDICATIONS  (PRN):  benzonatate 100 milliGRAM(s) Oral three times a day PRN Cough  dextrose Oral Gel 15 Gram(s) Oral once PRN Blood Glucose LESS THAN 70 milliGRAM(s)/deciliter  lactulose Syrup 20 Gram(s) Oral three times a day PRN 2-3 bm daily  polyethylene glycol 3350 17 Gram(s) Oral two times a day PRN 2-3 bm daily  sodium chloride 0.9% Bolus. 100 milliLiter(s) IV Bolus every 5 minutes PRN SBP LESS THAN or EQUAL to 90 mmHg  traMADol 25 milliGRAM(s) Oral every 12 hours PRN Moderate Pain (4 - 6)-Severe pain (7-10)      I&O's Summary      PHYSICAL EXAM:  Vital Signs Last 24 Hrs  T(C): 36.7 (04 Dec 2023 09:40), Max: 36.9 (03 Dec 2023 22:00)  T(F): 98.1 (04 Dec 2023 09:40), Max: 98.4 (03 Dec 2023 22:00)  HR: 77 (04 Dec 2023 09:40) (77 - 81)  BP: 130/59 (04 Dec 2023 09:40) (130/59 - 145/71)  BP(mean): --  RR: 18 (04 Dec 2023 09:40) (18 - 18)  SpO2: 96% (04 Dec 2023 09:40) (96% - 100%)    Parameters below as of 04 Dec 2023 09:40  Patient On (Oxygen Delivery Method): room air      General: No acute distress  Eyes: PERRL, EOMI   ENT: MMM, no oropharyngeal lesions or erythema appreciated   Pulm: No increased WOB. CTAB. No wheezing.   CV: RRR. S1&S2+. No M/R/G appreciated.   Abdomen: distended abdomen, nontender    MSK: Nml ROM   Extremities: No peripheral edema or cyanosis.    Neuro: A&Ox3, no focal deficits    Skin: Warm and dry. No visible rash.     LABS:                        8.9    4.55  )-----------( 56       ( 04 Dec 2023 06:10 )             26.1     12-04    137  |  100  |  32<H>  ----------------------------<  86  4.7   |  26  |  3.58<H>    Ca    8.1<L>      04 Dec 2023 06:10  Phos  3.5     12-04  Mg     2.00     12-04            Urinalysis Basic - ( 04 Dec 2023 06:10 )    Color: x / Appearance: x / SG: x / pH: x  Gluc: 86 mg/dL / Ketone: x  / Bili: x / Urobili: x   Blood: x / Protein: x / Nitrite: x   Leuk Esterase: x / RBC: x / WBC x   Sq Epi: x / Non Sq Epi: x / Bacteria: x

## 2023-12-04 NOTE — PROGRESS NOTE ADULT - PROBLEM SELECTOR PLAN 1
Pt with anemia. likely AOCD. No e/o bleeding.   -Goal hgb >8 due to CAD and recent stents   -S/p 1U PRBC on 12/1 with good response   -EPO per Nephro   -CTM    #abdominal distention  f/up abdominal xray

## 2023-12-04 NOTE — PROGRESS NOTE ADULT - PROBLEM SELECTOR PLAN 1
Pt. with ESRD on HD TIW (MWF, LUE AVF). Pt. also with history of kidney transplant (not on immunosuppression). Last HD as outpatient was done on 11/6/23 via L AVF. Pt. admitted to MICU with respiratory failure and systemic shock. Pt. initiated on CRRT on 11/8, discontinued on 11/10 as volume status and FiO2 requirements improved. Had some oozing from AVF after treatment - vascular completed fistulogram 11/29 no signs of bleed seen. Labs reviewed.  Dose medications as per ESRD/HD.  plan for HD tomorrow.

## 2023-12-05 ENCOUNTER — TRANSCRIPTION ENCOUNTER (OUTPATIENT)
Age: 73
End: 2023-12-05

## 2023-12-05 VITALS
HEART RATE: 87 BPM | DIASTOLIC BLOOD PRESSURE: 77 MMHG | SYSTOLIC BLOOD PRESSURE: 135 MMHG | OXYGEN SATURATION: 99 % | RESPIRATION RATE: 18 BRPM | TEMPERATURE: 98 F

## 2023-12-05 LAB
ANION GAP SERPL CALC-SCNC: 12 MMOL/L — SIGNIFICANT CHANGE UP (ref 7–14)
ANION GAP SERPL CALC-SCNC: 12 MMOL/L — SIGNIFICANT CHANGE UP (ref 7–14)
BUN SERPL-MCNC: 45 MG/DL — HIGH (ref 7–23)
BUN SERPL-MCNC: 45 MG/DL — HIGH (ref 7–23)
CALCIUM SERPL-MCNC: 8.1 MG/DL — LOW (ref 8.4–10.5)
CALCIUM SERPL-MCNC: 8.1 MG/DL — LOW (ref 8.4–10.5)
CHLORIDE SERPL-SCNC: 100 MMOL/L — SIGNIFICANT CHANGE UP (ref 98–107)
CHLORIDE SERPL-SCNC: 100 MMOL/L — SIGNIFICANT CHANGE UP (ref 98–107)
CO2 SERPL-SCNC: 25 MMOL/L — SIGNIFICANT CHANGE UP (ref 22–31)
CO2 SERPL-SCNC: 25 MMOL/L — SIGNIFICANT CHANGE UP (ref 22–31)
CREAT SERPL-MCNC: 4.4 MG/DL — HIGH (ref 0.5–1.3)
CREAT SERPL-MCNC: 4.4 MG/DL — HIGH (ref 0.5–1.3)
EGFR: 14 ML/MIN/1.73M2 — LOW
EGFR: 14 ML/MIN/1.73M2 — LOW
GLUCOSE BLDC GLUCOMTR-MCNC: 127 MG/DL — HIGH (ref 70–99)
GLUCOSE BLDC GLUCOMTR-MCNC: 127 MG/DL — HIGH (ref 70–99)
GLUCOSE BLDC GLUCOMTR-MCNC: 166 MG/DL — HIGH (ref 70–99)
GLUCOSE BLDC GLUCOMTR-MCNC: 166 MG/DL — HIGH (ref 70–99)
GLUCOSE BLDC GLUCOMTR-MCNC: 77 MG/DL — SIGNIFICANT CHANGE UP (ref 70–99)
GLUCOSE BLDC GLUCOMTR-MCNC: 77 MG/DL — SIGNIFICANT CHANGE UP (ref 70–99)
GLUCOSE BLDC GLUCOMTR-MCNC: 87 MG/DL — SIGNIFICANT CHANGE UP (ref 70–99)
GLUCOSE BLDC GLUCOMTR-MCNC: 87 MG/DL — SIGNIFICANT CHANGE UP (ref 70–99)
GLUCOSE BLDC GLUCOMTR-MCNC: 94 MG/DL — SIGNIFICANT CHANGE UP (ref 70–99)
GLUCOSE BLDC GLUCOMTR-MCNC: 94 MG/DL — SIGNIFICANT CHANGE UP (ref 70–99)
GLUCOSE SERPL-MCNC: 77 MG/DL — SIGNIFICANT CHANGE UP (ref 70–99)
GLUCOSE SERPL-MCNC: 77 MG/DL — SIGNIFICANT CHANGE UP (ref 70–99)
HCT VFR BLD CALC: 25.8 % — LOW (ref 39–50)
HCT VFR BLD CALC: 25.8 % — LOW (ref 39–50)
HGB BLD-MCNC: 9.1 G/DL — LOW (ref 13–17)
HGB BLD-MCNC: 9.1 G/DL — LOW (ref 13–17)
MAGNESIUM SERPL-MCNC: 2.1 MG/DL — SIGNIFICANT CHANGE UP (ref 1.6–2.6)
MAGNESIUM SERPL-MCNC: 2.1 MG/DL — SIGNIFICANT CHANGE UP (ref 1.6–2.6)
MCHC RBC-ENTMCNC: 31.3 PG — SIGNIFICANT CHANGE UP (ref 27–34)
MCHC RBC-ENTMCNC: 31.3 PG — SIGNIFICANT CHANGE UP (ref 27–34)
MCHC RBC-ENTMCNC: 35.3 GM/DL — SIGNIFICANT CHANGE UP (ref 32–36)
MCHC RBC-ENTMCNC: 35.3 GM/DL — SIGNIFICANT CHANGE UP (ref 32–36)
MCV RBC AUTO: 88.7 FL — SIGNIFICANT CHANGE UP (ref 80–100)
MCV RBC AUTO: 88.7 FL — SIGNIFICANT CHANGE UP (ref 80–100)
NRBC # BLD: 0 /100 WBCS — SIGNIFICANT CHANGE UP (ref 0–0)
NRBC # BLD: 0 /100 WBCS — SIGNIFICANT CHANGE UP (ref 0–0)
NRBC # FLD: 0 K/UL — SIGNIFICANT CHANGE UP (ref 0–0)
NRBC # FLD: 0 K/UL — SIGNIFICANT CHANGE UP (ref 0–0)
PHOSPHATE SERPL-MCNC: 4.5 MG/DL — SIGNIFICANT CHANGE UP (ref 2.5–4.5)
PHOSPHATE SERPL-MCNC: 4.5 MG/DL — SIGNIFICANT CHANGE UP (ref 2.5–4.5)
PLATELET # BLD AUTO: 58 K/UL — LOW (ref 150–400)
PLATELET # BLD AUTO: 58 K/UL — LOW (ref 150–400)
POTASSIUM SERPL-MCNC: 5.5 MMOL/L — HIGH (ref 3.5–5.3)
POTASSIUM SERPL-MCNC: 5.5 MMOL/L — HIGH (ref 3.5–5.3)
POTASSIUM SERPL-SCNC: 5.5 MMOL/L — HIGH (ref 3.5–5.3)
POTASSIUM SERPL-SCNC: 5.5 MMOL/L — HIGH (ref 3.5–5.3)
RBC # BLD: 2.91 M/UL — LOW (ref 4.2–5.8)
RBC # BLD: 2.91 M/UL — LOW (ref 4.2–5.8)
RBC # FLD: 19.2 % — HIGH (ref 10.3–14.5)
RBC # FLD: 19.2 % — HIGH (ref 10.3–14.5)
SODIUM SERPL-SCNC: 137 MMOL/L — SIGNIFICANT CHANGE UP (ref 135–145)
SODIUM SERPL-SCNC: 137 MMOL/L — SIGNIFICANT CHANGE UP (ref 135–145)
WBC # BLD: 4.54 K/UL — SIGNIFICANT CHANGE UP (ref 3.8–10.5)
WBC # BLD: 4.54 K/UL — SIGNIFICANT CHANGE UP (ref 3.8–10.5)
WBC # FLD AUTO: 4.54 K/UL — SIGNIFICANT CHANGE UP (ref 3.8–10.5)
WBC # FLD AUTO: 4.54 K/UL — SIGNIFICANT CHANGE UP (ref 3.8–10.5)

## 2023-12-05 PROCEDURE — 99239 HOSP IP/OBS DSCHRG MGMT >30: CPT

## 2023-12-05 PROCEDURE — 90935 HEMODIALYSIS ONE EVALUATION: CPT | Mod: GC

## 2023-12-05 RX ORDER — INSULIN GLARGINE 100 [IU]/ML
6 INJECTION, SOLUTION SUBCUTANEOUS
Refills: 0 | DISCHARGE

## 2023-12-05 RX ORDER — TRAMADOL HYDROCHLORIDE 50 MG/1
0.5 TABLET ORAL
Qty: 0 | Refills: 0 | DISCHARGE
Start: 2023-12-05

## 2023-12-05 RX ORDER — POLYETHYLENE GLYCOL 3350 17 G/17G
17 POWDER, FOR SOLUTION ORAL
Qty: 0 | Refills: 0 | DISCHARGE
Start: 2023-12-05

## 2023-12-05 RX ORDER — ASPIRIN/CALCIUM CARB/MAGNESIUM 324 MG
1 TABLET ORAL
Refills: 0 | DISCHARGE

## 2023-12-05 RX ORDER — LACTULOSE 10 G/15ML
30 SOLUTION ORAL
Qty: 0 | Refills: 0 | DISCHARGE
Start: 2023-12-05

## 2023-12-05 RX ORDER — LEVOTHYROXINE SODIUM 125 MCG
1 TABLET ORAL
Qty: 0 | Refills: 0 | DISCHARGE
Start: 2023-12-05

## 2023-12-05 RX ORDER — TAMSULOSIN HYDROCHLORIDE 0.4 MG/1
1 CAPSULE ORAL
Refills: 0 | DISCHARGE

## 2023-12-05 RX ORDER — LIDOCAINE 4 G/100G
1 CREAM TOPICAL
Qty: 0 | Refills: 0 | DISCHARGE
Start: 2023-12-05

## 2023-12-05 RX ORDER — FOLIC ACID 0.8 MG
1 TABLET ORAL
Refills: 0 | DISCHARGE

## 2023-12-05 RX ORDER — NITROGLYCERIN 6.5 MG
1 CAPSULE, EXTENDED RELEASE ORAL
Refills: 0 | DISCHARGE

## 2023-12-05 RX ORDER — ASPIRIN/CALCIUM CARB/MAGNESIUM 324 MG
1 TABLET ORAL
Qty: 0 | Refills: 0 | DISCHARGE
Start: 2023-12-05

## 2023-12-05 RX ORDER — CLOPIDOGREL BISULFATE 75 MG/1
1 TABLET, FILM COATED ORAL
Qty: 0 | Refills: 0 | DISCHARGE
Start: 2023-12-05

## 2023-12-05 RX ORDER — ATORVASTATIN CALCIUM 80 MG/1
1 TABLET, FILM COATED ORAL
Refills: 0 | DISCHARGE

## 2023-12-05 RX ORDER — CARVEDILOL PHOSPHATE 80 MG/1
1 CAPSULE, EXTENDED RELEASE ORAL
Refills: 0 | DISCHARGE

## 2023-12-05 RX ORDER — FOLIC ACID 0.8 MG
1 TABLET ORAL
Qty: 0 | Refills: 0 | DISCHARGE
Start: 2023-12-05

## 2023-12-05 RX ORDER — INSULIN LISPRO 100/ML
15 VIAL (ML) SUBCUTANEOUS
Refills: 0 | DISCHARGE

## 2023-12-05 RX ORDER — CARVEDILOL PHOSPHATE 80 MG/1
1 CAPSULE, EXTENDED RELEASE ORAL
Qty: 0 | Refills: 0 | DISCHARGE
Start: 2023-12-05

## 2023-12-05 RX ORDER — CLOPIDOGREL BISULFATE 75 MG/1
1 TABLET, FILM COATED ORAL
Refills: 0 | DISCHARGE

## 2023-12-05 RX ORDER — GABAPENTIN 400 MG/1
1 CAPSULE ORAL
Qty: 0 | Refills: 0 | DISCHARGE
Start: 2023-12-05

## 2023-12-05 RX ORDER — SACUBITRIL AND VALSARTAN 24; 26 MG/1; MG/1
1 TABLET, FILM COATED ORAL
Qty: 0 | Refills: 0 | DISCHARGE
Start: 2023-12-05

## 2023-12-05 RX ADMIN — CARVEDILOL PHOSPHATE 3.12 MILLIGRAM(S): 80 CAPSULE, EXTENDED RELEASE ORAL at 17:17

## 2023-12-05 RX ADMIN — CHLORHEXIDINE GLUCONATE 1 APPLICATION(S): 213 SOLUTION TOPICAL at 11:38

## 2023-12-05 RX ADMIN — LIDOCAINE 1 PATCH: 4 CREAM TOPICAL at 05:40

## 2023-12-05 RX ADMIN — Medication 81 MILLIGRAM(S): at 11:33

## 2023-12-05 RX ADMIN — ERYTHROPOIETIN 6000 UNIT(S): 10000 INJECTION, SOLUTION INTRAVENOUS; SUBCUTANEOUS at 08:32

## 2023-12-05 RX ADMIN — CLOPIDOGREL BISULFATE 75 MILLIGRAM(S): 75 TABLET, FILM COATED ORAL at 11:33

## 2023-12-05 RX ADMIN — Medication 600 MILLIGRAM(S): at 17:17

## 2023-12-05 RX ADMIN — LIDOCAINE 1 PATCH: 4 CREAM TOPICAL at 17:21

## 2023-12-05 RX ADMIN — Medication 1 MILLIGRAM(S): at 11:33

## 2023-12-05 RX ADMIN — SACUBITRIL AND VALSARTAN 1 TABLET(S): 24; 26 TABLET, FILM COATED ORAL at 17:17

## 2023-12-05 RX ADMIN — LIDOCAINE 1 PATCH: 4 CREAM TOPICAL at 19:21

## 2023-12-05 NOTE — PROGRESS NOTE ADULT - PROBLEM SELECTOR PROBLEM 9
CAD (coronary artery disease)
Elevated TSH
Elevated TSH
CAD (coronary artery disease)
Elevated TSH
Elevated TSH
CAD (coronary artery disease)
Need for prophylactic measure
Need for prophylactic measure
Elevated TSH
Elevated TSH
Need for prophylactic measure
Need for prophylactic measure
Elevated TSH
Need for prophylactic measure
CAD (coronary artery disease)
CAD (coronary artery disease)

## 2023-12-05 NOTE — PROGRESS NOTE ADULT - PROBLEM SELECTOR PLAN 1
Pt. with ESRD on HD TIW (MWF, LUE AVF). Pt. also with history of kidney transplant (not on immunosuppression). Last HD as outpatient was done on 11/6/23 via L AVF. Pt. admitted to MICU with respiratory failure and systemic shock. Pt. initiated on CRRT on 11/8, discontinued on 11/10 as volume status and FiO2 requirements improved. Had some oozing from AVF after treatment - vascular completed fistulogram 11/29 no signs of bleed seen. Labs reviewed.  Dose medications as per ESRD/HD.   HD ongoing. UF 2-2.5 Kg as tolerated   next HD 12/7

## 2023-12-05 NOTE — PROGRESS NOTE ADULT - NUTRITIONAL ASSESSMENT
This patient has been assessed with a concern for Malnutrition and has been determined to have a diagnosis/diagnoses of Severe protein-calorie malnutrition.    This patient is being managed with:   Diet Renal Restrictions-  For patients receiving Renal Replacement - No Protein Restr No Conc K No Conc Phos Low Sodium  DASH/TLC {Sodium & Cholesterol Restricted} (DASH)  Supplement Feeding Modality:  Oral  Nepro Cans or Servings Per Day:  1       Frequency:  Daily  Entered: Nov 30 2023  4:50PM  
This patient has been assessed with a concern for Malnutrition and has been determined to have a diagnosis/diagnoses of Severe protein-calorie malnutrition.    This patient is being managed with:   Diet NPO with Tube Feed-  Tube Feeding Modality: Orogastric  Glucerna 1.5 Juan Carlos (GLUCERNA1.5RTH)  Total Volume for 24 Hours (mL): 960  Continuous  Starting Tube Feed Rate {mL per Hour}: 10  Increase Tube Feed Rate by (mL): 10     Every 4 hours  Until Goal Tube Feed Rate (mL per Hour): 40  Tube Feed Duration (in Hours): 24  Tube Feed Start Time: 02:00  Entered: Nov 8 2023  1:54AM  
This patient has been assessed with a concern for Malnutrition and has been determined to have a diagnosis/diagnoses of Severe protein-calorie malnutrition.    This patient is being managed with:   Diet Soft and Bite Sized-  Consistent Carbohydrate {No Snacks} (CSTCHO)  DASH/TLC {Sodium & Cholesterol Restricted} (DASH)  For patients receiving Renal Replacement - No Protein Restr No Conc K No Conc Phos Low Sodium (RENAL)  Entered: Nov 12 2023 12:01PM  
This patient has been assessed with a concern for Malnutrition and has been determined to have a diagnosis/diagnoses of Severe protein-calorie malnutrition.    This patient is being managed with:   Diet Soft and Bite Sized-  Consistent Carbohydrate {No Snacks} (CSTCHO)  DASH/TLC {Sodium & Cholesterol Restricted} (DASH)  For patients receiving Renal Replacement - No Protein Restr No Conc K No Conc Phos Low Sodium (RENAL)  Entered: Nov 12 2023 12:01PM  
This patient has been assessed with a concern for Malnutrition and has been determined to have a diagnosis/diagnoses of Severe protein-calorie malnutrition.    This patient is being managed with:   Diet Soft and Bite Sized-  Consistent Carbohydrate {No Snacks} (CSTCHO)  DASH/TLC {Sodium & Cholesterol Restricted} (DASH)  For patients receiving Renal Replacement - No Protein Restr No Conc K No Conc Phos Low Sodium (RENAL)  Entered: Nov 23 2023  3:05PM  
This patient has been assessed with a concern for Malnutrition and has been determined to have a diagnosis/diagnoses of Severe protein-calorie malnutrition.    This patient is being managed with:   Diet NPO with Tube Feed-  Tube Feeding Modality: Orogastric  Glucerna 1.5 Juan Carlos (GLUCERNA1.5RTH)  Total Volume for 24 Hours (mL): 960  Continuous  Starting Tube Feed Rate {mL per Hour}: 10  Increase Tube Feed Rate by (mL): 10     Every 4 hours  Until Goal Tube Feed Rate (mL per Hour): 40  Tube Feed Duration (in Hours): 24  Tube Feed Start Time: 02:00  Entered: Nov 8 2023  1:54AM  
This patient has been assessed with a concern for Malnutrition and has been determined to have a diagnosis/diagnoses of Severe protein-calorie malnutrition.    This patient is being managed with:   Diet Soft and Bite Sized-  Consistent Carbohydrate {No Snacks} (CSTCHO)  DASH/TLC {Sodium & Cholesterol Restricted} (DASH)  For patients receiving Renal Replacement - No Protein Restr No Conc K No Conc Phos Low Sodium (RENAL)  Entered: Nov 23 2023  3:05PM  
This patient has been assessed with a concern for Malnutrition and has been determined to have a diagnosis/diagnoses of Severe protein-calorie malnutrition.    This patient is being managed with:   Diet Soft and Bite Sized-  DASH/TLC {Sodium & Cholesterol Restricted} (DASH)  For patients receiving Renal Replacement - No Protein Restr No Conc K No Conc Phos Low Sodium (RENAL)  Entered: Nov 29 2023  6:22AM  
This patient has been assessed with a concern for Malnutrition and has been determined to have a diagnosis/diagnoses of Severe protein-calorie malnutrition.    This patient is being managed with:   Diet Soft and Bite Sized-  Consistent Carbohydrate {No Snacks} (CSTCHO)  DASH/TLC {Sodium & Cholesterol Restricted} (DASH)  For patients receiving Renal Replacement - No Protein Restr No Conc K No Conc Phos Low Sodium (RENAL)  Entered: Nov 12 2023 12:01PM  
This patient has been assessed with a concern for Malnutrition and has been determined to have a diagnosis/diagnoses of Severe protein-calorie malnutrition.    This patient is being managed with:   Diet Renal Restrictions-  For patients receiving Renal Replacement - No Protein Restr No Conc K No Conc Phos Low Sodium  DASH/TLC {Sodium & Cholesterol Restricted} (DASH)  Supplement Feeding Modality:  Oral  Nepro Cans or Servings Per Day:  1       Frequency:  Daily  Entered: Nov 30 2023  4:50PM  
This patient has been assessed with a concern for Malnutrition and has been determined to have a diagnosis/diagnoses of Severe protein-calorie malnutrition.    This patient is being managed with:   Diet Soft and Bite Sized-  Consistent Carbohydrate {No Snacks} (CSTCHO)  DASH/TLC {Sodium & Cholesterol Restricted} (DASH)  For patients receiving Renal Replacement - No Protein Restr No Conc K No Conc Phos Low Sodium (RENAL)  Entered: Nov 12 2023 12:01PM  
This patient has been assessed with a concern for Malnutrition and has been determined to have a diagnosis/diagnoses of Severe protein-calorie malnutrition.    This patient is being managed with:   Diet Soft and Bite Sized-  Consistent Carbohydrate {No Snacks} (CSTCHO)  DASH/TLC {Sodium & Cholesterol Restricted} (DASH)  For patients receiving Renal Replacement - No Protein Restr No Conc K No Conc Phos Low Sodium (RENAL)  Entered: Nov 23 2023  3:05PM  
This patient has been assessed with a concern for Malnutrition and has been determined to have a diagnosis/diagnoses of Severe protein-calorie malnutrition.    This patient is being managed with:   Diet Soft and Bite Sized-  Consistent Carbohydrate {No Snacks} (CSTCHO)  DASH/TLC {Sodium & Cholesterol Restricted} (DASH)  For patients receiving Renal Replacement - No Protein Restr No Conc K No Conc Phos Low Sodium (RENAL)  Entered: Nov 12 2023 12:01PM  
This patient has been assessed with a concern for Malnutrition and has been determined to have a diagnosis/diagnoses of Severe protein-calorie malnutrition.    This patient is being managed with:   Diet Renal Restrictions-  For patients receiving Renal Replacement - No Protein Restr No Conc K No Conc Phos Low Sodium  DASH/TLC {Sodium & Cholesterol Restricted} (DASH)  Entered: Nov 30 2023 12:51PM  
This patient has been assessed with a concern for Malnutrition and has been determined to have a diagnosis/diagnoses of Severe protein-calorie malnutrition.    This patient is being managed with:   Diet Soft and Bite Sized-  Consistent Carbohydrate {No Snacks} (CSTCHO)  DASH/TLC {Sodium & Cholesterol Restricted} (DASH)  For patients receiving Renal Replacement - No Protein Restr No Conc K No Conc Phos Low Sodium (RENAL)  Entered: Nov 12 2023 12:01PM  
This patient has been assessed with a concern for Malnutrition and has been determined to have a diagnosis/diagnoses of Severe protein-calorie malnutrition.    This patient is being managed with:   Diet NPO after Midnight-     NPO Start Date: 28-Nov-2023   NPO Start Time: 23:59  Except Medications  Entered: Nov 28 2023 12:46PM    Diet Soft and Bite Sized-  DASH/TLC {Sodium & Cholesterol Restricted} (DASH)  For patients receiving Renal Replacement - No Protein Restr No Conc K No Conc Phos Low Sodium (RENAL)  Entered: Nov 28 2023  9:41AM  
This patient has been assessed with a concern for Malnutrition and has been determined to have a diagnosis/diagnoses of Severe protein-calorie malnutrition.    This patient is being managed with:   Diet Soft and Bite Sized-  Consistent Carbohydrate {No Snacks} (CSTCHO)  DASH/TLC {Sodium & Cholesterol Restricted} (DASH)  For patients receiving Renal Replacement - No Protein Restr No Conc K No Conc Phos Low Sodium (RENAL)  Entered: Nov 12 2023 12:01PM  
This patient has been assessed with a concern for Malnutrition and has been determined to have a diagnosis/diagnoses of Severe protein-calorie malnutrition.    This patient is being managed with:   Diet Soft and Bite Sized-  Consistent Carbohydrate {No Snacks} (CSTCHO)  DASH/TLC {Sodium & Cholesterol Restricted} (DASH)  For patients receiving Renal Replacement - No Protein Restr No Conc K No Conc Phos Low Sodium (RENAL)  Entered: Nov 12 2023 12:01PM  
This patient has been assessed with a concern for Malnutrition and has been determined to have a diagnosis/diagnoses of Severe protein-calorie malnutrition.    This patient is being managed with:   Diet Soft and Bite Sized-  Consistent Carbohydrate {No Snacks} (CSTCHO)  DASH/TLC {Sodium & Cholesterol Restricted} (DASH)  For patients receiving Renal Replacement - No Protein Restr No Conc K No Conc Phos Low Sodium (RENAL)  Entered: Nov 23 2023  3:05PM  
This patient has been assessed with a concern for Malnutrition and has been determined to have a diagnosis/diagnoses of Severe protein-calorie malnutrition.    This patient is being managed with:   Diet Soft and Bite Sized-  Consistent Carbohydrate {No Snacks} (CSTCHO)  DASH/TLC {Sodium & Cholesterol Restricted} (DASH)  For patients receiving Renal Replacement - No Protein Restr No Conc K No Conc Phos Low Sodium (RENAL)  Entered: Nov 23 2023  3:05PM  
This patient has been assessed with a concern for Malnutrition and has been determined to have a diagnosis/diagnoses of Severe protein-calorie malnutrition.    This patient is being managed with:   Diet Renal Restrictions-  For patients receiving Renal Replacement - No Protein Restr No Conc K No Conc Phos Low Sodium  DASH/TLC {Sodium & Cholesterol Restricted} (DASH)  Supplement Feeding Modality:  Oral  Nepro Cans or Servings Per Day:  1       Frequency:  Daily  Entered: Nov 30 2023  4:50PM

## 2023-12-05 NOTE — PROGRESS NOTE ADULT - PROBLEM SELECTOR PLAN 11
Normal FT4  Concerning for subclinical hypothyroidism   Discussed w/. endo, c/w levothyroxine 25mcg QD  Repeat TFTs in 4-6 weeks -w/ PCP or Endo ([facility]: [id number] [place] [address]; Ph # [phone]) Normal FT4  Concerning for subclinical hypothyroidism   Discussed w/. endo, c/w levothyroxine 25mcg QD  Repeat TFTs in 4-6 weeks -w/ PCP or Endo ([facility] at [facility]: [address] [place] [address]; Ph # [phone])

## 2023-12-05 NOTE — PROGRESS NOTE ADULT - PROBLEM SELECTOR PLAN 12
SCDS given thrombocytopenia   Swallow eval: Renal/DASH   Dispo: [facility] pending placement
SCDS given thrombocytopenia   Swallow eval: Renal/DASH   Dispo: [facility] pending placement
SCDS given thrombocytopenia   Swallow eval: Renal/DASH   Dispo: [facility] pending placement (likely discharge Monday).
SCDS given thrombocytopenia   Swallow eval: Renal/DASH   Dispo: [facility] pending placement
SCDS given thrombocytopenia   Swallow eval: Renal/DASH   Dispo: [facility] pending placement

## 2023-12-05 NOTE — PROGRESS NOTE ADULT - PROBLEM SELECTOR PROBLEM 4
ESRD on dialysis
Transaminitis
ESRD on dialysis
Severe sepsis
ESRD on dialysis
Severe sepsis
Severe sepsis
ESRD on dialysis
Thrombocytopenia
Thrombocytopenia
Transaminitis
Thrombocytopenia
Transaminitis
Transaminitis
Thrombocytopenia
Transaminitis
Thrombocytopenia

## 2023-12-05 NOTE — PROGRESS NOTE ADULT - PROBLEM SELECTOR PROBLEM 8
Type 2 diabetes mellitus
Elevated TSH
Type 2 diabetes mellitus
Elevated TSH
Metabolic encephalopathy
Elevated TSH
Metabolic encephalopathy
Type 2 diabetes mellitus
Elevated TSH
Type 2 diabetes mellitus
Elevated TSH
Type 2 diabetes mellitus
Metabolic encephalopathy
Type 2 diabetes mellitus
Metabolic encephalopathy
Metabolic encephalopathy

## 2023-12-05 NOTE — PROGRESS NOTE ADULT - SUBJECTIVE AND OBJECTIVE BOX
[facility] Division of Hospital Medicine  Neda Oh MD  Hospitalist   Pager [phone]  Avaliable via MS teams     SUBJECTIVE / OVERNIGHT EVENTS: patient seen and examined. patient with no acute complaints.     ADDITIONAL REVIEW OF SYSTEMS:    MEDICATIONS  (STANDING):  aspirin enteric coated 81 milliGRAM(s) Oral daily  carvedilol 3.125 milliGRAM(s) Oral every 12 hours  chlorhexidine 2% Cloths 1 Application(s) Topical daily  clopidogrel Tablet 75 milliGRAM(s) Oral daily  dextrose 5%. 1000 milliLiter(s) (100 mL/Hr) IV Continuous <Continuous>  dextrose 5%. 1000 milliLiter(s) (50 mL/Hr) IV Continuous <Continuous>  dextrose 50% Injectable 25 Gram(s) IV Push once  epoetin dean (EPOGEN) Injectable 6000 Unit(s) IV Push <User Schedule>  folic acid 1 milliGRAM(s) Oral daily  gabapentin 100 milliGRAM(s) Oral <User Schedule>  glucagon  Injectable 1 milliGRAM(s) IntraMuscular once  guaiFENesin  milliGRAM(s) Oral every 12 hours  levothyroxine 25 MICROGram(s) Oral daily  lidocaine   4% Patch 1 Patch Transdermal every 24 hours  sacubitril 24 mG/valsartan 26 mG 1 Tablet(s) Oral two times a day    MEDICATIONS  (PRN):  benzonatate 100 milliGRAM(s) Oral three times a day PRN Cough  dextrose Oral Gel 15 Gram(s) Oral once PRN Blood Glucose LESS THAN 70 milliGRAM(s)/deciliter  lactulose Syrup 20 Gram(s) Oral three times a day PRN 2-3 bm daily  polyethylene glycol 3350 17 Gram(s) Oral two times a day PRN 2-3 bm daily  sodium chloride 0.9% Bolus. 100 milliLiter(s) IV Bolus every 5 minutes PRN SBP LESS THAN or EQUAL to 90 mmHg  traMADol 25 milliGRAM(s) Oral every 12 hours PRN Moderate Pain (4 - 6)-Severe pain (7-10)      I&O's Summary    05 Dec 2023 07:01  -  05 Dec 2023 13:45  --------------------------------------------------------  IN: 400 mL / OUT: 1400 mL / NET: -1000 mL        PHYSICAL EXAM:  Vital Signs Last 24 Hrs  T(C): 36.6 (05 Dec 2023 09:35), Max: 36.9 (04 Dec 2023 22:00)  T(F): 97.8 (05 Dec 2023 09:35), Max: 98.5 (04 Dec 2023 22:00)  HR: 88 (05 Dec 2023 09:35) (79 - 88)  BP: 174/66 (05 Dec 2023 09:35) (155/76 - 174/66)  BP(mean): --  RR: 17 (05 Dec 2023 09:35) (17 - 18)  SpO2: 98% (05 Dec 2023 09:35) (98% - 100%)    Parameters below as of 05 Dec 2023 09:35  Patient On (Oxygen Delivery Method): room air    General: No acute distress  Eyes: PERRL, EOMI   ENT: MMM, no oropharyngeal lesions or erythema appreciated   Pulm: No increased WOB. CTAB. No wheezing.   CV: RRR. S1&S2+. No M/R/G appreciated.   Abdomen: distended abdomen- improved, nontender    MSK: Nml ROM   Extremities: No peripheral edema or cyanosis.    Neuro: A&Ox3, no focal deficits    Skin: Warm and dry. No visible rash.     LABS:                        9.1    4.54  )-----------( 58       ( 05 Dec 2023 06:56 )             25.8     12-05    137  |  100  |  45<H>  ----------------------------<  77  5.5<H>   |  25  |  4.40<H>    Ca    8.1<L>      05 Dec 2023 06:56  Phos  4.5     12-05  Mg     2.10     12-05            Urinalysis Basic - ( 05 Dec 2023 06:56 )    Color: x / Appearance: x / SG: x / pH: x  Gluc: 77 mg/dL / Ketone: x  / Bili: x / Urobili: x   Blood: x / Protein: x / Nitrite: x   Leuk Esterase: x / RBC: x / WBC x   Sq Epi: x / Non Sq Epi: x / Bacteria: x     [facility] Division of Hospital Medicine  Neda Oh MD  Hospitalist   Pager [phone]  Avaliable via MS teams     SUBJECTIVE / OVERNIGHT EVENTS: patient seen and examined. patient with no acute complaints.     ADDITIONAL REVIEW OF SYSTEMS:    MEDICATIONS  (STANDING):  aspirin enteric coated 81 milliGRAM(s) Oral daily  carvedilol 3.125 milliGRAM(s) Oral every 12 hours  chlorhexidine 2% Cloths 1 Application(s) Topical daily  clopidogrel Tablet 75 milliGRAM(s) Oral daily  dextrose 5%. 1000 milliLiter(s) (100 mL/Hr) IV Continuous <Continuous>  dextrose 5%. 1000 milliLiter(s) (50 mL/Hr) IV Continuous <Continuous>  dextrose 50% Injectable 25 Gram(s) IV Push once  epoetin dean (EPOGEN) Injectable 6000 Unit(s) IV Push <User Schedule>  folic acid 1 milliGRAM(s) Oral daily  gabapentin 100 milliGRAM(s) Oral <User Schedule>  glucagon  Injectable 1 milliGRAM(s) IntraMuscular once  guaiFENesin  milliGRAM(s) Oral every 12 hours  levothyroxine 25 MICROGram(s) Oral daily  lidocaine   4% Patch 1 Patch Transdermal every 24 hours  sacubitril 24 mG/valsartan 26 mG 1 Tablet(s) Oral two times a day    MEDICATIONS  (PRN):  benzonatate 100 milliGRAM(s) Oral three times a day PRN Cough  dextrose Oral Gel 15 Gram(s) Oral once PRN Blood Glucose LESS THAN 70 milliGRAM(s)/deciliter  lactulose Syrup 20 Gram(s) Oral three times a day PRN 2-3 bm daily  polyethylene glycol 3350 17 Gram(s) Oral two times a day PRN 2-3 bm daily  sodium chloride 0.9% Bolus. 100 milliLiter(s) IV Bolus every 5 minutes PRN SBP LESS THAN or EQUAL to 90 mmHg  traMADol 25 milliGRAM(s) Oral every 12 hours PRN Moderate Pain (4 - 6)-Severe pain (7-10)      I&O's Summary    05 Dec 2023 07:01  -  05 Dec 2023 13:45  --------------------------------------------------------  IN: 400 mL / OUT: 1400 mL / NET: -1000 mL        PHYSICAL EXAM:  Vital Signs Last 24 Hrs  T(C): 36.6 (05 Dec 2023 09:35), Max: 36.9 (04 Dec 2023 22:00)  T(F): 97.8 (05 Dec 2023 09:35), Max: 98.5 (04 Dec 2023 22:00)  HR: 88 (05 Dec 2023 09:35) (79 - 88)  BP: 174/66 (05 Dec 2023 09:35) (155/76 - 174/66)  BP(mean): --  RR: 17 (05 Dec 2023 09:35) (17 - 18)  SpO2: 98% (05 Dec 2023 09:35) (98% - 100%)    Parameters below as of 05 Dec 2023 09:35  Patient On (Oxygen Delivery Method): room air    General: No acute distress  Eyes: PERRL, EOMI   ENT: MMM, no oropharyngeal lesions or erythema appreciated   Pulm: No increased WOB. CTAB. No wheezing.   CV: RRR. S1&S2+. No M/R/G appreciated.   Abdomen: distended abdomen- improved, nontender    MSK: Nml ROM   Extremities: No peripheral edema or cyanosis.    Neuro: A&Ox3, no focal deficits    Skin: Warm and dry. No visible rash.     LABS:                        9.1    4.54  )-----------( 58       ( 05 Dec 2023 06:56 )             25.8     12-05    137  |  100  |  45<H>  ----------------------------<  77  5.5<H>   |  25  |  4.40<H>    Ca    8.1<L>      05 Dec 2023 06:56  Phos  4.5     12-05  Mg     2.10     12-05            Urinalysis Basic - ( 05 Dec 2023 06:56 )    Color: x / Appearance: x / SG: x / pH: x  Gluc: 77 mg/dL / Ketone: x  / Bili: x / Urobili: x   Blood: x / Protein: x / Nitrite: x   Leuk Esterase: x / RBC: x / WBC x   Sq Epi: x / Non Sq Epi: x / Bacteria: x

## 2023-12-05 NOTE — PROGRESS NOTE ADULT - PROBLEM SELECTOR PROBLEM 10
Need for prophylactic measure
Type 2 diabetes mellitus
Need for prophylactic measure
Type 2 diabetes mellitus

## 2023-12-05 NOTE — DISCHARGE NOTE NURSING/CASE MANAGEMENT/SOCIAL WORK - PATIENT PORTAL LINK FT
You can access the FollowMyHealth Patient Portal offered by [facility] by registering at the following website: [email or web address]. By joining [facility] [facility]’s FollowMyHealth portal, you will also be able to view your health information using other applications (apps) compatible with our system. You can access the FollowMyHealth Patient Portal offered by [facility] by registering at the following website: [email or web address]. By joining [employer]’s FollowMyHealth portal, you will also be able to view your health information using other applications (apps) compatible with our system.

## 2023-12-05 NOTE — PROGRESS NOTE ADULT - PROBLEM SELECTOR PLAN 1
Pt with anemia. likely AOCD. No e/o bleeding.   -Goal hgb >8 due to CAD and recent stents   -S/p 1U PRBC on 12/1 with good response   -EPO per Nephro   -CTM    #abdominal distention  f/up abdominal xray ; ascites - known

## 2023-12-05 NOTE — PROGRESS NOTE ADULT - PROBLEM SELECTOR PLAN 7
--Intermittent hypoglycemia noted  --c/w lantus dec to 2units at bedtime  --c/w admelog 3units before meals  -A1C 5.8  --ISS  --Monitor fingersticks
Appreciate cardiology recs  - c/w home aspirin 81mg qd  - c/w home plavix 75mg qd  - c/w coreg - decrease to 3.125mg BID  - HOLD statin given transaminitis  - Continue tele monitoring
Appreciate cardiology recs  - c/w home aspirin 81mg qd  - c/w home plavix 75mg qd  - c/w coreg - decreased to 3.125mg BID  - HOLD statin given transaminitis  - Continue tele monitoring
Appreciate cardiology recs  - c/w home aspirin 81mg qd  - c/w home plavix 75mg qd  - c/w coreg - decrease to 3.125mg BID  - HOLD statin given transaminitis  - Continue tele monitoring
Home lantus 6 qhs with humalog TIDAC  contine lantus 6 qhs with admelog 3 u tidac
HSQ VTE ppx  Swallow eval: Soft and bite sized; will not order cinesophogram   PT eval
Appreciate cardiology recs  - c/w home aspirin 81mg qd  - c/w home plavix 75mg qd  - c/w coreg - decreased to 3.125mg BID  - HOLD statin given transaminitis  - Continue tele monitoring
Appreciate cardiology recs  - c/w home aspirin 81mg qd  - c/w home plavix 75mg qd  - c/w coreg - decreased to 3.125mg BID  - HOLD statin given transaminitis  - Continue tele monitoring
Home lantus 6 qhs with humalog TIDAC  contine lantus 6 qhs with admelog 3 u tidac
Pt w/ intermittent reproducible chest pain, unclear etiology. Suspect MSK  --Lower suspicion for cardiac etiology  --EKGs non ischemic  --Trops downtrending  --Continue tele monitoring  --Lidocaine patch/ PRN Pain control  -NOW RESOLVED
Appreciate cardiology recs  - c/w home aspirin 81mg qd  - c/w home plavix 75mg qd  - c/w coreg - decrease to 3.125mg BID  - HOLD statin given transaminitis  - Continue tele monitoring
HSQ VTE ppx  Swallow eval: Soft and bite sized; will not order cinesophogram   PT, stable for d/c to [facility]
Pt w/ intermittent reproducible chest pain, unclear etiology. Suspect MSK  --Lower suspicion for cardiac etiology  --EKGs non ischemic  --Trops downtrending  --Continue tele monitoring  --Lidocaine patch/ PRN Pain control  -NOW RESOLVED
Appreciate cardiology recs  - c/w home aspirin 81mg qd  - c/w home plavix 75mg qd  - c/w coreg - decrease to 3.125mg BID  - HOLD statin given transaminitis  - Continue tele monitoring
--Intermittent hypoglycemia noted  --d/c lantus  --c/w admelog 3units before meals  -A1C 5.8  --ISS  --Monitor fingersticks
Home lantus 6 qhs with humalog TIDAC  contine lantus 6 qhs with admelog 3 u tidac
Appreciate cardiology recs  - c/w home aspirin 81mg qd  - c/w home plavix 75mg qd  - c/w coreg - decrease to 3.125mg BID  - HOLD statin given transaminitis  - Continue tele monitoring
Appreciate cardiology recs  - c/w home aspirin 81mg qd  - c/w home plavix 75mg qd  - c/w coreg - decrease to 3.125mg BID  - HOLD statin given transaminitis  - Continue tele monitoring
Pt w/ intermittent reproducible chest pain, unclear etiology. Suspect MSK  --Lower suspicion for cardiac etiology  --EKGs non ischemic  --Trops downtrending  --Continue tele monitoring  --Lidocaine patch/ PRN Pain control  -NOW RESOLVED

## 2023-12-05 NOTE — PROGRESS NOTE ADULT - SUBJECTIVE AND OBJECTIVE BOX
[facility] DIVISION OF KIDNEY DISEASES AND HYPERTENSION -- HEMODIALYSIS NOTE   Nehrology Office [phone]  available on [employer] teams--> Betito Chi   --------------------------------------------------------------------------------  Chief Complaint: ESRD/Ongoing hemodialysis requirement    24 hour events/subjective:      ALLERGIES & MEDICATIONS  --------------------------------------------------------------------------------  Allergies    No Known Allergies    Intolerances      Standing Inpatient Medications  aspirin enteric coated 81 milliGRAM(s) Oral daily  carvedilol 3.125 milliGRAM(s) Oral every 12 hours  chlorhexidine 2% Cloths 1 Application(s) Topical daily  clopidogrel Tablet 75 milliGRAM(s) Oral daily  dextrose 5%. 1000 milliLiter(s) IV Continuous <Continuous>  dextrose 5%. 1000 milliLiter(s) IV Continuous <Continuous>  dextrose 50% Injectable 25 Gram(s) IV Push once  epoetin dean (EPOGEN) Injectable 6000 Unit(s) IV Push <User Schedule>  folic acid 1 milliGRAM(s) Oral daily  gabapentin 100 milliGRAM(s) Oral <User Schedule>  glucagon  Injectable 1 milliGRAM(s) IntraMuscular once  guaiFENesin  milliGRAM(s) Oral every 12 hours  levothyroxine 25 MICROGram(s) Oral daily  lidocaine   4% Patch 1 Patch Transdermal every 24 hours  sacubitril 24 mG/valsartan 26 mG 1 Tablet(s) Oral two times a day    PRN Inpatient Medications  benzonatate 100 milliGRAM(s) Oral three times a day PRN  dextrose Oral Gel 15 Gram(s) Oral once PRN  lactulose Syrup 20 Gram(s) Oral three times a day PRN  polyethylene glycol 3350 17 Gram(s) Oral two times a day PRN  sodium chloride 0.9% Bolus. 100 milliLiter(s) IV Bolus every 5 minutes PRN  traMADol 25 milliGRAM(s) Oral every 12 hours PRN      VITALS/PHYSICAL EXAM  --------------------------------------------------------------------------------  T(C): 36.6 (12-05-23 @ 09:35), Max: 36.9 (12-04-23 @ 22:00)  HR: 88 (12-05-23 @ 09:35) (79 - 88)  BP: 174/66 (12-05-23 @ 09:35) (155/76 - 174/66)  RR: 17 (12-05-23 @ 09:35) (17 - 18)  SpO2: 98% (12-05-23 @ 09:35) (98% - 100%)  Wt(kg): --        12-05-23 @ 07:01  -  12-05-23 @ 12:45  --------------------------------------------------------  IN: 400 mL / OUT: 1400 mL / NET: -1000 mL      Physical Exam:  	Gen NAD  	HEENT: no JVD  	Pulm: CTABL  	CV: S1S2,  	Abd: Soft,   	Ext:   - edema B/L LE   	Neuro: Awake and alert  	Skin: Warm and dry          Vascular:   AVF + cannulated     LABS/STUDIES  --------------------------------------------------------------------------------              9.1    4.54  >-----------<  58       [12-05-23 @ 06:56]              25.8     137  |  100  |  45  ----------------------------<  77      [12-05-23 @ 06:56]  5.5   |  25  |  4.40        Ca     8.1     [12-05-23 @ 06:56]      Mg     2.10     [12-05-23 @ 06:56]      Phos  4.5     [12-05-23 @ 06:56]            Iron 59, TIBC 118, %sat 50      [12-01-23 @ 05:44]  Ferritin 2663      [12-01-23 @ 05:44]  TSH 12.60      [11-20-23 @ 05:30]    HBsAb 320.9      [11-24-23 @ 07:00]  HBsAb Reactive      [11-13-23 @ 14:30]  HBsAg Nonreact      [11-13-23 @ 14:30]  HBcAb Reactive      [11-13-23 @ 14:30]  HCV 0.16, Nonreact      [11-13-23 @ 14:30]   [facility] DIVISION OF KIDNEY DISEASES AND HYPERTENSION -- HEMODIALYSIS NOTE   Nehrology Office [phone]  available on [employer] teams--> Betito Chi   --------------------------------------------------------------------------------  Chief Complaint: ESRD/Ongoing hemodialysis requirement    24 hour events/subjective:      ALLERGIES & MEDICATIONS  --------------------------------------------------------------------------------  Allergies    No Known Allergies    Intolerances      Standing Inpatient Medications  aspirin enteric coated 81 milliGRAM(s) Oral daily  carvedilol 3.125 milliGRAM(s) Oral every 12 hours  chlorhexidine 2% Cloths 1 Application(s) Topical daily  clopidogrel Tablet 75 milliGRAM(s) Oral daily  dextrose 5%. 1000 milliLiter(s) IV Continuous <Continuous>  dextrose 5%. 1000 milliLiter(s) IV Continuous <Continuous>  dextrose 50% Injectable 25 Gram(s) IV Push once  epoetin dean (EPOGEN) Injectable 6000 Unit(s) IV Push <User Schedule>  folic acid 1 milliGRAM(s) Oral daily  gabapentin 100 milliGRAM(s) Oral <User Schedule>  glucagon  Injectable 1 milliGRAM(s) IntraMuscular once  guaiFENesin  milliGRAM(s) Oral every 12 hours  levothyroxine 25 MICROGram(s) Oral daily  lidocaine   4% Patch 1 Patch Transdermal every 24 hours  sacubitril 24 mG/valsartan 26 mG 1 Tablet(s) Oral two times a day    PRN Inpatient Medications  benzonatate 100 milliGRAM(s) Oral three times a day PRN  dextrose Oral Gel 15 Gram(s) Oral once PRN  lactulose Syrup 20 Gram(s) Oral three times a day PRN  polyethylene glycol 3350 17 Gram(s) Oral two times a day PRN  sodium chloride 0.9% Bolus. 100 milliLiter(s) IV Bolus every 5 minutes PRN  traMADol 25 milliGRAM(s) Oral every 12 hours PRN      VITALS/PHYSICAL EXAM  --------------------------------------------------------------------------------  T(C): 36.6 (12-05-23 @ 09:35), Max: 36.9 (12-04-23 @ 22:00)  HR: 88 (12-05-23 @ 09:35) (79 - 88)  BP: 174/66 (12-05-23 @ 09:35) (155/76 - 174/66)  RR: 17 (12-05-23 @ 09:35) (17 - 18)  SpO2: 98% (12-05-23 @ 09:35) (98% - 100%)  Wt(kg): --        12-05-23 @ 07:01  -  12-05-23 @ 12:45  --------------------------------------------------------  IN: 400 mL / OUT: 1400 mL / NET: -1000 mL      Physical Exam:  	Gen NAD  	HEENT: no JVD  	Pulm: CTABL  	CV: S1S2,  	Abd: Soft,   	Ext:   - edema B/L LE   	Neuro: Awake and alert  	Skin: Warm and dry          Vascular:   AVF + cannulated     LABS/STUDIES  --------------------------------------------------------------------------------              9.1    4.54  >-----------<  58       [12-05-23 @ 06:56]              25.8     137  |  100  |  45  ----------------------------<  77      [12-05-23 @ 06:56]  5.5   |  25  |  4.40        Ca     8.1     [12-05-23 @ 06:56]      Mg     2.10     [12-05-23 @ 06:56]      Phos  4.5     [12-05-23 @ 06:56]            Iron 59, TIBC 118, %sat 50      [12-01-23 @ 05:44]  Ferritin 2663      [12-01-23 @ 05:44]  TSH 12.60      [11-20-23 @ 05:30]    HBsAb 320.9      [11-24-23 @ 07:00]  HBsAb Reactive      [11-13-23 @ 14:30]  HBsAg Nonreact      [11-13-23 @ 14:30]  HBcAb Reactive      [11-13-23 @ 14:30]  HCV 0.16, Nonreact      [11-13-23 @ 14:30]   [facility] DIVISION OF KIDNEY DISEASES AND HYPERTENSION -- HEMODIALYSIS NOTE   Nehrology Office [phone]  available on [employer] teams--> Betito Chi   --------------------------------------------------------------------------------  Chief Complaint: ESRD/Ongoing hemodialysis requirement  pt was evaluated during HD session this am. BP and Blood flow during dialysis are acceptable   24 hour events/subjective:      ALLERGIES & MEDICATIONS  --------------------------------------------------------------------------------  Allergies    No Known Allergies    Intolerances      Standing Inpatient Medications  aspirin enteric coated 81 milliGRAM(s) Oral daily  carvedilol 3.125 milliGRAM(s) Oral every 12 hours  chlorhexidine 2% Cloths 1 Application(s) Topical daily  clopidogrel Tablet 75 milliGRAM(s) Oral daily  dextrose 5%. 1000 milliLiter(s) IV Continuous <Continuous>  dextrose 5%. 1000 milliLiter(s) IV Continuous <Continuous>  dextrose 50% Injectable 25 Gram(s) IV Push once  epoetin dean (EPOGEN) Injectable 6000 Unit(s) IV Push <User Schedule>  folic acid 1 milliGRAM(s) Oral daily  gabapentin 100 milliGRAM(s) Oral <User Schedule>  glucagon  Injectable 1 milliGRAM(s) IntraMuscular once  guaiFENesin  milliGRAM(s) Oral every 12 hours  levothyroxine 25 MICROGram(s) Oral daily  lidocaine   4% Patch 1 Patch Transdermal every 24 hours  sacubitril 24 mG/valsartan 26 mG 1 Tablet(s) Oral two times a day    PRN Inpatient Medications  benzonatate 100 milliGRAM(s) Oral three times a day PRN  dextrose Oral Gel 15 Gram(s) Oral once PRN  lactulose Syrup 20 Gram(s) Oral three times a day PRN  polyethylene glycol 3350 17 Gram(s) Oral two times a day PRN  sodium chloride 0.9% Bolus. 100 milliLiter(s) IV Bolus every 5 minutes PRN  traMADol 25 milliGRAM(s) Oral every 12 hours PRN      VITALS/PHYSICAL EXAM  --------------------------------------------------------------------------------  T(C): 36.6 (12-05-23 @ 09:35), Max: 36.9 (12-04-23 @ 22:00)  HR: 88 (12-05-23 @ 09:35) (79 - 88)  BP: 174/66 (12-05-23 @ 09:35) (155/76 - 174/66)  RR: 17 (12-05-23 @ 09:35) (17 - 18)  SpO2: 98% (12-05-23 @ 09:35) (98% - 100%)  Wt(kg): --        12-05-23 @ 07:01  -  12-05-23 @ 12:45  --------------------------------------------------------  IN: 400 mL / OUT: 1400 mL / NET: -1000 mL      Physical Exam:  	Gen NAD  	HEENT: no JVD  	Pulm: CTABL  	CV: S1S2,  	Abd: Soft,   	Ext:   - edema B/L LE   	Neuro: Awake and alert  	Skin: Warm and dry          Vascular:   AVF + cannulated     LABS/STUDIES  --------------------------------------------------------------------------------              9.1    4.54  >-----------<  58       [12-05-23 @ 06:56]              25.8     137  |  100  |  45  ----------------------------<  77      [12-05-23 @ 06:56]  5.5   |  25  |  4.40        Ca     8.1     [12-05-23 @ 06:56]      Mg     2.10     [12-05-23 @ 06:56]      Phos  4.5     [12-05-23 @ 06:56]            Iron 59, TIBC 118, %sat 50      [12-01-23 @ 05:44]  Ferritin 2663      [12-01-23 @ 05:44]  TSH 12.60      [11-20-23 @ 05:30]    HBsAb 320.9      [11-24-23 @ 07:00]  HBsAb Reactive      [11-13-23 @ 14:30]  HBsAg Nonreact      [11-13-23 @ 14:30]  HBcAb Reactive      [11-13-23 @ 14:30]  HCV 0.16, Nonreact      [11-13-23 @ 14:30]   [facility] DIVISION OF KIDNEY DISEASES AND HYPERTENSION -- HEMODIALYSIS NOTE   Nehrology Office [phone]  available on [employer] teams--> Betito Chi   --------------------------------------------------------------------------------  Chief Complaint: ESRD/Ongoing hemodialysis requirement  pt was evaluated during HD session this am. BP and Blood flow during dialysis are acceptable   24 hour events/subjective:      ALLERGIES & MEDICATIONS  --------------------------------------------------------------------------------  Allergies    No Known Allergies    Intolerances      Standing Inpatient Medications  aspirin enteric coated 81 milliGRAM(s) Oral daily  carvedilol 3.125 milliGRAM(s) Oral every 12 hours  chlorhexidine 2% Cloths 1 Application(s) Topical daily  clopidogrel Tablet 75 milliGRAM(s) Oral daily  dextrose 5%. 1000 milliLiter(s) IV Continuous <Continuous>  dextrose 5%. 1000 milliLiter(s) IV Continuous <Continuous>  dextrose 50% Injectable 25 Gram(s) IV Push once  epoetin dean (EPOGEN) Injectable 6000 Unit(s) IV Push <User Schedule>  folic acid 1 milliGRAM(s) Oral daily  gabapentin 100 milliGRAM(s) Oral <User Schedule>  glucagon  Injectable 1 milliGRAM(s) IntraMuscular once  guaiFENesin  milliGRAM(s) Oral every 12 hours  levothyroxine 25 MICROGram(s) Oral daily  lidocaine   4% Patch 1 Patch Transdermal every 24 hours  sacubitril 24 mG/valsartan 26 mG 1 Tablet(s) Oral two times a day    PRN Inpatient Medications  benzonatate 100 milliGRAM(s) Oral three times a day PRN  dextrose Oral Gel 15 Gram(s) Oral once PRN  lactulose Syrup 20 Gram(s) Oral three times a day PRN  polyethylene glycol 3350 17 Gram(s) Oral two times a day PRN  sodium chloride 0.9% Bolus. 100 milliLiter(s) IV Bolus every 5 minutes PRN  traMADol 25 milliGRAM(s) Oral every 12 hours PRN      VITALS/PHYSICAL EXAM  --------------------------------------------------------------------------------  T(C): 36.6 (12-05-23 @ 09:35), Max: 36.9 (12-04-23 @ 22:00)  HR: 88 (12-05-23 @ 09:35) (79 - 88)  BP: 174/66 (12-05-23 @ 09:35) (155/76 - 174/66)  RR: 17 (12-05-23 @ 09:35) (17 - 18)  SpO2: 98% (12-05-23 @ 09:35) (98% - 100%)  Wt(kg): --        12-05-23 @ 07:01  -  12-05-23 @ 12:45  --------------------------------------------------------  IN: 400 mL / OUT: 1400 mL / NET: -1000 mL      Physical Exam:  	Gen NAD  	HEENT: no JVD  	Pulm: CTABL  	CV: S1S2,  	Abd: Soft,   	Ext:   - edema B/L LE   	Neuro: Awake and alert  	Skin: Warm and dry          Vascular:   AVF + cannulated     LABS/STUDIES  --------------------------------------------------------------------------------              9.1    4.54  >-----------<  58       [12-05-23 @ 06:56]              25.8     137  |  100  |  45  ----------------------------<  77      [12-05-23 @ 06:56]  5.5   |  25  |  4.40        Ca     8.1     [12-05-23 @ 06:56]      Mg     2.10     [12-05-23 @ 06:56]      Phos  4.5     [12-05-23 @ 06:56]            Iron 59, TIBC 118, %sat 50      [12-01-23 @ 05:44]  Ferritin 2663      [12-01-23 @ 05:44]  TSH 12.60      [11-20-23 @ 05:30]    HBsAb 320.9      [11-24-23 @ 07:00]  HBsAb Reactive      [11-13-23 @ 14:30]  HBsAg Nonreact      [11-13-23 @ 14:30]  HBcAb Reactive      [11-13-23 @ 14:30]  HCV 0.16, Nonreact      [11-13-23 @ 14:30]

## 2023-12-05 NOTE — PROGRESS NOTE ADULT - PROBLEM SELECTOR PLAN 3
Routine ob at 20w0d  Doing well , reporting + FM  1.  Supervision of other normal pregnancy, antepartum  • MFM Anatomy US today:  Fetal anatomical survey  1. Single living fetus with a gestational age of 20w 0d based on the reported clinical dates.  2. Current growth parameters are consistent with the clinical date of 20w 0d, indicating normal  fetal growth. Composite age based on the current ultrasound alone is 21w 0d (US RAY  07/05/2023).  3. Normal fetal anatomic survey. Detailed fetal evaluation was performed and no structural  abnormalities are noted.  4. Placenta: Posterior No Previa  5. Amniotic fluid Normal.  6. Normal transvaginal appearance of the cervix.  • RTC in 4 weeks for routine care  2. Obesity BMI 36   Growth US at 37 weeks   BPP weekly starting at 37 weeks          HD per nephrology  anemia due to ESRD, aranesp at outpt HD per renal    Appreciate vasc surg eval of oozing fistula, s/p fistulogram 11/29. tolerating HD well through fistula, no oozing noted today.   Avoid nephrotoxins /renally dose meds

## 2023-12-05 NOTE — PROGRESS NOTE ADULT - PROBLEM SELECTOR PROBLEM 2
Anemia secondary to renal failure
Anemia secondary to renal failure
Complication of AV dialysis fistula
Anemia secondary to renal failure
Thrombocytopenia
Thrombocytopenia
Anemia secondary to renal failure
ESRD on dialysis
Metabolic encephalopathy
Anemia secondary to renal failure
Thrombocytopenia
ESRD on dialysis
Complication of AV dialysis fistula
Thrombocytopenia
Complication of AV dialysis fistula
ESRD on dialysis
Complication of AV dialysis fistula
ESRD on dialysis
Thrombocytopenia
Thrombocytopenia
Metabolic encephalopathy
Thrombocytopenia
Complication of AV dialysis fistula
ESRD on dialysis

## 2023-12-05 NOTE — PROGRESS NOTE ADULT - PROBLEM SELECTOR PROBLEM 1
ESRD on dialysis
ESRD on dialysis
Severe sepsis
ESRD on dialysis
Severe sepsis
Anemia
ESRD on dialysis
Severe sepsis
Anemia
ESRD on dialysis
Severe sepsis
ESRD on dialysis
Severe sepsis
Anemia
Severe sepsis
ESRD on dialysis
Severe sepsis
Anemia
Anemia

## 2023-12-05 NOTE — DISCHARGE NOTE NURSING/CASE MANAGEMENT/SOCIAL WORK - NSDCPEFALRISK_GEN_ALL_CORE
For information on Fall & Injury Prevention, visit: [email or web address] OR  [email or web address] OR  https://www.cdc.gov/steadi/patient.html For information on Fall & Injury Prevention, visit: [email or web address] OR  [email or web address]-tips-[email or web address] OR  https://www.cdc.gov/steadi/patient.html

## 2023-12-05 NOTE — PROGRESS NOTE ADULT - PROBLEM SELECTOR PROBLEM 5
Chest pain
Chest pain
Metabolic encephalopathy
Chest pain
Chest pain
Transaminitis
Chest pain
Metabolic encephalopathy
Metabolic encephalopathy
Type 2 diabetes mellitus
Transaminitis
Chest pain
Type 2 diabetes mellitus
Metabolic encephalopathy
Metabolic encephalopathy
Chest pain
Transaminitis

## 2023-12-05 NOTE — PROGRESS NOTE ADULT - PROBLEM SELECTOR PLAN 9
Normal FT4  Concerning for subclinical hypothyroidism   Discussed w/. endo, c/w levothyroxine 25mcg QD  Repeat TFTs in 4-6 weeks -w/ PCP or Endo (Endocrine Clinic at [facility] [place]: [address] [place] [address]; Ph # [phone])
Normal FT4  Concerning for subclinical hypothyroidism   Discussed w/. endo, c/w levothyroxine 25mcg QD  Repeat TFTs in 4-6 weeks -w/ PCP or Endo ([facility] at [facility] [place]: [address] [place] [address]; Ph # [phone])
Normal FT4  Concerning for subclinical hypothyroidism   Discussed w/. endo, c/w levothyroxine 25mcg QD  Repeat TFTs in 4-6 weeks -w/ PCP or Endo (Endocrine Clinic at [facility] [place]: [address] [place] [address]; Ph # [phone])
Normal FT4  Concerning for subclinical hypothyroidism   Discussed w/. endo, c/w levothyroxine 25mcg QD  Repeat TFTs in 4-6 weeks -w/ PCP or Endo (Endocrine Clinic at [facility] [place]: [address] [place] [address]; Ph # [phone])
Normal FT4  Concerning for subclinical hypothyroidism   Discussed w/. endo, c/w levothyroxine 25mcg QD  Repeat TFTs in 4-6 weeks -w/ PCP or Endo ([facility] at [facility] [place]: [address] [place] [address]; Ph # [phone])
Normal FT4  Concerning for subclinical hypothyroidism   Discussed w/. endo, c/w levothyroxine 25mcg QD  Repeat TFTs in 4-6 weeks -w/ PCP or Endo (Endocrine Clinic at [facility] [place]: [address] [place] [address]; Ph # [phone])
HSQ VTE ppx  Swallow eval: Soft and bite sized; will not order cinesophogram   PT, stable for d/c to [facility]
HSQ VTE ppx  Swallow eval: Soft and bite sized; will not order cinesophogram   PT, stable for d/c to [facility]
SCDS given thrombocytopenia   Swallow eval: Soft and bite sized- tolerating diet   NPO at midnight   PT, stable for d/c to [facility]    Discussed w/ family at bedside
SCDS given thrombocytopenia   Swallow eval: Soft and bite sized- tolerating diet   PT, stable for d/c to [facility]    Discussed w/ family at bedside
Appreciate cardiology recs. Recent stent ~ 1 months ago.   - c/w home aspirin 81mg qd  - c/w home plavix 75mg qd  - c/w coreg - decreased to 3.125mg BID  - HOLD statin given transaminitis  - Continue tele monitoring
Normal FT4  Concerning for subclinical hypothyroidism   Discussed w/. endo, c/w levothyroxine 25mcg QD  Repeat TFTs in 4-6 weeks -w/ PCP or Endo (Endocrine Clinic at [facility] [place]: [address] [place] [address]; Ph # [phone])
Appreciate cardiology recs. Recent stent ~ 1 months ago.   - c/w home aspirin 81mg qd  - c/w home plavix 75mg qd  - c/w coreg - decreased to 3.125mg BID  - HOLD statin given transaminitis  - Continue tele monitoring
HSQ VTE ppx  Swallow eval: Soft and bite sized; will not order cinesophogram   PT, stable for d/c to [facility]
Normal FT4  Concerning for subclinical hypothyroidism   Discussed w/. endo, c/w levothyroxine 25mcg QD  Repeat TFTs in 4-6 weeks -w/ PCP or Endo (Endocrine Clinic at [facility] [place]: [address] [place] [address]; Ph # [phone])
Normal FT4  Concerning for subclinical hypothyroidism   Discussed w/. endo, c/w levothyroxine 25mcg QD  Repeat TFTs in 4-6 weeks -w/ PCP or Endo (Endocrine Clinic at [facility] [place]: [address] [place] [address]; Ph # [phone])
Appreciate cardiology recs. Recent stent ~ 1 months ago.   - c/w home aspirin 81mg qd  - c/w home plavix 75mg qd  - c/w coreg - decreased to 3.125mg BID  - HOLD statin given transaminitis  - Continue tele monitoring

## 2023-12-05 NOTE — PROGRESS NOTE ADULT - PROBLEM SELECTOR PROBLEM 3
ESRD on dialysis
Transaminitis
Thrombocytopenia
CAD (coronary artery disease)
Transaminitis
ESRD on dialysis
Transaminitis
Transaminitis
Thrombocytopenia
Thrombocytopenia
Transaminitis
Thrombocytopenia
Transaminitis
ESRD on dialysis
Thrombocytopenia
Thrombocytopenia
CAD (coronary artery disease)
Transaminitis
Thrombocytopenia
ESRD on dialysis
ESRD on dialysis

## 2023-12-05 NOTE — PROGRESS NOTE ADULT - PROBLEM SELECTOR PLAN 8
Normal FT4  repeat TSH 11/20
Normal FT4  Concerning for subclinical hypothyroidism   Discussed w/. endo, will resume levothyroxine 25mcg QD  Repeat TFTs in 4-6 weeks -w/ PCP or Endo ([facility] [place]: [address] [place] [address]; Ph # [phone])
Normal FT4  repeat TSH 11/20
Present on admission, due to sepsis  Appreciate neurology eval  No evidence CVA  No further neurology w/u needed  -NOW RESOLVED
--Intermittent hypoglycemia noted  --d/c lantus/ admelog  -A1C 5.8  --ISS  --Monitor fingersticks
Intermittent hypoglycemia noted.   -D/carlos insulin and sliding scale.   -A1C 5.8  -Will continue to monitor qAC fingersticks with hypoglycemia protocol   -Fingersticks q6H while NPO   -Discontinue home diabetic meds on discharge
Intermittent hypoglycemia noted.   -D/carlos insulin and sliding scale.   -A1C 5.8  -Will continue to monitor qAC fingersticks with hypoglycemia protocol   -Fingersticks q6H while NPO   -Discontinue home diabetic meds on discharge
--Intermittent hypoglycemia noted  --d/c lantus/ admelog  -A1C 5.8  --ISS  --Monitor fingersticks
Intermittent hypoglycemia noted.   -D/carlos insulin and sliding scale.   -A1C 5.8  -Will continue to monitor qAC fingersticks with hypoglycemia protocol   -Fingersticks q6H while NPO   -Discontinue home diabetic meds on discharge
Normal FT4  repeat TSH in 1-2 weeks
--Intermittent hypoglycemia noted  --d/c lantus/ admelog  -A1C 5.8  --ISS  --Monitor fingersticks
Present on admission, due to sepsis  Appreciate neurology eval  No evidence CVA  No further neurology w/u needed  -NOW RESOLVED
--Intermittent hypoglycemia noted  --d/c lantus/ admelog  -A1C 5.8  --ISS  --Monitor fingersticks
Normal FT4  Concerning for subclinical hypothyroidism   Discussed w/. endo, will resume levothyroxine 25mcg QD  Repeat TFTs in 4-6 weeks -w/ PCP or Endo ([facility] [place]: [address] [place] [address]; Ph # [phone])
--Intermittent hypoglycemia noted  --d/c lantus/ admelog  -A1C 5.8  --ISS  --Monitor fingersticks
Present on admission, due to sepsis  Appreciate neurology eval  No evidence CVA  No further neurology w/u needed  -NOW RESOLVED
--Intermittent hypoglycemia noted  --d/c lantus  --c/w admelog 3units before meals  -A1C 5.8  --ISS  --Monitor fingersticks
Present on admission, due to sepsis  Appreciate neurology eval  No evidence CVA  No further neurology w/u needed  -NOW RESOLVED
Present on admission, due to sepsis  Appreciate neurology eval  No evidence CVA  No further neurology w/u needed  -NOW RESOLVED

## 2023-12-05 NOTE — PROGRESS NOTE ADULT - PROBLEM SELECTOR PLAN 2
Anemia of ESRD, recommend monitor iron studies. Hgb (7.3) currently below target range. Will dose OC as appropriate. Monitor CBC. Transfuse per primary team.
Anemia of ESRD, recommend monitor iron studies. Hgb (7.6) currently below target range. Will dose OC as appropriate. Monitor CBC. Transfuse per primary team.
Anemia of ESRD, recommend monitor iron studies. Hgb (8.5) currently below target range. On EPO with HD TIW. Monitor CBC. Transfuse per primary team.
Anemia of ESRD, recommend monitor iron studies. Hgb (7.8) currently below target range. Will dose OC as appropriate. Monitor CBC.
Unclear etiology, presumed due to sepsis  Improving  continue to monitor
Unclear etiology. Presume to be 2/2 infection. No s/s of bleeding.   Transfuse as indicated  continue to monitor  -Starting to stabilize
Anemia of ESRD, recommend monitor iron studies. Hgb (10.2) currently above target range. Will dose OC as appropriate. Monitor CBC.    If you have any questions, please feel free to contact me  Vitor Luis  Nephrology Fellow  [phone] / [employer] Teams(Preferred)  (After 5pm or on weekends please page the on-call fellow)
Anemia of ESRD, recommend monitor iron studies. Hgb (7.8) currently below target range. Will dose OC as appropriate. Monitor CBC. Transfuse per primary team.
Anemia of ESRD, recommend monitor iron studies. Hgb (8.7) currently below target range. Will dose OC as appropriate. Monitor CBC.
Anemia of ESRD, recommend monitor iron studies. Hgb (8.9) currently below target range. increase EPO with HD TIW. Monitor CBC. Transfuse per primary team.
Present on admission, due to sepsis  Appreciate neurology eval  No evidence CVA  No further neurology w/u needed  Encephalopathy has resolved
Anemia of ESRD, recommend monitor iron studies. Hgb (9.1) currently below target range. Will dose OC as appropriate. Monitor CBC.
Pt p/w oozing from AV fistula.   -Vascular consulted, s/p fistulogram  -Fistula functioning well during HD on 11/30  -Oozing now resolved
Anemia of ESRD, recommend monitor iron studies. Hgb (8.4) currently below target range. Will dose OC as appropriate. Monitor CBC. Transfuse per primary team.
Anemia of CKD recommend monitor iron studies.  Will dose OC as appropriate.  Monitor CBC.
Anemia of CKD recommend monitor iron studies.  Will dose OC as appropriate.  Monitor CBC.
Anemia of ESRD, recommend monitor iron studies. Hgb (7.5) currently below target range. Will dose OC as appropriate. Monitor CBC.
Pt p/w oozing from AV fistula.   -Vascular consulted, s/p fistulogram  -Fistula functioning well during HD on 11/30  -Oozing now resolved
Anemia of ESRD, Hgb (9.1) currently below target range. increased EPO with HD TIW. Monitor CBC. Transfuse per primary team.
Unclear etiology. Presume to be 2/2 infection   Unclear baseline  continue to monitor  Transfuse as indicated  -Blue top platelets ordered for the AM
HD per nephrology  anemia due to ESRD, aranesp at outpt HD per renal    Appreciate vasc surg eval of oozing fistula, for fistulogram  Patient w/ RCRI score III; Class IV Risk. Pt is medically optimized for fistulogram  Planned for fistulogram 11/22, pt refused. Re-addressed w/ patient and family. Now in agreement. Will discuss w/ vascular   Avoid nephrotoxins /renally dose meds
HD per nephrology  anemia due to ESRD, aranesp at outpt HD per renal    Appreciate vasc surg eval of oozing fistula, for fistulogram  Patient w/ RCRI score III; Class IV Risk. Pt is medically optimized for fistulogram  Planned for fistulogram 11/22, pt refused. Re-addressed w/ patient and family. Now in agreement.   Re-Discussed w/ vascular , still planned for inpatient fistulogram though timing TBD   Avoid nephrotoxins /renally dose meds
HD per nephrology  anemia due to ESRD, aranesp at outpt HD per renal    Appreciate vasc surg eval of oozing fistula, for fistulogram  Initially Planned for fistulogram 11/22, pt refused. Re-addressed w/ patient and family. Now in agreement.   Per vasc, no plan for inpatient fistulogram at this time   Avoid nephrotoxins /renally dose meds
HD per nephrology  anemia due to ESRD, aranesp at outpt HD per renal    Appreciate vasc surg eval of oozing fistula, for fistulogram  Initially Planned for fistulogram 11/22, pt refused. Re-addressed w/ patient and family. Now in agreement.   Per vasc, no plan for inpatient fistulogram at this time   Discussed w/ family, family would prefer inpatient fistulogram, requesting to speak w/ vasc. Vasc surg made aware  Avoid nephrotoxins /renally dose meds
HD per nephrology  anemia due to ESRD, aranesp at outpt HD per renal    Appreciate vasc surg eval of oozing fistula, for fistulogram  Patient w/ RCRI score III; Class IV Risk. Pt is medically optimized for fistulogram  Avoid nephrotoxins /renally dose meds
Unclear etiology, presumed due to sepsis  Improving  continue to monitor
HD per nephrology  anemia due to ESRD, aranesp at outpt HD per renal    Appreciate vasc surg eval of oozing fistula, for fistulogram  Initially Planned for fistulogram 11/22, pt refused. Re-addressed w/ patient and family. Now in agreement.   Per vasc, no plan for inpatient fistulogram at this time   Discussed w/ family, family would prefer inpatient fistulogram, requesting to speak w/ vasc. Vasc surg made aware  Avoid nephrotoxins /renally dose meds
Present on admission, due to sepsis  Appreciate neurology eval  No evidence CVA  No further neurology w/u needed  Encephalopathy has resolved
Unclear etiology. Presume to be 2/2 infection. No s/s of bleeding.   Transfuse as indicated  continue to monitor  -Starting to stabilize
HD per nephrology  anemia due to ESRD, aranesp at outpt HD per renal    Appreciate vasc surg eval of oozing fistula, for fistulogram  Patient w/ RCRI score III; Class IV Risk. Pt is medically optimized for fistulogram  Planned for fistulogram 11/22, pt refused. Re-addressed w/ patient and family. Now in agreement.   Re-Discussed w/ vascular , still planned for inpatient fistulogram though timing TBD   Avoid nephrotoxins /renally dose meds
Pt p/w oozing from AV fistula.   -Vascular consulted, s/p fistulogram  -Fistula functioning well during HD on 11/30  -Oozing now resolved
Unclear etiology, presumed due to sepsis  Improving  continue to monitor
Unclear etiology, presumed due to sepsis  Improving  continue to monitor
Pt p/w oozing from AV fistula.   -Vascular consulted, s/p fistulogram  -Fistula functioning well during HD on 11/30 and 12/2   -Oozing now resolved
Pt p/w oozing from AV fistula.   -Vascular consulted, s/p fistulogram  -Fistula functioning well during HD on 11/30 and 12/2 and 12/5   -Oozing now resolved

## 2023-12-05 NOTE — PROGRESS NOTE ADULT - PROBLEM SELECTOR PLAN 10
SCDS given thrombocytopenia   Swallow eval: Soft and bite sized- tolerating diet   PT, stable for d/c to [facility]    Discussed w/ family at bedside
SCDS given thrombocytopenia   Swallow eval: Soft and bite sized- tolerating diet   PT, stable for d/c to [facility]    Discussed w/ family at bedside
SCDS given thrombocytopenia   Swallow eval: Renal/DASH   Dispo: [facility] pending placement
Intermittent hypoglycemia noted.   -D/carlos insulin and sliding scale.   -A1C 5.8  -Will continue to monitor qAC fingersticks with hypoglycemia protocol   -Discontinue home diabetic meds on discharge
SCDS given thrombocytopenia   Swallow eval: Soft and bite sized. Will repeat S&S as last assessment was post extubation, clinically patient has improved.   Dispo: [facility] pending fistulogram 11/29
SCDS given thrombocytopenia   Swallow eval: Soft and bite sized- tolerating diet   PT, stable for d/c to [facility]
SCDS given thrombocytopenia   Swallow eval: Soft and bite sized. Will repeat S&S as last assessment was post extubation, clinically patient has improved.   Dispo: [facility] pending placement
Intermittent hypoglycemia noted.   -D/carlos insulin and sliding scale.   -A1C 5.8  -Will continue to monitor qAC fingersticks with hypoglycemia protocol   -Discontinue home diabetic meds on discharge
Intermittent hypoglycemia noted.   -D/carlos insulin and sliding scale.   -A1C 5.8  -Will continue to monitor qAC fingersticks with hypoglycemia protocol   -Discontinue home diabetic meds on discharge

## 2023-12-05 NOTE — PROGRESS NOTE ADULT - PROBLEM SELECTOR PLAN 6
Present on admission, due to sepsis  Appreciate neurology eval  No evidence CVA  No further neurology w/u needed  -NOW RESOLVED
Appreciate cardiology recs  - c/w home aspirin 81mg qd  - c/w home plavix 75mg qd  - c/w coreg 6.25 BID  - HOLD statin given transaminitis  - Repeat EKG given chest pain this morning, though lower susp for cardiac etiology  - Continue tele monitoring
Septic shock due to multifocal pneumonia present on admission  Pt was intubated for airway protection, now extubated  Diagnostic paracentesis negative for SBP  Appreciate cardiology recs--low suspicion for cardiogenic component to shock  s/p IV meropenem  Stress dose steroids tapered off  Tapered droxydopa 200 tid-->100 Tid on 11/15,  d/carlos 11/16  Monitor BP  Sepsis now resolved  Appreciate PM&R eval, [facility] recommended  -NOW RESOLVED
Present on admission, due to sepsis  Appreciate neurology eval  No evidence CVA  No further neurology w/u needed  Encephalopathy has resolved
Septic shock due to multifocal pneumonia present on admission  Pt was intubated for airway protection, now extubated  Diagnostic paracentesis negative for SBP  Appreciate cardiology recs--low suspicion for cardiogenic component to shock  s/p IV meropenem  Stress dose steroids tapered off  Tapered droxydopa 200 tid-->100 Tid on 11/15,  d/c 11/16  Monitor BP  Sepsis now resolved  Appreciate PM&R eval, [facility] recommended  -NOW RESOLVED
Normal FT4  repeat TSH in 1-2 weeks
Present on admission, due to sepsis  Appreciate neurology eval  No evidence CVA  No further neurology w/u needed  Encephalopathy has resolved
Present on admission, due to sepsis  Appreciate neurology eval  No evidence CVA  No further neurology w/u needed  -NOW RESOLVED
Appreciate cardiology recs  - c/w home aspirin 81mg qd  - c/w home plavix 75mg qd  - c/w home atorvastatin 80mg qd  - initially held home coreg 12.5mg BID;  droxydopa and HC now tapered off, resume coreg 3.125 bid and titrate as tolerated; increase to 6.25 BID on 11/19
Present on admission, due to sepsis  Appreciate neurology eval  No evidence CVA  No further neurology w/u needed  Encephalopathy has resolved
Present on admission, due to sepsis  Appreciate neurology eval  No evidence CVA  No further neurology w/u needed  Encephalopathy has resolved
Appreciate cardiology recs  - c/w home aspirin 81mg qd  - c/w home plavix 75mg qd  - c/w home atorvastatin 80mg qd  - holding home coreg 12.5mg BID;  droxydopa and HC now tapered off, resume coreg 3.125 bid and titrate as tolerated
Appreciate cardiology recs  - c/w home aspirin 81mg qd  - c/w home plavix 75mg qd  - c/w coreg 6.25 BID  - HOLD statin given transaminitis
Normal FT4  repeat TSH in 1-2 weeks
Present on admission, due to sepsis  Appreciate neurology eval  No evidence CVA  No further neurology w/u needed  -NOW RESOLVED
Present on admission, due to sepsis  Appreciate neurology eval  No evidence CVA  No further neurology w/u needed  Encephalopathy has resolved
Septic shock due to multifocal pneumonia present on admission  Pt was intubated for airway protection, now extubated  Diagnostic paracentesis negative for SBP  Appreciate cardiology recs--low suspicion for cardiogenic component to shock  s/p IV meropenem  Stress dose steroids tapered off  Tapered droxydopa 200 tid-->100 Tid on 11/15,  d/c 11/16  Monitor BP  Sepsis now resolved  Appreciate PM&R eval, [facility] recommended  -NOW RESOLVED
Appreciate cardiology recs  - c/w home aspirin 81mg qd  - c/w home plavix 75mg qd  - c/w home atorvastatin 80mg qd  - initially held home coreg 12.5mg BID;  droxydopa and HC now tapered off, resume coreg 3.125 bid and titrate as tolerated
Present on admission, due to sepsis  Appreciate neurology eval  No evidence CVA  No further neurology w/u needed  Encephalopathy has resolved
Septic shock due to multifocal pneumonia present on admission  Pt was intubated for airway protection, now extubated  Diagnostic paracentesis negative for SBP  Appreciate cardiology recs--low suspicion for cardiogenic component to shock  s/p IV meropenem  Stress dose steroids tapered off  Tapered droxydopa 200 tid-->100 Tid on 11/15,  d/carlos 11/16  Monitor BP  Sepsis now resolved  Appreciate PM&R eval, [facility] recommended  -NOW RESOLVED
Septic shock due to multifocal pneumonia present on admission  Pt was intubated for airway protection, now extubated  Diagnostic paracentesis negative for SBP  Appreciate cardiology recs--low suspicion for cardiogenic component to shock  s/p IV meropenem  Stress dose steroids tapered off  Tapered droxydopa 200 tid-->100 Tid on 11/15,  d/c 11/16  Monitor BP  Sepsis now resolved  Appreciate PM&R eval, [facility] recommended  -NOW RESOLVED

## 2023-12-05 NOTE — PROGRESS NOTE ADULT - PROBLEM SELECTOR PROBLEM 6
CAD (coronary artery disease)
Metabolic encephalopathy
CAD (coronary artery disease)
Metabolic encephalopathy
Metabolic encephalopathy
Elevated TSH
CAD (coronary artery disease)
Severe sepsis
Elevated TSH
CAD (coronary artery disease)
CAD (coronary artery disease)
Metabolic encephalopathy
Severe sepsis

## 2023-12-05 NOTE — PROGRESS NOTE ADULT - ASSESSMENT
73M with PMH of ESRD s/p kidney transplant on HD, CAD s/p multiple stents (most recently, RCA stent on 10/2023), T2DM, and HTN, with recent hospitalization for bacterial PNA, who originally presented to the ED on 11/7 after being found unresponsive with agonal breathing by his daughter. CPR  administered by the daughter. Admitted for AMS, intubated for airway protection and AHRF likely 2/2 multifocal pneumonia, s/p MICU course, transferred to medicine for further mgt. Noted to have oozing from fistula, s/p fistulogram per Vascular on 11/29, now resolved.     Dispo: Pending [facility] placement

## 2023-12-05 NOTE — PROGRESS NOTE ADULT - PROBLEM SELECTOR PROBLEM 7
Need for prophylactic measure
Type 2 diabetes mellitus
CAD (coronary artery disease)
Type 2 diabetes mellitus
Type 2 diabetes mellitus
CAD (coronary artery disease)
Type 2 diabetes mellitus
CAD (coronary artery disease)
Need for prophylactic measure
Chest pain
CAD (coronary artery disease)
Type 2 diabetes mellitus
Chest pain

## 2023-12-05 NOTE — PROGRESS NOTE ADULT - PROBLEM SELECTOR PLAN 5
Home lantus 6 qhs with humalog TIDAC  contine lantus 6 qhs with admelog 3 u tidac
Pt w/ intermittent reproducible chest pain, unclear etiology. Suspect MSK  --Lower suspicion for cardiac etiology  --Now seemingly improved  --EKGs non ischemic  --Trops downtrending  --Continue tele monitoring  --Lidocaine patch/ PRN Pain control
Pt w/ intermittent reproducible chest pain, unclear etiology. Suspect MSK  --Lower suspicion for cardiac etiology  --Now seemingly improved  --EKGs non ischemic  --Trops downtrending  --Continue tele monitoring  --Lidocaine patch/ PRN Pain control
Home lantus 6 qhs with humalog TIDAC  contine lantus 6 qhs with admelog 3 u tidac
Pt w/ intermittent reproducible chest pain, unclear etiology. Suspect MSK  --Lower suspicion for cardiac etiology  --Now seemingly improved  --EKGs non ischemic  --Trops downtrending  --Continue tele monitoring  --Lidocaine patch/ PRN Pain control
Present on admission, due to sepsis  Appreciate neurology eval  No evidence CVA  No further neurology w/u needed  Encephalopathy has resolved
Present on admission, due to sepsis  Appreciate neurology eval  No evidence CVA  No further neurology w/u needed  Encephalopathy has resolved
Pt w/ intermittent reproducible chest pain, unclear etiology. Suspect MSK  --Lower suspicion for cardiac etiology  --Now seemingly improved  --EKGs non ischemic  --Trops downtrending  --Continue tele monitoring  --Lidocaine patch/ PRN Pain control
Present on admission, due to sepsis  Appreciate neurology eval  No evidence CVA  No further neurology w/u needed  Encephalopathy has resolved
Presumed due to shock liver  improving  continue to monitor
Pt w/ intermittent reproducible chest pain, unclear etiology. Suspect MSK  --Lower suspicion for cardiac etiology  --EKGs non ischemic  --Trops downtrending  --Continue tele monitoring  --Lidocaine patch/ PRN Pain control
Present on admission, due to sepsis  Appreciate neurology eval  No evidence CVA  No further neurology w/u needed  Encephalopathy has resolved
Present on admission, due to sepsis  Appreciate neurology eval  No evidence CVA  No further neurology w/u needed  Encephalopathy has resolved
Pt w/ intermittent reproducible chest pain, unclear etiology. Suspect MSK  --Lower suspicion for cardiac etiology  --EKGs non ischemic  --Trops downtrending  --Continue tele monitoring  --Lidocaine patch/ PRN Pain control  -NOW RESOLVED
Presumed due to shock liver  improving  continue to monitor
Pt w/ intermittent reproducible chest pain, unclear etiology. Suspect MSK  --Lower suspicion for cardiac etiology  --Now seemingly improved  --EKGs non ischemic  --Trops downtrending  --Continue tele monitoring  --Lidocaine patch/ PRN Pain control
Pt w/ intermittent reproducible chest pain, unclear etiology. Suspect MSK  --Lower suspicion for cardiac etiology  --EKGs non ischemic  --Trops downtrending  --Continue tele monitoring  --Lidocaine patch/ PRN Pain control  -NOW RESOLVED
Pt w/ intermittent reproducible chest pain, unclear etiology. Suspect MSK  --Lower suspicion for cardiac etiology  --EKGs non ischemic  --Trops downtrending  --Continue tele monitoring  --Lidocaine patch/ PRN Pain control  -NOW RESOLVED
Presumed due to shock liver  improving  continue to monitor

## 2023-12-07 LAB
CULTURE RESULTS: ABNORMAL
CULTURE RESULTS: ABNORMAL
SPECIMEN SOURCE: SIGNIFICANT CHANGE UP
SPECIMEN SOURCE: SIGNIFICANT CHANGE UP

## 2023-12-09 LAB
CULTURE RESULTS: SIGNIFICANT CHANGE UP
CULTURE RESULTS: SIGNIFICANT CHANGE UP
SPECIMEN SOURCE: SIGNIFICANT CHANGE UP
SPECIMEN SOURCE: SIGNIFICANT CHANGE UP

## 2024-03-08 NOTE — PROGRESS NOTE ADULT - ASSESSMENT
73M with PMH of ESRD s/p kidney transplant on HD, CAD s/p multiple stents (most recently, RCA stent on 10/2023), T2DM, and HTN, with recent hospitalization for bacterial PNA, who originally presented to the ED on 11/7 after being found unresponsive with agonal breathing by his daughter. CPR  administered by the daughter. Admitted for AMS, intubated for airway protection and AHRF likely 2/2 multifocal pneumonia, s/p MICU course, transferred to medicine for further mgt. Noted to have oozing from fistula, now resolved. Pending fistulogram per Vascular on 11/29.  Alert and oriented to person, place and time

## 2024-04-03 ENCOUNTER — OFFICE (OUTPATIENT)
Dept: URBAN - METROPOLITAN AREA CLINIC 77 | Facility: CLINIC | Age: 74
Setting detail: OPHTHALMOLOGY
End: 2024-04-03
Payer: MEDICARE

## 2024-04-03 DIAGNOSIS — H26.491: ICD-10-CM

## 2024-04-03 DIAGNOSIS — H01.001: ICD-10-CM

## 2024-04-03 DIAGNOSIS — E11.3521: ICD-10-CM

## 2024-04-03 DIAGNOSIS — H16.223: ICD-10-CM

## 2024-04-03 DIAGNOSIS — E11.3592: ICD-10-CM

## 2024-04-03 DIAGNOSIS — H01.004: ICD-10-CM

## 2024-04-03 PROCEDURE — 92285 EXTERNAL OCULAR PHOTOGRAPHY: CPT | Performed by: OPHTHALMOLOGY

## 2024-04-03 PROCEDURE — 66821 AFTER CATARACT LASER SURGERY: CPT | Mod: RT | Performed by: OPHTHALMOLOGY

## 2024-04-03 PROCEDURE — 99214 OFFICE O/P EST MOD 30 MIN: CPT | Mod: 57 | Performed by: OPHTHALMOLOGY

## 2024-04-03 PROCEDURE — 92250 FUNDUS PHOTOGRAPHY W/I&R: CPT | Performed by: OPHTHALMOLOGY

## 2024-04-03 ASSESSMENT — LID EXAM ASSESSMENTS
OS_BLEPHARITIS: LUL 1+
OD_BLEPHARITIS: RUL 1+

## 2024-04-04 ENCOUNTER — OFFICE (OUTPATIENT)
Dept: URBAN - METROPOLITAN AREA CLINIC 77 | Facility: CLINIC | Age: 74
Setting detail: OPHTHALMOLOGY
End: 2024-04-04
Payer: MEDICARE

## 2024-04-04 DIAGNOSIS — H26.492: ICD-10-CM

## 2024-04-04 PROCEDURE — 66821 AFTER CATARACT LASER SURGERY: CPT | Mod: 79,LT | Performed by: OPHTHALMOLOGY

## 2024-04-04 ASSESSMENT — LID EXAM ASSESSMENTS
OS_BLEPHARITIS: LUL 1+
OD_BLEPHARITIS: RUL 1+

## 2024-04-09 ENCOUNTER — OUTPATIENT HOSPITAL (OUTPATIENT)
Dept: URBAN - METROPOLITAN AREA CLINIC 78 | Facility: CLINIC | Age: 74
Setting detail: OPHTHALMOLOGY
End: 2024-04-09
Payer: MEDICARE

## 2024-04-09 DIAGNOSIS — E11.3592: ICD-10-CM

## 2024-04-09 DIAGNOSIS — H59.022: ICD-10-CM

## 2024-04-09 DIAGNOSIS — H43.02: ICD-10-CM

## 2024-04-09 PROCEDURE — 67036 REMOVAL OF INNER EYE FLUID: CPT | Mod: 79,LT | Performed by: OPHTHALMOLOGY

## 2024-04-09 PROCEDURE — 66850 REMOVAL OF LENS MATERIAL: CPT | Mod: 79,LT | Performed by: OPHTHALMOLOGY

## 2024-04-10 ENCOUNTER — RX ONLY (RX ONLY)
Age: 74
End: 2024-04-10

## 2024-04-10 ENCOUNTER — OFFICE (OUTPATIENT)
Dept: URBAN - METROPOLITAN AREA CLINIC 77 | Facility: CLINIC | Age: 74
Setting detail: OPHTHALMOLOGY
End: 2024-04-10
Payer: MEDICARE

## 2024-04-10 DIAGNOSIS — H54.8: ICD-10-CM

## 2024-04-10 DIAGNOSIS — E11.3521: ICD-10-CM

## 2024-04-10 DIAGNOSIS — H26.491: ICD-10-CM

## 2024-04-10 DIAGNOSIS — H01.004: ICD-10-CM

## 2024-04-10 DIAGNOSIS — E11.3592: ICD-10-CM

## 2024-04-10 DIAGNOSIS — H54.40: ICD-10-CM

## 2024-04-10 DIAGNOSIS — H01.001: ICD-10-CM

## 2024-04-10 DIAGNOSIS — H16.223: ICD-10-CM

## 2024-04-10 DIAGNOSIS — H43.02: ICD-10-CM

## 2024-04-10 DIAGNOSIS — H59.022: ICD-10-CM

## 2024-04-10 PROCEDURE — 99024 POSTOP FOLLOW-UP VISIT: CPT | Performed by: OPHTHALMOLOGY

## 2024-04-10 ASSESSMENT — LID EXAM ASSESSMENTS
OD_BLEPHARITIS: RUL 1+
OS_BLEPHARITIS: LUL 1+

## 2024-04-17 ENCOUNTER — OFFICE (OUTPATIENT)
Dept: URBAN - METROPOLITAN AREA CLINIC 77 | Facility: CLINIC | Age: 74
Setting detail: OPHTHALMOLOGY
End: 2024-04-17
Payer: MEDICARE

## 2024-04-17 ENCOUNTER — RX ONLY (RX ONLY)
Age: 74
End: 2024-04-17

## 2024-04-17 DIAGNOSIS — H01.004: ICD-10-CM

## 2024-04-17 DIAGNOSIS — E11.3592: ICD-10-CM

## 2024-04-17 DIAGNOSIS — H54.8: ICD-10-CM

## 2024-04-17 DIAGNOSIS — H43.02: ICD-10-CM

## 2024-04-17 DIAGNOSIS — H26.491: ICD-10-CM

## 2024-04-17 DIAGNOSIS — E11.3521: ICD-10-CM

## 2024-04-17 DIAGNOSIS — H01.001: ICD-10-CM

## 2024-04-17 DIAGNOSIS — H54.40: ICD-10-CM

## 2024-04-17 DIAGNOSIS — H11.32: ICD-10-CM

## 2024-04-17 DIAGNOSIS — H59.022: ICD-10-CM

## 2024-04-17 DIAGNOSIS — H16.223: ICD-10-CM

## 2024-04-17 PROCEDURE — 99024 POSTOP FOLLOW-UP VISIT: CPT | Performed by: OPHTHALMOLOGY

## 2024-04-17 ASSESSMENT — LID EXAM ASSESSMENTS
OS_BLEPHARITIS: LUL 1+
OD_BLEPHARITIS: RUL 1+

## 2024-05-01 ENCOUNTER — OFFICE (OUTPATIENT)
Dept: URBAN - METROPOLITAN AREA CLINIC 77 | Facility: CLINIC | Age: 74
Setting detail: OPHTHALMOLOGY
End: 2024-05-01
Payer: MEDICARE

## 2024-05-01 DIAGNOSIS — H53.2: ICD-10-CM

## 2024-05-01 DIAGNOSIS — H47.213: ICD-10-CM

## 2024-05-01 DIAGNOSIS — H52.212: ICD-10-CM

## 2024-05-01 DIAGNOSIS — H16.223: ICD-10-CM

## 2024-05-01 PROBLEM — H11.32 SUBCONJUNCTIVAL HEMORRHAGE; LEFT EYE: Status: RESOLVED | Noted: 2024-04-17 | Resolved: 2024-05-01

## 2024-05-01 PROBLEM — H26.491 POSTERIOR CAPSULAR OPACIFICATION; RIGHT EYE,: Status: ACTIVE | Noted: 2024-04-04

## 2024-05-01 PROBLEM — H43.02 VITREOUS PROLAPSE; LEFT EYE: Status: RESOLVED | Noted: 2024-04-10 | Resolved: 2024-05-01

## 2024-05-01 PROBLEM — H54.8 LEGAL BLINDNESS: Status: ACTIVE | Noted: 2024-04-03

## 2024-05-01 PROBLEM — E11.3521 DM TYPE 2; RIGHT PROLIFERATIVE TRD MACULA, LEFT PROLIFERATIVE WITHOUT ME: Status: ACTIVE | Noted: 2024-04-03

## 2024-05-01 PROBLEM — H59.022 LENS FRAGMENTS IN EYE FOLLOWING CATARACT SURGERY; LEFT EYE: Status: RESOLVED | Noted: 2024-04-10 | Resolved: 2024-05-01

## 2024-05-01 PROBLEM — E11.3592 DM TYPE 2; RIGHT PROLIFERATIVE TRD MACULA, LEFT PROLIFERATIVE WITHOUT ME: Status: ACTIVE | Noted: 2024-04-03

## 2024-05-01 PROCEDURE — 92133 CPTRZD OPH DX IMG PST SGM ON: CPT | Performed by: OPHTHALMOLOGY

## 2024-05-01 PROCEDURE — 99213 OFFICE O/P EST LOW 20 MIN: CPT | Mod: 24 | Performed by: OPHTHALMOLOGY

## 2024-05-01 ASSESSMENT — LID EXAM ASSESSMENTS
OD_BLEPHARITIS: RUL 1+
OS_BLEPHARITIS: LUL 1+

## 2024-05-01 ASSESSMENT — CONFRONTATIONAL VISUAL FIELD TEST (CVF): OS_FINDINGS: FULL

## 2024-06-17 ENCOUNTER — OFFICE (OUTPATIENT)
Dept: URBAN - METROPOLITAN AREA CLINIC 77 | Facility: CLINIC | Age: 74
Setting detail: OPHTHALMOLOGY
End: 2024-06-17
Payer: MEDICARE

## 2024-06-17 ENCOUNTER — RX ONLY (RX ONLY)
Age: 74
End: 2024-06-17

## 2024-06-17 DIAGNOSIS — H47.213: ICD-10-CM

## 2024-06-17 DIAGNOSIS — E11.3521: ICD-10-CM

## 2024-06-17 DIAGNOSIS — H51.11: ICD-10-CM

## 2024-06-17 DIAGNOSIS — H52.212: ICD-10-CM

## 2024-06-17 DIAGNOSIS — H16.223: ICD-10-CM

## 2024-06-17 DIAGNOSIS — H01.001: ICD-10-CM

## 2024-06-17 DIAGNOSIS — H53.2: ICD-10-CM

## 2024-06-17 DIAGNOSIS — H54.8: ICD-10-CM

## 2024-06-17 DIAGNOSIS — H54.40: ICD-10-CM

## 2024-06-17 DIAGNOSIS — H50.111: ICD-10-CM

## 2024-06-17 DIAGNOSIS — E11.3592: ICD-10-CM

## 2024-06-17 PROCEDURE — 92134 CPTRZ OPH DX IMG PST SGM RTA: CPT | Performed by: OPHTHALMOLOGY

## 2024-06-17 PROCEDURE — 92201 OPSCPY EXTND RTA DRAW UNI/BI: CPT | Performed by: OPHTHALMOLOGY

## 2024-06-17 PROCEDURE — 99213 OFFICE O/P EST LOW 20 MIN: CPT | Mod: 24 | Performed by: OPHTHALMOLOGY

## 2024-06-17 PROCEDURE — 92060 SENSORIMOTOR EXAMINATION: CPT | Performed by: OPHTHALMOLOGY

## 2024-06-17 ASSESSMENT — CONFRONTATIONAL VISUAL FIELD TEST (CVF): OS_FINDINGS: FULL

## 2024-06-17 ASSESSMENT — LID EXAM ASSESSMENTS
OS_BLEPHARITIS: LUL 1+
OD_BLEPHARITIS: RUL 1+

## 2024-08-12 ASSESSMENT — REFRACTION_MANIFEST
OS_VA1: 20/80
OS_ADD: +3.50
OS_ADD: +2.50
OD_VA1: 20/CF
OS_SPHERE: -1.50
OD_ADD: +2.50
OD_SPHERE: PLANO
OS_AXIS: 51
OS_SPHERE: +0.50
OD_VA1: CF 3FT
OS_CYLINDER: -4.00
OS_ADD: +2.50
OD_AXIS: 50
OD_SPHERE: -1.50
OS_SPHERE: +0.25
OS_CYLINDER: -2.00
OD_ADD: +2.50
OD_ADD: +3.50
OD_SPHERE: +0.50
OS_SPHERE: -1.00
OS_AXIS: 50
OS_VA1: 20/150
OS_VA1: 20/150
OS_CYLINDER: -1.00
OU_VA: 20/40
OS_AXIS: 90
OS_AXIS: 155
OD_SPHERE: +0.75
OD_VA1: CF 3FT
OS_CYLINDER: -2.00
OD_VA1: HM
OD_CYLINDER: -4.00
OS_VA1: 20/40

## 2024-08-12 ASSESSMENT — KERATOMETRY
OD_AXISANGLE_DEGREES: 175
OS_K1POWER_DIOPTERS: 40.25
OS_AXISANGLE_DEGREES: 017
OD_K1POWER_DIOPTERS: 37.50
OS_K2POWER_DIOPTERS: 41.25
OD_K2POWER_DIOPTERS: 41.50

## 2024-10-28 ENCOUNTER — OFFICE (OUTPATIENT)
Dept: URBAN - METROPOLITAN AREA CLINIC 77 | Facility: CLINIC | Age: 74
Setting detail: OPHTHALMOLOGY
End: 2024-10-28
Payer: MEDICARE

## 2024-10-28 DIAGNOSIS — H26.491: ICD-10-CM

## 2024-10-28 DIAGNOSIS — H01.001: ICD-10-CM

## 2024-10-28 DIAGNOSIS — H47.213: ICD-10-CM

## 2024-10-28 DIAGNOSIS — E11.3592: ICD-10-CM

## 2024-10-28 DIAGNOSIS — E11.3521: ICD-10-CM

## 2024-10-28 DIAGNOSIS — H16.223: ICD-10-CM

## 2024-10-28 PROCEDURE — 92134 CPTRZ OPH DX IMG PST SGM RTA: CPT | Performed by: OPHTHALMOLOGY

## 2024-10-28 PROCEDURE — 99214 OFFICE O/P EST MOD 30 MIN: CPT | Performed by: OPHTHALMOLOGY

## 2024-10-28 PROCEDURE — 92201 OPSCPY EXTND RTA DRAW UNI/BI: CPT | Performed by: OPHTHALMOLOGY

## 2024-10-28 ASSESSMENT — REFRACTION_AUTOREFRACTION
OD_AXIS: 075
OS_SPHERE: +0.50
OD_SPHERE: +1.75
OS_CYLINDER: -1.75
OS_AXIS: 087
OD_CYLINDER: -1.75

## 2024-10-28 ASSESSMENT — REFRACTION_MANIFEST
OS_ADD: +3.50
OD_AXIS: 50
OD_VA1: CF 3FT
OS_CYLINDER: -4.00
OS_ADD: +2.50
OD_ADD: +3.50
OD_VA1: 20/CF
OS_SPHERE: +0.25
OS_CYLINDER: -1.00
OD_CYLINDER: -4.00
OS_AXIS: 51
OS_VA1: 20/80
OS_AXIS: 50
OS_ADD: +2.50
OD_ADD: +2.50
OS_AXIS: 155
OD_ADD: +2.50
OD_SPHERE: +0.50
OS_SPHERE: -1.00
OD_SPHERE: +0.75
OS_VA1: 20/40
OD_VA1: HM
OS_CYLINDER: -2.00
OS_CYLINDER: -2.00
OS_VA1: 20/150
OU_VA: 20/40
OS_AXIS: 90
OS_SPHERE: +0.50
OS_SPHERE: -1.50
OS_VA1: 20/150
OD_SPHERE: PLANO
OD_SPHERE: -1.50
OD_VA1: CF 3FT

## 2024-10-28 ASSESSMENT — KERATOMETRY
OD_AXISANGLE_DEGREES: 175
OD_K1POWER_DIOPTERS: 37.50
OD_K2POWER_DIOPTERS: 41.50
OS_K1POWER_DIOPTERS: 40.25
OS_K2POWER_DIOPTERS: 41.25
OS_AXISANGLE_DEGREES: 017

## 2024-10-28 ASSESSMENT — VISUAL ACUITY
OD_BCVA: 20/200
OS_BCVA: HM

## 2024-10-28 ASSESSMENT — LID EXAM ASSESSMENTS
OD_BLEPHARITIS: RUL 1+
OS_BLEPHARITIS: LUL 1+

## 2024-10-28 ASSESSMENT — SUPERFICIAL PUNCTATE KERATITIS (SPK)
OD_SPK: 3+
OS_SPK: 3+

## 2025-07-17 ENCOUNTER — RX ONLY (RX ONLY)
Age: 75
End: 2025-07-17

## 2025-07-17 ENCOUNTER — OFFICE (OUTPATIENT)
Dept: URBAN - METROPOLITAN AREA CLINIC 77 | Facility: CLINIC | Age: 75
Setting detail: OPHTHALMOLOGY
End: 2025-07-17
Payer: MEDICARE

## 2025-07-17 DIAGNOSIS — H16.223: ICD-10-CM

## 2025-07-17 DIAGNOSIS — H52.212: ICD-10-CM

## 2025-07-17 DIAGNOSIS — H01.004: ICD-10-CM

## 2025-07-17 DIAGNOSIS — H54.40: ICD-10-CM

## 2025-07-17 DIAGNOSIS — H26.491: ICD-10-CM

## 2025-07-17 DIAGNOSIS — H01.001: ICD-10-CM

## 2025-07-17 DIAGNOSIS — H54.8: ICD-10-CM

## 2025-07-17 DIAGNOSIS — H50.111: ICD-10-CM

## 2025-07-17 DIAGNOSIS — E11.3521: ICD-10-CM

## 2025-07-17 DIAGNOSIS — E11.3592: ICD-10-CM

## 2025-07-17 DIAGNOSIS — H53.2: ICD-10-CM

## 2025-07-17 DIAGNOSIS — H47.213: ICD-10-CM

## 2025-07-17 PROCEDURE — 99214 OFFICE O/P EST MOD 30 MIN: CPT | Performed by: OPHTHALMOLOGY

## 2025-07-17 PROCEDURE — 92250 FUNDUS PHOTOGRAPHY W/I&R: CPT | Performed by: OPHTHALMOLOGY

## 2025-07-17 ASSESSMENT — SUPERFICIAL PUNCTATE KERATITIS (SPK)
OS_SPK: 3+
OD_SPK: 3+

## 2025-07-17 ASSESSMENT — KERATOMETRY
OD_K2POWER_DIOPTERS: 41.50
OS_AXISANGLE_DEGREES: 017
OS_K2POWER_DIOPTERS: 41.25
OD_K1POWER_DIOPTERS: 37.50
OD_AXISANGLE_DEGREES: 175
OS_K1POWER_DIOPTERS: 40.25

## 2025-07-17 ASSESSMENT — REFRACTION_MANIFEST
OD_VA1: CF 3FT
OS_CYLINDER: -1.00
OS_ADD: +2.50
OD_ADD: +3.50
OS_CYLINDER: -2.00
OS_SPHERE: -1.50
OS_CYLINDER: -4.00
OS_AXIS: 51
OS_AXIS: 90
OD_ADD: +2.50
OU_VA: 20/40
OS_SPHERE: +0.50
OD_CYLINDER: -4.00
OD_SPHERE: -1.50
OD_SPHERE: +0.50
OS_ADD: +2.50
OS_ADD: +3.50
OS_VA1: 20/40
OS_AXIS: 50
OD_AXIS: 50
OS_VA1: 20/80
OS_AXIS: 155
OD_SPHERE: +0.75
OD_VA1: CF 3FT
OD_SPHERE: PLANO
OS_VA1: 20/150
OS_CYLINDER: -2.00
OS_SPHERE: +0.25
OS_VA1: 20/150
OS_SPHERE: -1.00
OD_VA1: HM
OD_VA1: 20/CF
OD_ADD: +2.50

## 2025-07-17 ASSESSMENT — REFRACTION_AUTOREFRACTION
OS_SPHERE: +0.50
OD_CYLINDER: -1.75
OS_AXIS: 087
OD_AXIS: 075
OD_SPHERE: +1.75
OS_CYLINDER: -1.75

## 2025-07-17 ASSESSMENT — VISUAL ACUITY
OD_BCVA: 20/40
OS_BCVA: HM

## 2025-07-17 ASSESSMENT — LID EXAM ASSESSMENTS
OS_BLEPHARITIS: LUL 1+
OD_BLEPHARITIS: RUL 1+

## (undated) DEVICE — BAG DECANTER DISP

## (undated) DEVICE — DRSG STERISTRIPS 0.5 X 4"

## (undated) DEVICE — SUT VICRYL 3-0 27" SH UNDYED

## (undated) DEVICE — SUT PROLENE 6-0 24" BV-1

## (undated) DEVICE — WARMING BLANKET LOWER ADULT

## (undated) DEVICE — PREP CHLORAPREP HI-LITE ORANGE 26ML

## (undated) DEVICE — SUT SILK 4-0 17-18"

## (undated) DEVICE — VESSEL LOOP MINI-BLUE 0.075" X 16"

## (undated) DEVICE — POSITIONER STRAP ARMBOARD VELCRO TS-30

## (undated) DEVICE — ELCTR GROUNDING PAD ADULT COVIDIEN

## (undated) DEVICE — GEL AQUSNC PACKET 20GR

## (undated) DEVICE — PACK AV FISTULA

## (undated) DEVICE — DRSG BENZOIN 0.6CC

## (undated) DEVICE — SOL BAG NS 0.9% 1000ML

## (undated) DEVICE — DRSG TEGADERM 2.5X3"

## (undated) DEVICE — DRSG CURITY GAUZE SPONGE 4 X 4" 12-PLY

## (undated) DEVICE — DRAPE HAND 77" X 146"

## (undated) DEVICE — DRAPE TOWEL BLUE 17" X 24"

## (undated) DEVICE — NDL HYPO SAFE 25G X 5/8" (ORANGE)

## (undated) DEVICE — DRAPE 3/4 SHEET 52X76"

## (undated) DEVICE — CLAMP BULLDOG MIDI 45 DEGREE (GREEN) DISP

## (undated) DEVICE — SYR LUER LOK 10CC

## (undated) DEVICE — SUT MONOCRYL 4-0 27" PS-2 UNDYED

## (undated) DEVICE — GLV 6.5 PROTEXIS (WHITE)

## (undated) DEVICE — SUT SILK 3-0 18" TIES

## (undated) DEVICE — SUT SILK 2-0 18" TIES

## (undated) DEVICE — DRSG KERLIX ROLL 4.5"

## (undated) DEVICE — SOL IRR BAG NS 0.9% 1000ML

## (undated) DEVICE — SUT PROLENE 7-0 24" BV-1

## (undated) DEVICE — VENODYNE/SCD SLEEVE CALF MEDIUM